# Patient Record
Sex: MALE | Race: WHITE | NOT HISPANIC OR LATINO | Employment: UNEMPLOYED | URBAN - METROPOLITAN AREA
[De-identification: names, ages, dates, MRNs, and addresses within clinical notes are randomized per-mention and may not be internally consistent; named-entity substitution may affect disease eponyms.]

---

## 2017-02-15 ENCOUNTER — HOSPITAL ENCOUNTER (EMERGENCY)
Facility: HOSPITAL | Age: 47
Discharge: HOME | End: 2017-02-15
Attending: EMERGENCY MEDICINE
Payer: COMMERCIAL

## 2017-02-15 VITALS
TEMPERATURE: 97.5 F | SYSTOLIC BLOOD PRESSURE: 138 MMHG | RESPIRATION RATE: 18 BRPM | OXYGEN SATURATION: 98 % | WEIGHT: 260.7 LBS | HEART RATE: 101 BPM | DIASTOLIC BLOOD PRESSURE: 88 MMHG

## 2017-02-15 DIAGNOSIS — G47.00 INSOMNIA: ICD-10-CM

## 2017-02-15 DIAGNOSIS — S76.211A GROIN STRAIN, RIGHT, INITIAL ENCOUNTER: Primary | ICD-10-CM

## 2017-02-15 PROCEDURE — 99283 EMERGENCY DEPT VISIT LOW MDM: CPT

## 2017-02-15 RX ORDER — NAPROXEN SODIUM 550 MG/1
550 TABLET ORAL 2 TIMES DAILY WITH MEALS
Qty: 20 TABLET | Refills: 0 | Status: SHIPPED | OUTPATIENT
Start: 2017-02-15 | End: 2018-03-02

## 2017-02-15 RX ORDER — IBUPROFEN 400 MG/1
800 TABLET ORAL ONCE
Status: COMPLETED | OUTPATIENT
Start: 2017-02-15 | End: 2017-02-15

## 2017-02-15 RX ADMIN — IBUPROFEN 800 MG: 400 TABLET ORAL at 01:30

## 2017-02-27 ENCOUNTER — APPOINTMENT (EMERGENCY)
Dept: CT IMAGING | Facility: HOSPITAL | Age: 47
End: 2017-02-27
Payer: COMMERCIAL

## 2017-02-27 ENCOUNTER — HOSPITAL ENCOUNTER (EMERGENCY)
Facility: HOSPITAL | Age: 47
Discharge: HOME | End: 2017-02-27
Attending: EMERGENCY MEDICINE
Payer: COMMERCIAL

## 2017-02-27 VITALS
BODY MASS INDEX: 33.37 KG/M2 | HEIGHT: 74 IN | RESPIRATION RATE: 16 BRPM | DIASTOLIC BLOOD PRESSURE: 67 MMHG | TEMPERATURE: 98.1 F | WEIGHT: 260 LBS | SYSTOLIC BLOOD PRESSURE: 135 MMHG | HEART RATE: 102 BPM | OXYGEN SATURATION: 98 %

## 2017-02-27 DIAGNOSIS — R10.9 ABDOMINAL PAIN: Primary | ICD-10-CM

## 2017-02-27 DIAGNOSIS — T14.8XXA MUSCLE STRAIN: ICD-10-CM

## 2017-02-27 LAB
ALBUMIN SERPL BCP-MCNC: 3.2 G/DL (ref 3.5–5)
ALP SERPL-CCNC: 111 U/L (ref 46–116)
ALT SERPL W P-5'-P-CCNC: 45 U/L (ref 12–78)
ANION GAP SERPL CALCULATED.3IONS-SCNC: 12 MMOL/L (ref 4–13)
APTT PPP: 27 SECONDS (ref 24–36)
AST SERPL W P-5'-P-CCNC: 42 U/L (ref 5–45)
BASOPHILS # BLD AUTO: 0.06 THOUSANDS/ΜL (ref 0–0.1)
BASOPHILS NFR BLD AUTO: 1 % (ref 0–1)
BILIRUB SERPL-MCNC: 0.4 MG/DL (ref 0.2–1)
BUN SERPL-MCNC: 9 MG/DL (ref 5–25)
CALCIUM SERPL-MCNC: 8.2 MG/DL (ref 8.3–10.1)
CHLORIDE SERPL-SCNC: 101 MMOL/L (ref 100–108)
CO2 SERPL-SCNC: 22 MMOL/L (ref 21–32)
CREAT SERPL-MCNC: 0.91 MG/DL (ref 0.6–1.3)
EOSINOPHIL # BLD AUTO: 0.41 THOUSAND/ΜL (ref 0–0.61)
EOSINOPHIL NFR BLD AUTO: 4 % (ref 0–6)
ERYTHROCYTE [DISTWIDTH] IN BLOOD BY AUTOMATED COUNT: 14.7 % (ref 11.6–15.1)
GFR SERPL CREATININE-BSD FRML MDRD: >60 ML/MIN/1.73SQ M
GLUCOSE SERPL-MCNC: 249 MG/DL (ref 65–140)
HCT VFR BLD AUTO: 45.1 % (ref 36.5–49.3)
HGB BLD-MCNC: 15.5 G/DL (ref 12–17)
INR PPP: 0.95 (ref 0.86–1.16)
LIPASE SERPL-CCNC: 218 U/L (ref 73–393)
LYMPHOCYTES # BLD AUTO: 1.51 THOUSANDS/ΜL (ref 0.6–4.47)
LYMPHOCYTES NFR BLD AUTO: 16 % (ref 14–44)
MCH RBC QN AUTO: 30.6 PG (ref 26.8–34.3)
MCHC RBC AUTO-ENTMCNC: 34.4 G/DL (ref 31.4–37.4)
MCV RBC AUTO: 89 FL (ref 82–98)
MONOCYTES # BLD AUTO: 0.56 THOUSAND/ΜL (ref 0.17–1.22)
MONOCYTES NFR BLD AUTO: 6 % (ref 4–12)
NEUTROPHILS # BLD AUTO: 6.75 THOUSANDS/ΜL (ref 1.85–7.62)
NEUTS SEG NFR BLD AUTO: 73 % (ref 43–75)
PLATELET # BLD AUTO: 177 THOUSANDS/UL (ref 149–390)
PMV BLD AUTO: 10.5 FL (ref 8.9–12.7)
POTASSIUM SERPL-SCNC: 4 MMOL/L (ref 3.5–5.3)
PROT SERPL-MCNC: 7.3 G/DL (ref 6.4–8.2)
PROTHROMBIN TIME: 12.5 SECONDS (ref 12–14.3)
RBC # BLD AUTO: 5.07 MILLION/UL (ref 3.88–5.62)
SODIUM SERPL-SCNC: 135 MMOL/L (ref 136–145)
WBC # BLD AUTO: 9.29 THOUSAND/UL (ref 4.31–10.16)

## 2017-02-27 PROCEDURE — 83690 ASSAY OF LIPASE: CPT | Performed by: EMERGENCY MEDICINE

## 2017-02-27 PROCEDURE — 80053 COMPREHEN METABOLIC PANEL: CPT | Performed by: EMERGENCY MEDICINE

## 2017-02-27 PROCEDURE — 36415 COLL VENOUS BLD VENIPUNCTURE: CPT | Performed by: EMERGENCY MEDICINE

## 2017-02-27 PROCEDURE — 85610 PROTHROMBIN TIME: CPT | Performed by: EMERGENCY MEDICINE

## 2017-02-27 PROCEDURE — 85730 THROMBOPLASTIN TIME PARTIAL: CPT | Performed by: EMERGENCY MEDICINE

## 2017-02-27 PROCEDURE — 96374 THER/PROPH/DIAG INJ IV PUSH: CPT

## 2017-02-27 PROCEDURE — 85025 COMPLETE CBC W/AUTO DIFF WBC: CPT | Performed by: EMERGENCY MEDICINE

## 2017-02-27 PROCEDURE — 74177 CT ABD & PELVIS W/CONTRAST: CPT

## 2017-02-27 PROCEDURE — 99284 EMERGENCY DEPT VISIT MOD MDM: CPT

## 2017-02-27 RX ORDER — KETOROLAC TROMETHAMINE 30 MG/ML
30 INJECTION, SOLUTION INTRAMUSCULAR; INTRAVENOUS ONCE
Status: COMPLETED | OUTPATIENT
Start: 2017-02-27 | End: 2017-02-27

## 2017-02-27 RX ORDER — OXYCODONE HYDROCHLORIDE AND ACETAMINOPHEN 5; 325 MG/1; MG/1
1 TABLET ORAL EVERY 4 HOURS PRN
COMMUNITY
End: 2018-03-02

## 2017-02-27 RX ORDER — SERTRALINE HYDROCHLORIDE 100 MG/1
150 TABLET, FILM COATED ORAL DAILY
COMMUNITY
End: 2018-03-02

## 2017-02-27 RX ORDER — ALPRAZOLAM 1 MG/1
1 TABLET ORAL
COMMUNITY
End: 2018-03-02

## 2017-02-27 RX ORDER — RANITIDINE 300 MG/1
300 TABLET ORAL
COMMUNITY
End: 2018-03-02

## 2017-02-27 RX ORDER — IBUPROFEN 400 MG/1
800 TABLET ORAL ONCE
Status: COMPLETED | OUTPATIENT
Start: 2017-02-27 | End: 2017-02-27

## 2017-02-27 RX ADMIN — IBUPROFEN 800 MG: 400 TABLET ORAL at 08:14

## 2017-02-27 RX ADMIN — KETOROLAC TROMETHAMINE 30 MG: 30 INJECTION, SOLUTION INTRAMUSCULAR at 06:25

## 2017-02-27 RX ADMIN — IOHEXOL 100 ML: 350 INJECTION, SOLUTION INTRAVENOUS at 07:37

## 2017-05-04 ENCOUNTER — HOSPITAL ENCOUNTER (EMERGENCY)
Facility: HOSPITAL | Age: 47
Discharge: HOME | End: 2017-05-04
Payer: COMMERCIAL

## 2017-05-04 VITALS
DIASTOLIC BLOOD PRESSURE: 87 MMHG | BODY MASS INDEX: 32.74 KG/M2 | TEMPERATURE: 97.7 F | SYSTOLIC BLOOD PRESSURE: 144 MMHG | WEIGHT: 255 LBS | OXYGEN SATURATION: 95 % | RESPIRATION RATE: 16 BRPM | HEART RATE: 95 BPM

## 2017-05-04 DIAGNOSIS — L30.9 DERMATITIS: Primary | ICD-10-CM

## 2017-05-04 PROCEDURE — 99282 EMERGENCY DEPT VISIT SF MDM: CPT

## 2017-05-04 RX ORDER — METHYLPREDNISOLONE 4 MG/1
TABLET ORAL
Qty: 21 TABLET | Refills: 0 | Status: SHIPPED | OUTPATIENT
Start: 2017-05-04 | End: 2018-03-02

## 2017-05-04 RX ORDER — DIPHENHYDRAMINE HCL 25 MG
25 TABLET ORAL
Qty: 30 TABLET | Refills: 0 | Status: SHIPPED | OUTPATIENT
Start: 2017-05-04 | End: 2018-03-02

## 2018-03-02 ENCOUNTER — HOSPITAL ENCOUNTER (EMERGENCY)
Facility: HOSPITAL | Age: 48
Discharge: HOME | End: 2018-03-02
Attending: EMERGENCY MEDICINE | Admitting: EMERGENCY MEDICINE
Payer: COMMERCIAL

## 2018-03-02 VITALS
SYSTOLIC BLOOD PRESSURE: 143 MMHG | TEMPERATURE: 98.2 F | WEIGHT: 260 LBS | OXYGEN SATURATION: 98 % | HEIGHT: 74 IN | DIASTOLIC BLOOD PRESSURE: 81 MMHG | BODY MASS INDEX: 33.37 KG/M2 | HEART RATE: 109 BPM | RESPIRATION RATE: 18 BRPM

## 2018-03-02 DIAGNOSIS — F10.10 ALCOHOL ABUSE: ICD-10-CM

## 2018-03-02 DIAGNOSIS — F10.929 ACUTE ALCOHOL INTOXICATION (HCC): ICD-10-CM

## 2018-03-02 DIAGNOSIS — F41.9 ANXIETY: ICD-10-CM

## 2018-03-02 DIAGNOSIS — R45.89 DEPRESSED MOOD: Primary | ICD-10-CM

## 2018-03-02 LAB
ALBUMIN SERPL BCP-MCNC: 3.6 G/DL (ref 3.5–5)
ALP SERPL-CCNC: 116 U/L (ref 46–116)
ALT SERPL W P-5'-P-CCNC: 45 U/L (ref 12–78)
AMPHETAMINES SERPL QL SCN: NEGATIVE
ANION GAP SERPL CALCULATED.3IONS-SCNC: 15 MMOL/L (ref 4–13)
AST SERPL W P-5'-P-CCNC: 23 U/L (ref 5–45)
ATRIAL RATE: 97 BPM
BACTERIA UR QL AUTO: ABNORMAL /HPF
BARBITURATES UR QL: NEGATIVE
BASOPHILS # BLD AUTO: 0.1 THOUSANDS/ΜL (ref 0–0.1)
BASOPHILS NFR BLD AUTO: 1 % (ref 0–1)
BENZODIAZ UR QL: NEGATIVE
BILIRUB SERPL-MCNC: 0.3 MG/DL (ref 0.2–1)
BILIRUB UR QL STRIP: NEGATIVE
BUN SERPL-MCNC: 7 MG/DL (ref 5–25)
CALCIUM SERPL-MCNC: 8.8 MG/DL (ref 8.3–10.1)
CHLORIDE SERPL-SCNC: 105 MMOL/L (ref 100–108)
CLARITY UR: CLEAR
CO2 SERPL-SCNC: 22 MMOL/L (ref 21–32)
COCAINE UR QL: NEGATIVE
COLOR UR: YELLOW
CREAT SERPL-MCNC: 1 MG/DL (ref 0.6–1.3)
EOSINOPHIL # BLD AUTO: 0.3 THOUSAND/ΜL (ref 0–0.61)
EOSINOPHIL NFR BLD AUTO: 3 % (ref 0–6)
ERYTHROCYTE [DISTWIDTH] IN BLOOD BY AUTOMATED COUNT: 14 % (ref 11.6–15.1)
ETHANOL SERPL-MCNC: 144 MG/DL (ref 0–3)
GFR SERPL CREATININE-BSD FRML MDRD: 89 ML/MIN/1.73SQ M
GLUCOSE SERPL-MCNC: 139 MG/DL (ref 65–140)
GLUCOSE UR STRIP-MCNC: NEGATIVE MG/DL
HCT VFR BLD AUTO: 51.7 % (ref 42–52)
HGB BLD-MCNC: 17.4 G/DL (ref 14–18)
HGB UR QL STRIP.AUTO: ABNORMAL
KETONES UR STRIP-MCNC: NEGATIVE MG/DL
LEUKOCYTE ESTERASE UR QL STRIP: NEGATIVE
LYMPHOCYTES # BLD AUTO: 3.2 THOUSANDS/ΜL (ref 0.6–4.47)
LYMPHOCYTES NFR BLD AUTO: 30 % (ref 14–44)
MCH RBC QN AUTO: 30.7 PG (ref 27–31)
MCHC RBC AUTO-ENTMCNC: 33.6 G/DL (ref 31.4–37.4)
MCV RBC AUTO: 91 FL (ref 82–98)
METHADONE UR QL: NEGATIVE
MONOCYTES # BLD AUTO: 0.7 THOUSAND/ΜL (ref 0.17–1.22)
MONOCYTES NFR BLD AUTO: 6 % (ref 4–12)
NEUTROPHILS # BLD AUTO: 6.5 THOUSANDS/ΜL (ref 1.85–7.62)
NEUTS SEG NFR BLD AUTO: 61 % (ref 43–75)
NITRITE UR QL STRIP: NEGATIVE
NON-SQ EPI CELLS URNS QL MICRO: ABNORMAL /HPF
NRBC BLD AUTO-RTO: 0 /100 WBCS
OPIATES UR QL SCN: NEGATIVE
P AXIS: 75 DEGREES
PCP UR QL: NEGATIVE
PH UR STRIP.AUTO: 6 [PH] (ref 5–9)
PLATELET # BLD AUTO: 195 THOUSANDS/UL (ref 130–400)
PMV BLD AUTO: 8.6 FL (ref 8.9–12.7)
POTASSIUM SERPL-SCNC: 3.4 MMOL/L (ref 3.5–5.3)
PR INTERVAL: 152 MS
PROT SERPL-MCNC: 7.5 G/DL (ref 6.4–8.2)
PROT UR STRIP-MCNC: NEGATIVE MG/DL
QRS AXIS: 79 DEGREES
QRSD INTERVAL: 94 MS
QT INTERVAL: 350 MS
QTC INTERVAL: 444 MS
RBC # BLD AUTO: 5.66 MILLION/UL (ref 4.7–6.1)
RBC #/AREA URNS AUTO: ABNORMAL /HPF
SODIUM SERPL-SCNC: 142 MMOL/L (ref 136–145)
SP GR UR STRIP.AUTO: 1.01 (ref 1–1.03)
T WAVE AXIS: 29 DEGREES
THC UR QL: NEGATIVE
UROBILINOGEN UR QL STRIP.AUTO: 0.2 E.U./DL
VENTRICULAR RATE: 97 BPM
WBC # BLD AUTO: 10.8 THOUSAND/UL (ref 4.8–10.8)
WBC #/AREA URNS AUTO: ABNORMAL /HPF

## 2018-03-02 PROCEDURE — 93005 ELECTROCARDIOGRAM TRACING: CPT | Performed by: EMERGENCY MEDICINE

## 2018-03-02 PROCEDURE — 36415 COLL VENOUS BLD VENIPUNCTURE: CPT | Performed by: EMERGENCY MEDICINE

## 2018-03-02 PROCEDURE — 81001 URINALYSIS AUTO W/SCOPE: CPT | Performed by: EMERGENCY MEDICINE

## 2018-03-02 PROCEDURE — 80320 DRUG SCREEN QUANTALCOHOLS: CPT | Performed by: EMERGENCY MEDICINE

## 2018-03-02 PROCEDURE — 85025 COMPLETE CBC W/AUTO DIFF WBC: CPT | Performed by: EMERGENCY MEDICINE

## 2018-03-02 PROCEDURE — 99284 EMERGENCY DEPT VISIT MOD MDM: CPT

## 2018-03-02 PROCEDURE — 80307 DRUG TEST PRSMV CHEM ANLYZR: CPT | Performed by: EMERGENCY MEDICINE

## 2018-03-02 PROCEDURE — 80053 COMPREHEN METABOLIC PANEL: CPT | Performed by: EMERGENCY MEDICINE

## 2018-03-02 PROCEDURE — 93010 ELECTROCARDIOGRAM REPORT: CPT | Performed by: INTERNAL MEDICINE

## 2018-03-02 RX ORDER — HYDROXYZINE HYDROCHLORIDE 25 MG/1
25 TABLET, FILM COATED ORAL
Qty: 12 TABLET | Refills: 0 | Status: SHIPPED | OUTPATIENT
Start: 2018-03-02

## 2018-03-02 RX ORDER — LORAZEPAM 1 MG/1
1 TABLET ORAL ONCE
Status: COMPLETED | OUTPATIENT
Start: 2018-03-02 | End: 2018-03-02

## 2018-03-02 RX ADMIN — LORAZEPAM 1 MG: 1 TABLET ORAL at 04:38

## 2018-03-02 NOTE — ED NOTES
Discharge instructions reviewed with patient  Belongings returned to patient  Patient states understanding  Discharged to home       Jl Gtz RN  03/02/18 7440

## 2018-03-02 NOTE — ED RE-EVALUATION NOTE
Patient signed out by Dr Marianna Reyes has 51 y/o M, presenting with depressed mood, acute alcohol intoxication, requesting to see psychiatrist   Gene Leslee any suicidal ideation, suicidal attempt homicidal ideations, lucent lesions or delusions on arrival and on re-evaluation this morning  No acute medical and/or traumatic complaints  At sign-out patient was pending crisis evaluation  Patient now status post crisis evaluation by Lidia Mensah, patient stable for discharge, recommend outpatient follow-up at [facility] which the knee is helping him obtaining an appointment for next week  Patient verbalizes understanding and agrees with plan of follow-up care  Jeremiah Sol MD  03/02/18 6184    Patient requesting anxiolytic to help with sleep disturbance, prescribed hydroxy q h s  #12 given  Patient given instructions on when to take medication to avoid alcohol while on this medication       Jeremiah Sol MD  03/02/18 7858
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
## 2018-03-02 NOTE — PROGRESS NOTES
3/2/18 @ 15: 52year-old SWVANESSA, who came to ED intoxicated with BAL @ 144, and stated that he wanted help with his depression  Patient presents in good mood, but says he's depressed  Patient reports many stressors, including his inability to hold a job, increasing finanacial problems, and feelings of very low self esteem  Patient denies SI/HI, or any history  Patient admits to drinking problem, but is not ready to quit  Please see copy of crisis assessment for further details  Patient given contact information for [facility]; PES called and left message that patient would be calling after 0900  Patient discharged home    Arline Epley, MS

## 2018-03-02 NOTE — DISCHARGE INSTRUCTIONS
Depression   WHAT YOU NEED TO KNOW:   What is depression? Depression is a medical condition that causes feelings of sadness or hopelessness that do not go away  Depression may cause you to lose interest in things you used to enjoy  These feelings may interfere with your daily life  What causes or increases my risk for depression? Depression may be caused by changes in brain chemicals that affect your mood  Your risk for depression may be higher if you have any of the following:  · Stressful events such as the death of a loved one, unemployment, childhood trauma, divorce, or domestic abuse    · A chronic medical condition such as diabetes, heart disease, or cancer    · Parents, siblings, or other family members with a history of depression    · Drug or alcohol abuse  What are the signs and symptoms of depression? · Appetite changes, or weight gain or loss    · Trouble going to sleep or staying asleep, or sleeping too much    · Fatigue or lack of energy    · Feeling restless, irritable, or withdrawn    · Feeling worthless, hopeless, discouraged, or guilty    · Trouble concentrating, remembering things, doing daily tasks, or making decisions    · Thoughts about hurting or killing yourself  How is depression diagnosed? Your healthcare provider will ask about your symptoms and how long you have had them  He or she will ask if you have any family members with depression  Tell your healthcare provider about any stressful events in your life  He or she may ask about any other health conditions or medicines you take  How is depression treated? · Therapy  may be used to treat your depression  A therapist will help you learn to cope with your thoughts and feelings  This can be done alone or in a group  It may also be done with family members or a significant other  · Antidepressant medicine  may be given to improve or balance your mood   You may need to take this medicine for several weeks before you begin to feel better  Tell your healthcare provider about any side effects or problems you have with your medicine  The type or amount of medicine may need to be changed  How can I manage depression? · Get regular physical activity  Try to exercise for 30 minutes, 3 to 5 days a week  Work with your healthcare provider to develop an exercise plan that you enjoy  Physical activity may improve your symptoms  · Get enough sleep  Create a routine to help you relax before bed  You can listen to music, read, or do yoga  Try to go to bed and wake up at the same time every day  Sleep is important for emotional health  · Eat a variety of healthy foods from all of the food groups  A healthy meal plan is low in fat, salt, and added sugar  Ask your healthcare provider for more information about a meal plan that is right for you  · Do not drink alcohol or use drugs  Alcohol and drugs can make your symptoms worse  Call 911 for any of the following:   · You think about harming yourself or someone else  When should I contact my healthcare provider? · Your symptoms do not improve  · You cannot make it to your next appointment  · You have new symptoms  · You have questions or concerns about your condition or care  CARE AGREEMENT:   You have the right to help plan your care  Learn about your health condition and how it may be treated  Discuss treatment options with your caregivers to decide what care you want to receive  You always have the right to refuse treatment  The above information is an  only  It is not intended as medical advice for individual conditions or treatments  Talk to your doctor, nurse or pharmacist before following any medical regimen to see if it is safe and effective for you  © 2017 [employer] Information is for End User's use only and may not be sold, redistributed or otherwise used for commercial purposes   All illustrations and images included in [employer] [address] are the copyrighted property of A [employer] A M , Inc  or Mukul David  Abuse of Alcohol   WHAT YOU NEED TO KNOW:   · Alcohol abuse is unhealthy drinking behavior  You may drink too much at one time once a week, or continue to drink too much daily  You continue to drink even though it causes problems  The problems can be alcohol related legal problems, or problems with work or relationships with family  · If you drink too much at one time, you are binge drinking  Binge drinking is when you have a large amount of alcohol in a short time  Your blood alcohol concentrations (ELIZABETH) goes above 0 08 g/dLlevel during binge drinking  For men, this usually happens with more than 4 drinks in 2 hours  For women, it is more than 3 drinks in 2 hours  A drink is 12 ounces of beer, 4 ounces of wine, or 1½ ounces of liquor  DISCHARGE INSTRUCTIONS:   Call 911 for the following:   · You have sudden chest pain or trouble breathing  · You have a seizure or have shaking or trembling  · You were in an accident because of alcohol  Seek care immediately if:   · You want to harm yourself or others  · You have hallucinations (you see or hear things that are not real)  · You cannot stop vomiting or you vomit blood  Contact your healthcare provider if:   · You need help to stop drinking alcohol  · You have questions or concerns about your condition or care  Medicines:   · Vitamin supplements  may be given to treat low vitamin levels  Alcohol can make it hard for your body to absorb enough vitamins such as B1  Vitamin supplements may also be given to prevent alcohol related brain damage  · Take your medicine as directed  Contact your healthcare provider if you think your medicine is not helping or if you have side effects  Tell him or her if you are allergic to any medicine  Keep a list of the medicines, vitamins, and herbs you take  Include the amounts, and when and why you take them  Bring the list or the pill bottles to follow-up visits  Carry your medicine list with you in case of an emergency  Treatments or therapies you may need:   · Detoxification (detox) and withdrawal  is a program that helps you to safely get alcohol out of your body  Detox can also help get rid of the physical need to drink  Healthcare providers monitor the physical symptoms of withdrawal  They may give you medicines to help decrease nausea, dehydration, and seizures  Healthcare providers will also monitor your blood pressure, heart and breathing rates, and your temperature  Symptoms of anxiety, depression, and suicidal thoughts are also monitored and managed during detox  Healthcare providers may give you medicines for these symptoms and therapy sessions will be available to you  Detox is usually done at a detox center or in a hospital  Healthcare providers do not recommend that you try to detox at home or by yourself  Withdrawal symptoms may become life-threatening  The center can help you find 12 step programs or an individual therapist to help with emotional support after detox  · Inpatient and outpatient treatment  focus on your personal needs to help you stop drinking  Treatment helps you understand the reasons you abuse alcohol  Counselors and therapists provide you with support and help you find ways to cope instead of drinking  You may need inpatient treatment to provide a controlled environment  You may need outpatient treatment after your inpatient treatment is complete  · Alcohol aversion therapy  takes away the desire to drink by causing a negative reaction when you drink  Healthcare providers may give you medicines that cause nausea and vomiting when you drink alcohol  They may instead give you a medicine that decreases your urge to drink alcohol  These medicines are used to help you stop drinking or reduce the amount you drink  They can also help you avoid relapse    Follow up with your healthcare provider as directed:  Write down your questions so you remember to ask them during your visits  Avoid alcohol:  You should stop drinking entirely  Alcohol can damage your brain, heart, and liver  It also increases your risk for injury, high blood pressure, and certain types of cancer  Alcohol is dangerous when you combine it with certain medicines  Do not drive if you drink alcohol:  Make sure someone who has not been drinking can help you get home  Get support:  Most people need support to stop drinking alcohol  Mental health providers, support groups, rehabilitation centers, and your healthcare provider can provide support  For more information:   · Alcoholics Anonymous  Web Address: http://[employer]/  · Substance Abuse and [address] , [address] [place]  Web Address: https://[employer]/  © 2017 [address] Information is for End User's use only and may not be sold, redistributed or otherwise used for commercial purposes  All illustrations and images included in CareNotes® are the copyrighted property of A [employer] A M , Inc  or Mukul David  The above information is an  only  It is not intended as medical advice for individual conditions or treatments  Talk to your doctor, nurse or pharmacist before following any medical regimen to see if it is safe and effective for you  Anxiety   WHAT YOU SHOULD KNOW:   Anxiety is a condition that causes you to feel excessive worry, uneasiness, or fear  Family or work stress, smoking, caffeine, and alcohol can increase your risk for anxiety  Certain medicines or health conditions can also increase your risk  Anxiety may begin gradually, and can become a long-term condition if it is not managed or treated  AFTER YOU LEAVE:   Medicines:   · Medicines  can help you feel more calm and relaxed, and decrease your symptoms  · Take your medicine as directed    Contact your healthcare provider if you think your medicine is not helping or if you have side effects  Tell him if you are allergic to any medicine  Keep a list of the medicines, vitamins, and herbs you take  Include the amounts, and when and why you take them  Bring the list or the pill bottles to follow-up visits  Carry your medicine list with you in case of an emergency  Follow up with your healthcare provider within 2 weeks or as directed:  Write down your questions so you remember to ask them during your visits  Manage anxiety:   · Go to counseling as directed  Cognitive behavioral therapy can help you understand and change how you react to events that trigger your symptoms  · Find ways to manage your symptoms  Activities such as exercise, meditation, or listening to music can help you relax  · Practice deep breathing  Breathing can change how your body reacts to stress  Focus on taking slow, deep breaths several times a day, or during an anxiety attack  Breathe in through your nose, and out through your mouth  · Avoid caffeine  Caffeine can make your symptoms worse  Avoid foods or drinks that are meant to increase your energy level  · Limit or avoid alcohol  Ask your healthcare provider if alcohol is safe for you  You may not be able to drink alcohol if you take certain anxiety or depression medicines  Limit alcohol to 1 drink per day if you are a woman  Limit alcohol to 2 drinks per day if you are a man  A drink of alcohol is 12 ounces of beer, 5 ounces of wine, or 1½ ounces of liquor  Contact your healthcare provider if:   · Your symptoms get worse or do not get better with treatment  · You think your medicine may be causing side effects  · Your anxiety keeps you from doing your regular daily activities  · You have new symptoms since your last visit  · You have questions or concerns about your condition or care    Seek care immediately or call 911 if:   · You have chest pain, tightness, or heaviness that may spread to your shoulders, arms, jaw, neck, or back  · You feel like hurting yourself or someone else  · You feel dizzy, lightheaded, or faint  © 2014 [address] is for End User's use only and may not be sold, redistributed or otherwise used for commercial purposes  All illustrations and images included in CareNotes® are the copyrighted property of [employer]  or Mukul David  The above information is an  only  It is not intended as medical advice for individual conditions or treatments  Talk to your doctor, nurse or pharmacist before following any medical regimen to see if it is safe and effective for you  Suicide Prevention for Adults   WHAT YOU NEED TO KNOW:   What do I need to know about suicide prevention? A person may see suicide as the only way to escape emotional or physical pain and suffering  You can help provide emotional support for him or her and get the help he or she needs  Learn to recognize warning signs that the person may be considering suicide  Find resources to help prevent him or her from attempting to take his or her life  What should I do if I think the person is considering suicide? Call 911 if you feel the person is at immediate risk of suicide, or if he or she talks about an active suicide plan  Assume that the person intends to carry out his or her plan  Resources are available to help you and the person  The following are some things you can do:  · Call the [address] [place] [address] at 8-980-151-TALK ([phone])   · Call the Suicide Hotline at 8-859-DJTVRGM (8-762.117.5472)   · Contact the person's therapist  His or her healthcare provider can give you a list of therapists if he or she does not have one  · Keep medicines, weapons, and alcohol out of the person's reach  Do not leave the person alone if he or she says they want to commit suicide   Do not leave the person alone if you think he or she may try it  Make sure you do not put yourself at risk if the person has a weapon  · Do not be afraid to ask if the person is thinking of ending his or her life  Ask if the person has a plan for hurting or killing himself or herself  What warning signs should I watch for? · Talking about a plan for committing suicide, or suddenly deciding to make a will    · Cutting himself or herself, burning the skin with cigarettes, or driving recklessly    · Drug or alcohol use, not taking prescribed medicine, or taking too much prescribed medicine    · Sudden anger, lashing out at others, or seeming hopeless, anxious, or angry and then suddenly becoming happy or peaceful    · Not wanting to spend time with others or doing things he or she usually enjoys    · Trouble at work, or not showing up for work    · A change in the way he or she eats, sleeps, or dresses    · Weight gain or loss or having less energy than usual    · Trouble sleeping or spending a lot of time sleeping    · Giving away or throwing away his belongings  What increases the risk for suicide? · Depression or chronic sleep disorders such as insomnia    · Alcohol or drug use    · Death of an important person, or the anniversary of that person's death    · A past suicide attempt, or someone close to him or her attempted or committed suicide    · Mental illness, such as schizophrenia, bipolar disorder, or posttraumatic stress disorder (PTSD)     · Chronic pain, or a serious illness, such as heart disease, cancer, or AIDS    · Being physically dependent on others    · Mental, physical, or sexual abuse    · A history of violence or aggression toward others, or feeling guilty for hurting someone else    · Stress from divorce or a breakup, or loss of a friendship, or loneliness    · Struggling with being rain, lesbian, or bisexual    · Stress from the loss of a job, or a stressful job  How will healthcare providers help the person? · Healthcare providers will ask questions about the person's suicide thoughts and plans  They will ask how often he or she thinks about suicide and if he or she has tried it before  They will ask if he or she hurt himself, such as with cutting or reckless driving  They may ask if he or she has access to weapons or drugs  · A healthcare provider will help the person create a safety plan  The plan includes a list of people or groups to contact if he or she has suicidal thoughts again  The list may include friends, family members, a spiritual leader, and others he or she trusts  The person may be asked to make a verbal agreement or sign a contract that he or she will not try to harm himself  What treatment may the person need? · Medicines  may be given to prevent mood swings, or to decrease anxiety or depression  The person will need to take all medicines as directed  A sudden stop can be harmful  It may take 4 to 6 weeks for the medicine to help him or her feel better  · A therapist  can help the person identify and change negative feelings or beliefs about himself or herself  This may also help change the way the he or she feels and acts  A therapist can also help the person find ways to cope with things that cannot be changed  What can I do to help the person? · Encourage the person to seek help for drug or alcohol abuse  Drugs and alcohol can increase suicidal thoughts and make the person more likely to act on them  · Help the person connect with others  Encourage him or her to become involved in the community  Some examples include tutoring a young student, volunteering at a [employer], or joining a group exercise program  The person may need help setting up a computer or creating an e-mail account to help him or her remain connected to others  · Exercise with the person  Exercise can lift his or her mood, increase energy, and make it easier to sleep at night       · Encourage the person to try new things  Adults who are open to new experiences handle stress and change better than those who are not  · Call, visit, or send postcards to the person often  Check on him or her after the loss of a pet, longtime friend, or child  Holidays, birthdays, and anniversaries can be difficult for a person after a loss  The loss of a spouse can be especially painful and lonely  · Help the person schedule a visit with his or her Anabaptism or spiritual leader  A Anabaptism or spiritual leader may be able to offer additional support and resources to the person  · Help the person get equipment that will increase his or her comfort and mobility  Examples are hearing aids, glasses, large print books, and walkers  These can help him or her enjoy activities and feel more independent  · Encourage the person to continue taking medicine and going to therapy  Medicine and therapy can help improve his or her mental health  Where can I find support and more information? · [address]  Phone: 3- 731 - [phone] (01 [phone])  Web Address: [employer]  · Suicide Awareness [employer] of Education  [address] [place] [address] [address] , [address]  Phone: 4- 902 - 682-9608  Web Address: [employer]  org  Call 911 if:   · The person has done something on purpose to hurt himself or herself  · The person tries to commit suicide  When should I seek immediate care? · The person tells you he or she made a plan to commit suicide  · The person acts out in anger, is reckless, or is abusing alcohol or drugs  · The person has serious thoughts of suicide, even with treatment  When should I contact the person's healthcare provider or therapist?   · You begin to see warning signs that the person may be considering suicide      · The person has intense feelings of sadness, anger, revenge, or despair, or he cannot make decisions easily  · The person tells you he or she has more thoughts of suicide when they are alone  · The person withdraws from others  · The person stops eating, or begins to smoke or drink heavily  · The person feels he or she is a burden because of a disability or disease  · The person has trouble dealing with stress, such as a breakup or a job loss  · You have questions or concerns about the person's condition or care  CARE AGREEMENT:   You have the right to help plan your care  Learn about your health condition and how it may be treated  Discuss treatment options with your caregivers to decide what care you want to receive  You always have the right to refuse treatment  The above information is an  only  It is not intended as medical advice for individual conditions or treatments  Talk to your doctor, nurse or pharmacist before following any medical regimen to see if it is safe and effective for you  © 2017 [address] Information is for End User's use only and may not be sold, redistributed or otherwise used for commercial purposes  All illustrations and images included in CareNotes® are the copyrighted property of [employer]  or Mukul David

## 2018-03-30 ENCOUNTER — OFFICE VISIT (OUTPATIENT)
Dept: FAMILY MEDICINE CLINIC | Facility: CLINIC | Age: 48
End: 2018-03-30
Payer: COMMERCIAL

## 2018-03-30 VITALS
WEIGHT: 253 LBS | HEART RATE: 110 BPM | DIASTOLIC BLOOD PRESSURE: 90 MMHG | SYSTOLIC BLOOD PRESSURE: 148 MMHG | OXYGEN SATURATION: 97 % | RESPIRATION RATE: 18 BRPM | HEIGHT: 74 IN | BODY MASS INDEX: 32.47 KG/M2

## 2018-03-30 DIAGNOSIS — G89.29 CHRONIC RIGHT-SIDED LOW BACK PAIN WITHOUT SCIATICA: ICD-10-CM

## 2018-03-30 DIAGNOSIS — K21.9 GASTROESOPHAGEAL REFLUX DISEASE, ESOPHAGITIS PRESENCE NOT SPECIFIED: ICD-10-CM

## 2018-03-30 DIAGNOSIS — Z13.1 SCREENING FOR DIABETES MELLITUS (DM): ICD-10-CM

## 2018-03-30 DIAGNOSIS — F32.A DEPRESSION, UNSPECIFIED DEPRESSION TYPE: ICD-10-CM

## 2018-03-30 DIAGNOSIS — Z13.6 SCREENING FOR CARDIOVASCULAR CONDITION: Primary | ICD-10-CM

## 2018-03-30 DIAGNOSIS — M79.671 RIGHT FOOT PAIN: ICD-10-CM

## 2018-03-30 DIAGNOSIS — M54.50 CHRONIC RIGHT-SIDED LOW BACK PAIN WITHOUT SCIATICA: ICD-10-CM

## 2018-03-30 PROCEDURE — 99203 OFFICE O/P NEW LOW 30 MIN: CPT | Performed by: FAMILY MEDICINE

## 2018-03-30 PROCEDURE — 3008F BODY MASS INDEX DOCD: CPT | Performed by: FAMILY MEDICINE

## 2018-03-30 PROCEDURE — 3725F SCREEN DEPRESSION PERFORMED: CPT | Performed by: FAMILY MEDICINE

## 2018-03-30 RX ORDER — ESCITALOPRAM OXALATE 20 MG/1
20 TABLET ORAL DAILY
Qty: 30 TABLET | Refills: 0 | Status: SHIPPED | OUTPATIENT
Start: 2018-03-30 | End: 2018-05-11 | Stop reason: SDUPTHER

## 2018-03-30 RX ORDER — RANITIDINE 150 MG/1
150 CAPSULE ORAL 2 TIMES DAILY
Qty: 60 CAPSULE | Refills: 0 | Status: SHIPPED | OUTPATIENT
Start: 2018-03-30 | End: 2018-05-11 | Stop reason: SDUPTHER

## 2018-03-30 RX ORDER — NAPROXEN 500 MG/1
500 TABLET ORAL 2 TIMES DAILY WITH MEALS
Qty: 28 TABLET | Refills: 0 | Status: SHIPPED | OUTPATIENT
Start: 2018-03-30 | End: 2018-04-13

## 2018-03-31 PROBLEM — G89.29 CHRONIC RIGHT-SIDED LOW BACK PAIN WITHOUT SCIATICA: Status: ACTIVE | Noted: 2018-03-31

## 2018-03-31 PROBLEM — M79.671 RIGHT FOOT PAIN: Status: ACTIVE | Noted: 2018-03-31

## 2018-03-31 PROBLEM — F32.A DEPRESSION: Status: ACTIVE | Noted: 2018-03-31

## 2018-03-31 PROBLEM — K21.9 GASTROESOPHAGEAL REFLUX DISEASE: Status: ACTIVE | Noted: 2018-03-31

## 2018-03-31 PROBLEM — M54.50 CHRONIC RIGHT-SIDED LOW BACK PAIN WITHOUT SCIATICA: Status: ACTIVE | Noted: 2018-03-31

## 2018-05-11 DIAGNOSIS — K21.9 GASTROESOPHAGEAL REFLUX DISEASE, ESOPHAGITIS PRESENCE NOT SPECIFIED: ICD-10-CM

## 2018-05-11 DIAGNOSIS — F32.A DEPRESSION, UNSPECIFIED DEPRESSION TYPE: ICD-10-CM

## 2018-05-11 RX ORDER — ESCITALOPRAM OXALATE 20 MG/1
20 TABLET ORAL DAILY
Qty: 60 TABLET | Refills: 2 | Status: SHIPPED | OUTPATIENT
Start: 2018-05-11 | End: 2018-12-08 | Stop reason: SDUPTHER

## 2018-05-11 RX ORDER — RANITIDINE 150 MG/1
150 CAPSULE ORAL 2 TIMES DAILY
Qty: 120 CAPSULE | Refills: 3 | Status: SHIPPED | OUTPATIENT
Start: 2018-05-11

## 2018-10-02 ENCOUNTER — HOSPITAL ENCOUNTER (EMERGENCY)
Facility: HOSPITAL | Age: 48
Discharge: HOME | End: 2018-10-02
Attending: EMERGENCY MEDICINE | Admitting: EMERGENCY MEDICINE
Payer: COMMERCIAL

## 2018-10-02 VITALS
HEART RATE: 97 BPM | OXYGEN SATURATION: 98 % | DIASTOLIC BLOOD PRESSURE: 78 MMHG | RESPIRATION RATE: 16 BRPM | SYSTOLIC BLOOD PRESSURE: 151 MMHG | TEMPERATURE: 98.6 F

## 2018-10-02 DIAGNOSIS — S61.209A AVULSION OF FINGER, INITIAL ENCOUNTER: Primary | ICD-10-CM

## 2018-10-02 PROCEDURE — 99282 EMERGENCY DEPT VISIT SF MDM: CPT

## 2018-10-03 NOTE — DISCHARGE INSTRUCTIONS
Skin Avulsion   WHAT YOU NEED TO KNOW:   Skin avulsion is a wound that happens when skin is torn from your body during an accident or other injury  The torn skin may be lost or too damaged to be repaired, and it must be removed  A wound of this type cannot be stitched closed because there is tissue missing  Avulsion wounds are usually bigger and have more scars because of the missing tissue  DISCHARGE INSTRUCTIONS:   Medicines:   · Antibiotic ointment:  Your healthcare provider may tell you to gently rub a topical antibiotic ointment on your wound  This will help prevent an infection and help your wound heal faster  · Pain medicine: You may be given medicine to take away or decrease pain  Do not wait until the pain is severe before you take your medicine  · NSAIDs , such as ibuprofen, help decrease swelling, pain, and fever  This medicine is available with or without a doctor's order  NSAIDs can cause stomach bleeding or kidney problems in certain people  If you take blood thinner medicine, always ask if NSAIDs are safe for you  Always read the medicine label and follow directions  Do not give these medicines to children under 10months of age without direction from your child's healthcare provider  · Take your medicine as directed  Contact your healthcare provider if you think your medicine is not helping or if you have side effects  Tell him of her if you are allergic to any medicine  Keep a list of the medicines, vitamins, and herbs you take  Include the amounts, and when and why you take them  Bring the list or the pill bottles to follow-up visits  Carry your medicine list with you in case of an emergency  Care for your wound:  Avulsion wounds may take longer to heal because they cannot be closed with tape or stitches  Keep your wound clean and protected to prevent infection and speed healing  · Clean your wound:  Wash your hands with soap and water before and after you care for your wound  You may be able to use a soft cloth to gently clean the wound after the first 24 to 48 hours  After that, gently clean the wound once or twice a day with cool water  Do not soak your wound  Use soap to clean around the wound, but try not to get any on the wound itself  Do not use alcohol or hydrogen peroxide to clean your wound unless you are directed to  Gently pat the area dry and reapply the bandage as directed  · Elevate your wound:  Prop your injured area on pillows to raise it above the level of your heart  This will help reduce pain and swelling  Do this for 30 minutes at a time, as often as you can  · Bandage your wound:  Bandages keep your wound clean, dry, and protected from infection  They may also prevent swelling  Use a bandage that does not stick to your wound, and has a spongy layer to absorb fluids  Leave your bandage on as long as directed  Ask your healthcare provider when and how to change your bandage  Do not wrap the bandage too tightly  This could cut off blood flow and cause more injury  · Use cool compresses:  Wet a washcloth or towel with cool water and hold it on your wound as directed  Ask how often to apply the compress and for how long each time  · Reduce scarring:  Avoid direct sunlight on your wound  Sunlight may burn or change the color of the new skin over your wound  Use sunscreen (SPF 30 or higher) on the new skin for at least 1 year after it heals  Support for leg and arm wounds: You may need to use crutches if the wound is on your leg  You may need to use a sling if the wound is on your arm  Crutches and slings help protect the injured area, prevent further injury, and heal the area in the right position  Follow up with your healthcare provider within 2 days or as directed: If you have stitches, ask when to return to have them removed  Write down your questions so you remember to ask them during your visits     Contact your healthcare provider if:   · You have new pain, or it gets worse  · You have trouble moving the injured body area  · Your wound splits open or does not seem to be healing  Return to the emergency department if:   · You have a fever  · You have painful swelling, redness, or warmth around your wound  · Your wound is red and there are red streaks on your skin starting at your wound and moving upward  · Your wound is draining pus  · You have heavy bleeding or bleeding that does not stop after 10 minutes of holding firm, direct pressure over the wound  · You feel like there is an object stuck in your wound  © 2017 [employer] Information is for End User's use only and may not be sold, redistributed or otherwise used for commercial purposes  All illustrations and images included in CareNotes® are the copyrighted property of A D A M , Inc  or Mukul David  The above information is an  only  It is not intended as medical advice for individual conditions or treatments  Talk to your doctor, nurse or pharmacist before following any medical regimen to see if it is safe and effective for you

## 2018-10-06 NOTE — ED PROVIDER NOTES
History  Chief Complaint   Patient presents with    Laceration      R pointer finger tip sliced with manadolin     Patient presents for evaluation of laceration of the pad of the right index finger  Patient sliced it on a mandolin  Tetanus up to date  History provided by:  Patient   used: No    Laceration       Prior to Admission Medications   Prescriptions Last Dose Informant Patient Reported? Taking?   escitalopram (LEXAPRO) 20 mg tablet   No No   Sig: Take 1 tablet (20 mg total) by mouth daily   hydrOXYzine HCL (ATARAX) 25 mg tablet   No No   Sig: Take 1 tablet (25 mg total) by mouth daily at bedtime as needed for itching   naproxen (EC NAPROSYN) 500 MG EC tablet   No No   Sig: Take 1 tablet (500 mg total) by mouth 2 (two) times a day with meals for 14 days   ranitidine (ZANTAC) 150 MG capsule   No No   Sig: Take 1 capsule (150 mg total) by mouth 2 (two) times a day      Facility-Administered Medications: None       Past Medical History:   Diagnosis Date    Anxiety     Back pain     Depression     GERD (gastroesophageal reflux disease)     Right foot pain        Past Surgical History:   Procedure Laterality Date    NO PAST SURGERIES         Family History   Problem Relation Age of Onset    COPD Father     Diabetes Father     Cancer Maternal Grandfather      I have reviewed and agree with the history as documented  Social History   Substance Use Topics    Smoking status: Current Every Day Smoker     Packs/day: 1 00     Years: 20 00    Smokeless tobacco: Never Used    Alcohol use 3 6 oz/week     6 Cans of beer per week      Comment: 6 pack of beer a night        Review of Systems   Skin: Positive for wound  All other systems reviewed and are negative  Physical Exam  Physical Exam   Constitutional: He is oriented to person, place, and time  No distress  Cardiovascular: Normal rate, regular rhythm and intact distal pulses      Pulmonary/Chest: Effort normal and breath sounds normal  No respiratory distress  Musculoskeletal: Normal range of motion  Neurological: He is alert and oriented to person, place, and time  Skin: Capillary refill takes less than 2 seconds  He is not diaphoretic  Nursing note and vitals reviewed  Vital Signs  ED Triage Vitals [10/02/18 2347]   Temperature Pulse Respirations Blood Pressure SpO2   98 6 °F (37 °C) 97 16 151/78 98 %      Temp Source Heart Rate Source Patient Position - Orthostatic VS BP Location FiO2 (%)   Tympanic Monitor Sitting -- --      Pain Score       9           Vitals:    10/02/18 2347   BP: 151/78   Pulse: 97   Patient Position - Orthostatic VS: Sitting       Visual Acuity      ED Medications  Medications - No data to display    Diagnostic Studies  Results Reviewed     None                 No orders to display              Procedures  Procedures       Phone Contacts  ED Phone Contact    ED Course                               MDM  Number of Diagnoses or Management Options  Avulsion of finger, initial encounter:   Diagnosis management comments: Pulse ox 98% on RA indicating adequate oxygenation    Wound was could not be sutured, gel foam and dressing placed on wound  Amount and/or Complexity of Data Reviewed  Decide to obtain previous medical records or to obtain history from someone other than the patient: yes  Review and summarize past medical records: yes    Patient Progress  Patient progress: stable    CritCare Time    Disposition  Final diagnoses:   Avulsion of finger, initial encounter     Time reflects when diagnosis was documented in both MDM as applicable and the Disposition within this note     Time User Action Codes Description Comment    10/2/2018 11:52 PM Kike Maki Avulsion of finger, initial encounter       ED Disposition     ED Disposition Condition Comment    Discharge  Maru Roach discharge to home/self care      Condition at discharge: stable        Follow-up Information     Follow up With Specialties Details Why Contact Info Additional Information    Carlene Acosta MD Family Medicine In 3 days For wound re-check [address]  [address] Emergency Department Emergency Medicine  If symptoms worsen [address] [place] [address]  [phone] [facility] ED, [facility] [place], [address]          Discharge Medication List as of 10/2/2018 11:52 PM      CONTINUE these medications which have NOT CHANGED    Details   escitalopram (LEXAPRO) 20 mg tablet Take 1 tablet (20 mg total) by mouth daily, Starting Fri 5/11/2018, Normal      hydrOXYzine HCL (ATARAX) 25 mg tablet Take 1 tablet (25 mg total) by mouth daily at bedtime as needed for itching, Starting Fri 3/2/2018, Print      naproxen (EC NAPROSYN) 500 MG EC tablet Take 1 tablet (500 mg total) by mouth 2 (two) times a day with meals for 14 days, Starting Fri 3/30/2018, Until Fri 4/13/2018, Normal      ranitidine (ZANTAC) 150 MG capsule Take 1 capsule (150 mg total) by mouth 2 (two) times a day, Starting Fri 5/11/2018, Normal           No discharge procedures on file      ED Provider  Electronically Signed by           Nathanial Cushing, DO  10/06/18 9476

## 2018-10-10 ENCOUNTER — VBI (OUTPATIENT)
Dept: FAMILY MEDICINE CLINIC | Facility: CLINIC | Age: 48
End: 2018-10-10

## 2018-10-10 NOTE — TELEPHONE ENCOUNTER
Pt was seen in [address] on 10/2/18  CC: Laceration, DX: Avulsion of finger  Phone number listed is not receiving incoming calls at this time, letter sent

## 2018-12-08 DIAGNOSIS — F32.A DEPRESSION, UNSPECIFIED DEPRESSION TYPE: ICD-10-CM

## 2018-12-11 RX ORDER — ESCITALOPRAM OXALATE 20 MG/1
TABLET ORAL
Qty: 60 TABLET | Refills: 2 | Status: SHIPPED | OUTPATIENT
Start: 2018-12-11 | End: 2019-06-24 | Stop reason: SDUPTHER

## 2019-03-05 DIAGNOSIS — K21.9 GASTROESOPHAGEAL REFLUX DISEASE, ESOPHAGITIS PRESENCE NOT SPECIFIED: Primary | ICD-10-CM

## 2019-03-05 RX ORDER — RANITIDINE 150 MG/1
TABLET ORAL
Qty: 120 TABLET | Refills: 0 | Status: SHIPPED | OUTPATIENT
Start: 2019-03-05 | End: 2019-05-15 | Stop reason: SDUPTHER

## 2019-05-15 DIAGNOSIS — K21.9 GASTROESOPHAGEAL REFLUX DISEASE, ESOPHAGITIS PRESENCE NOT SPECIFIED: ICD-10-CM

## 2019-05-16 RX ORDER — RANITIDINE 150 MG/1
TABLET ORAL
Qty: 120 TABLET | Refills: 0 | Status: SHIPPED | OUTPATIENT
Start: 2019-05-16 | End: 2019-07-27 | Stop reason: SDUPTHER

## 2019-06-24 DIAGNOSIS — F32.A DEPRESSION, UNSPECIFIED DEPRESSION TYPE: ICD-10-CM

## 2019-06-25 RX ORDER — ESCITALOPRAM OXALATE 20 MG/1
TABLET ORAL
Qty: 60 TABLET | Refills: 2 | Status: SHIPPED | OUTPATIENT
Start: 2019-06-25

## 2019-07-27 DIAGNOSIS — K21.9 GASTROESOPHAGEAL REFLUX DISEASE, ESOPHAGITIS PRESENCE NOT SPECIFIED: ICD-10-CM

## 2019-07-30 RX ORDER — RANITIDINE 150 MG/1
TABLET ORAL
Qty: 120 TABLET | Refills: 0 | Status: SHIPPED | OUTPATIENT
Start: 2019-07-30

## 2019-08-06 ENCOUNTER — APPOINTMENT (EMERGENCY)
Dept: RADIOLOGY | Facility: HOSPITAL | Age: 49
End: 2019-08-06
Payer: COMMERCIAL

## 2019-08-06 ENCOUNTER — HOSPITAL ENCOUNTER (EMERGENCY)
Facility: HOSPITAL | Age: 49
End: 2019-08-06
Attending: EMERGENCY MEDICINE | Admitting: EMERGENCY MEDICINE
Payer: COMMERCIAL

## 2019-08-06 ENCOUNTER — ANESTHESIA EVENT (EMERGENCY)
Dept: PERIOP | Facility: HOSPITAL | Age: 49
End: 2019-08-06
Payer: COMMERCIAL

## 2019-08-06 ENCOUNTER — HOSPITAL ENCOUNTER (EMERGENCY)
Facility: HOSPITAL | Age: 49
Discharge: HOME | End: 2019-08-06
Attending: PLASTIC SURGERY | Admitting: PLASTIC SURGERY
Payer: COMMERCIAL

## 2019-08-06 ENCOUNTER — ANESTHESIA (EMERGENCY)
Dept: PERIOP | Facility: HOSPITAL | Age: 49
End: 2019-08-06
Payer: COMMERCIAL

## 2019-08-06 VITALS
DIASTOLIC BLOOD PRESSURE: 86 MMHG | RESPIRATION RATE: 20 BRPM | OXYGEN SATURATION: 95 % | SYSTOLIC BLOOD PRESSURE: 147 MMHG | TEMPERATURE: 97.8 F | HEART RATE: 80 BPM

## 2019-08-06 VITALS
DIASTOLIC BLOOD PRESSURE: 58 MMHG | HEART RATE: 89 BPM | BODY MASS INDEX: 32.08 KG/M2 | OXYGEN SATURATION: 95 % | TEMPERATURE: 99.4 F | HEIGHT: 74 IN | SYSTOLIC BLOOD PRESSURE: 115 MMHG | RESPIRATION RATE: 16 BRPM | WEIGHT: 250 LBS

## 2019-08-06 DIAGNOSIS — S63.269A CLOSED DISLOCATION OF METACARPAL JOINT OF FINGER OF RIGHT HAND, INITIAL ENCOUNTER: Primary | ICD-10-CM

## 2019-08-06 PROBLEM — M54.50 CHRONIC RIGHT-SIDED LOW BACK PAIN WITHOUT SCIATICA: Status: RESOLVED | Noted: 2018-03-31 | Resolved: 2019-08-06

## 2019-08-06 PROBLEM — G89.29 CHRONIC RIGHT-SIDED LOW BACK PAIN WITHOUT SCIATICA: Status: RESOLVED | Noted: 2018-03-31 | Resolved: 2019-08-06

## 2019-08-06 PROBLEM — M79.671 RIGHT FOOT PAIN: Status: RESOLVED | Noted: 2018-03-31 | Resolved: 2019-08-06

## 2019-08-06 PROBLEM — K21.9 GASTROESOPHAGEAL REFLUX DISEASE: Status: RESOLVED | Noted: 2018-03-31 | Resolved: 2019-08-06

## 2019-08-06 PROBLEM — F32.A DEPRESSION: Status: RESOLVED | Noted: 2018-03-31 | Resolved: 2019-08-06

## 2019-08-06 PROCEDURE — 73110 X-RAY EXAM OF WRIST: CPT

## 2019-08-06 PROCEDURE — 99285 EMERGENCY DEPT VISIT HI MDM: CPT | Performed by: EMERGENCY MEDICINE

## 2019-08-06 PROCEDURE — 99284 EMERGENCY DEPT VISIT MOD MDM: CPT

## 2019-08-06 PROCEDURE — 26705 TREAT KNUCKLE DISLOCATION: CPT | Performed by: EMERGENCY MEDICINE

## 2019-08-06 PROCEDURE — 99152 MOD SED SAME PHYS/QHP 5/>YRS: CPT | Performed by: EMERGENCY MEDICINE

## 2019-08-06 PROCEDURE — 96376 TX/PRO/DX INJ SAME DRUG ADON: CPT

## 2019-08-06 PROCEDURE — 73140 X-RAY EXAM OF FINGER(S): CPT

## 2019-08-06 PROCEDURE — 96372 THER/PROPH/DIAG INJ SC/IM: CPT

## 2019-08-06 PROCEDURE — 96375 TX/PRO/DX INJ NEW DRUG ADDON: CPT

## 2019-08-06 PROCEDURE — 96374 THER/PROPH/DIAG INJ IV PUSH: CPT

## 2019-08-06 PROCEDURE — 73130 X-RAY EXAM OF HAND: CPT

## 2019-08-06 RX ORDER — BUPIVACAINE HYDROCHLORIDE 2.5 MG/ML
INJECTION, SOLUTION INFILTRATION; PERINEURAL AS NEEDED
Status: DISCONTINUED | OUTPATIENT
Start: 2019-08-06 | End: 2019-08-06 | Stop reason: HOSPADM

## 2019-08-06 RX ORDER — OXYCODONE HYDROCHLORIDE AND ACETAMINOPHEN 5; 325 MG/1; MG/1
1 TABLET ORAL ONCE
Status: COMPLETED | OUTPATIENT
Start: 2019-08-06 | End: 2019-08-06

## 2019-08-06 RX ORDER — LABETALOL 20 MG/4 ML (5 MG/ML) INTRAVENOUS SYRINGE
10 ONCE
Status: COMPLETED | OUTPATIENT
Start: 2019-08-06 | End: 2019-08-06

## 2019-08-06 RX ORDER — HYDROMORPHONE HCL/PF 1 MG/ML
0.5 SYRINGE (ML) INJECTION
Status: DISCONTINUED | OUTPATIENT
Start: 2019-08-06 | End: 2019-08-06 | Stop reason: HOSPADM

## 2019-08-06 RX ORDER — HYDROMORPHONE HCL/PF 1 MG/ML
1 SYRINGE (ML) INJECTION ONCE
Status: COMPLETED | OUTPATIENT
Start: 2019-08-06 | End: 2019-08-06

## 2019-08-06 RX ORDER — SODIUM CHLORIDE, SODIUM LACTATE, POTASSIUM CHLORIDE, CALCIUM CHLORIDE 600; 310; 30; 20 MG/100ML; MG/100ML; MG/100ML; MG/100ML
INJECTION, SOLUTION INTRAVENOUS CONTINUOUS PRN
Status: DISCONTINUED | OUTPATIENT
Start: 2019-08-06 | End: 2019-08-06 | Stop reason: SURG

## 2019-08-06 RX ORDER — KETAMINE HCL IN NACL, ISO-OSM 100MG/10ML
0.5 SYRINGE (ML) INJECTION ONCE
Status: COMPLETED | OUTPATIENT
Start: 2019-08-06 | End: 2019-08-06

## 2019-08-06 RX ORDER — KETOROLAC TROMETHAMINE 30 MG/ML
INJECTION, SOLUTION INTRAMUSCULAR; INTRAVENOUS AS NEEDED
Status: DISCONTINUED | OUTPATIENT
Start: 2019-08-06 | End: 2019-08-06 | Stop reason: SURG

## 2019-08-06 RX ORDER — CEFAZOLIN SODIUM 2 G/50ML
SOLUTION INTRAVENOUS AS NEEDED
Status: DISCONTINUED | OUTPATIENT
Start: 2019-08-06 | End: 2019-08-06 | Stop reason: SURG

## 2019-08-06 RX ORDER — RANITIDINE 150 MG/1
150 CAPSULE ORAL 2 TIMES DAILY
Status: CANCELLED | OUTPATIENT
Start: 2019-08-06

## 2019-08-06 RX ORDER — KETAMINE HYDROCHLORIDE 50 MG/ML
25 INJECTION, SOLUTION, CONCENTRATE INTRAMUSCULAR; INTRAVENOUS ONCE
Status: COMPLETED | OUTPATIENT
Start: 2019-08-06 | End: 2019-08-06

## 2019-08-06 RX ORDER — DEXAMETHASONE SODIUM PHOSPHATE 4 MG/ML
INJECTION, SOLUTION INTRA-ARTICULAR; INTRALESIONAL; INTRAMUSCULAR; INTRAVENOUS; SOFT TISSUE AS NEEDED
Status: DISCONTINUED | OUTPATIENT
Start: 2019-08-06 | End: 2019-08-06 | Stop reason: SURG

## 2019-08-06 RX ORDER — LIDOCAINE HYDROCHLORIDE 20 MG/ML
5 INJECTION, SOLUTION EPIDURAL; INFILTRATION; INTRACAUDAL; PERINEURAL ONCE
Status: COMPLETED | OUTPATIENT
Start: 2019-08-06 | End: 2019-08-06

## 2019-08-06 RX ORDER — PROPOFOL 10 MG/ML
150 INJECTION, EMULSION INTRAVENOUS ONCE
Status: COMPLETED | OUTPATIENT
Start: 2019-08-06 | End: 2019-08-06

## 2019-08-06 RX ORDER — MIDAZOLAM HYDROCHLORIDE 1 MG/ML
INJECTION INTRAMUSCULAR; INTRAVENOUS AS NEEDED
Status: DISCONTINUED | OUTPATIENT
Start: 2019-08-06 | End: 2019-08-06 | Stop reason: SURG

## 2019-08-06 RX ORDER — OXYCODONE HYDROCHLORIDE AND ACETAMINOPHEN 5; 325 MG/1; MG/1
1 TABLET ORAL EVERY 6 HOURS PRN
Status: CANCELLED | OUTPATIENT
Start: 2019-08-06

## 2019-08-06 RX ORDER — PROPOFOL 10 MG/ML
INJECTION, EMULSION INTRAVENOUS AS NEEDED
Status: DISCONTINUED | OUTPATIENT
Start: 2019-08-06 | End: 2019-08-06 | Stop reason: SURG

## 2019-08-06 RX ORDER — PROPOFOL 10 MG/ML
20 INJECTION, EMULSION INTRAVENOUS ONCE
Status: COMPLETED | OUTPATIENT
Start: 2019-08-06 | End: 2019-08-06

## 2019-08-06 RX ORDER — LIDOCAINE HYDROCHLORIDE 10 MG/ML
20 INJECTION, SOLUTION EPIDURAL; INFILTRATION; INTRACAUDAL; PERINEURAL ONCE
Status: COMPLETED | OUTPATIENT
Start: 2019-08-06 | End: 2019-08-06

## 2019-08-06 RX ORDER — HYDROXYZINE HYDROCHLORIDE 25 MG/1
25 TABLET, FILM COATED ORAL
Status: CANCELLED | OUTPATIENT
Start: 2019-08-06

## 2019-08-06 RX ORDER — MORPHINE SULFATE 4 MG/ML
4 INJECTION, SOLUTION INTRAMUSCULAR; INTRAVENOUS ONCE
Status: COMPLETED | OUTPATIENT
Start: 2019-08-06 | End: 2019-08-06

## 2019-08-06 RX ORDER — ESCITALOPRAM OXALATE 20 MG/1
20 TABLET ORAL DAILY
Status: CANCELLED | OUTPATIENT
Start: 2019-08-07

## 2019-08-06 RX ORDER — OXYCODONE HYDROCHLORIDE AND ACETAMINOPHEN 5; 325 MG/1; MG/1
1 TABLET ORAL EVERY 4 HOURS PRN
Status: DISCONTINUED | OUTPATIENT
Start: 2019-08-06 | End: 2019-08-06 | Stop reason: HOSPADM

## 2019-08-06 RX ORDER — METOCLOPRAMIDE HYDROCHLORIDE 5 MG/ML
10 INJECTION INTRAMUSCULAR; INTRAVENOUS ONCE AS NEEDED
Status: DISCONTINUED | OUTPATIENT
Start: 2019-08-06 | End: 2019-08-06 | Stop reason: HOSPADM

## 2019-08-06 RX ORDER — OXYCODONE HYDROCHLORIDE AND ACETAMINOPHEN 5; 325 MG/1; MG/1
1 TABLET ORAL EVERY 6 HOURS PRN
Qty: 6 TABLET | Refills: 0 | Status: SHIPPED | OUTPATIENT
Start: 2019-08-06

## 2019-08-06 RX ORDER — NAPROXEN 500 MG/1
500 TABLET ORAL 2 TIMES DAILY WITH MEALS
Status: CANCELLED | OUTPATIENT
Start: 2019-08-06

## 2019-08-06 RX ORDER — ONDANSETRON 2 MG/ML
4 INJECTION INTRAMUSCULAR; INTRAVENOUS ONCE AS NEEDED
Status: DISCONTINUED | OUTPATIENT
Start: 2019-08-06 | End: 2019-08-06 | Stop reason: HOSPADM

## 2019-08-06 RX ORDER — RANITIDINE 150 MG/1
150 TABLET ORAL 2 TIMES DAILY
Status: CANCELLED | OUTPATIENT
Start: 2019-08-06

## 2019-08-06 RX ORDER — FENTANYL CITRATE 50 UG/ML
INJECTION, SOLUTION INTRAMUSCULAR; INTRAVENOUS AS NEEDED
Status: DISCONTINUED | OUTPATIENT
Start: 2019-08-06 | End: 2019-08-06 | Stop reason: SURG

## 2019-08-06 RX ORDER — ONDANSETRON 2 MG/ML
INJECTION INTRAMUSCULAR; INTRAVENOUS AS NEEDED
Status: DISCONTINUED | OUTPATIENT
Start: 2019-08-06 | End: 2019-08-06 | Stop reason: SURG

## 2019-08-06 RX ORDER — PROPOFOL 10 MG/ML
10 INJECTION, EMULSION INTRAVENOUS ONCE
Status: COMPLETED | OUTPATIENT
Start: 2019-08-06 | End: 2019-08-06

## 2019-08-06 RX ORDER — GLYCOPYRROLATE 0.2 MG/ML
INJECTION INTRAMUSCULAR; INTRAVENOUS AS NEEDED
Status: DISCONTINUED | OUTPATIENT
Start: 2019-08-06 | End: 2019-08-06 | Stop reason: SURG

## 2019-08-06 RX ORDER — ROCURONIUM BROMIDE 10 MG/ML
INJECTION, SOLUTION INTRAVENOUS AS NEEDED
Status: DISCONTINUED | OUTPATIENT
Start: 2019-08-06 | End: 2019-08-06 | Stop reason: SURG

## 2019-08-06 RX ORDER — MAGNESIUM HYDROXIDE 1200 MG/15ML
LIQUID ORAL AS NEEDED
Status: DISCONTINUED | OUTPATIENT
Start: 2019-08-06 | End: 2019-08-06 | Stop reason: HOSPADM

## 2019-08-06 RX ORDER — SUCCINYLCHOLINE/SOD CL,ISO/PF 100 MG/5ML
SYRINGE (ML) INTRAVENOUS AS NEEDED
Status: DISCONTINUED | OUTPATIENT
Start: 2019-08-06 | End: 2019-08-06 | Stop reason: SURG

## 2019-08-06 RX ADMIN — OXYCODONE HYDROCHLORIDE AND ACETAMINOPHEN 1 TABLET: 5; 325 TABLET ORAL at 10:30

## 2019-08-06 RX ADMIN — PROPOFOL 300 MG: 10 INJECTION, EMULSION INTRAVENOUS at 15:19

## 2019-08-06 RX ADMIN — CEFAZOLIN SODIUM 2000 MG: 2 SOLUTION INTRAVENOUS at 15:14

## 2019-08-06 RX ADMIN — HYDROMORPHONE HYDROCHLORIDE 1 MG: 1 INJECTION, SOLUTION INTRAMUSCULAR; INTRAVENOUS; SUBCUTANEOUS at 04:09

## 2019-08-06 RX ADMIN — SUGAMMADEX 200 MG: 100 INJECTION, SOLUTION INTRAVENOUS at 16:02

## 2019-08-06 RX ADMIN — OXYCODONE HYDROCHLORIDE AND ACETAMINOPHEN 1 TABLET: 5; 325 TABLET ORAL at 13:13

## 2019-08-06 RX ADMIN — PROPOFOL 20 MG: 10 INJECTION, EMULSION INTRAVENOUS at 03:12

## 2019-08-06 RX ADMIN — FENTANYL CITRATE 50 MCG: 50 INJECTION INTRAMUSCULAR; INTRAVENOUS at 15:19

## 2019-08-06 RX ADMIN — ROCURONIUM BROMIDE 30 MG: 10 INJECTION, SOLUTION INTRAVENOUS at 15:28

## 2019-08-06 RX ADMIN — FENTANYL CITRATE 50 MCG: 50 INJECTION INTRAMUSCULAR; INTRAVENOUS at 15:28

## 2019-08-06 RX ADMIN — Medication 140 MG: at 15:19

## 2019-08-06 RX ADMIN — KETOROLAC TROMETHAMINE 30 MG: 30 INJECTION, SOLUTION INTRAMUSCULAR at 15:56

## 2019-08-06 RX ADMIN — KETAMINE HYDROCHLORIDE 25 MG: 50 INJECTION INTRAMUSCULAR; INTRAVENOUS at 03:18

## 2019-08-06 RX ADMIN — SODIUM CHLORIDE, SODIUM LACTATE, POTASSIUM CHLORIDE, AND CALCIUM CHLORIDE: .6; .31; .03; .02 INJECTION, SOLUTION INTRAVENOUS at 14:28

## 2019-08-06 RX ADMIN — DEXAMETHASONE SODIUM PHOSPHATE 4 MG: 4 INJECTION, SOLUTION INTRAMUSCULAR; INTRAVENOUS at 15:57

## 2019-08-06 RX ADMIN — LIDOCAINE HYDROCHLORIDE 20 ML: 10 INJECTION, SOLUTION EPIDURAL; INFILTRATION; INTRACAUDAL; PERINEURAL at 12:28

## 2019-08-06 RX ADMIN — LIDOCAINE HYDROCHLORIDE 100 MG: 20 INJECTION, SOLUTION INTRAVENOUS at 15:19

## 2019-08-06 RX ADMIN — MIDAZOLAM HYDROCHLORIDE 2 MG: 1 INJECTION, SOLUTION INTRAMUSCULAR; INTRAVENOUS at 15:12

## 2019-08-06 RX ADMIN — PROPOFOL 10 MG: 10 INJECTION, EMULSION INTRAVENOUS at 03:14

## 2019-08-06 RX ADMIN — MORPHINE SULFATE 4 MG: 4 INJECTION INTRAVENOUS at 03:53

## 2019-08-06 RX ADMIN — PROPOFOL 20 MG: 10 INJECTION, EMULSION INTRAVENOUS at 03:19

## 2019-08-06 RX ADMIN — GLYCOPYRROLATE 0.2 MG: 0.2 INJECTION, SOLUTION INTRAMUSCULAR; INTRAVENOUS at 16:03

## 2019-08-06 RX ADMIN — MORPHINE SULFATE 4 MG: 4 INJECTION INTRAVENOUS at 02:47

## 2019-08-06 RX ADMIN — ONDANSETRON 4 MG: 2 INJECTION INTRAMUSCULAR; INTRAVENOUS at 15:12

## 2019-08-06 RX ADMIN — LIDOCAINE HYDROCHLORIDE 5 ML: 20 INJECTION, SOLUTION EPIDURAL; INFILTRATION; INTRACAUDAL; PERINEURAL at 02:34

## 2019-08-06 RX ADMIN — Medication 25 MG: at 03:11

## 2019-08-06 RX ADMIN — PROPOFOL 20 MG: 10 INJECTION, EMULSION INTRAVENOUS at 03:16

## 2019-08-06 RX ADMIN — LABETALOL 20 MG/4 ML (5 MG/ML) INTRAVENOUS SYRINGE 10 MG: at 16:43

## 2019-08-06 RX ADMIN — MORPHINE SULFATE 2 MG: 2 INJECTION, SOLUTION INTRAMUSCULAR; INTRAVENOUS at 06:11

## 2019-08-06 NOTE — SEDATION DOCUMENTATION
Pt with 3 RN's in room attempting to reduce right hand, md having difficult time reducing despite multiple doses of medication pt seems to fight sedation

## 2019-08-06 NOTE — ED NOTES
Pt now requesting BLS transfer as he wants to let his family go home and sleep, MD made aware   md offered pt to have hand splinted- pt refused      Umer Pina RN  08/06/19 9967

## 2019-08-06 NOTE — ED NOTES
Pt verbalizing agitation "Im in pain I can't go home like this" Md aware of pts complaints, additional orders received      Javon Cedillo RN  08/06/19 4038

## 2019-08-06 NOTE — ED NOTES
Pt transported by [facility] for ortho consult  Pt provided with pillow for elevation of R hand  Pt continues to refuse ice and splint due to pain level when in use  All belongings went with pt to [facility] ED       Leah Tellez RN  08/06/19 3004

## 2019-08-06 NOTE — ED NOTES
Pain seems to come in waves pt intermittently laughing/joking  SR on monitor,   VSS      Ammy Jorgensen RN  08/06/19 0046

## 2019-08-06 NOTE — ED NOTES
Pt accepted as a transfer to [facility] for 0800 and dr Allan Fournier will meet pt there and bill to his insurance   Call placed to PAC's to arrange transfer to [facility] however pt go in own car driven by family      Valerie Isidro RN  08/06/19 Victor M Brown RN  08/06/19 6227

## 2019-08-06 NOTE — CONSULTS
History and physical;  Patient is a 66-year-old male seen for occult dislocation of the MP joint right index finger which happened last night when he tripped and fell and was seen in [address] emergency room  Past medical history hernia repair  Allergies none  Physical examination shows a dislocated MP joint right index finger and attempted reduction in the ER was unsuccessful patient Needs open reduction;   Heart normal sinus rhythm  Lungs are clear  Impression:  Dislocation Mpthe joint right index finger  Plan:  Open reduction under general anesthesia

## 2019-08-06 NOTE — ED NOTES
RN remains at bedside to monitor pt post procedure  MD was unable to reduce right hand dislocation  Pt remains on CM with SR/ST noted  VSS spo2 97% 2lnc   Pt talking non sense but speech clear      Leni Teresa RN  08/06/19 0161

## 2019-08-06 NOTE — ED NOTES
Pt reports due to [employer] insurance cannot cross into [place] for treatment, MD making phone calls       Huyen Dumont RN  08/06/19 8485

## 2019-08-06 NOTE — EMTALA/ACUTE CARE TRANSFER
[facility] EMERGENCY DEPARTMENT  [address]  [place] [address]  Dept: [phone]      EMTALA TRANSFER CONSENT    NAME Jeanette Eid                                         1970                              MRN [id number]    I have been informed of my rights regarding examination, treatment, and transfer   by Dr Franchester Boas, DO    Benefits: Specialized equipment and/or services available at the receiving facility (Include comment)________________________    Risks: Potential for delay in receiving treatment, Potential deterioration of medical condition, Increased discomfort during transfer, Possible worsening of condition or death during transfer      Consent for Transfer:  I acknowledge that my medical condition has been evaluated and explained to me by the emergency department physician or other qualified medical person and/or my attending physician, who has recommended that I be transferred to the service of  Accepting Physician: Dr Neal Kasota at [address] [place] [address] Rd Name, [address] : [place], [place]  The above potential benefits of such transfer, the potential risks associated with such transfer, and the probable risks of not being transferred have been explained to me, and I fully understand them  The doctor has explained that, in my case, the benefits of transfer outweigh the risks  I agree to be transferred  I authorize the performance of emergency medical procedures and treatments upon me in both transit and upon arrival at the receiving facility  Additionally, I authorize the release of any and all medical records to the receiving facility and request they be transported with me, if possible  I understand that the safest mode of transportation during a medical emergency is an ambulance and that the Hospital advocates the use of this mode of transport   Risks of traveling to the receiving facility by car, including absence of medical control, life sustaining equipment, such as oxygen, and medical personnel has been explained to me and I fully understand them  (CHRISTIE CORRECT BOX BELOW)  [  ]  I consent to the stated transfer and to be transported by ambulance/helicopter  [  ]  I consent to the stated transfer, but refuse transportation by ambulance and accept full responsibility for my transportation by car  I understand the risks of non-ambulance transfers and I exonerate the Hospital and its staff from any deterioration in my condition that results from this refusal     X___________________________________________    DATE  19  TIME________  Signature of patient or legally responsible individual signing on patient behalf           RELATIONSHIP TO PATIENT_________________________          Provider Certification    NAME Mauricio Interiano                                         1970                              MRN [id number]    A medical screening exam was performed on the above named patient  Based on the examination:    Condition Necessitating Transfer The encounter diagnosis was Closed dislocation of metacarpal joint of finger of right hand, initial encounter  Patient Condition:  An emergency transfer is being made prior to stabilization due to the need for definitive care and the benefit of transfer outweighs the risk    Reason for Transfer: Level of Care needed not available at this facility    Transfer Requirements: [address] [place], [place]   · Space available and qualified personnel available for treatment as acknowledged by    · Agreed to accept transfer and to provide appropriate medical treatment as acknowledged by       Dr Merline Mountain  · Appropriate medical records of the examination and treatment of the patient are provided at the time of transfer   [facility], [address] _______  · Transfer will be performed by qualified personnel from    and appropriate transfer equipment as required, including the use of necessary and appropriate life support measures  Provider Certification: I have examined the patient and explained the following risks and benefits of being transferred/refusing transfer to the patient/family:  General risk, such as traffic hazards, adverse weather conditions, rough terrain or turbulence, possible failure of equipment (including vehicle or aircraft), or consequences of actions of persons outside the control of the transport personnel, Risk of worsening condition      Based on these reasonable risks and benefits to the patient and/or the unborn child(blaise), and based upon the information available at the time of the patients examination, I certify that the medical benefits reasonably to be expected from the provision of appropriate medical treatments at another medical facility outweigh the increasing risks, if any, to the individuals medical condition, and in the case of labor to the unborn child, from effecting the transfer      X____________________________________________ DATE 08/06/19        TIME_______      ORIGINAL - SEND TO MEDICAL RECORDS   COPY - SEND WITH PATIENT DURING TRANSFER

## 2019-08-06 NOTE — ED NOTES
Pt awake and alert coming around  Pt reports 10/10 pain right hand  Speech clear and coherent, pt talkative   VSS      Aaron Saavedra RN  08/06/19 7493

## 2019-08-06 NOTE — ED NOTES
MD at bedside obtaining consent for conscious sedation, room/pt being prepared two RN's at bedside     Karin Billy RN  08/06/19 6122

## 2019-08-06 NOTE — DISCHARGE SUMMARY
Discharge Summary - Marques Cardona 52 y o  male MRN: [id number]    Unit/Bed#: OR POOL Encounter: [id number]    Admission Date:     Admitting Diagnosis: Closed dislocation of metacarpal joint of finger of right hand, initial encounter    HPI:     Procedures Performed: No orders of the defined types were placed in this encounter  Summary of Hospital Course:     Significant Findings, Care, Treatment and Services Provided:  Open reduction    Complications: none    Discharge Diagnosis: same    Resolved Problems  Date Reviewed: 3/31/2018    None          Condition at Discharge: good         Discharge instructions/Information to patient and family:   See after visit summary for information provided to patient and family  Provisions for Follow-Up Care:  See after visit summary for information related to follow-up care and any pertinent home health orders  PCP: Ananya Justice MD    Disposition: Home    Planned Readmission: No    Discharge Statement   I spent 30 minutes discharging the patient  This time was spent on the day of discharge  I had direct contact with the patient on the day of discharge  Additional documentation is required if more than 30 minutes were spent on discharge  Discharge Medications:  See after visit summary for reconciled discharge medications provided to patient and family

## 2019-08-06 NOTE — ED NOTES
MD at bedside speaking with pt and family discussing treatment options      Umer Pina RN  08/06/19 9664

## 2019-08-06 NOTE — ED PROVIDER NOTES
History  Chief Complaint   Patient presents with    Hand Problem     pt tripped over lawn chair injurying right hand + deformity  denies parestheisa  admits to few drihnks loydat arrived with spouse      Pt presents with pain and deformity to right hand after he tripped over a lawn chair and hit his hand on a table  Pt is right hand dominant  He admits to drinking some ETOH earlier tonight  He denies head injury or LOC  Prior to Admission Medications   Prescriptions Last Dose Informant Patient Reported? Taking?   escitalopram (LEXAPRO) 20 mg tablet 8/5/2019 at Unknown time  No Yes   Sig: TAKE ONE TABLET DAILY   hydrOXYzine HCL (ATARAX) 25 mg tablet Not Taking at Unknown time  No No   Sig: Take 1 tablet (25 mg total) by mouth daily at bedtime as needed for itching   Patient not taking: Reported on 8/6/2019   naproxen (EC NAPROSYN) 500 MG EC tablet   No No   Sig: Take 1 tablet (500 mg total) by mouth 2 (two) times a day with meals for 14 days   ranitidine (ZANTAC) 150 MG capsule 8/5/2019 at Unknown time  No Yes   Sig: Take 1 capsule (150 mg total) by mouth 2 (two) times a day   ranitidine (ZANTAC) 150 mg tablet Not Taking at Unknown time  No No   Sig: TAKE ONE TABLET TWICE DAILY   Patient not taking: Reported on 8/6/2019      Facility-Administered Medications: None       Past Medical History:   Diagnosis Date    Anxiety     Back pain     Depression     GERD (gastroesophageal reflux disease)     Right foot pain        Past Surgical History:   Procedure Laterality Date    NO PAST SURGERIES         Family History   Problem Relation Age of Onset    COPD Father     Diabetes Father     Cancer Maternal Grandfather      I have reviewed and agree with the history as documented  Social History     Tobacco Use    Smoking status: Current Every Day Smoker     Packs/day: 1 00     Years: 20 00     Pack years: 20 00    Smokeless tobacco: Never Used   Substance Use Topics    Alcohol use:  Yes Alcohol/week: 6 0 standard drinks     Types: 6 Cans of beer per week     Comment: 6 pack of beer a night    Drug use: No        Review of Systems   Constitutional: Negative for chills and fever  Respiratory: Negative for cough, shortness of breath and wheezing  Cardiovascular: Negative for chest pain and palpitations  Gastrointestinal: Negative for abdominal pain, constipation, diarrhea, nausea and vomiting  Genitourinary: Negative for dysuria, flank pain, hematuria and urgency  Musculoskeletal: Positive for joint swelling  Negative for back pain  Skin: Negative for color change, pallor, rash and wound  All other systems reviewed and are negative  Physical Exam  Physical Exam   Constitutional: He is oriented to person, place, and time  He appears well-developed and well-nourished  He appears distressed  HENT:   Head: Normocephalic and atraumatic  Eyes: Pupils are equal, round, and reactive to light  EOM are normal  Right conjunctiva is injected  Left conjunctiva is injected  Cardiovascular: Normal rate and regular rhythm  Pulmonary/Chest: Effort normal and breath sounds normal    Musculoskeletal: He exhibits tenderness and deformity  He exhibits no edema  Right hand: He exhibits decreased range of motion, tenderness, bony tenderness and deformity  He exhibits normal two-point discrimination, normal capillary refill, no laceration and no swelling  Normal sensation noted  Normal strength noted  Hands:  Indentation noted to dorsum of right hand at the base of right index finger, limited ROM secondary to pain  Neurological: He is alert and oriented to person, place, and time  Nursing note and vitals reviewed        Vital Signs  ED Triage Vitals [08/06/19 0124]   Temperature Pulse Respirations Blood Pressure SpO2   98 9 °F (37 2 °C) (!) 51 18 131/81 98 %      Temp Source Heart Rate Source Patient Position - Orthostatic VS BP Location FiO2 (%)   Oral Monitor Sitting Left arm --      Pain Score       7           Vitals:    08/06/19 0430 08/06/19 0445 08/06/19 0500 08/06/19 0615   BP: 110/61 111/58  115/58   Pulse: 102 104 100 89   Patient Position - Orthostatic VS:    Sitting         Visual Acuity      ED Medications  Medications   lidocaine (PF) (XYLOCAINE-MPF) 2 % injection 5 mL (5 mL Infiltration Given 8/6/19 0234)   Ketamine HCl 57 mg (25 mg Intravenous Given 8/6/19 0311)   propofol (DIPRIVAN) 200 MG/20ML bolus injection 150 mg (20 mg Intravenous Given 8/6/19 0312)   morphine (PF) 4 mg/mL injection 4 mg (4 mg Intravenous Given 8/6/19 0247)   propofol (DIPRIVAN) 200 MG/20ML bolus injection 10 mg (10 mg Intravenous Given 8/6/19 0314)   propofol (DIPRIVAN) 200 MG/20ML bolus injection 20 mg (20 mg Intravenous Given 8/6/19 0316)   ketamine (KETALAR) 50 mg/mL 25 mg (25 mg Intravenous Given 8/6/19 0318)   propofol (DIPRIVAN) 200 MG/20ML bolus injection 20 mg (20 mg Intravenous Given 8/6/19 0319)   morphine (PF) 4 mg/mL injection 4 mg (4 mg Intravenous Given 8/6/19 0353)   HYDROmorphone (DILAUDID) injection 1 mg (1 mg Intravenous Given 8/6/19 0409)   morphine injection 2 mg (2 mg Intramuscular Given 8/6/19 0611)       Diagnostic Studies  Results Reviewed     None                 XR hand 3+ views RIGHT   ED Interpretation by Sushila Medina DO (08/06 0209)   2nd MCP dislocation       by Jose Juan Soares MD (08/06 5183)      XR wrist 3+ views RIGHT    (Results Pending)              Procedures  Procedural Sedation  Date/Time: 8/6/2019 3:05 AM  Performed by: Sushila Medina DO  Authorized by: Sushila Medina DO     Immediate pre-procedure details:     Reassessment: Patient reassessed immediately prior to procedure      Reviewed: vital signs and NPO status      Verified: bag valve mask available, emergency equipment available, intubation equipment available, IV patency confirmed, oxygen available and suction available    Procedure details (see MAR for exact dosages):     Preoxygenation:  Nasal cannula    Sedation:  Ketamine    Analgesia:  Morphine    Intra-procedure monitoring:  Blood pressure monitoring and continuous pulse oximetry    Intra-procedure events: none    Post-procedure details:     Attendance: Constant attendance by certified staff until patient recovered      Recovery: Patient returned to pre-procedure baseline      Patient is stable for discharge or admission: yes      Patient tolerance: Tolerated well, no immediate complications    Orthopedic injury treatment  Date/Time: 8/6/2019 3:35 AM  Performed by: Romulo Mcdonnell DO  Authorized by: Romulo Mcdonnell DO     Patient Location:  ED  Other Assisting Provider: No    Verbal consent obtained?: Yes    Risks and benefits: Risks, benefits and alternatives were discussed    Consent given by:  Patient  Patient states understanding of procedure being performed: Yes    Patient's understanding of procedure matches consent: Yes    Procedure consent matches procedure scheduled: Yes    Site marked: Yes    Radiology Images displayed and confirmed  If images not available, report reviewed: Yes    Required items: Required blood products, implants, devices and special equipment available    Patient identity confirmed:  Verbally with patient  Time out: Immediately prior to the procedure a time out was called    Injury location:  Hand  Location details:  Right hand  Injury type:  Dislocation  Neurovascular status: Neurovascularly intact    Distal perfusion: normal    Neurological function: normal    Range of motion: normal    Local anesthesia used?: Yes    General anesthesia used?: Yes    Anesthesia:  Local infiltration  Local anesthetic:  Lidocaine 2% without epinephrine  Anesthetic total (ml):  6  Sedation type:   Moderate (conscious) sedation (See separate Procedural Sedation form)  Manipulation performed?: Yes    Skeletal traction used?: Yes    Reduction successful?: No    Neurovascular status: Neurovascularly intact    Distal perfusion: normal    Neurological function: normal    Range of motion: normal     Unsuccessful reduction      Conscious Sedation Assessment      Classification Score   ASA Scale Assessment  2-Mild to moderate systemic disease, medically well controlled, with no functional limitation filed at 08/06/2019 0308           ED Course                               MDM  Number of Diagnoses or Management Options  Closed dislocation of metacarpal joint of finger of right hand, initial encounter:   Diagnosis management comments: Unable to successfully reduce joint  D/w Dr Holly Spence  He will see pt in the office in the morning  [facility] and unable to see Dr Holly Spence in the office in the AM  D/w Dr Red Han, who states that Dr Victor M Gaviria is unavailable tomorrow  D/w Dr Holly Spence  He will see pt in [facility] ER at 8am today for joint reduction  Pt will be transferred  Amount and/or Complexity of Data Reviewed  Tests in the radiology section of CPT®: ordered and reviewed    Risk of Complications, Morbidity, and/or Mortality  Presenting problems: moderate  Diagnostic procedures: moderate  Management options: moderate    Patient Progress  Patient progress: stable      Disposition  Final diagnoses:   Closed dislocation of metacarpal joint of finger of right hand, initial encounter     Time reflects when diagnosis was documented in both MDM as applicable and the Disposition within this note     Time User Action Codes Description Comment    8/6/2019  4:48 AM Ann-Marie Galvan Add [[id number]] Closed dislocation of metacarpal joint of finger of right hand, initial encounter       ED Disposition     ED Disposition Condition Date/Time Comment    Transfer to Another Facility-In Network  Tue Aug 6, 2019  5:33 AM Shwetha William should be transferred out to [address] MD Documentation      Most Recent Value   Patient Condition  An emergency transfer is being made prior to stabilization due to the need for definitive care and the benefit of transfer outweighs the risk   Reason for Transfer  Level of Care needed not available at this facility   Benefits of Transfer  Specialized equipment and/or services available at the receiving facility (Include comment)________________________   Risks of Transfer  Potential for delay in receiving treatment, Potential deterioration of medical condition, Increased discomfort during transfer, Possible worsening of condition or death during transfer   Accepting Physician  Dr Zach Garcia Name, Patrick Ang [place]   Sending MD Dr Lawyer Bautista   Provider Certification  General risk, such as traffic hazards, adverse weather conditions, rough terrain or turbulence, possible failure of equipment (including vehicle or aircraft), or consequences of actions of persons outside the control of the transport personnel, Risk of worsening condition      RN Documentation      [facility] Name, Ravin Palaciosma   Bed Assignment  emergency department    Report Given to  cielo       Follow-up Information     Follow up With Specialties Details Why Contact Info    Franklin Martinez MD Family Medicine Schedule an appointment as soon as possible for a visit in 2 days for follow up [facility] [employer]  [facility]      Radha Smiley MD Plastic Surgery, Hand Surgery Go today for joint reduction [address]  [address]            Patient's Medications   Discharge Prescriptions    OXYCODONE-ACETAMINOPHEN (PERCOCET) 5-325 MG PER TABLET    Take 1 tablet by mouth every 6 (six) hours as needed for moderate pain for up to 6 dosesMax Daily Amount: 4 tablets       Start Date: 8/6/2019  End Date: --       Order Dose: 1 tablet       Quantity: 6 tablet    Refills: 0     No discharge procedures on file      ED Provider  Electronically Signed by           Franchester Boas, DO  08/06/19 7196

## 2019-08-06 NOTE — ANESTHESIA PREPROCEDURE EVALUATION
Review of Systems/Medical History  Patient summary reviewed  Chart reviewed  No history of anesthetic complications     Cardiovascular  Negative cardio ROS    Pulmonary  Smoker ,        GI/Hepatic    GERD ,        Negative  ROS        Endo/Other  Negative endo/other ROS      GYN       Hematology  Negative hematology ROS      Musculoskeletal  Negative musculoskeletal ROS        Neurology  Negative neurology ROS      Psychology   Anxiety, Depression ,              Physical Exam    Airway    Mallampati score: II  TM Distance: >3 FB  Neck ROM: full     Dental       Cardiovascular  Comment: Negative ROS,     Pulmonary      Other Findings        Anesthesia Plan  ASA Score- 2 Emergent    Anesthesia Type- general with ASA Monitors  Additional Monitors:   Airway Plan: ETT  Plan Factors-    Induction- intravenous  Postoperative Plan-     Informed Consent- Anesthetic plan and risks discussed with patient  I personally reviewed this patient with the CRNA  Discussed and agreed on the Anesthesia Plan with the CRNA  Lily Ponce

## 2019-08-06 NOTE — OP NOTE
OPERATIVE REPORT  PATIENT NAME: Anita Miles    :  1970  MRN: [id number]  Pt Location: AN OR ROOM 04    SURGERY DATE: 2019    Surgeon(s) and Role:     Chelsey Rizzo MD - Primary    Preop Diagnosis:  closed dislocation MP joint right index finger    * No Diagnosis Codes entered *    Procedure(s) (LRB):  OPEN REDUCTION FINGER (Right)    Specimen(s):  * No specimens in log *    Estimated Blood Loss:   Minimal    Drains:  * No LDAs found *    Anesthesia Type:   General    Operative Indications:  Failed closed reduction      Operative Findings:      Complications:   None    Procedure and Technique:  After the patient identified in the operating table general anesthesia induced by the anesthesiologist and the right upper extremity was prepped and draped local anesthesia infiltrated and a forearm tourniquet applied elevated 250 mm of mercury; incision was made over the metacarpal head right index and the soft tissue was dissected upon lifting the volar plate and the adjacent soft tissue the head of the metacarpal could be reduced back into the joint  The volar plate was sutured with figure-of-eight 4-0 nylon suture wound was thoroughly irrigated post reduction x-ray was done to confirm the position skin was closed with 4-0 nylon and a volar splint was applied extending from the tip of the finger to the mid forearm    Tourniquet let down down tourniquet time was 15 minutes patient was sent to the recovery room in a stable condition   I was present for the entire procedure    Patient Disposition:  PACU     SIGNATURE: Jaya Mills MD  DATE: 2019  TIME: 4:14 PM

## 2019-08-06 NOTE — ED NOTES
MD at bedside to discuss plan of care after discharge with pt and family   Offered splint to right hand      Aaron Saavedra RN  08/06/19 [id number]

## 2019-08-06 NOTE — CONSULTS
Consultation - Plastic Surgery   Lynda Baker 52 y o  male MRN: [id number]  Unit/Bed#: ED 13 Encounter: [id number]      Assessment/Plan      Assessment     Dislocation right index MPJ                                                                                             Plan : open reduction in OR  History of Present Illness   Physician Requesting Consult: No att  providers found  Reason for Consult / Principal Problem:   Hx and PE limited by:   HPI: Lynda Baker is a 52y o  year old male who presents with closed dislocation right index MPJ and attempted reduction in ER was unsuccessful ; need open reduction    Consults    Review of Systems    Historical Information   Past Medical History:   Diagnosis Date    Anxiety     Back pain     Depression     GERD (gastroesophageal reflux disease)     Right foot pain      Past Surgical History:   Procedure Laterality Date    NO PAST SURGERIES       Social History   Social History     Substance and Sexual Activity   Alcohol Use Yes    Alcohol/week: 6 0 standard drinks    Types: 6 Cans of beer per week    Comment: 6 pack of beer a night     Social History     Substance and Sexual Activity   Drug Use No     Social History     Tobacco Use   Smoking Status Current Every Day Smoker    Packs/day: 1 00    Years: 20 00    Pack years: 20 00   Smokeless Tobacco Never Used     Family History: non-contributory    Meds/Allergies   all current active meds have been reviewed    No Known Allergies    Objective   No intake or output data in the 24 hours ending 08/06/19 1308    Invasive Devices     None                 Physical Exam  Deformed index finger right at MPJ; heart normal sinus ; lungs clear  Lab Results:   Lab Results   Component Value Date    WBC 10 80 03/02/2018    HGB 17 4 03/02/2018    HCT 51 7 03/02/2018    MCV 91 03/02/2018     03/02/2018      No results found for: TISSUECULT, WOUNDCULT  Imaging Studies: dorsal dislocation MPJ index right  EKG, Pathology, and Other Studies:   No results found for: FINALDX  VTE Prophylaxis: Reason for no pharmacologic prophylaxis ambulatory    Code Status: No Order  Advance Directive and Living Will:      Power of :    POLST:      Counseling / Coordination of Care  Total floor / unit time spent today 30 minutes  Greater than 50% of total time was spent with the patient and / or family counseling and / or coordination of care   A description of the counseling / coordination of care:

## 2019-08-06 NOTE — H&P
Patient was transferred from [facility] Emergency Room with a dislocated MP joint right index finger that happened last night    Two attempts were made to reduce the dislocation in the ER but both were unsuccessful and patient is transferred here to [facility] for open reduction of the dislocation  Past medical history nothing significant  No allergies  Family see nothing significant  Smoker  Physical examination shows a deformity of the right index finger at the MP joint; heart normal sinus rhythm lungs are clear  X-ray reveals dorsal dislocation of the MP joint right index  Impression:  Closed dislocation MP joint  Right index finger  Plan:  Open reduction of the dislocation under general anesthesia

## 2019-08-06 NOTE — ED NOTES
Pt received resting on stretcher  Pt refuses ice or splint at this time  Pt instructed to remain NPO due to possible procedure in [facility] ED  Pt complaint with no complaints offered   [address], RN  08/06/19 8387

## 2020-08-10 ENCOUNTER — HOSPITAL ENCOUNTER (EMERGENCY)
Facility: HOSPITAL | Age: 50
Discharge: HOME | End: 2020-08-10
Attending: EMERGENCY MEDICINE | Admitting: EMERGENCY MEDICINE
Payer: COMMERCIAL

## 2020-08-10 VITALS
WEIGHT: 272.49 LBS | DIASTOLIC BLOOD PRESSURE: 78 MMHG | BODY MASS INDEX: 34.99 KG/M2 | RESPIRATION RATE: 18 BRPM | HEART RATE: 100 BPM | TEMPERATURE: 98.3 F | OXYGEN SATURATION: 98 % | SYSTOLIC BLOOD PRESSURE: 142 MMHG

## 2020-08-10 DIAGNOSIS — T30.0 BURN: Primary | ICD-10-CM

## 2020-08-10 PROCEDURE — 99283 EMERGENCY DEPT VISIT LOW MDM: CPT

## 2020-08-10 PROCEDURE — 99282 EMERGENCY DEPT VISIT SF MDM: CPT | Performed by: EMERGENCY MEDICINE

## 2020-08-10 RX ORDER — GINSENG 100 MG
1 CAPSULE ORAL 2 TIMES DAILY
Qty: 28 G | Refills: 0 | Status: SHIPPED | OUTPATIENT
Start: 2020-08-10

## 2020-08-10 RX ORDER — GINSENG 100 MG
1 CAPSULE ORAL ONCE
Status: COMPLETED | OUTPATIENT
Start: 2020-08-10 | End: 2020-08-10

## 2020-08-10 RX ADMIN — BACITRACIN ZINC 1 LARGE APPLICATION: 500 OINTMENT TOPICAL at 04:10
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
## 2020-08-10 NOTE — ED NOTES
Pt seen, assessed and d/c by provider  Pt appeared to be in no acute distress upon discharge  Pt able to ambulate well without assistance upon exiting        Dada Nunez RN  08/10/20 6731

## 2022-03-17 ENCOUNTER — HOSPITAL ENCOUNTER (EMERGENCY)
Facility: HOSPITAL | Age: 52
Discharge: HOME | End: 2022-03-17
Attending: EMERGENCY MEDICINE
Payer: COMMERCIAL

## 2022-03-17 ENCOUNTER — APPOINTMENT (EMERGENCY)
Dept: RADIOLOGY | Facility: HOSPITAL | Age: 52
End: 2022-03-17
Payer: COMMERCIAL

## 2022-03-17 VITALS
OXYGEN SATURATION: 98 % | WEIGHT: 250 LBS | DIASTOLIC BLOOD PRESSURE: 81 MMHG | TEMPERATURE: 98.2 F | BODY MASS INDEX: 32.1 KG/M2 | SYSTOLIC BLOOD PRESSURE: 142 MMHG | HEART RATE: 83 BPM | RESPIRATION RATE: 18 BRPM

## 2022-03-17 DIAGNOSIS — M25.561 ACUTE PAIN OF RIGHT KNEE: Primary | ICD-10-CM

## 2022-03-17 PROCEDURE — 99284 EMERGENCY DEPT VISIT MOD MDM: CPT | Performed by: PHYSICIAN ASSISTANT

## 2022-03-17 PROCEDURE — 73562 X-RAY EXAM OF KNEE 3: CPT

## 2022-03-17 PROCEDURE — 96372 THER/PROPH/DIAG INJ SC/IM: CPT

## 2022-03-17 PROCEDURE — 99283 EMERGENCY DEPT VISIT LOW MDM: CPT

## 2022-03-17 RX ORDER — NAPROXEN 500 MG/1
500 TABLET ORAL 2 TIMES DAILY WITH MEALS
Qty: 30 TABLET | Refills: 0 | Status: SHIPPED | OUTPATIENT
Start: 2022-03-17

## 2022-03-17 RX ORDER — KETOROLAC TROMETHAMINE 30 MG/ML
15 INJECTION, SOLUTION INTRAMUSCULAR; INTRAVENOUS ONCE
Status: COMPLETED | OUTPATIENT
Start: 2022-03-17 | End: 2022-03-17

## 2022-03-17 RX ADMIN — KETOROLAC TROMETHAMINE 15 MG: 30 INJECTION, SOLUTION INTRAMUSCULAR at 14:25

## 2022-03-17 NOTE — ED PROVIDER NOTES
History  Chief Complaint   Patient presents with    Knee Pain     Right knee pain started 3 days ago does not know what triggered it  At this point pt  states he is unable to walk and bear weight so he uses the furniture to hold unto  Patient is a 77-year-old male with past medical history significant for chronic back pain,  GERD, who presents today for evaluation of right-sided knee pain  Pain started approximately 3 days ago  Patient does not recall any injury but he has progressively become less and less able to bear weight  He is currently using furniture to help him ambulate around his home  History provided by:  Patient  Knee Pain  Location:  Knee  Time since incident:  3 days  Injury: no    Knee location:  R knee  Pain details:     Quality:  Throbbing, aching and sharp    Radiates to:  Does not radiate    Severity:  Severe    Onset quality:  Gradual    Timing:  Constant    Progression:  Worsening  Chronicity:  New  Dislocation: no    Foreign body present:  No foreign bodies  Prior injury to area:  No  Relieved by:  Nothing  Worsened by:  Bearing weight, extension, flexion, exercise and activity  Ineffective treatments:  Acetaminophen  Associated symptoms: decreased ROM, stiffness and swelling    Associated symptoms: no back pain, no fatigue, no fever, no muscle weakness, no numbness and no tingling    Swelling:     Location:  Leg (Knee  swelling)    Onset quality:  Sudden    Timing:  Constant    Progression:  Worsening    Chronicity:  New  Risk factors: obesity    Risk factors: no concern for non-accidental trauma, no frequent fractures, no known bone disorder and no recent illness        Prior to Admission Medications   Prescriptions Last Dose Informant Patient Reported?  Taking?   bacitracin topical ointment 500 units/g topical ointment   No No   Sig: Apply 1 large application topically 2 (two) times a day   escitalopram (LEXAPRO) 20 mg tablet   No No   Sig: TAKE ONE TABLET DAILY   hydrOXYzine HCL (ATARAX) 25 mg tablet   No No   Sig: Take 1 tablet (25 mg total) by mouth daily at bedtime as needed for itching   Patient not taking: Reported on 8/6/2019   naproxen (EC NAPROSYN) 500 MG EC tablet   No No   Sig: Take 1 tablet (500 mg total) by mouth 2 (two) times a day with meals for 14 days   oxyCODONE-acetaminophen (PERCOCET) 5-325 mg per tablet   No No   Sig: Take 1 tablet by mouth every 6 (six) hours as needed for moderate pain for up to 6 dosesMax Daily Amount: 4 tablets   ranitidine (ZANTAC) 150 MG capsule   No No   Sig: Take 1 capsule (150 mg total) by mouth 2 (two) times a day   ranitidine (ZANTAC) 150 mg tablet   No No   Sig: TAKE ONE TABLET TWICE DAILY   Patient not taking: Reported on 8/6/2019      Facility-Administered Medications: None       Past Medical History:   Diagnosis Date    Anxiety     Back pain     Chronic right-sided low back pain without sciatica 3/31/2018    Depression     Gastroesophageal reflux disease 3/31/2018    GERD (gastroesophageal reflux disease)     Right foot pain        Past Surgical History:   Procedure Laterality Date    NO PAST SURGERIES      ORIF FINGER FRACTURE Right 8/6/2019    Procedure: OPEN REDUCTION FINGER;  Surgeon: Paloma Soriano MD;  [facility];  Service: Plastics       Family History   Problem Relation Age of Onset    COPD Father     Diabetes Father     Cancer Maternal Grandfather      I have reviewed and agree with the history as documented  E-Cigarette/Vaping    E-Cigarette Use Never User      E-Cigarette/Vaping Substances    Nicotine No     THC No     CBD No     Flavoring No     Other No     Unknown No      Social History     Tobacco Use    Smoking status: Current Every Day Smoker     Packs/day: 1 00     Years: 20 00     Pack years: 20 00    Smokeless tobacco: Never Used   Vaping Use    Vaping Use: Never used   Substance Use Topics    Alcohol use:  Yes     Alcohol/week: 6 0 standard drinks     Types: 6 Cans of beer per week Comment: 6 pack of beer a night    Drug use: No       Review of Systems   Constitutional: Positive for activity change  Negative for chills, fatigue, fever and unexpected weight change  Respiratory: Negative for apnea, chest tightness, shortness of breath and wheezing  Cardiovascular: Negative for chest pain, palpitations and leg swelling  Gastrointestinal: Negative  Endocrine: Negative  Negative for polydipsia, polyphagia and polyuria  Musculoskeletal: Positive for arthralgias, joint swelling and stiffness  Negative for back pain, myalgias and neck stiffness  Skin: Negative  Allergic/Immunologic: Negative  Neurological: Negative  Hematological: Negative  Physical Exam  Physical Exam  Vitals and nursing note reviewed  Constitutional:       Appearance: He is well-developed  HENT:      Head: Normocephalic and atraumatic  Nose: Nose normal       Mouth/Throat:      Mouth: Mucous membranes are moist    Eyes:      General: No scleral icterus  Conjunctiva/sclera: Conjunctivae normal    Cardiovascular:      Rate and Rhythm: Normal rate and regular rhythm  Heart sounds: No murmur heard  Pulmonary:      Effort: Pulmonary effort is normal  No respiratory distress  Abdominal:      Palpations: Abdomen is soft  Tenderness: There is no abdominal tenderness  Musculoskeletal:      Cervical back: Normal range of motion and neck supple  Right knee: Swelling present  No deformity, ecchymosis or bony tenderness  Decreased range of motion  Tenderness present over the lateral joint line  Left knee: Normal       Right lower leg: Normal       Left lower leg: Normal       Right ankle: Normal       Right Achilles Tendon: Normal       Left ankle: Normal       Left Achilles Tendon: Normal         Legs:    Skin:     General: Skin is warm and dry  Neurological:      Mental Status: He is alert           Vital Signs  ED Triage Vitals   Temperature Pulse Respirations Blood Pressure SpO2   03/17/22 1319 03/17/22 1319 03/17/22 1319 03/17/22 1319 03/17/22 1319   98 2 °F (36 8 °C) 83 18 142/81 98 %      Temp Source Heart Rate Source Patient Position - Orthostatic VS BP Location FiO2 (%)   03/17/22 1319 03/17/22 1319 03/17/22 1319 03/17/22 1319 --   Tympanic Monitor Sitting Right arm       Pain Score       03/17/22 1425       10 - Worst Possible Pain           Vitals:    03/17/22 1319   BP: 142/81   Pulse: 83   Patient Position - Orthostatic VS: Sitting         Visual Acuity      ED Medications  Medications   ketorolac (TORADOL) injection 15 mg (15 mg Intramuscular Given 3/17/22 1425)       Diagnostic Studies  Results Reviewed     None                 XR knee 3 views right non injury   ED Interpretation by Adama Rollins PA-C (03/17 1426)   No obvious osseous abnormalite      Final Result by Frances Payton MD (03/17 1615)      No acute osseous abnormality  Small effusion  This report is in agreement with the preliminary interpretation           Workstation performed: [id number]                    Procedures  Procedures         ED Course  ED Course as of 03/17/22 1718   Thu Mar 17, 2022   1716 XR knee 3 views right non injury                                             MDM  Number of Diagnoses or Management Options  Acute pain of right knee: new and requires workup  Diagnosis management comments: Acute pain of right knee  Likely tendinopathy as opposed to osteoarthritis  Naprosyn scheduled twice a day for 2 weeks  Follow-up with Dr Jacqueline Estrada for recommendations for PT  Pt discharged with wrap and crutches         Amount and/or Complexity of Data Reviewed  Tests in the radiology section of CPT®: ordered and reviewed  Tests in the medicine section of CPT®: reviewed  Decide to obtain previous medical records or to obtain history from someone other than the patient: yes  Review and summarize past medical records: yes  Independent visualization of images, tracings, or specimens: yes    Risk of Complications, Morbidity, and/or Mortality  Presenting problems: moderate  Diagnostic procedures: moderate  Management options: moderate    Patient Progress  Patient progress: stable      Disposition  Final diagnoses:   Acute pain of right knee     Time reflects when diagnosis was documented in both MDM as applicable and the Disposition within this note     Time User Action Codes Description Comment    3/17/2022  2:35 PM Lu Lopes Add [S03 263] Acute pain of right knee       ED Disposition     ED Disposition Condition Date/Time Comment    Discharge Stable Thu Mar 17, 2022  2:34 PM Patrici Mohs discharge to home/self care              Follow-up Information     Follow up With Specialties Details Why Contact Info    Karen Patterson MD Orthopedic Surgery Schedule an appointment as soon as possible for a visit   [place] [address] [address]  Call  As needed if you need to establish a PCP in the area [phone]            Discharge Medication List as of 3/17/2022  5:00 PM      START taking these medications    Details   naproxen (Naprosyn) 500 mg tablet Take 1 tablet (500 mg total) by mouth 2 (two) times a day with meals, Starting Thu 3/17/2022, Normal         CONTINUE these medications which have NOT CHANGED    Details   bacitracin topical ointment 500 units/g topical ointment Apply 1 large application topically 2 (two) times a day, Starting Mon 8/10/2020, Normal      escitalopram (LEXAPRO) 20 mg tablet TAKE ONE TABLET DAILY, Normal      hydrOXYzine HCL (ATARAX) 25 mg tablet Take 1 tablet (25 mg total) by mouth daily at bedtime as needed for itching, Starting Fri 3/2/2018, Print      naproxen (EC NAPROSYN) 500 MG EC tablet Take 1 tablet (500 mg total) by mouth 2 (two) times a day with meals for 14 days, Starting Fri 3/30/2018, Until Fri 4/13/2018, Normal      oxyCODONE-acetaminophen (PERCOCET) 5-325 mg per tablet Take 1 tablet by mouth every 6 (six) hours as needed for moderate pain for up to 6 dosesMax Daily Amount: 4 tablets, Starting Tue 8/6/2019, Print      ranitidine (ZANTAC) 150 MG capsule Take 1 capsule (150 mg total) by mouth 2 (two) times a day, Starting Fri 5/11/2018, Normal      ranitidine (ZANTAC) 150 mg tablet TAKE ONE TABLET TWICE DAILY, Normal             No discharge procedures on file      PDMP Review     None          ED Provider  Electronically Signed by           Linda Padron PA-C  03/17/22 2287

## 2023-01-02 ENCOUNTER — HOSPITAL ENCOUNTER (EMERGENCY)
Facility: HOSPITAL | Age: 53
Discharge: HOME | End: 2023-01-02
Attending: EMERGENCY MEDICINE

## 2023-01-02 VITALS
DIASTOLIC BLOOD PRESSURE: 93 MMHG | RESPIRATION RATE: 18 BRPM | SYSTOLIC BLOOD PRESSURE: 191 MMHG | OXYGEN SATURATION: 96 % | HEART RATE: 94 BPM | TEMPERATURE: 98.2 F

## 2023-01-02 DIAGNOSIS — R03.0 ELEVATED BLOOD PRESSURE READING: ICD-10-CM

## 2023-01-02 DIAGNOSIS — J06.9 VIRAL URI WITH COUGH: Primary | ICD-10-CM

## 2023-01-02 DIAGNOSIS — J32.9 SINUSITIS: ICD-10-CM

## 2023-01-02 RX ORDER — AMOXICILLIN 500 MG/1
500 CAPSULE ORAL 3 TIMES DAILY
Qty: 30 CAPSULE | Refills: 0 | Status: SHIPPED | OUTPATIENT
Start: 2023-01-02 | End: 2023-01-12

## 2023-01-02 RX ORDER — FLUTICASONE PROPIONATE 50 MCG
1 SPRAY, SUSPENSION (ML) NASAL DAILY
Qty: 16 G | Refills: 0 | Status: SHIPPED | OUTPATIENT
Start: 2023-01-02

## 2023-01-02 NOTE — DISCHARGE INSTRUCTIONS
Please make sure to follow-up with family doctor  Your blood pressure is elevated in the ER  Use amoxicillin for the sinusitis  Use honey for cough

## 2023-01-03 LAB
FLUAV RNA RESP QL NAA+PROBE: NEGATIVE
FLUBV RNA RESP QL NAA+PROBE: NEGATIVE
SARS-COV-2 RNA RESP QL NAA+PROBE: NEGATIVE

## 2023-01-03 NOTE — ED PROVIDER NOTES
History  Chief Complaint   Patient presents with   • Cough     Pt arrives c/o cough and runny nose x 1 wk  Denies fever  Reports L ear pain since yesterday        History provided by:  Patient   used: No    Cough  Associated symptoms: ear pain and rhinorrhea    Associated symptoms: no chest pain, no chills, no diaphoresis, no fever, no headaches, no rash, no shortness of breath, no sore throat and no wheezing      80-year-old male presenting to emergency department with cough and runny nose  Been having congestion nasal for over a week  Left ear pain  No fever  No headache  Neck is supple  No chest pain or shortness of breath  No nausea vomiting  Prior to Admission Medications   Prescriptions Last Dose Informant Patient Reported?  Taking?   bacitracin topical ointment 500 units/g topical ointment   No No   Sig: Apply 1 large application topically 2 (two) times a day   escitalopram (LEXAPRO) 20 mg tablet   No No   Sig: TAKE ONE TABLET DAILY   hydrOXYzine HCL (ATARAX) 25 mg tablet   No No   Sig: Take 1 tablet (25 mg total) by mouth daily at bedtime as needed for itching   Patient not taking: Reported on 8/6/2019   naproxen (Naprosyn) 500 mg tablet   No No   Sig: Take 1 tablet (500 mg total) by mouth 2 (two) times a day with meals   oxyCODONE-acetaminophen (PERCOCET) 5-325 mg per tablet   No No   Sig: Take 1 tablet by mouth every 6 (six) hours as needed for moderate pain for up to 6 dosesMax Daily Amount: 4 tablets   ranitidine (ZANTAC) 150 MG capsule   No No   Sig: Take 1 capsule (150 mg total) by mouth 2 (two) times a day   ranitidine (ZANTAC) 150 mg tablet   No No   Sig: TAKE ONE TABLET TWICE DAILY   Patient not taking: Reported on 8/6/2019      Facility-Administered Medications: None       Past Medical History:   Diagnosis Date   • Anxiety    • Back pain    • Chronic right-sided low back pain without sciatica 3/31/2018   • Depression    • Gastroesophageal reflux disease 3/31/2018   • GERD (gastroesophageal reflux disease)    • Right foot pain        Past Surgical History:   Procedure Laterality Date   • NO PAST SURGERIES     • ORIF FINGER FRACTURE Right 8/6/2019    Procedure: OPEN REDUCTION FINGER;  Surgeon: Tomie Ahumada, MD;  Location: [facility];  Service: Plastics       Family History   Problem Relation Age of Onset   • COPD Father    • Diabetes Father    • Cancer Maternal Grandfather      I have reviewed and agree with the history as documented  E-Cigarette/Vaping   • E-Cigarette Use Never User      E-Cigarette/Vaping Substances   • Nicotine No    • THC No    • CBD No    • Flavoring No    • Other No    • Unknown No      Social History     Tobacco Use   • Smoking status: Every Day     Packs/day: 1 00     Years: 20 00     Pack years: 20 00     Types: Cigarettes   • Smokeless tobacco: Never   Vaping Use   • Vaping Use: Never used   Substance Use Topics   • Alcohol use: Yes     Alcohol/week: 6 0 standard drinks     Types: 6 Cans of beer per week     Comment: 6 pack of beer a night   • Drug use: No       Review of Systems   Constitutional: Negative for chills, diaphoresis and fever  HENT: Positive for ear pain and rhinorrhea  Negative for congestion and sore throat  Respiratory: Positive for cough  Negative for shortness of breath, wheezing and stridor  Cardiovascular: Negative for chest pain, palpitations and leg swelling  Gastrointestinal: Negative for abdominal pain, blood in stool, diarrhea, nausea and vomiting  Genitourinary: Negative for dysuria, frequency and urgency  Musculoskeletal: Negative for neck stiffness  Skin: Negative for pallor and rash  Neurological: Negative for dizziness, syncope, weakness, light-headedness and headaches  All other systems reviewed and are negative  Physical Exam  Physical Exam  Vitals reviewed  Constitutional:       Appearance: Normal appearance  He is well-developed  HENT:      Head: Normocephalic and atraumatic        Right Ear: Tympanic membrane normal       Left Ear: Tympanic membrane normal    Eyes:      Extraocular Movements: Extraocular movements intact  Pupils: Pupils are equal, round, and reactive to light  Cardiovascular:      Rate and Rhythm: Normal rate and regular rhythm  Heart sounds: Normal heart sounds  Pulmonary:      Effort: Pulmonary effort is normal  No respiratory distress  Breath sounds: Normal breath sounds  Abdominal:      General: Bowel sounds are normal       Palpations: Abdomen is soft  Tenderness: There is no abdominal tenderness  Musculoskeletal:         General: No swelling or tenderness  Normal range of motion  Cervical back: Normal range of motion and neck supple  Skin:     General: Skin is warm and dry  Capillary Refill: Capillary refill takes less than 2 seconds  Neurological:      General: No focal deficit present  Mental Status: He is alert and oriented to person, place, and time           Vital Signs  ED Triage Vitals   Temperature Pulse Respirations Blood Pressure SpO2   01/02/23 1208 01/02/23 1206 01/02/23 1206 01/02/23 1206 01/02/23 1206   98 2 °F (36 8 °C) 94 18 (!) 191/93 96 %      Temp Source Heart Rate Source Patient Position - Orthostatic VS BP Location FiO2 (%)   01/02/23 1208 01/02/23 1206 01/02/23 1206 01/02/23 1206 --   Oral Monitor Lying Left arm       Pain Score       --                  Vitals:    01/02/23 1206   BP: (!) 191/93   Pulse: 94   Patient Position - Orthostatic VS: Lying         Visual Acuity      ED Medications  Medications - No data to display    Diagnostic Studies  Results Reviewed     Procedure Component Value Units Date/Time    FLU/COVID - if FLU clinically relevant [[id number]]  (Normal) Collected: 01/02/23 1406    Lab Status: Final result Specimen: Nares from Nose Updated: 01/03/23 1230     SARS-CoV-2 Negative     INFLUENZA A PCR Negative     INFLUENZA B PCR Negative    Narrative:      FOR PEDIATRIC PATIENTS - copy/paste COVID Guidelines URL to browser: [email or web address] [email or web address]  [email or web address]    SARS-CoV-2 assay is a Nucleic Acid Amplification assay intended for the  qualitative detection of nucleic acid from SARS-CoV-2 in nasopharyngeal  swabs  Results are for the presumptive identification of SARS-CoV-2 RNA  Positive results are indicative of infection with SARS-CoV-2, the virus  causing COVID-19, but do not rule out bacterial infection or co-infection  with other viruses  Laboratories within the [place] and its  territories are required to report all positive results to the appropriate  public health authorities  Negative results do not preclude SARS-CoV-2  infection and should not be used as the sole basis for treatment or other  patient management decisions  Negative results must be combined with  clinical observations, patient history, and epidemiological information  This test has not been FDA cleared or approved  This test has been authorized by FDA under an Emergency Use Authorization  (EUA)  This test is only authorized for the duration of time the  declaration that circumstances exist justifying the authorization of the  emergency use of an in vitro diagnostic tests for detection of SARS-CoV-2  virus and/or diagnosis of COVID-19 infection under section 564(b)(1) of  the Act, 21 U  S C  530BCT-1(D)(8), unless the authorization is terminated  or revoked sooner  The test has been validated but independent review by FDA  and CLIA is pending  Test performed using the Roche ashley 6800 System: This RT-PCR assay  targets ORF1, a region unique to SARS-CoV-2  A conserved region in the  E-gene was chosen for pan-Sarbecovirus detection which includes  SARS-CoV-2  According to CMS-2020-01-R, this platform meets the definition of high-throughput technology                     No orders to display              Procedures  Procedures         ED Course Medical Decision Making  66-year-old presenting with viral syndrome has progressed into sinusitis  Concern for bacterial sinusitis, will treat with antibiotics  Discussed elevated blood pressure, he is on blood pressure medications, will follow-up with family doctor  Elevated blood pressure reading: complicated acute illness or injury  Sinusitis: complicated acute illness or injury  Viral URI with cough: complicated acute illness or injury  Amount and/or Complexity of Data Reviewed  Labs: ordered  Risk  Prescription drug management  Disposition  Final diagnoses:   Viral URI with cough   Sinusitis   Elevated blood pressure reading     Time reflects when diagnosis was documented in both MDM as applicable and the Disposition within this note     Time User Action Codes Description Comment    1/2/2023  2:04 PM Lauren Luna [J06 9] Viral URI with cough     1/2/2023  2:04 PM Lauren Luna [J32 9] Sinusitis     1/2/2023  2:04 PM Lauren Luna [R03 0] Elevated blood pressure reading       ED Disposition     ED Disposition   Discharge    Condition   Stable    Date/Time   Mon Jan 2, 2023  2:04 PM    [id number] discharge to home/self care                 Follow-up Information     Follow up With Specialties Details Why Contact Info Additional Information    [facility] Emergency Medicine  As needed, If symptoms worsen [address] [facility] [address] [facility], [address], [place], [place], [address] [facility]  Family Medicine Schedule an appointment as soon as possible for a visit  Please follow-up with family doctor [address] [phone]  [id number]  , [place], [place], [address]          Discharge Medication List as of 1/2/2023  2:05 PM      START taking these medications    Details   amoxicillin (AMOXIL) 500 mg capsule Take 1 capsule (500 mg total) by mouth 3 (three) times a day for 10 days, Starting Mon 1/2/2023, Until Thu 1/12/2023, Normal         CONTINUE these medications which have NOT CHANGED    Details   bacitracin topical ointment 500 units/g topical ointment Apply 1 large application topically 2 (two) times a day, Starting Mon 8/10/2020, Normal      escitalopram (LEXAPRO) 20 mg tablet TAKE ONE TABLET DAILY, Normal      hydrOXYzine HCL (ATARAX) 25 mg tablet Take 1 tablet (25 mg total) by mouth daily at bedtime as needed for itching, Starting Fri 3/2/2018, Print      naproxen (Naprosyn) 500 mg tablet Take 1 tablet (500 mg total) by mouth 2 (two) times a day with meals, Starting Thu 3/17/2022, Normal      oxyCODONE-acetaminophen (PERCOCET) 5-325 mg per tablet Take 1 tablet by mouth every 6 (six) hours as needed for moderate pain for up to 6 dosesMax Daily Amount: 4 tablets, Starting Tue 8/6/2019, Print      ranitidine (ZANTAC) 150 MG capsule Take 1 capsule (150 mg total) by mouth 2 (two) times a day, Starting Fri 5/11/2018, Normal      ranitidine (ZANTAC) 150 mg tablet TAKE ONE TABLET TWICE DAILY, Normal             No discharge procedures on file      PDMP Review     None          ED Provider  Electronically Signed by           Livan Longo MD  01/03/23 4994

## 2023-12-14 ENCOUNTER — HOSPITAL ENCOUNTER (OUTPATIENT)
Dept: RADIOLOGY | Facility: HOSPITAL | Age: 53
End: 2023-12-14
Payer: COMMERCIAL

## 2023-12-14 DIAGNOSIS — M25.562 ARTHRALGIA OF BOTH LOWER LEGS: ICD-10-CM

## 2023-12-14 DIAGNOSIS — M25.561 ARTHRALGIA OF BOTH LOWER LEGS: ICD-10-CM

## 2023-12-14 PROCEDURE — 73562 X-RAY EXAM OF KNEE 3: CPT

## 2023-12-21 ENCOUNTER — OFFICE VISIT (OUTPATIENT)
Dept: OBGYN CLINIC | Facility: CLINIC | Age: 53
End: 2023-12-21
Payer: COMMERCIAL

## 2023-12-21 VITALS
DIASTOLIC BLOOD PRESSURE: 100 MMHG | OXYGEN SATURATION: 98 % | SYSTOLIC BLOOD PRESSURE: 145 MMHG | BODY MASS INDEX: 31.95 KG/M2 | HEART RATE: 111 BPM | WEIGHT: 249 LBS | HEIGHT: 74 IN

## 2023-12-21 DIAGNOSIS — G89.29 CHRONIC PAIN OF LEFT KNEE: Primary | ICD-10-CM

## 2023-12-21 DIAGNOSIS — M25.562 CHRONIC PAIN OF LEFT KNEE: Primary | ICD-10-CM

## 2023-12-21 PROCEDURE — 99203 OFFICE O/P NEW LOW 30 MIN: CPT | Performed by: PHYSICIAN ASSISTANT

## 2023-12-21 RX ORDER — MELOXICAM 15 MG/1
15 TABLET ORAL DAILY
Qty: 28 TABLET | Refills: 0 | Status: SHIPPED | OUTPATIENT
Start: 2023-12-21

## 2023-12-21 NOTE — PROGRESS NOTES
Assessment/Plan   Diagnoses and all orders for this visit:    Chronic pain of left knee  - Continue PT exercises  - Ice as needed  - Mobic  - MRI attn menisci  - Follow up with Dr. De La O          Subjective   Patient ID: Viktor Farfan is a 53 y.o. male.    Vitals:    12/21/23 1616   BP: 145/100   Pulse: (!) 111   SpO2: 98%     52yo male comes in for an evaluation of his left knee.  He originally injured the left knee about 2 years ago when he slipped while walking up a hill.  He was completely unable to walk for about 2 weeks and then slowly improved.  Xrays at that time were negative for fracture and he was told it was a meniscal tear.  He did several courses of PT and has tried NSAIDs.  He has regained his motion and ability to walk, but still has generalized knee pain.  No catching or locking.  Sometimes, when he is limping a lot, he gets some soreness in the right, but this is not nearly as bad as the left.  He works delivering packages.  The pain is dull in character, mild in severity, pain does not radiate and is not associated with numbness.            The following portions of the patient's history were reviewed and updated as appropriate: allergies, current medications, past family history, past medical history, past social history, past surgical history, and problem list.    Review of Systems  Ortho Exam  Past Medical History:   Diagnosis Date    Anxiety     Back pain     Chronic right-sided low back pain without sciatica 3/31/2018    Depression     Gastroesophageal reflux disease 3/31/2018    GERD (gastroesophageal reflux disease)     Right foot pain      Past Surgical History:   Procedure Laterality Date    NO PAST SURGERIES      ORIF FINGER FRACTURE Right 8/6/2019    Procedure: OPEN REDUCTION FINGER;  Surgeon: Tomas Morgan MD;  Location[facility];  Service: Plastics     Family History   Problem Relation Age of Onset    COPD Father     Diabetes Father     Cancer Maternal Grandfather      Social History      Occupational History    Not on file   Tobacco Use    Smoking status: Every Day     Current packs/day: 1.00     Average packs/day: 1 pack/day for 20.0 years (20.0 ttl pk-yrs)     Types: Cigarettes    Smokeless tobacco: Never   Vaping Use    Vaping status: Never Used   Substance and Sexual Activity    Alcohol use: Yes     Alcohol/week: 6.0 standard drinks of alcohol     Types: 6 Cans of beer per week     Comment: 6 pack of beer a night    Drug use: No    Sexual activity: Not on file       Review of Systems   Constitutional: Negative.    HENT: Negative.    Eyes: Negative.    Respiratory: Negative.    Cardiovascular: Negative.    Gastrointestinal: Negative.    Endocrine: Negative.    Genitourinary: Negative.    Musculoskeletal: As below..   Allergic/Immunologic: Negative.    Neurological: Negative.    Hematological: Negative.    Psychiatric/Behavioral: Negative.        Objective   Physical Exam    Constitutional: Awake, Alert, Oriented  Eyes: EOMI  Psych: Mood and affect appropriate  Heart: regular rate   Lungs: No audible wheezing  Abdomen: No guarding  Lymph: no lymphedema  left Knee:  Appearance  No swelling, discoloration, deformity, or ecchymosis  Effusion  none  Palpation  + Tenderness medial joint line., + Tenderness lateral joint line., and Otherwise no tenderness of the patella, patellar tendon, MCL, LCL, pes anserine, medial / lateral plateau  ROM  Extension: 0 and Flexion: 130  Special Tests  Kane's Test negative, Lachman's Test negative, Anterior Drawer Test negative, Posterior Drawer Test negative, Valgus Stress Test negative, Varus Stress Test negative, and Patellar apprehension negative  Motor  normal 5/5 in all planes  NVI distally    Xrays  3 views of the b/l knees were done in the office today.  No acute displaced fracture or severe osteoarthritis. Radiologist reading pending.

## 2023-12-30 ENCOUNTER — HOSPITAL ENCOUNTER (OUTPATIENT)
Dept: MRI IMAGING | Facility: HOSPITAL | Age: 53
Discharge: HOME | End: 2023-12-30
Payer: COMMERCIAL

## 2023-12-30 DIAGNOSIS — M25.562 CHRONIC PAIN OF LEFT KNEE: ICD-10-CM

## 2023-12-30 DIAGNOSIS — G89.29 CHRONIC PAIN OF LEFT KNEE: ICD-10-CM

## 2023-12-30 PROCEDURE — G1004 CDSM NDSC: HCPCS

## 2023-12-30 PROCEDURE — 73721 MRI JNT OF LWR EXTRE W/O DYE: CPT

## 2024-01-03 ENCOUNTER — OFFICE VISIT (OUTPATIENT)
Dept: OBGYN CLINIC | Facility: CLINIC | Age: 54
End: 2024-01-03
Payer: COMMERCIAL

## 2024-01-03 VITALS
HEIGHT: 74 IN | HEART RATE: 113 BPM | SYSTOLIC BLOOD PRESSURE: 140 MMHG | WEIGHT: 249 LBS | DIASTOLIC BLOOD PRESSURE: 87 MMHG | BODY MASS INDEX: 31.95 KG/M2 | RESPIRATION RATE: 18 BRPM

## 2024-01-03 DIAGNOSIS — G89.29 CHRONIC PAIN OF LEFT KNEE: Primary | ICD-10-CM

## 2024-01-03 DIAGNOSIS — M25.562 CHRONIC PAIN OF LEFT KNEE: Primary | ICD-10-CM

## 2024-01-03 PROCEDURE — 99214 OFFICE O/P EST MOD 30 MIN: CPT | Performed by: STUDENT IN AN ORGANIZED HEALTH CARE EDUCATION/TRAINING PROGRAM

## 2024-01-03 RX ORDER — GABAPENTIN 100 MG/1
CAPSULE ORAL
COMMUNITY
Start: 2023-11-27

## 2024-01-03 RX ORDER — MIRTAZAPINE 15 MG/1
TABLET, FILM COATED ORAL
COMMUNITY
Start: 2023-11-16

## 2024-01-03 RX ORDER — AMLODIPINE BESYLATE 10 MG/1
10 TABLET ORAL DAILY
COMMUNITY
Start: 2023-11-18

## 2024-01-03 RX ORDER — OMEPRAZOLE 20 MG/1
20 CAPSULE, DELAYED RELEASE ORAL DAILY
COMMUNITY
Start: 2023-12-18

## 2024-01-03 NOTE — PROGRESS NOTES
Ortho Sports Medicine Knee New Patient Visit     Assesment:   53 y.o. male bilateral knee osteoarthritis, right knee chronic anterior cruciate ligament tear.    Plan:    Viktor is present today for evaluation for bilateral knee pain. Patient referred by Zac Bishop PA-C. MRI and X-rays were done prior to today's visit. Both X-rays and MRI were reviewed in detail with the patient. We discussed that we do not have official radiologist read, however, looks like there is a chronic anterior cruciate ligament tear which corresponds with physical exam.  I recommend conservative treatment with a short hinged knee brace and a course of physical therapy for lower extremity range of motion and strengthening.  Patient declined physical therapy today however is interested in a knee brace.  Explained he can follow-up in several weeks after a home directed exercise program and bracing to reevaluate his symptoms.  I explained I can we discuss this with the patient after formal radiology read.      OTC NSAIDS prn for pain.  Tylenol for pain.  Hinged knee brace ordered.  Let pain guide gradual return activities.    Imaging:    All imaging from today was reviewed by myself and explained to the patient.       Injection:    No Injection planned at this time.      Surgery:     No surgery is recommended at this point, continue with conservative treatment plan as noted.      Follow up:    Return in about 2 weeks (around 1/17/2024).        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  At this time the knee pain is worse in the left knee, reports that knee pains are sharp in character ans is a 6-7/10 pain. Pain improves with rest and aleve. Pain is located at the medial joint line on the right knee and anterior  knee of the left. Patient denies any related injuries or accidents, however, states that he was walking in his drive way two years ago when he randomly developed pain. Reports that he feels instability with walking up and down the stairs and while walking. Patient reports he was not able to walk for two weeks following this incident due to intense pain. Pain is worse with walking and stairs.   Reports that occasionally he will get a sharp pain from his knee to his calf bilaterally, denies numbness.       The patient has tried rest and NSAIDS.          Knee Surgical History:  None    Past Medical, Social and Family History:  Past Medical History:   Diagnosis Date    Anxiety     Back pain     Chronic right-sided low back pain without sciatica 3/31/2018    Depression     Gastroesophageal reflux disease 3/31/2018    GERD (gastroesophageal reflux disease)     Right foot pain      Past Surgical History:   Procedure Laterality Date    NO PAST SURGERIES      ORIF FINGER FRACTURE Right 8/6/2019    Procedure: OPEN REDUCTION FINGER;  Surgeon: Tomas Morgan MD;  Location: [facility];  Service: Plastics     No Known Allergies  Current Outpatient Medications on File Prior to Visit   Medication Sig Dispense Refill    amLODIPine (NORVASC) 10 mg tablet Take 10 mg by mouth daily      bacitracin topical ointment 500 units/g topical ointment Apply 1 large application topically 2 (two) times a day 28 g 0    Diclofenac Sodium (VOLTAREN) 1 % APPLY 2 GRAMS TO AFFECTED AREA TWICE A DAY      escitalopram (LEXAPRO) 20 mg tablet TAKE ONE TABLET DAILY 60 tablet 2    fluticasone (FLONASE) 50 mcg/act nasal spray 1 spray into each nostril daily 16 g 0    gabapentin (NEURONTIN) 100 mg capsule TAKE 2 CAPSULE BY ORAL ROUTE 3 TIMES EVERY BEDTIME      meloxicam (Mobic) 15 mg tablet Take 1 tablet (15 mg total) by mouth daily 28 tablet 0    mirtazapine (REMERON) 15 mg tablet TAKE 1 TABLET BY MOUTH EVERY DAY BEFORE BEDTIME      omeprazole (PriLOSEC) 20 mg  delayed release capsule Take 20 mg by mouth daily Take before a meal      oxyCODONE-acetaminophen (PERCOCET) 5-325 mg per tablet Take 1 tablet by mouth every 6 (six) hours as needed for moderate pain for up to 6 dosesMax Daily Amount: 4 tablets 6 tablet 0    hydrOXYzine HCL (ATARAX) 25 mg tablet Take 1 tablet (25 mg total) by mouth daily at bedtime as needed for itching (Patient not taking: Reported on 8/6/2019) 12 tablet 0    [DISCONTINUED] ranitidine (ZANTAC) 150 MG capsule Take 1 capsule (150 mg total) by mouth 2 (two) times a day 120 capsule 3    [DISCONTINUED] ranitidine (ZANTAC) 150 mg tablet TAKE ONE TABLET TWICE DAILY (Patient not taking: Reported on 8/6/2019) 120 tablet 0     No current facility-administered medications on file prior to visit.     Social History     Socioeconomic History    Marital status: Single     Spouse name: Not on file    Number of children: Not on file    Years of education: Not on file    Highest education level: Not on file   Occupational History    Not on file   Tobacco Use    Smoking status: Every Day     Current packs/day: 1.00     Average packs/day: 1 pack/day for 20.0 years (20.0 ttl pk-yrs)     Types: Cigarettes    Smokeless tobacco: Never   Vaping Use    Vaping status: Never Used   Substance and Sexual Activity    Alcohol use: Yes     Alcohol/week: 6.0 standard drinks of alcohol     Types: 6 Cans of beer per week     Comment: 6 pack of beer a night    Drug use: No    Sexual activity: Not on file   Other Topics Concern    Not on file   Social History Narrative    Not on file     Social Determinants of Health     Financial Resource Strain: Not on file   Food Insecurity: Not on file   Transportation Needs: Not on file   Physical Activity: Not on file   Stress: Not on file   Social Connections: Not on file   Intimate Partner Violence: Not on file   Housing Stability: Not on file         I have reviewed the past medical, surgical, social and family history, medications and  "allergies as documented in the EMR.    Review of systems: ROS is negative other than that noted in the HPI.  Constitutional: Negative for fatigue and fever.   HENT: Negative for sore throat.    Respiratory: Negative for shortness of breath.    Cardiovascular: Negative for chest pain.   Gastrointestinal: Negative for abdominal pain.   Endocrine: Negative for cold intolerance and heat intolerance.   Genitourinary: Negative for flank pain.   Musculoskeletal: Negative for back pain.   Skin: Negative for rash.   Allergic/Immunologic: Negative for immunocompromised state.   Neurological: Negative for dizziness.   Psychiatric/Behavioral: Negative for agitation.      Physical Exam:    Blood pressure 140/87, pulse (!) 113, resp. rate 18, height 6' 2\" (1.88 m), weight 113 kg (249 lb).    General/Constitutional: NAD, well developed, well nourished  HENT: Normocephalic, atraumatic  CV: Intact distal pulses, regular rate  Resp: No respiratory distress or labored breathing  Lymphatic: No lymphadenopathy palpated  Neuro: Alert and Oriented x 3, no focal deficits  Psych: Normal mood, normal affect, normal judgement, normal behavior  Skin: Warm, dry, no rashes, no erythema      Left Knee Exam (focused):  Visual inspection of the left  knee demonstrates normal contour without atrophy.   Palpable bakers cyst present  NO previous incisions   There is no significant erythema or edema.    No significant joint effusion   Range of motion is full from 0-130 degrees of flexion   Able to straight leg raise   - medial joint line tenderness, - lateral joint line tenderness  +medial Kane's, - lateral Kane's   2B Lachman exam, stable posterior drawer  Stable to varus and valgus stress at both 0 and 30°  Patella tracks normally.  No J sign.  No apprehension.  Translation is approximately 2 quadrants and is equal to the contralateral side  Patellar eversion is similar to the contralateral side    Examination of the patient's ipsilateral hip " demonstrates full painless range of motion.  No crepitus.      LE NV Exam: +2 DP/PT pulses bilaterally  Sensation intact to light touch L2-S1 bilaterally     Bilateral hip ROM demonstrates no pain actively or passively    No calf tenderness to palpation bilaterally    Right Knee Exam (focused):  Visual inspection of the right knee demonstrates normal contour without atrophy.   NO previous incisions   There is no significant erythema or edema.    No significant joint effusion   Range of motion is full from 5-130 degrees of flexion   Able to straight leg raise   + medial joint line tenderness, - lateral joint line tenderness  - medial Kane's, - lateral Kane's  stable Lachman exam, stable posterior drawer  Stable to varus and valgus stress at both 0 and 30°  Patella tracks normally.  No J sign.  No apprehension.  Translation is approximately 2 quadrants and is equal to the contralateral side  Patellar eversion is similar to the contralateral side    Knee Imaging    X-rays of the bilateral knee were reviewed, which demonstrate mild-moderate osteoarthritis.  I have reviewed the radiology report and agree with their impression.    MRI of the left knee were reviewed, which demonstrate chronic anterior cruciate ligament tear.  No evidence of bone bruise pattern.  No significant knee effusion.  No obvious internal derangement of the medial meniscus.  Lateral meniscus has some slight signal abnormality of the anterior horn at the site of ACL insertion, however the remainder of the lateral meniscus appears intact.  No full-thickness chondral defects.  There is some fissuring of the medial facet of the patella.  I have reviewed the radiology report and do not currently have a radiology reading from [facility], but will check the result once the reading is performed.      Scribe Attestation      I,:  Aurora Wright PA-C am acting as a scribe while in the presence of the attending physician.:       I,:  Sean De La O,  DO personally performed the services described in this documentation    as scribed in my presence.:

## 2024-01-10 DIAGNOSIS — M17.12 PRIMARY OSTEOARTHRITIS OF LEFT KNEE: Primary | ICD-10-CM

## 2024-01-17 ENCOUNTER — OFFICE VISIT (OUTPATIENT)
Dept: OBGYN CLINIC | Facility: CLINIC | Age: 54
End: 2024-01-17
Payer: COMMERCIAL

## 2024-01-17 VITALS
RESPIRATION RATE: 17 BRPM | DIASTOLIC BLOOD PRESSURE: 88 MMHG | WEIGHT: 249 LBS | HEIGHT: 74 IN | BODY MASS INDEX: 31.95 KG/M2 | SYSTOLIC BLOOD PRESSURE: 144 MMHG | HEART RATE: 111 BPM

## 2024-01-17 DIAGNOSIS — M17.12 PRIMARY OSTEOARTHRITIS OF LEFT KNEE: Primary | ICD-10-CM

## 2024-01-17 PROCEDURE — 99213 OFFICE O/P EST LOW 20 MIN: CPT | Performed by: STUDENT IN AN ORGANIZED HEALTH CARE EDUCATION/TRAINING PROGRAM

## 2024-01-17 NOTE — PROGRESS NOTES
Ortho Sports Medicine Knee Follow Up Visit     Assesment:     53 y.o. male bilateral knee osteoarthritis  Plan:    Viktor is present today for follow up visit. Due to benign imaging we discussed and recommended physical therapy for bilateral knees for strengthening as his quads appear weak. Patient did not want to try physical therapy. Explained that the pain and soreness that he is experiencing is associated with his osteoarthritis bilaterally. We offered corticosteroid injections bilaterally which was decline by the patient. Patient is to use the brace given at last appointment and follow up as needed for possible corticosteroid injection.     Conservative treatment:    Ice to knee for 20 minutes at least 1-2 times daily.  OTC NSAIDS   Tylenol for pain.    Imaging:    All imaging from today was reviewed by myself and explained to the patient.       Injection:    No Injection planned at this time.      Surgery:     No surgery is recommended at this point, continue with conservative treatment plan as noted.    Follow up:    Return if symptoms worsen or fail to improve.        Chief Complaint   Patient presents with    Left Knee - Follow-up    Right Knee - Follow-up       History of Present Illness:    The patient is returns for follow up of bilateral knee osteoarthritis.  Since the prior visit, He reports no improvement. Pain is still present in the posterior knee in the left with instability along with medial joint line tenderness. In the right knee he has medial joint line tenderness as well. Patient reports that he still feels weak and that his knees feel like they will give out below him. Patient was prescribed Meloxicam by his PCP which he reports does not help. He takes aleve as needed but also does not provide adequate relief. Pain is still a 7/10 dull soreness.          Knee Surgical History:  None    Past Medical, Social and Family History:  Past Medical History:   Diagnosis Date    Anxiety     Back pain      Chronic right-sided low back pain without sciatica 3/31/2018    Depression     Gastroesophageal reflux disease 3/31/2018    GERD (gastroesophageal reflux disease)     Right foot pain      Past Surgical History:   Procedure Laterality Date    NO PAST SURGERIES      ORIF FINGER FRACTURE Right 8/6/2019    Procedure: OPEN REDUCTION FINGER;  Surgeon: Tomas Morgan MD;  Location[facility];  Service: Plastics     No Known Allergies  Current Outpatient Medications on File Prior to Visit   Medication Sig Dispense Refill    amLODIPine (NORVASC) 10 mg tablet Take 10 mg by mouth daily      bacitracin topical ointment 500 units/g topical ointment Apply 1 large application topically 2 (two) times a day 28 g 0    Diclofenac Sodium (VOLTAREN) 1 % APPLY 2 GRAMS TO AFFECTED AREA TWICE A DAY      escitalopram (LEXAPRO) 20 mg tablet TAKE ONE TABLET DAILY 60 tablet 2    fluticasone (FLONASE) 50 mcg/act nasal spray 1 spray into each nostril daily 16 g 0    gabapentin (NEURONTIN) 100 mg capsule TAKE 2 CAPSULE BY ORAL ROUTE 3 TIMES EVERY BEDTIME      meloxicam (Mobic) 15 mg tablet Take 1 tablet (15 mg total) by mouth daily 28 tablet 0    mirtazapine (REMERON) 15 mg tablet TAKE 1 TABLET BY MOUTH EVERY DAY BEFORE BEDTIME      omeprazole (PriLOSEC) 20 mg delayed release capsule Take 20 mg by mouth daily Take before a meal      oxyCODONE-acetaminophen (PERCOCET) 5-325 mg per tablet Take 1 tablet by mouth every 6 (six) hours as needed for moderate pain for up to 6 dosesMax Daily Amount: 4 tablets 6 tablet 0    hydrOXYzine HCL (ATARAX) 25 mg tablet Take 1 tablet (25 mg total) by mouth daily at bedtime as needed for itching (Patient not taking: Reported on 8/6/2019) 12 tablet 0     No current facility-administered medications on file prior to visit.     Social History     Socioeconomic History    Marital status: Single     Spouse name: Not on file    Number of children: Not on file    Years of education: Not on file    Highest education level:  "Not on file   Occupational History    Not on file   Tobacco Use    Smoking status: Every Day     Current packs/day: 1.00     Average packs/day: 1 pack/day for 20.0 years (20.0 ttl pk-yrs)     Types: Cigarettes    Smokeless tobacco: Never   Vaping Use    Vaping status: Never Used   Substance and Sexual Activity    Alcohol use: Yes     Alcohol/week: 6.0 standard drinks of alcohol     Types: 6 Cans of beer per week     Comment: 6 pack of beer a night    Drug use: No    Sexual activity: Not on file   Other Topics Concern    Not on file   Social History Narrative    Not on file     Social Determinants of Health     Financial Resource Strain: Not on file   Food Insecurity: Not on file   Transportation Needs: Not on file   Physical Activity: Not on file   Stress: Not on file   Social Connections: Not on file   Intimate Partner Violence: Not on file   Housing Stability: Not on file         I have reviewed the past medical, surgical, social and family history, medications and allergies as documented in the EMR.    Review of systems: ROS is negative other than that noted in the HPI.  Constitutional: Negative for fatigue and fever.      Physical Exam:    Blood pressure 144/88, pulse (!) 111, resp. rate 17, height 6' 2\" (1.88 m), weight 113 kg (249 lb).    General/Constitutional: NAD, well developed, well nourished  HENT: Normocephalic, atraumatic  CV: Intact distal pulses, regular rate  Resp: No respiratory distress or labored breathing  Lymphatic: No lymphadenopathy palpated  Neuro: Alert and Oriented x 3, no focal deficits  Psych: Normal mood, normal affect, normal judgement, normal behavior  Skin: Warm, dry, no rashes, no erythema      Left Knee Exam (focused):  Visual inspection of the left  knee demonstrates normal contour without atrophy.   Palpable bakers cyst present  NO previous incisions   There is no significant erythema or edema.    No significant joint effusion   Range of motion is full from 0-130 degrees of " flexion   Difficulty with straight leg raise due to stiffness   +medial joint line tenderness, - lateral joint line tenderness  +medial Kane's, - lateral Kane's   stable Lachman exam, stable posterior drawer  Stable to varus and valgus stress at both 0 and 30°  Patella tracks normally.  No J sign.  No apprehension.  Translation is approximately 2 quadrants and is equal to the contralateral side  Patellar eversion is similar to the contralateral side Examination of the patient's ipsilateral hip demonstrates full painless range of motion.  No crepitus.           LE NV Exam: +2 DP/PT pulses bilaterally  Sensation intact to light touch L2-S1 bilaterally    No calf tenderness to palpation bilaterally    Right Knee Exam (focused):  Visual inspection of the right knee demonstrates normal contour without atrophy.   NO previous incisions   There is no significant erythema or edema.    No significant joint effusion   Range of motion is full from 5-130 degrees of flexion   Difficulty with straight leg raise due to stiffness   + medial joint line tenderness, - lateral joint line tenderness  - medial Kane's, - lateral Kane's  stable Lachman exam, stable posterior drawer  Stable to varus and valgus stress at both 0 and 30°  Patella tracks normally.  No J sign.  No apprehension.  Translation is approximately 2 quadrants and is equal to the contralateral side  Patellar eversion is similar to the contralateral side    Knee Imaging    No imaging was performed today      Scribe Attestation      I,:  Aurora Wright PA-C am acting as a scribe while in the presence of the attending physician.:       I,:  Sean De La O, DO personally performed the services described in this documentation    as scribed in my presence.:

## 2024-06-10 ENCOUNTER — NURSE TRIAGE (OUTPATIENT)
Dept: OTHER | Facility: OTHER | Age: 54
End: 2024-06-10

## 2024-06-11 NOTE — TELEPHONE ENCOUNTER
"Patient reports \"massive\" right knee pain, swelling, redness and warmth. Denies fever but does note he started with chills today. Patient rates pain 10/10 and has difficulty walking. Has tried Icy Hot, motrin and ice with no relief.     Care advice given, patient was advised to be evaluated in the ED due to symptoms however he declined. Wishes to wait and be seen in the office.    Will forward to office- please facilitate appt ASAP. Thank you.   "

## 2024-06-11 NOTE — TELEPHONE ENCOUNTER
"Reason for Disposition  • [1] SEVERE pain (e.g., excruciating, unable to walk) AND [2] not improved after 2 hours of pain medicine    Answer Assessment - Initial Assessment Questions  1. LOCATION and RADIATION: \"Where is the pain located?\"       Right knee pain  2. QUALITY: \"What does the pain feel like?\"  (e.g., sharp, dull, aching, burning)      Shooting pain  3. SEVERITY: \"How bad is the pain?\" \"What does it keep you from doing?\"   (Scale 1-10; or mild, moderate, severe)    -  MILD (1-3): doesn't interfere with normal activities     -  MODERATE (4-7): interferes with normal activities (e.g., work or school) or awakens from sleep, limping     -  SEVERE (8-10): excruciating pain, unable to do any normal activities, unable to walk      Severe, rates pain 10/10  4. ONSET: \"When did the pain start?\" \"Does it come and go, or is it there all the time?\"      Ongoing for 5 months now, but has been gradually getting worse  5. RECURRENT: \"Have you had this pain before?\" If Yes, ask: \"When, and what happened then?\"      yes  7. AGGRAVATING FACTORS: \"What makes the knee pain worse?\" (e.g., walking, climbing stairs, running)      Worse when walking  8. ASSOCIATED SYMPTOMS: \"Is there any swelling or redness of the knee?\"      Swelling, redness and warmth to right knee  9. OTHER SYMPTOMS: \"Do you have any other symptoms?\" (e.g., chest pain, difficulty breathing, fever, calf pain)      Denies    No fever noted, but patient does complain of chills today    Protocols used: Knee Pain-ADULT-AH    "

## 2024-06-14 ENCOUNTER — OFFICE VISIT (OUTPATIENT)
Dept: OBGYN CLINIC | Facility: CLINIC | Age: 54
End: 2024-06-14
Payer: COMMERCIAL

## 2024-06-14 VITALS
WEIGHT: 249 LBS | SYSTOLIC BLOOD PRESSURE: 117 MMHG | HEART RATE: 102 BPM | RESPIRATION RATE: 17 BRPM | DIASTOLIC BLOOD PRESSURE: 75 MMHG | BODY MASS INDEX: 31.95 KG/M2 | HEIGHT: 74 IN

## 2024-06-14 DIAGNOSIS — M17.12 PRIMARY OSTEOARTHRITIS OF LEFT KNEE: Primary | ICD-10-CM

## 2024-06-14 DIAGNOSIS — M17.11 PRIMARY OSTEOARTHRITIS OF RIGHT KNEE: ICD-10-CM

## 2024-06-14 PROCEDURE — 99213 OFFICE O/P EST LOW 20 MIN: CPT | Performed by: STUDENT IN AN ORGANIZED HEALTH CARE EDUCATION/TRAINING PROGRAM

## 2024-06-14 NOTE — PROGRESS NOTES
Ortho Sports Medicine Knee Follow Up Visit     Assesment:     53 y.o. male bilateral knee osteoarthritis  Plan:    Viktor is present today for follow up visit. Due to benign imaging we discussed and recommended physical therapy for bilateral knees for strengthening.  Explained that the pain and soreness that he is experiencing is associated with his osteoarthritis bilaterally. We offered corticosteroid injections bilaterally which was decline by the patient, patient at this time does not want to do physical therapy. Discussed that if he needs chronic pain medication that he should talk with his PCP as they can further manage and monitor labs.    Conservative treatment:    Ice to knee for 20 minutes at least 1-2 times daily.  OTC NSAIDS   Tylenol for pain.    Imaging:    All imaging from today was reviewed by myself and explained to the patient.       Injection:    No Injection planned at this time.      Surgery:     No surgery is recommended at this point, continue with conservative treatment plan as noted.    Follow up:    Return if symptoms worsen or fail to improve.        Chief Complaint   Patient presents with    Left Knee - Follow-up       History of Present Illness:  6/14/24 Update:  At this time patient reports that his left knee is doing alright and no longer a problem. At this time he is complaining of right knee pain along the medial joint line. Patient reports that he does not have any numbness or tingling. At this time would like to talk about medication to help with his pain, reports that ibuprofen and tylenol do not work, topical Voltaren gel does not help. States that his pain is keeping him up at night.     The patient is returns for follow up of bilateral knee osteoarthritis.  Since the prior visit, He reports no improvement. Pain is still present in the posterior knee in the left with instability along with medial joint line tenderness. In the right knee he has medial joint line tenderness as well.  Patient reports that he still feels weak and that his knees feel like they will give out below him. Patient was prescribed Meloxicam by his PCP which he reports does not help. He takes aleve as needed but also does not provide adequate relief. Pain is still a 7/10 dull soreness.          Knee Surgical History:  None    Past Medical, Social and Family History:  Past Medical History:   Diagnosis Date    Anxiety     Back pain     Chronic right-sided low back pain without sciatica 3/31/2018    Depression     Gastroesophageal reflux disease 3/31/2018    GERD (gastroesophageal reflux disease)     Right foot pain      Past Surgical History:   Procedure Laterality Date    NO PAST SURGERIES      ORIF FINGER FRACTURE Right 8/6/2019    Procedure: OPEN REDUCTION FINGER;  Surgeon: Tomas Morgan MD;  Location: [facility];  Service: Plastics     No Known Allergies  Current Outpatient Medications on File Prior to Visit   Medication Sig Dispense Refill    amLODIPine (NORVASC) 10 mg tablet Take 10 mg by mouth daily      bacitracin topical ointment 500 units/g topical ointment Apply 1 large application topically 2 (two) times a day 28 g 0    Diclofenac Sodium (VOLTAREN) 1 % APPLY 2 GRAMS TO AFFECTED AREA TWICE A DAY      escitalopram (LEXAPRO) 20 mg tablet TAKE ONE TABLET DAILY 60 tablet 2    fluticasone (FLONASE) 50 mcg/act nasal spray 1 spray into each nostril daily 16 g 0    gabapentin (NEURONTIN) 100 mg capsule TAKE 2 CAPSULE BY ORAL ROUTE 3 TIMES EVERY BEDTIME      meloxicam (Mobic) 15 mg tablet Take 1 tablet (15 mg total) by mouth daily 28 tablet 0    mirtazapine (REMERON) 15 mg tablet TAKE 1 TABLET BY MOUTH EVERY DAY BEFORE BEDTIME      omeprazole (PriLOSEC) 20 mg delayed release capsule Take 20 mg by mouth daily Take before a meal      oxyCODONE-acetaminophen (PERCOCET) 5-325 mg per tablet Take 1 tablet by mouth every 6 (six) hours as needed for moderate pain for up to 6 dosesMax Daily Amount: 4 tablets 6 tablet 0    hydrOXYzine  "HCL (ATARAX) 25 mg tablet Take 1 tablet (25 mg total) by mouth daily at bedtime as needed for itching (Patient not taking: Reported on 8/6/2019) 12 tablet 0     No current facility-administered medications on file prior to visit.     Social History     Socioeconomic History    Marital status: Single     Spouse name: Not on file    Number of children: Not on file    Years of education: Not on file    Highest education level: Not on file   Occupational History    Not on file   Tobacco Use    Smoking status: Every Day     Current packs/day: 1.00     Average packs/day: 1 pack/day for 20.0 years (20.0 ttl pk-yrs)     Types: Cigarettes    Smokeless tobacco: Never   Vaping Use    Vaping status: Never Used   Substance and Sexual Activity    Alcohol use: Yes     Alcohol/week: 6.0 standard drinks of alcohol     Types: 6 Cans of beer per week     Comment: 6 pack of beer a night    Drug use: No    Sexual activity: Not on file   Other Topics Concern    Not on file   Social History Narrative    Not on file     Social Determinants of Health     Financial Resource Strain: Not on file   Food Insecurity: Not on file   Transportation Needs: Not on file   Physical Activity: Not on file   Stress: Not on file   Social Connections: Not on file   Intimate Partner Violence: Not on file   Housing Stability: Not on file         I have reviewed the past medical, surgical, social and family history, medications and allergies as documented in the EMR.    Review of systems: ROS is negative other than that noted in the HPI.  Constitutional: Negative for fatigue and fever.      Physical Exam:    Blood pressure 117/75, pulse 102, resp. rate 17, height 6' 2\" (1.88 m), weight 113 kg (249 lb).    General/Constitutional: NAD, well developed, well nourished  HENT: Normocephalic, atraumatic  CV: Intact distal pulses, regular rate  Resp: No respiratory distress or labored breathing  Lymphatic: No lymphadenopathy palpated  Neuro: Alert and Oriented x 3, no " focal deficits  Psych: Normal mood, normal affect, normal judgement, normal behavior  Skin: Warm, dry, no rashes, no erythema      Left Knee Exam (focused):  Visual inspection of the left  knee demonstrates normal contour without atrophy.   Palpable bakers cyst present  NO previous incisions   There is no significant erythema or edema.    No significant joint effusion   Range of motion is full from 0-130 degrees of flexion   Difficulty with straight leg raise due to stiffness   +medial joint line tenderness, - lateral joint line tenderness  +medial Kane's, - lateral Kane's   stable Lachman exam, stable posterior drawer  Stable to varus and valgus stress at both 0 and 30°  Patella tracks normally.  No J sign.  No apprehension.  Translation is approximately 2 quadrants and is equal to the contralateral side  Patellar eversion is similar to the contralateral side Examination of the patient's ipsilateral hip demonstrates full painless range of motion.  No crepitus.           LE NV Exam: +2 DP/PT pulses bilaterally  Sensation intact to light touch L2-S1 bilaterally    No calf tenderness to palpation bilaterally    Right Knee Exam (focused):  Visual inspection of the right knee demonstrates normal contour without atrophy.   NO previous incisions   There is no significant erythema or edema.    No significant joint effusion   Range of motion is full from 5-130 degrees of flexion   Difficulty with straight leg raise due to stiffness   + medial joint line tenderness, - lateral joint line tenderness  - medial Kane's, - lateral Kane's  stable Lachman exam, stable posterior drawer  Stable to varus and valgus stress at both 0 and 30°  Patella tracks normally.  No J sign.  No apprehension.  Translation is approximately 2 quadrants and is equal to the contralateral side  Patellar eversion is similar to the contralateral side    Knee Imaging    No imaging was performed today      Scribe Attestation      I,:  Aurora  KIM Wright am acting as a scribe while in the presence of the attending physician.:       I,:  Aurora Wright PA-C personally performed the services described in this documentation    as scribed in my presence.:

## 2024-06-17 ENCOUNTER — TELEPHONE (OUTPATIENT)
Dept: OBGYN CLINIC | Facility: CLINIC | Age: 54
End: 2024-06-17

## 2024-06-17 NOTE — TELEPHONE ENCOUNTER
Left Voicemail for patient regarding his concerns with his visit with Dr. De La O on 6/14/24. I had received a voicemail from his girlfriend, Solange, but we do not have a communication consent form that allows us to speak with her.

## 2024-07-08 DIAGNOSIS — G89.29 CHRONIC PAIN OF LEFT KNEE: ICD-10-CM

## 2024-07-08 DIAGNOSIS — M25.562 CHRONIC PAIN OF LEFT KNEE: ICD-10-CM

## 2024-07-09 DIAGNOSIS — G89.29 CHRONIC PAIN OF LEFT KNEE: ICD-10-CM

## 2024-07-09 DIAGNOSIS — M25.562 CHRONIC PAIN OF LEFT KNEE: ICD-10-CM

## 2024-07-09 RX ORDER — MELOXICAM 15 MG/1
15 TABLET ORAL DAILY
Qty: 30 TABLET | Refills: 5 | Status: SHIPPED | OUTPATIENT
Start: 2024-07-09 | End: 2024-07-12

## 2024-07-09 NOTE — TELEPHONE ENCOUNTER
Please review to see if the refill is appropriate.   Should this script be discontinued? Or will  continue to refill

## 2024-07-12 RX ORDER — MELOXICAM 15 MG/1
15 TABLET ORAL DAILY
Qty: 30 TABLET | Refills: 5 | Status: SHIPPED | OUTPATIENT
Start: 2024-07-12

## 2024-08-19 ENCOUNTER — TELEPHONE (OUTPATIENT)
Age: 54
End: 2024-08-19

## 2024-08-19 NOTE — TELEPHONE ENCOUNTER
Pts spouse Solange calling for NP appt for acne    Offered VV with Dr Stark late Oct/Early Nov, she stated he needs to be seen in person. So I offered NP next avail, she declined and may call back

## 2024-08-21 ENCOUNTER — TELEPHONE (OUTPATIENT)
Age: 54
End: 2024-08-21

## 2024-09-19 ENCOUNTER — HOSPITAL ENCOUNTER (OUTPATIENT)
Dept: ULTRASOUND IMAGING | Facility: HOSPITAL | Age: 54
End: 2024-09-19
Attending: FAMILY MEDICINE
Payer: COMMERCIAL

## 2024-09-19 DIAGNOSIS — R94.5 NONSPECIFIC ABNORMAL RESULTS OF FUNCTION STUDY OF LIVER: ICD-10-CM

## 2024-09-19 PROCEDURE — 76700 US EXAM ABDOM COMPLETE: CPT

## 2024-10-15 ENCOUNTER — TELEPHONE (OUTPATIENT)
Age: 54
End: 2024-10-15

## 2024-10-15 NOTE — TELEPHONE ENCOUNTER
Pt Friend is calling with pt next to her. Pt is scheduled for a new patient consult on 10/23 with Dr. Ro for cough and issues with breathing. They requested a sooner appt. State that Pt is wheezing, coughing, having shortness of breath, and shallow breathing, and stops for a second, gets out of breath just getting dressed. Pt has already said that he is adamant about going to the ED and his friend states that she cannot force him. Informed completely understand however, the recommendation for concerns as shallow breathing and stops breathing periodically is going to the ED for further evaluation or UC as well, if he is really adamant about both then he would have to follow up with PCP. Explained that the ED although seems to be too much for patient our providers can be consulted there as well and that would be beneficial to the patient. Informed that do not any other appt this week at the [facility] and that the next available is 10/22 at 10AM, could offer a possible sooner appt at a different location but pt wanted [facility], switched appt day to 10/22.

## 2024-10-16 ENCOUNTER — APPOINTMENT (INPATIENT)
Dept: CT IMAGING | Facility: HOSPITAL | Age: 54
DRG: 720 | End: 2024-10-16
Payer: COMMERCIAL

## 2024-10-16 ENCOUNTER — APPOINTMENT (EMERGENCY)
Dept: VASCULAR ULTRASOUND | Facility: HOSPITAL | Age: 54
End: 2024-10-16
Payer: COMMERCIAL

## 2024-10-16 ENCOUNTER — APPOINTMENT (INPATIENT)
Dept: RADIOLOGY | Facility: HOSPITAL | Age: 54
DRG: 720 | End: 2024-10-16
Payer: COMMERCIAL

## 2024-10-16 ENCOUNTER — APPOINTMENT (EMERGENCY)
Dept: CT IMAGING | Facility: HOSPITAL | Age: 54
End: 2024-10-16
Payer: COMMERCIAL

## 2024-10-16 ENCOUNTER — HOSPITAL ENCOUNTER (EMERGENCY)
Facility: HOSPITAL | Age: 54
Discharge: ANOTHER HOSPITAL | End: 2024-10-16
Attending: INTERNAL MEDICINE
Payer: COMMERCIAL

## 2024-10-16 ENCOUNTER — APPOINTMENT (EMERGENCY)
Dept: RADIOLOGY | Facility: HOSPITAL | Age: 54
End: 2024-10-16
Payer: COMMERCIAL

## 2024-10-16 ENCOUNTER — HOSPITAL ENCOUNTER (INPATIENT)
Facility: HOSPITAL | Age: 54
LOS: 12 days | Discharge: HOME | DRG: 720 | End: 2024-10-28
Attending: INTERNAL MEDICINE | Admitting: INTERNAL MEDICINE
Payer: COMMERCIAL

## 2024-10-16 VITALS
RESPIRATION RATE: 21 BRPM | HEART RATE: 93 BPM | OXYGEN SATURATION: 94 % | TEMPERATURE: 97.8 F | DIASTOLIC BLOOD PRESSURE: 56 MMHG | SYSTOLIC BLOOD PRESSURE: 149 MMHG

## 2024-10-16 DIAGNOSIS — E87.70 VOLUME OVERLOAD: ICD-10-CM

## 2024-10-16 DIAGNOSIS — R06.02 SOB (SHORTNESS OF BREATH): ICD-10-CM

## 2024-10-16 DIAGNOSIS — E53.8 VITAMIN B12 DEFICIENCY: ICD-10-CM

## 2024-10-16 DIAGNOSIS — R74.01 TRANSAMINITIS: ICD-10-CM

## 2024-10-16 DIAGNOSIS — F32.A DEPRESSION, UNSPECIFIED DEPRESSION TYPE: ICD-10-CM

## 2024-10-16 DIAGNOSIS — R06.03 RESPIRATORY DISTRESS: ICD-10-CM

## 2024-10-16 DIAGNOSIS — E87.1 HYPONATREMIA: Primary | ICD-10-CM

## 2024-10-16 DIAGNOSIS — G70.01 MYASTHENIA GRAVIS IN CRISIS (HCC): ICD-10-CM

## 2024-10-16 DIAGNOSIS — J81.1 PULMONARY EDEMA: ICD-10-CM

## 2024-10-16 DIAGNOSIS — E53.8 FOLATE DEFICIENCY: ICD-10-CM

## 2024-10-16 DIAGNOSIS — J18.9 PNEUMONIA OF BOTH UPPER LOBES DUE TO INFECTIOUS ORGANISM: Primary | ICD-10-CM

## 2024-10-16 DIAGNOSIS — F10.10 ALCOHOL ABUSE: ICD-10-CM

## 2024-10-16 DIAGNOSIS — R26.2 AMBULATORY DYSFUNCTION: ICD-10-CM

## 2024-10-16 DIAGNOSIS — Z72.0 TOBACCO ABUSE: ICD-10-CM

## 2024-10-16 DIAGNOSIS — E87.1 HYPONATREMIA: ICD-10-CM

## 2024-10-16 PROBLEM — R65.10 SIRS (SYSTEMIC INFLAMMATORY RESPONSE SYNDROME) (HCC): Status: ACTIVE | Noted: 2024-10-16

## 2024-10-16 PROBLEM — E87.20 NORMAL ANION GAP METABOLIC ACIDOSIS: Status: RESOLVED | Noted: 2024-10-16 | Resolved: 2024-10-16

## 2024-10-16 PROBLEM — M25.511 RIGHT SHOULDER PAIN: Status: ACTIVE | Noted: 2024-10-16

## 2024-10-16 PROBLEM — E87.4: Status: ACTIVE | Noted: 2024-10-16

## 2024-10-16 PROBLEM — R29.6 FALLS: Status: ACTIVE | Noted: 2024-10-16

## 2024-10-16 PROBLEM — E83.42 HYPOMAGNESEMIA: Status: ACTIVE | Noted: 2024-10-16

## 2024-10-16 PROBLEM — E87.20 NORMAL ANION GAP METABOLIC ACIDOSIS: Status: ACTIVE | Noted: 2024-10-16

## 2024-10-16 PROBLEM — E87.5 HYPERKALEMIA: Status: ACTIVE | Noted: 2024-10-16

## 2024-10-16 LAB
ALBUMIN SERPL BCG-MCNC: 3.3 G/DL (ref 3.5–5)
ALBUMIN SERPL BCG-MCNC: 3.5 G/DL (ref 3.5–5)
ALBUMIN SERPL BCG-MCNC: 3.6 G/DL (ref 3.5–5)
ALP SERPL-CCNC: 196 U/L (ref 34–104)
ALP SERPL-CCNC: 211 U/L (ref 34–104)
ALP SERPL-CCNC: 213 U/L (ref 34–104)
ALT SERPL W P-5'-P-CCNC: 19 U/L (ref 7–52)
ANION GAP SERPL CALCULATED.3IONS-SCNC: 11 MMOL/L (ref 4–13)
ANION GAP SERPL CALCULATED.3IONS-SCNC: 7 MMOL/L (ref 4–13)
ANION GAP SERPL CALCULATED.3IONS-SCNC: 7 MMOL/L (ref 4–13)
ANION GAP SERPL CALCULATED.3IONS-SCNC: 8 MMOL/L (ref 4–13)
ANION GAP SERPL CALCULATED.3IONS-SCNC: 9 MMOL/L (ref 4–13)
AST SERPL W P-5'-P-CCNC: 64 U/L (ref 13–39)
AST SERPL W P-5'-P-CCNC: 66 U/L (ref 13–39)
AST SERPL W P-5'-P-CCNC: 66 U/L (ref 13–39)
ATRIAL RATE: 92 BPM
BACTERIA UR QL AUTO: ABNORMAL /HPF
BASE EX.OXY STD BLDV CALC-SCNC: 80.3 % (ref 60–80)
BASE EX.OXY STD BLDV CALC-SCNC: 93.7 % (ref 60–80)
BASE EXCESS BLDV CALC-SCNC: -3.1 MMOL/L
BASE EXCESS BLDV CALC-SCNC: -3.6 MMOL/L
BASOPHILS # BLD AUTO: 0.02 THOUSANDS/ΜL (ref 0–0.1)
BASOPHILS NFR BLD AUTO: 0 % (ref 0–1)
BILIRUB SERPL-MCNC: 2.3 MG/DL (ref 0.2–1)
BILIRUB SERPL-MCNC: 2.34 MG/DL (ref 0.2–1)
BILIRUB SERPL-MCNC: 2.35 MG/DL (ref 0.2–1)
BILIRUB UR QL STRIP: NEGATIVE
BNP SERPL-MCNC: 308 PG/ML (ref 0–100)
BUN SERPL-MCNC: 7 MG/DL (ref 5–25)
BUN SERPL-MCNC: 8 MG/DL (ref 5–25)
CA-I BLD-SCNC: 1.02 MMOL/L (ref 1.12–1.32)
CALCIUM ALBUM COR SERPL-MCNC: 8.5 MG/DL (ref 8.3–10.1)
CALCIUM SERPL-MCNC: 7.9 MG/DL (ref 8.4–10.2)
CALCIUM SERPL-MCNC: 8 MG/DL (ref 8.4–10.2)
CALCIUM SERPL-MCNC: 8.1 MG/DL (ref 8.4–10.2)
CALCIUM SERPL-MCNC: 8.2 MG/DL (ref 8.4–10.2)
CALCIUM SERPL-MCNC: 8.6 MG/DL (ref 8.4–10.2)
CHLORIDE SERPL-SCNC: 74 MMOL/L (ref 96–108)
CHLORIDE SERPL-SCNC: 75 MMOL/L (ref 96–108)
CHLORIDE SERPL-SCNC: 75 MMOL/L (ref 96–108)
CHLORIDE SERPL-SCNC: 76 MMOL/L (ref 96–108)
CHLORIDE SERPL-SCNC: 79 MMOL/L (ref 96–108)
CLARITY UR: CLEAR
CO2 SERPL-SCNC: 16 MMOL/L (ref 21–32)
CO2 SERPL-SCNC: 17 MMOL/L (ref 21–32)
CO2 SERPL-SCNC: 17 MMOL/L (ref 21–32)
CO2 SERPL-SCNC: 18 MMOL/L (ref 21–32)
CO2 SERPL-SCNC: 20 MMOL/L (ref 21–32)
COLOR UR: ABNORMAL
CREAT SERPL-MCNC: 0.67 MG/DL (ref 0.6–1.3)
CREAT SERPL-MCNC: 0.72 MG/DL (ref 0.6–1.3)
CREAT SERPL-MCNC: 0.74 MG/DL (ref 0.6–1.3)
CREAT SERPL-MCNC: 0.75 MG/DL (ref 0.6–1.3)
CREAT SERPL-MCNC: 0.76 MG/DL (ref 0.6–1.3)
D DIMER PPP FEU-MCNC: 1.85 UG/ML FEU
EOSINOPHIL # BLD AUTO: 0.01 THOUSAND/ΜL (ref 0–0.61)
EOSINOPHIL NFR BLD AUTO: 0 % (ref 0–6)
ERYTHROCYTE [DISTWIDTH] IN BLOOD BY AUTOMATED COUNT: 14.2 % (ref 11.6–15.1)
ERYTHROCYTE [DISTWIDTH] IN BLOOD BY AUTOMATED COUNT: 14.7 % (ref 11.6–15.1)
ETHANOL SERPL-MCNC: <10 MG/DL
FERRITIN SERPL-MCNC: 62 NG/ML (ref 24–336)
FLUAV AG UPPER RESP QL IA.RAPID: NEGATIVE
FLUBV AG UPPER RESP QL IA.RAPID: NEGATIVE
FOLATE SERPL-MCNC: 5.9 NG/ML
GFR SERPL CREATININE-BSD FRML MDRD: 103 ML/MIN/1.73SQ M
GFR SERPL CREATININE-BSD FRML MDRD: 103 ML/MIN/1.73SQ M
GFR SERPL CREATININE-BSD FRML MDRD: 104 ML/MIN/1.73SQ M
GFR SERPL CREATININE-BSD FRML MDRD: 105 ML/MIN/1.73SQ M
GFR SERPL CREATININE-BSD FRML MDRD: 108 ML/MIN/1.73SQ M
GLUCOSE SERPL-MCNC: 135 MG/DL (ref 65–140)
GLUCOSE SERPL-MCNC: 144 MG/DL (ref 65–140)
GLUCOSE SERPL-MCNC: 163 MG/DL (ref 65–140)
GLUCOSE SERPL-MCNC: 166 MG/DL (ref 65–140)
GLUCOSE SERPL-MCNC: 182 MG/DL (ref 65–140)
GLUCOSE UR STRIP-MCNC: NEGATIVE MG/DL
HCO3 BLDV-SCNC: 18.1 MMOL/L (ref 24–30)
HCO3 BLDV-SCNC: 18.9 MMOL/L (ref 24–30)
HCT VFR BLD AUTO: 31.1 % (ref 36.5–49.3)
HCT VFR BLD AUTO: 31.7 % (ref 36.5–49.3)
HGB BLD-MCNC: 11.1 G/DL (ref 12–17)
HGB BLD-MCNC: 11.3 G/DL (ref 12–17)
HGB UR QL STRIP.AUTO: NEGATIVE
IMM GRANULOCYTES # BLD AUTO: 0.08 THOUSAND/UL (ref 0–0.2)
IMM GRANULOCYTES NFR BLD AUTO: 1 % (ref 0–2)
INR PPP: 1.27 (ref 0.85–1.19)
IRON SATN MFR SERPL: 32 % (ref 15–50)
IRON SERPL-MCNC: 123 UG/DL (ref 50–212)
KETONES UR STRIP-MCNC: ABNORMAL MG/DL
L PNEUMO1 AG UR QL IA.RAPID: NEGATIVE
LACTATE SERPL-SCNC: 1.9 MMOL/L (ref 0.5–2)
LEUKOCYTE ESTERASE UR QL STRIP: NEGATIVE
LIPASE SERPL-CCNC: 57 U/L (ref 11–82)
LYMPHOCYTES # BLD AUTO: 0.7 THOUSANDS/ΜL (ref 0.6–4.47)
LYMPHOCYTES NFR BLD AUTO: 8 % (ref 14–44)
MAGNESIUM SERPL-MCNC: 1.9 MG/DL (ref 1.9–2.7)
MCH RBC QN AUTO: 28.5 PG (ref 26.8–34.3)
MCH RBC QN AUTO: 28.6 PG (ref 26.8–34.3)
MCHC RBC AUTO-ENTMCNC: 35.6 G/DL (ref 31.4–37.4)
MCHC RBC AUTO-ENTMCNC: 35.7 G/DL (ref 31.4–37.4)
MCV RBC AUTO: 80 FL (ref 82–98)
MCV RBC AUTO: 80 FL (ref 82–98)
MONOCYTES # BLD AUTO: 0.83 THOUSAND/ΜL (ref 0.17–1.22)
MONOCYTES NFR BLD AUTO: 9 % (ref 4–12)
NASAL CANNULA: 4
NEUTROPHILS # BLD AUTO: 7.51 THOUSANDS/ΜL (ref 1.85–7.62)
NEUTS SEG NFR BLD AUTO: 82 % (ref 43–75)
NITRITE UR QL STRIP: NEGATIVE
NON-SQ EPI CELLS URNS QL MICRO: ABNORMAL /HPF
NRBC BLD AUTO-RTO: 0 /100 WBCS
O2 CT BLDV-SCNC: 13.4 ML/DL
O2 CT BLDV-SCNC: 20.2 ML/DL
OSMOLALITY UR/SERPL-RTO: 233 MMOL/KG (ref 282–298)
OSMOLALITY UR: 347 MMOL/KG
P AXIS: 44 DEGREES
PCO2 BLDV: 24.1 MM HG (ref 42–50)
PCO2 BLDV: 27.1 MM HG (ref 42–50)
PH BLDV: 7.46 [PH] (ref 7.3–7.4)
PH BLDV: 7.49 [PH] (ref 7.3–7.4)
PH UR STRIP.AUTO: 5.5 [PH]
PHOSPHATE SERPL-MCNC: 2.4 MG/DL (ref 2.7–4.5)
PLATELET # BLD AUTO: 104 THOUSANDS/UL (ref 149–390)
PLATELET # BLD AUTO: 105 THOUSANDS/UL (ref 149–390)
PMV BLD AUTO: 11.6 FL (ref 8.9–12.7)
PMV BLD AUTO: 11.9 FL (ref 8.9–12.7)
PO2 BLDV: 42.6 MM HG (ref 35–45)
PO2 BLDV: 76.9 MM HG (ref 35–45)
POTASSIUM SERPL-SCNC: 4.9 MMOL/L (ref 3.5–5.3)
POTASSIUM SERPL-SCNC: 5 MMOL/L (ref 3.5–5.3)
POTASSIUM SERPL-SCNC: 5.1 MMOL/L (ref 3.5–5.3)
POTASSIUM SERPL-SCNC: 5.3 MMOL/L (ref 3.5–5.3)
POTASSIUM SERPL-SCNC: 5.5 MMOL/L (ref 3.5–5.3)
PR INTERVAL: 158 MS
PROT SERPL-MCNC: 7.4 G/DL (ref 6.4–8.4)
PROT SERPL-MCNC: 7.8 G/DL (ref 6.4–8.4)
PROT SERPL-MCNC: 7.9 G/DL (ref 6.4–8.4)
PROT UR STRIP-MCNC: NEGATIVE MG/DL
PROTHROMBIN TIME: 16.6 SECONDS (ref 12.3–15)
QRS AXIS: 103 DEGREES
QRSD INTERVAL: 108 MS
QT INTERVAL: 368 MS
QTC INTERVAL: 455 MS
RBC # BLD AUTO: 3.9 MILLION/UL (ref 3.88–5.62)
RBC # BLD AUTO: 3.95 MILLION/UL (ref 3.88–5.62)
RBC #/AREA URNS AUTO: ABNORMAL /HPF
S PNEUM AG UR QL: NEGATIVE
SARS-COV+SARS-COV-2 AG RESP QL IA.RAPID: NEGATIVE
SODIUM 24H UR-SCNC: 21 MOL/L
SODIUM SERPL-SCNC: 101 MMOL/L (ref 135–147)
SODIUM SERPL-SCNC: 102 MMOL/L (ref 135–147)
SODIUM SERPL-SCNC: 103 MMOL/L (ref 135–147)
SODIUM SERPL-SCNC: 104 MMOL/L (ref 135–147)
SODIUM SERPL-SCNC: 99 MMOL/L (ref 135–147)
SP GR UR STRIP.AUTO: 1.03 (ref 1–1.03)
T WAVE AXIS: 47 DEGREES
TIBC SERPL-MCNC: 384 UG/DL (ref 250–450)
TSH SERPL DL<=0.05 MIU/L-ACNC: 1.35 UIU/ML (ref 0.45–4.5)
UIBC SERPL-MCNC: 261 UG/DL (ref 155–355)
UROBILINOGEN UR STRIP-ACNC: <2 MG/DL
VENTRICULAR RATE: 92 BPM
VIT B12 SERPL-MCNC: 330 PG/ML (ref 180–914)
WBC # BLD AUTO: 7.94 THOUSAND/UL (ref 4.31–10.16)
WBC # BLD AUTO: 9.15 THOUSAND/UL (ref 4.31–10.16)
WBC #/AREA URNS AUTO: ABNORMAL /HPF

## 2024-10-16 PROCEDURE — 80053 COMPREHEN METABOLIC PANEL: CPT

## 2024-10-16 PROCEDURE — 93971 EXTREMITY STUDY: CPT

## 2024-10-16 PROCEDURE — 85610 PROTHROMBIN TIME: CPT

## 2024-10-16 PROCEDURE — 87040 BLOOD CULTURE FOR BACTERIA: CPT

## 2024-10-16 PROCEDURE — 70450 CT HEAD/BRAIN W/O DYE: CPT

## 2024-10-16 PROCEDURE — 82805 BLOOD GASES W/O2 SATURATION: CPT

## 2024-10-16 PROCEDURE — 83540 ASSAY OF IRON: CPT

## 2024-10-16 PROCEDURE — 82607 VITAMIN B-12: CPT

## 2024-10-16 PROCEDURE — 73030 X-RAY EXAM OF SHOULDER: CPT

## 2024-10-16 PROCEDURE — 85379 FIBRIN DEGRADATION QUANT: CPT | Performed by: INTERNAL MEDICINE

## 2024-10-16 PROCEDURE — 71045 X-RAY EXAM CHEST 1 VIEW: CPT

## 2024-10-16 PROCEDURE — 96365 THER/PROPH/DIAG IV INF INIT: CPT

## 2024-10-16 PROCEDURE — 80048 BASIC METABOLIC PNL TOTAL CA: CPT | Performed by: PHYSICIAN ASSISTANT

## 2024-10-16 PROCEDURE — 93010 ELECTROCARDIOGRAM REPORT: CPT | Performed by: INTERNAL MEDICINE

## 2024-10-16 PROCEDURE — NC001 PR NO CHARGE: Performed by: INTERNAL MEDICINE

## 2024-10-16 PROCEDURE — 80048 BASIC METABOLIC PNL TOTAL CA: CPT | Performed by: INTERNAL MEDICINE

## 2024-10-16 PROCEDURE — 82077 ASSAY SPEC XCP UR&BREATH IA: CPT

## 2024-10-16 PROCEDURE — 83935 ASSAY OF URINE OSMOLALITY: CPT

## 2024-10-16 PROCEDURE — 36415 COLL VENOUS BLD VENIPUNCTURE: CPT | Performed by: INTERNAL MEDICINE

## 2024-10-16 PROCEDURE — 83880 ASSAY OF NATRIURETIC PEPTIDE: CPT | Performed by: INTERNAL MEDICINE

## 2024-10-16 PROCEDURE — 87449 NOS EACH ORGANISM AG IA: CPT

## 2024-10-16 PROCEDURE — 84300 ASSAY OF URINE SODIUM: CPT

## 2024-10-16 PROCEDURE — 93005 ELECTROCARDIOGRAM TRACING: CPT

## 2024-10-16 PROCEDURE — 82330 ASSAY OF CALCIUM: CPT

## 2024-10-16 PROCEDURE — 93971 EXTREMITY STUDY: CPT | Performed by: SURGERY

## 2024-10-16 PROCEDURE — 83550 IRON BINDING TEST: CPT

## 2024-10-16 PROCEDURE — 83930 ASSAY OF BLOOD OSMOLALITY: CPT

## 2024-10-16 PROCEDURE — 96367 TX/PROPH/DG ADDL SEQ IV INF: CPT

## 2024-10-16 PROCEDURE — 99285 EMERGENCY DEPT VISIT HI MDM: CPT

## 2024-10-16 PROCEDURE — 81001 URINALYSIS AUTO W/SCOPE: CPT

## 2024-10-16 PROCEDURE — 83735 ASSAY OF MAGNESIUM: CPT

## 2024-10-16 PROCEDURE — 87081 CULTURE SCREEN ONLY: CPT

## 2024-10-16 PROCEDURE — 87811 SARS-COV-2 COVID19 W/OPTIC: CPT | Performed by: INTERNAL MEDICINE

## 2024-10-16 PROCEDURE — 71275 CT ANGIOGRAPHY CHEST: CPT

## 2024-10-16 PROCEDURE — 83605 ASSAY OF LACTIC ACID: CPT

## 2024-10-16 PROCEDURE — 87205 SMEAR GRAM STAIN: CPT

## 2024-10-16 PROCEDURE — 80053 COMPREHEN METABOLIC PANEL: CPT | Performed by: INTERNAL MEDICINE

## 2024-10-16 PROCEDURE — 87070 CULTURE OTHR SPECIMN AEROBIC: CPT

## 2024-10-16 PROCEDURE — 85025 COMPLETE CBC W/AUTO DIFF WBC: CPT | Performed by: INTERNAL MEDICINE

## 2024-10-16 PROCEDURE — 83918 ORGANIC ACIDS TOTAL QUANT: CPT

## 2024-10-16 PROCEDURE — 82746 ASSAY OF FOLIC ACID SERUM: CPT

## 2024-10-16 PROCEDURE — 99255 IP/OBS CONSLTJ NEW/EST HI 80: CPT | Performed by: STUDENT IN AN ORGANIZED HEALTH CARE EDUCATION/TRAINING PROGRAM

## 2024-10-16 PROCEDURE — 96375 TX/PRO/DX INJ NEW DRUG ADDON: CPT

## 2024-10-16 PROCEDURE — 85027 COMPLETE CBC AUTOMATED: CPT

## 2024-10-16 PROCEDURE — 84100 ASSAY OF PHOSPHORUS: CPT

## 2024-10-16 PROCEDURE — 84443 ASSAY THYROID STIM HORMONE: CPT

## 2024-10-16 PROCEDURE — 87804 INFLUENZA ASSAY W/OPTIC: CPT | Performed by: INTERNAL MEDICINE

## 2024-10-16 PROCEDURE — 94640 AIRWAY INHALATION TREATMENT: CPT

## 2024-10-16 PROCEDURE — 83690 ASSAY OF LIPASE: CPT

## 2024-10-16 PROCEDURE — 82728 ASSAY OF FERRITIN: CPT

## 2024-10-16 PROCEDURE — 87040 BLOOD CULTURE FOR BACTERIA: CPT | Performed by: INTERNAL MEDICINE

## 2024-10-16 RX ORDER — FLUTICASONE PROPIONATE 50 MCG
1 SPRAY, SUSPENSION (ML) NASAL DAILY
Status: DISCONTINUED | OUTPATIENT
Start: 2024-10-17 | End: 2024-10-18

## 2024-10-16 RX ORDER — CHLORHEXIDINE GLUCONATE ORAL RINSE 1.2 MG/ML
15 SOLUTION DENTAL EVERY 12 HOURS SCHEDULED
Status: DISCONTINUED | OUTPATIENT
Start: 2024-10-16 | End: 2024-10-28 | Stop reason: HOSPADM

## 2024-10-16 RX ORDER — ASPIRIN 81 MG
81 TABLET,CHEWABLE ORAL DAILY
COMMUNITY
Start: 2024-10-09

## 2024-10-16 RX ORDER — LANOLIN ALCOHOL/MO/W.PET/CERES
250 CREAM (GRAM) TOPICAL 3 TIMES DAILY
Status: DISCONTINUED | OUTPATIENT
Start: 2024-10-19 | End: 2024-10-16

## 2024-10-16 RX ORDER — MIRTAZAPINE 15 MG/1
15 TABLET, FILM COATED ORAL
Status: DISCONTINUED | OUTPATIENT
Start: 2024-10-16 | End: 2024-10-28 | Stop reason: HOSPADM

## 2024-10-16 RX ORDER — LANOLIN ALCOHOL/MO/W.PET/CERES
100 CREAM (GRAM) TOPICAL DAILY
Status: DISCONTINUED | OUTPATIENT
Start: 2024-10-26 | End: 2024-10-16

## 2024-10-16 RX ORDER — ASPIRIN 81 MG/1
81 TABLET, CHEWABLE ORAL DAILY
Status: DISCONTINUED | OUTPATIENT
Start: 2024-10-17 | End: 2024-10-28 | Stop reason: HOSPADM

## 2024-10-16 RX ORDER — LANOLIN ALCOHOL/MO/W.PET/CERES
500 CREAM (GRAM) TOPICAL 3 TIMES DAILY
Status: DISCONTINUED | OUTPATIENT
Start: 2024-10-16 | End: 2024-10-16

## 2024-10-16 RX ORDER — ATORVASTATIN CALCIUM 20 MG/1
20 TABLET, FILM COATED ORAL DAILY
COMMUNITY
Start: 2024-10-09

## 2024-10-16 RX ORDER — CEFTRIAXONE 1 G/50ML
1000 INJECTION, SOLUTION INTRAVENOUS ONCE
Status: COMPLETED | OUTPATIENT
Start: 2024-10-16 | End: 2024-10-16

## 2024-10-16 RX ORDER — HYDRALAZINE HYDROCHLORIDE 20 MG/ML
10 INJECTION INTRAMUSCULAR; INTRAVENOUS ONCE
Status: COMPLETED | OUTPATIENT
Start: 2024-10-16 | End: 2024-10-16

## 2024-10-16 RX ORDER — OXYBUTYNIN CHLORIDE 5 MG/1
5 TABLET ORAL ONCE
Status: COMPLETED | OUTPATIENT
Start: 2024-10-17 | End: 2024-10-17

## 2024-10-16 RX ORDER — AMLODIPINE BESYLATE 5 MG/1
10 TABLET ORAL DAILY
Status: DISCONTINUED | OUTPATIENT
Start: 2024-10-17 | End: 2024-10-17

## 2024-10-16 RX ORDER — LANOLIN ALCOHOL/MO/W.PET/CERES
100 CREAM (GRAM) TOPICAL DAILY
Status: DISCONTINUED | OUTPATIENT
Start: 2024-10-26 | End: 2024-10-28 | Stop reason: HOSPADM

## 2024-10-16 RX ORDER — GINSENG 100 MG
1 CAPSULE ORAL 2 TIMES DAILY
Status: DISCONTINUED | OUTPATIENT
Start: 2024-10-16 | End: 2024-10-16

## 2024-10-16 RX ORDER — ONDANSETRON 2 MG/ML
4 INJECTION INTRAMUSCULAR; INTRAVENOUS EVERY 8 HOURS PRN
Status: DISCONTINUED | OUTPATIENT
Start: 2024-10-16 | End: 2024-10-28 | Stop reason: HOSPADM

## 2024-10-16 RX ORDER — LANOLIN ALCOHOL/MO/W.PET/CERES
100 CREAM (GRAM) TOPICAL 3 TIMES DAILY
Status: DISCONTINUED | OUTPATIENT
Start: 2024-10-22 | End: 2024-10-16

## 2024-10-16 RX ORDER — ONDANSETRON 2 MG/ML
4 INJECTION INTRAMUSCULAR; INTRAVENOUS ONCE
Status: COMPLETED | OUTPATIENT
Start: 2024-10-16 | End: 2024-10-16

## 2024-10-16 RX ORDER — FOLIC ACID 1 MG/1
1 TABLET ORAL DAILY
Status: DISCONTINUED | OUTPATIENT
Start: 2024-10-17 | End: 2024-10-16

## 2024-10-16 RX ORDER — CALCIUM GLUCONATE 20 MG/ML
1 INJECTION, SOLUTION INTRAVENOUS ONCE
Status: COMPLETED | OUTPATIENT
Start: 2024-10-16 | End: 2024-10-16

## 2024-10-16 RX ORDER — ENOXAPARIN SODIUM 100 MG/ML
40 INJECTION SUBCUTANEOUS DAILY
Status: DISCONTINUED | OUTPATIENT
Start: 2024-10-16 | End: 2024-10-28 | Stop reason: HOSPADM

## 2024-10-16 RX ORDER — 3% SODIUM CHLORIDE 3 G/100ML
30 INJECTION, SOLUTION INTRAVENOUS CONTINUOUS
Status: DISCONTINUED | OUTPATIENT
Start: 2024-10-16 | End: 2024-10-16

## 2024-10-16 RX ORDER — PANTOPRAZOLE SODIUM 20 MG/1
20 TABLET, DELAYED RELEASE ORAL
Status: DISCONTINUED | OUTPATIENT
Start: 2024-10-17 | End: 2024-10-17

## 2024-10-16 RX ORDER — POLYETHYLENE GLYCOL 3350 17 G/17G
17 POWDER, FOR SOLUTION ORAL DAILY
Status: DISCONTINUED | OUTPATIENT
Start: 2024-10-16 | End: 2024-10-22

## 2024-10-16 RX ORDER — IPRATROPIUM BROMIDE AND ALBUTEROL SULFATE 2.5; .5 MG/3ML; MG/3ML
3 SOLUTION RESPIRATORY (INHALATION) ONCE
Status: COMPLETED | OUTPATIENT
Start: 2024-10-16 | End: 2024-10-16

## 2024-10-16 RX ORDER — METHYLPREDNISOLONE SODIUM SUCCINATE 125 MG/2ML
60 INJECTION, POWDER, LYOPHILIZED, FOR SOLUTION INTRAMUSCULAR; INTRAVENOUS ONCE
Status: COMPLETED | OUTPATIENT
Start: 2024-10-16 | End: 2024-10-16

## 2024-10-16 RX ORDER — DESMOPRESSIN ACETATE 4 UG/ML
1 INJECTION, SOLUTION INTRAVENOUS; SUBCUTANEOUS ONCE
Status: COMPLETED | OUTPATIENT
Start: 2024-10-16 | End: 2024-10-16

## 2024-10-16 RX ORDER — FUROSEMIDE 10 MG/ML
20 INJECTION INTRAMUSCULAR; INTRAVENOUS ONCE
Status: COMPLETED | OUTPATIENT
Start: 2024-10-16 | End: 2024-10-16

## 2024-10-16 RX ORDER — ATORVASTATIN CALCIUM 20 MG/1
20 TABLET, FILM COATED ORAL DAILY
Status: DISCONTINUED | OUTPATIENT
Start: 2024-10-17 | End: 2024-10-28 | Stop reason: HOSPADM

## 2024-10-16 RX ORDER — NICOTINE 21 MG/24HR
21 PATCH, TRANSDERMAL 24 HOURS TRANSDERMAL DAILY
Status: DISCONTINUED | OUTPATIENT
Start: 2024-10-16 | End: 2024-10-28 | Stop reason: HOSPADM

## 2024-10-16 RX ORDER — SODIUM CHLORIDE 3 G/100ML
150 INJECTION, SOLUTION INTRAVENOUS ONCE
Status: COMPLETED | OUTPATIENT
Start: 2024-10-16 | End: 2024-10-16

## 2024-10-16 RX ADMIN — THIAMINE HYDROCHLORIDE 500 MG: 100 INJECTION, SOLUTION INTRAMUSCULAR; INTRAVENOUS at 18:15

## 2024-10-16 RX ADMIN — SODIUM CHLORIDE 150 ML: 3 INJECTION, SOLUTION INTRAVENOUS at 12:48

## 2024-10-16 RX ADMIN — PHENOBARBITAL SODIUM 650 MG: 130 INJECTION INTRAMUSCULAR at 18:43

## 2024-10-16 RX ADMIN — CEFEPIME 2000 MG: 2 INJECTION, POWDER, FOR SOLUTION INTRAVENOUS at 17:01

## 2024-10-16 RX ADMIN — IPRATROPIUM BROMIDE AND ALBUTEROL SULFATE 3 ML: 2.5; .5 SOLUTION RESPIRATORY (INHALATION) at 10:15

## 2024-10-16 RX ADMIN — FUROSEMIDE 20 MG: 10 INJECTION, SOLUTION INTRAMUSCULAR; INTRAVENOUS at 10:49

## 2024-10-16 RX ADMIN — THIAMINE HYDROCHLORIDE 500 MG: 100 INJECTION, SOLUTION INTRAMUSCULAR; INTRAVENOUS at 21:01

## 2024-10-16 RX ADMIN — ENOXAPARIN SODIUM 40 MG: 40 INJECTION SUBCUTANEOUS at 16:58

## 2024-10-16 RX ADMIN — AZITHROMYCIN MONOHYDRATE 500 MG: 500 INJECTION, POWDER, LYOPHILIZED, FOR SOLUTION INTRAVENOUS at 23:22

## 2024-10-16 RX ADMIN — SODIUM CHLORIDE 2000 MG: 0.9 INJECTION, SOLUTION INTRAVENOUS at 17:15

## 2024-10-16 RX ADMIN — DICLOFENAC SODIUM 2 G: 10 GEL TOPICAL at 23:30

## 2024-10-16 RX ADMIN — METHYLPREDNISOLONE SODIUM SUCCINATE 60 MG: 125 INJECTION, POWDER, FOR SOLUTION INTRAMUSCULAR; INTRAVENOUS at 10:14

## 2024-10-16 RX ADMIN — CEFTRIAXONE 1000 MG: 1 INJECTION, SOLUTION INTRAVENOUS at 11:43

## 2024-10-16 RX ADMIN — DESMOPRESSIN ACETATE 1 MCG: 4 INJECTION, SOLUTION INTRAVENOUS; SUBCUTANEOUS at 17:15

## 2024-10-16 RX ADMIN — HYDRALAZINE HYDROCHLORIDE 10 MG: 20 INJECTION INTRAMUSCULAR; INTRAVENOUS at 13:08

## 2024-10-16 RX ADMIN — VANCOMYCIN HYDROCHLORIDE 1750 MG: 5 INJECTION, POWDER, LYOPHILIZED, FOR SOLUTION INTRAVENOUS at 23:19

## 2024-10-16 RX ADMIN — CALCIUM GLUCONATE 1 G: 20 INJECTION, SOLUTION INTRAVENOUS at 17:09

## 2024-10-16 RX ADMIN — SODIUM CHLORIDE: 4 INJECTION, SOLUTION, CONCENTRATE INTRAVENOUS at 17:01

## 2024-10-16 RX ADMIN — NICOTINE 21 MG: 21 PATCH, EXTENDED RELEASE TRANSDERMAL at 16:58

## 2024-10-16 RX ADMIN — DOXYCYCLINE 100 MG: 100 INJECTION, POWDER, LYOPHILIZED, FOR SOLUTION INTRAVENOUS at 11:59

## 2024-10-16 RX ADMIN — ONDANSETRON 4 MG: 2 INJECTION INTRAMUSCULAR; INTRAVENOUS at 15:36

## 2024-10-16 RX ADMIN — IOHEXOL 80 ML: 350 INJECTION, SOLUTION INTRAVENOUS at 11:44

## 2024-10-16 NOTE — ASSESSMENT & PLAN NOTE
"Patient is a 54-year-old male who reports that he first began drinking alcohol at the age of 16.  States that he typically drinks a 12 pack of 16 ounce beer daily and has been doing so for an indeterminate number of years.  States he has tried to quit drinking \"cold turkey\" in the past without success.  Patient denies drinking hard liquor or wine, but wife reports that patient frequently drinks multiple glasses of rum and coke in addition to the 12 pack of beer daily.  Wife reports patient typically gets violent after drinking but states he has never been hospitalized for alcohol use before.    PLAN:  Initiate CIWA protocol and give phenobarbital as indicated  N.p.o. 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

## 2024-10-16 NOTE — ED NOTES
Patient moved to ED 9, monitors reconnected. Writer introduced self to patient and family.      Debi Monroy RN  10/16/24 [id number]

## 2024-10-16 NOTE — ED PROVIDER NOTES
Time reflects when diagnosis was documented in both MDM as applicable and the Disposition within this note       Time User Action Codes Description Comment    10/16/2024 12:42 PM Luis Santiago Add [J18.9] Pneumonia of both upper lobes due to infectious organism     10/16/2024 12:42 PM Luis Santiago Add [E87.1] Hyponatremia     10/16/2024 12:42 PM Luis Santiago Add [R06.03] Respiratory distress     10/16/2024 12:43 PM Luis Santiago Add [J81.1] Pulmonary edema           ED Disposition       ED Disposition   Transfer to Another Facility-In Network    Condition   --    Date/Time   Wed Oct 16, 2024  1:07 PM    Comment   Viktor Farfan should be transferred out to [place].               Assessment & Plan       Medical Decision Making  This is a 54 years old came from home for having SOB for 3 days.  Patient has productive cough of whitish sputum also for 3 days.  Patient has generalized weakness.  Patient has no fever.  Patient has bilateral leg edema in the left calf pain.  Patient is alcoholic drinking daily large amount of alcohol and also he is a smoker 1 pack/day all his life.  Patient has snoring and has sleep apnea .  On the arrival patient has pulse ox 90% giving 4 L/min, O2 and this increase pulse ox to 95%.  Patient denies abdominal pain.  Patient has extensive rash which are scattered papules on the trunk.  Physical exam is significant for having bilateral lung wheezing.  Has bilateral leg and ankle edema 2+.  EKG shows NSR rate 92/min with right axis deviation.  And low voltage.  CXR;  Cardiomegaly with probable pulmonary edema and asymmetric left perihilar alveolar edema. Superimposed pneumonia cannot be completely excluded  CTA chest;   Evaluation compromised by motion.   No emboli in the main, right, and left pulmonary arteries. Segmental and subsegmental emboli cannot be excluded due to severe motion artifact and suboptimal opacification of the pulmonary arteries.  Diffuse septal thickening  and groundglass opacity due to pulmonary edema with trace pleural effusions.   Multifocal bilateral upper lobe consolidation which could be due to pneumonia in the appropriate clinical setting versus localized alveolar edema.   Mildly nodular liver suggestive of cirrhosis with trace ascites.  Venous duplex LLE ; Negative for DVT    Reviewing the labs is significant for having sodium 101 and repeated again came back to 99, chloride 74, CO2 17, creatinine 0.75, AST 64 ALT 19 D-dimer is 1.85, ,   WBC is 9.8.  Tested for COVID/flu/ are undetected  Patient started on hypertonic NS 3% giving 150 mL in 20 minutes.  Patient started on antibiotics doxycycline and Rocephin for bilateral upper lobe pneumonias.  Pt has bilateral pneumonia, pulmonary edema, hyponatremia , respiratory distress.  Case discussed with critical at [facility] and he is accepted at ICU.         Amount and/or Complexity of Data Reviewed  Labs: ordered.     Details:   Reviewing the labs is significant for having sodium 101 and repeated again came back to 99, chloride 74, CO2 17, creatinine 0.75, AST 64 ALT 19 D-dimer is 1.85, ,   WBC is 9.8  Radiology: ordered.     Details: CXR;  Cardiomegaly with probable pulmonary edema and asymmetric left perihilar alveolar edema. Superimposed pneumonia cannot be completely excluded.         CTA chest;   Evaluation compromised by motion.   No emboli in the main, right, and left pulmonary arteries. Segmental and subsegmental emboli cannot be excluded due to severe motion artifact and suboptimal opacification of the pulmonary arteries.  Diffuse septal thickening and groundglass opacity due to pulmonary edema with trace pleural effusions.   Multifocal bilateral upper lobe consolidation which could be due to pneumonia in the appropriate clinical setting versus localized alveolar edema.   Mildly nodular liver suggestive of cirrhosis with trace ascites.    Venous duplex; LLE , negative for  DVT.    ECG/medicine tests: ordered.     Details: EKG shows NSR rate 92/min with right axis deviation.  And low voltage.      Discussion of management or test interpretation with external provider(s): Case discussed with critical at [facility] and he is accepted at ICU.      Risk  Prescription drug management.             Medications   ipratropium-albuterol (DUO-NEB) 0.5-2.5 mg/3 mL inhalation solution 3 mL (3 mL Nebulization Given 10/16/24 1015)   methylPREDNISolone sodium succinate (Solu-MEDROL) injection 60 mg (60 mg Intravenous Given 10/16/24 1014)   furosemide (LASIX) injection 20 mg (20 mg Intravenous Given 10/16/24 1049)   cefTRIAXone (ROCEPHIN) IVPB (premix in dextrose) 1,000 mg 50 mL (0 mg Intravenous Stopped 10/16/24 1159)   doxycycline (VIBRAMYCIN) 100 mg in sodium chloride 0.9 % 100 mL IVPB (0 mg Intravenous Stopped 10/16/24 1304)   iohexol (OMNIPAQUE) 350 MG/ML injection (SINGLE-DOSE) 80 mL (80 mL Intravenous Given 10/16/24 1144)   sodium chloride (HYPERTONIC) 3 % bolus 150 mL (0 mL Intravenous Stopped 10/16/24 1312)   hydrALAZINE (APRESOLINE) injection 10 mg (10 mg Intravenous Given 10/16/24 1308)       ED Risk Strat Scores                           SBIRT 20yo+      Flowsheet Row Most Recent Value   Initial Alcohol Screen: US AUDIT-C     1. How often do you have a drink containing alcohol? 0 Filed at: 10/16/2024 0959   2. How many drinks containing alcohol do you have on a typical day you are drinking?  0 Filed at: 10/16/2024 0959   3a. Male UNDER 65: How often do you have five or more drinks on one occasion? 0 Filed at: 10/16/2024 0959   3b. FEMALE Any Age, or MALE 65+: How often do you have 4 or more drinks on one occassion? 0 Filed at: 10/16/2024 0959   Audit-C Score 0 Filed at: 10/16/2024 0959   THANH: How many times in the past year have you...    Used an illegal drug or used a prescription medication for non-medical reasons? Never Filed at: 10/16/2024 0959                             History of Present Illness       Chief Complaint   Patient presents with    Shortness of Breath     Pt presents to the ed with SOB that started 3-4 days ago denies any chest pain, no meds pta        Past Medical History:   Diagnosis Date    Anxiety     Back pain     Chronic right-sided low back pain without sciatica 3/31/2018    Depression     Gastroesophageal reflux disease 3/31/2018    GERD (gastroesophageal reflux disease)     Right foot pain       Past Surgical History:   Procedure Laterality Date    NO PAST SURGERIES      ORIF FINGER FRACTURE Right 8/6/2019    Procedure: OPEN REDUCTION FINGER;  Surgeon: Tomas Morgan MD;  Location: [facility];  Service: Plastics      Family History   Problem Relation Age of Onset    COPD Father     Diabetes Father     Cancer Maternal Grandfather       Social History     Tobacco Use    Smoking status: Every Day     Current packs/day: 1.00     Average packs/day: 1 pack/day for 38.0 years (38.0 ttl pk-yrs)     Types: Cigarettes    Smokeless tobacco: Never   Vaping Use    Vaping status: Never Used   Substance Use Topics    Alcohol use: Yes     Alcohol/week: 6.0 standard drinks of alcohol     Types: 6 Cans of beer per week     Comment: 12 pack of beer a night    Drug use: No      E-Cigarette/Vaping    E-Cigarette Use Never User       E-Cigarette/Vaping Substances    Nicotine No     THC No     CBD No     Flavoring No     Other No     Unknown No       I have reviewed and agree with the history as documented.     This is a 54 years old with history of diabetes hypertension, peripheral neuropathy, came today because of generalized weakness and SOB.  Patient has SOB for 3 days.  Patient denies chest pain.  Patient is alcoholic and drinks alcohol daily for a long time and is a smoker 1 pack daily for many years.  Patient has no fever at home and he denies history of asthma or COPD.  Patient denies history of CHF.  Patient has a productive cough of whitish sputum for 3 days also.   Patient said that his right leg is also swollen with tenderness.  Wife stated that he snores heavily and he may have sleep apnea.  On the arrival to the ER patient has pulse ox 90% on the room air giving oxygen 4L/min, and his pulse ox ago up to 95%.  Patient denies any nausea vomiting denies abdominal pain.  Patient has history of osteoarthritis and according to the wife he does not move too much at home.      History provided by:  Patient and spouse   used: No    Shortness of Breath  Severity:  Moderate  Onset quality:  Sudden  Duration:  3 days  Timing:  Constant  Progression:  Worsening  Chronicity:  New  Relieved by:  Nothing  Worsened by:  Nothing  Associated symptoms: cough    Associated symptoms: no abdominal pain, no chest pain, no ear pain, no fever, no rash, no sore throat and no vomiting        Review of Systems   Constitutional:  Positive for fatigue. Negative for chills and fever.   HENT:  Negative for congestion, ear pain, rhinorrhea, sinus pressure, sinus pain and sore throat.    Eyes:  Negative for pain and visual disturbance.   Respiratory:  Positive for cough and shortness of breath.    Cardiovascular:  Negative for chest pain and palpitations.   Gastrointestinal:  Negative for abdominal pain, diarrhea and vomiting.   Endocrine: Negative for polydipsia, polyphagia and polyuria.   Genitourinary:  Negative for dysuria and hematuria.   Musculoskeletal:  Negative for arthralgias and back pain.   Skin:  Negative for color change and rash.   Neurological:  Negative for seizures, syncope and weakness.   Hematological:  Negative for adenopathy. Does not bruise/bleed easily.   All other systems reviewed and are negative.          Objective       ED Triage Vitals   Temperature Pulse Blood Pressure Respirations SpO2 Patient Position - Orthostatic VS   10/16/24 1052 10/16/24 1030 10/16/24 1030 10/16/24 1030 10/16/24 1030 10/16/24 1030   97.8 °F (36.6 °C) 84 (!) 175/74 (!) 28 95 % Lying       Temp Source Heart Rate Source BP Location FiO2 (%) Pain Score    10/16/24 1052 -- 10/16/24 1030 -- 10/16/24 1245    Oral  Left arm  No Pain      Vitals      Date and Time Temp Pulse SpO2 Resp BP Pain Score FACES Pain Rating User   10/16/24 1415 -- 93 94 % -- 149/56 -- -- DU   10/16/24 1400 -- 94 93 % -- 164/70 -- -- DU   10/16/24 1345 -- 93 94 % -- 146/63 -- -- DU   10/16/24 1330 -- 92 94 % -- 140/82 -- -- DU   10/16/24 1315 -- 91 93 % -- 139/71 -- -- DU   10/16/24 1300 -- 91 94 % 21 -- -- -- DU   10/16/24 1245 -- 88 93 % 28 217/87 No Pain -- DU   10/16/24 1052 97.8 °F (36.6 °C) -- -- -- -- -- -- DB   10/16/24 1030 -- 84 95 % 28 175/74 -- -- DB            Physical Exam  Vitals and nursing note reviewed.   Constitutional:       General: He is not in acute distress.     Appearance: He is well-developed. He is ill-appearing. He is not toxic-appearing or diaphoretic.      Interventions: He is not intubated.  HENT:      Head: Normocephalic and atraumatic.      Mouth/Throat:      Mouth: Mucous membranes are moist.      Pharynx: No pharyngeal swelling or oropharyngeal exudate.   Eyes:      Conjunctiva/sclera: Conjunctivae normal.   Neck:      Thyroid: No thyromegaly.      Vascular: No hepatojugular reflux or JVD.      Trachea: No tracheal deviation.   Cardiovascular:      Rate and Rhythm: Normal rate and regular rhythm. No extrasystoles are present.     Heart sounds: No murmur heard.     No gallop.   Pulmonary:      Effort: Pulmonary effort is normal. No tachypnea, bradypnea, accessory muscle usage or respiratory distress. He is not intubated.      Breath sounds: No stridor. Examination of the right-middle field reveals wheezing. Examination of the left-middle field reveals wheezing. Examination of the right-lower field reveals wheezing. Examination of the left-lower field reveals wheezing. Wheezing present. No decreased breath sounds, rhonchi or rales.   Chest:      Chest wall: No mass, deformity, tenderness, crepitus  or edema. There is no dullness to percussion.   Abdominal:      Palpations: Abdomen is soft. There is no hepatomegaly, splenomegaly or mass.      Tenderness: There is no abdominal tenderness. There is no guarding or rebound.   Musculoskeletal:         General: No swelling.      Cervical back: Normal range of motion and neck supple.      Right lower leg: No tenderness. Edema present.      Left lower leg: Tenderness present. Edema present.      Comments: Bilateral leg and ankle  edema ++  Swelling of LLE and tenderness at the L calf.    Lymphadenopathy:      Cervical: No cervical adenopathy.   Skin:     General: Skin is warm and dry.      Capillary Refill: Capillary refill takes less than 2 seconds.      Coloration: Skin is not cyanotic or pale.      Findings: Rash present. No ecchymosis or erythema.      Nails: There is no clubbing.      Comments: Has extensive multiple papules on the trunk and on his back.  Patient has appointment with the dermatologist on 10/30/2024.   Neurological:      Mental Status: He is alert.   Psychiatric:         Mood and Affect: Mood normal.         Results Reviewed       Procedure Component Value Units Date/Time    Blood culture #1 [[id number]] Collected: 10/16/24 1119    Lab Status: Preliminary result Specimen: Blood from Arm, Left Updated: 10/17/24 1401     Blood Culture No Growth at 24 hrs.    Blood culture #2 [[id number]] Collected: 10/16/24 1119    Lab Status: Preliminary result Specimen: Blood from Arm, Right Updated: 10/17/24 1401     Blood Culture No Growth at 24 hrs.    Basic metabolic panel [[id number]]  (Abnormal) Collected: 10/16/24 1354    Lab Status: Final result Specimen: Blood from Arm, Left Updated: 10/16/24 1436     Sodium 102 mmol/L      Potassium 4.9 mmol/L      Chloride 75 mmol/L      CO2 20 mmol/L      ANION GAP 7 mmol/L      BUN 7 mg/dL      Creatinine 0.74 mg/dL      Glucose 163 mg/dL      Calcium 8.2 mg/dL      eGFR 104 ml/min/1.73sq m     Narrative:      National  Kidney Disease Foundation guidelines for Chronic Kidney Disease (CKD):     Stage 1 with normal or high GFR (GFR > 90 mL/min/1.73 square meters)    Stage 2 Mild CKD (GFR = 60-89 mL/min/1.73 square meters)    Stage 3A Moderate CKD (GFR = 45-59 mL/min/1.73 square meters)    Stage 3B Moderate CKD (GFR = 30-44 mL/min/1.73 square meters)    Stage 4 Severe CKD (GFR = 15-29 mL/min/1.73 square meters)    Stage 5 End Stage CKD (GFR <15 mL/min/1.73 square meters)  Note: GFR calculation is accurate only with a steady state creatinine    Comprehensive metabolic panel [[id number]]  (Abnormal) Collected: 10/16/24 1141    Lab Status: Final result Specimen: Blood from Arm, Left Updated: 10/16/24 1212     Sodium 99 mmol/L      Potassium 5.3 mmol/L      Chloride 74 mmol/L      CO2 17 mmol/L      ANION GAP 8 mmol/L      BUN 7 mg/dL      Creatinine 0.75 mg/dL      Glucose 144 mg/dL      Calcium 7.9 mg/dL      Corrected Calcium 8.5 mg/dL      AST 64 U/L      ALT 19 U/L      Alkaline Phosphatase 196 U/L      Total Protein 7.4 g/dL      Albumin 3.3 g/dL      Total Bilirubin 2.34 mg/dL      eGFR 103 ml/min/1.73sq m     Narrative:      This sample was a second draw due to critical Sodium on previous sample  National Kidney Disease Foundation guidelines for Chronic Kidney Disease (CKD):     Stage 1 with normal or high GFR (GFR > 90 mL/min/1.73 square meters)    Stage 2 Mild CKD (GFR = 60-89 mL/min/1.73 square meters)    Stage 3A Moderate CKD (GFR = 45-59 mL/min/1.73 square meters)    Stage 3B Moderate CKD (GFR = 30-44 mL/min/1.73 square meters)    Stage 4 Severe CKD (GFR = 15-29 mL/min/1.73 square meters)    Stage 5 End Stage CKD (GFR <15 mL/min/1.73 square meters)  Note: GFR calculation is accurate only with a steady state creatinine    FLU/COVID Rapid Antigen (30 min. TAT) - Preferred screening test in ED [[id number]]  (Normal) Collected: 10/16/24 1119    Lab Status: Final result Specimen: Nares from Nose Updated: 10/16/24 1147     SARS COV  Rapid Antigen Negative     Influenza A Rapid Antigen Negative     Influenza B Rapid Antigen Negative    Narrative:      This test has been performed using the [employer] Ellen 2 FLU+SARS Antigen test under the Emergency Use Authorization (EUA). This test has been validated by the  and verified by the performing laboratory. The Ellen uses lateral flow immunofluorescent sandwich assay to detect SARS-COV, Influenza A and Influenza B Antigen.     The [employer] Ellen 2 SARS Antigen test does not differentiate between SARS-CoV and SARS-CoV-2.     Negative results are presumptive and may be confirmed with a molecular assay, if necessary, for patient management. Negative results do not rule out SARS-CoV-2 or influenza infection and should not be used as the sole basis for treatment or patient management decisions. A negative test result may occur if the level of antigen in a sample is below the limit of detection of this test.     Positive results are indicative of the presence of viral antigens, but do not rule out bacterial infection or co-infection with other viruses.     All test results should be used as an adjunct to clinical observations and other information available to the provider.    FOR PEDIATRIC PATIENTS - copy/paste COVID Guidelines URL to browser: [email or web address]    Comprehensive metabolic panel [[id number]]  (Abnormal) Collected: 10/16/24 1009    Lab Status: Final result Specimen: Blood from Arm, Left Updated: 10/16/24 1107     Sodium 101 mmol/L      Potassium 5.5 mmol/L      Chloride 75 mmol/L      CO2 17 mmol/L      ANION GAP 9 mmol/L      BUN 7 mg/dL      Creatinine 0.76 mg/dL      Glucose 135 mg/dL      Calcium 8.6 mg/dL      AST 66 U/L      ALT 19 U/L      Alkaline Phosphatase 213 U/L      Total Protein 7.9 g/dL      Albumin 3.6 g/dL      Total Bilirubin 2.30 mg/dL      eGFR 103 ml/min/1.73sq m     Narrative:      National Kidney Disease  Foundation guidelines for Chronic Kidney Disease (CKD):     Stage 1 with normal or high GFR (GFR > 90 mL/min/1.73 square meters)    Stage 2 Mild CKD (GFR = 60-89 mL/min/1.73 square meters)    Stage 3A Moderate CKD (GFR = 45-59 mL/min/1.73 square meters)    Stage 3B Moderate CKD (GFR = 30-44 mL/min/1.73 square meters)    Stage 4 Severe CKD (GFR = 15-29 mL/min/1.73 square meters)    Stage 5 End Stage CKD (GFR <15 mL/min/1.73 square meters)  Note: GFR calculation is accurate only with a steady state creatinine    CBC and differential [[id number]]  (Abnormal) Collected: 10/16/24 1009    Lab Status: Final result Specimen: Blood from Arm, Left Updated: 10/16/24 1059     WBC 9.15 Thousand/uL      RBC 3.95 Million/uL      Hemoglobin 11.3 g/dL      Hematocrit 31.7 %      MCV 80 fL      MCH 28.6 pg      MCHC 35.6 g/dL      RDW 14.2 %      MPV 11.6 fL      Platelets 104 Thousands/uL      nRBC 0 /100 WBCs      Segmented % 82 %      Immature Grans % 1 %      Lymphocytes % 8 %      Monocytes % 9 %      Eosinophils Relative 0 %      Basophils Relative 0 %      Absolute Neutrophils 7.51 Thousands/µL      Absolute Immature Grans 0.08 Thousand/uL      Absolute Lymphocytes 0.70 Thousands/µL      Absolute Monocytes 0.83 Thousand/µL      Eosinophils Absolute 0.01 Thousand/µL      Basophils Absolute 0.02 Thousands/µL     B-Type Natriuretic Peptide(BNP) [[id number]]  (Abnormal) Collected: 10/16/24 1009    Lab Status: Final result Specimen: Blood from Arm, Left Updated: 10/16/24 1059      pg/mL     D-dimer, quantitative [[id number]]  (Abnormal) Collected: 10/16/24 1009    Lab Status: Final result Specimen: Blood from Arm, Left Updated: 10/16/24 1028     D-Dimer, Quant 1.85 ug/ml FEU     Narrative:      In the evaluation for possible pulmonary embolism, in the appropriate (Well's Score of 4 or less) patient, the age adjusted d-dimer cutoff for this patient can be calculated as:    Age x 0.01 (in ug/mL) for Age-adjusted D-dimer exclusion  threshold for a patient over 50 years.            CTA ed chest pe study   Final Interpretation by Christina Rodriguez MD (10/16 1208)      Evaluation compromised by motion.      No emboli in the main, right, and left pulmonary arteries. Segmental and subsegmental emboli cannot be excluded due to severe motion artifact and suboptimal opacification of the pulmonary arteries.      Diffuse septal thickening and groundglass opacity due to pulmonary edema with trace pleural effusions.      Multifocal bilateral upper lobe consolidation which could be due to pneumonia in the appropriate clinical setting versus localized alveolar edema.      Mildly nodular liver suggestive of cirrhosis with trace ascites.      The study was marked in EPIC for immediate notification.            Workstation performed: [id number]         VAS lower limb venous duplex study, unilateral/limited   Final Interpretation by Wang Higgins MD (10/16 1257)      XR chest portable   Final Interpretation by Db Berrios DO (10/16 1126)      Cardiomegaly with probable pulmonary edema and asymmetric left perihilar alveolar edema. Superimposed pneumonia cannot be completely excluded.               Resident: Jona Pacheco I, the attending radiologist, have reviewed the images and agree with the final report above.      Workstation performed: [id number]             ECG 12 Lead Documentation Only    Date/Time: 10/16/2024 3:45 AM    Performed by: Luis Santiago MD  Authorized by: Luis Santiago MD    Indications / Diagnosis:  SOB  ECG reviewed by me, the ED Provider: yes    Patient location:  ED and bedside  Previous ECG:     Previous ECG:  Compared to current    Similarity:  No change  Interpretation:     Interpretation: abnormal    Rate:     ECG rate:  92    ECG rate assessment: normal    Rhythm:     Rhythm: sinus rhythm    Ectopy:     Ectopy: none    QRS:     QRS axis:  Normal    QRS intervals:  Normal  Conduction:      Conduction: normal    ST segments:     ST segments:  Normal  T waves:     T waves: normal    Comments:      LOW VOLTAGE  CriticalCare Time    Date/Time: 10/16/2024 1:12 PM    Performed by: Luis Santiago MD  Authorized by: Luis Santiago MD    Critical care provider statement:     Critical care time (minutes):  110    Critical care start time:  10/16/2024 10:10 PM    Critical care end time:  10/16/2024 12:33 PM    Critical care time was exclusive of:  Separately billable procedures and treating other patients    Critical care was necessary to treat or prevent imminent or life-threatening deterioration of the following conditions:  Metabolic crisis and respiratory failure (PULMONARY EDEMA- PNEUMONIA)    Critical care was time spent personally by me on the following activities:  Blood draw for specimens, development of treatment plan with patient or surrogate, discussions with consultants, discussions with primary provider, evaluation of patient's response to treatment, examination of patient, ordering and performing treatments and interventions, ordering and review of laboratory studies, ordering and review of radiographic studies, re-evaluation of patient's condition and review of old charts      ED Medication and Procedure Management   Prior to Admission Medications   Prescriptions Last Dose Informant Patient Reported? Taking?   Diclofenac Sodium (VOLTAREN) 1 %  Self Yes No   Sig: APPLY 2 GRAMS TO AFFECTED AREA TWICE A DAY   amLODIPine (NORVASC) 10 mg tablet  Self Yes No   Sig: Take 10 mg by mouth daily   bacitracin topical ointment 500 units/g topical ointment  Self No No   Sig: Apply 1 large application topically 2 (two) times a day   escitalopram (LEXAPRO) 20 mg tablet  Self No No   Sig: TAKE ONE TABLET DAILY   fluticasone (FLONASE) 50 mcg/act nasal spray  Self No No   Si spray into each nostril daily   gabapentin (NEURONTIN) 100 mg capsule  Self Yes No   Sig: TAKE 2 CAPSULE BY ORAL ROUTE 3 TIMES  EVERY BEDTIME   hydrOXYzine HCL (ATARAX) 25 mg tablet  Self No No   Sig: Take 1 tablet (25 mg total) by mouth daily at bedtime as needed for itching   meloxicam (MOBIC) 15 mg tablet   No No   Sig: TAKE 1 TABLET (15 MG TOTAL) BY MOUTH DAILY.   mirtazapine (REMERON) 15 mg tablet  Self Yes No   Sig: TAKE 1 TABLET BY MOUTH EVERY DAY BEFORE BEDTIME   omeprazole (PriLOSEC) 20 mg delayed release capsule  Self Yes No   Sig: Take 20 mg by mouth daily Take before a meal   oxyCODONE-acetaminophen (PERCOCET) 5-325 mg per tablet  Self No No   Sig: Take 1 tablet by mouth every 6 (six) hours as needed for moderate pain for up to 6 dosesMax Daily Amount: 4 tablets      Facility-Administered Medications: None     Discharge Medication List as of 10/16/2024  2:28 PM        CONTINUE these medications which have NOT CHANGED    Details   amLODIPine (NORVASC) 10 mg tablet Take 10 mg by mouth daily, Starting Sat 11/18/2023, Historical Med      bacitracin topical ointment 500 units/g topical ointment Apply 1 large application topically 2 (two) times a day, Starting Mon 8/10/2020, Normal      Diclofenac Sodium (VOLTAREN) 1 % APPLY 2 GRAMS TO AFFECTED AREA TWICE A DAY, Historical Med      escitalopram (LEXAPRO) 20 mg tablet TAKE ONE TABLET DAILY, Normal      fluticasone (FLONASE) 50 mcg/act nasal spray 1 spray into each nostril daily, Starting Mon 1/2/2023, Normal      gabapentin (NEURONTIN) 100 mg capsule TAKE 2 CAPSULE BY ORAL ROUTE 3 TIMES EVERY BEDTIME, Historical Med      hydrOXYzine HCL (ATARAX) 25 mg tablet Take 1 tablet (25 mg total) by mouth daily at bedtime as needed for itching, Starting Fri 3/2/2018, Print      meloxicam (MOBIC) 15 mg tablet TAKE 1 TABLET (15 MG TOTAL) BY MOUTH DAILY., Starting Fri 7/12/2024, Normal      mirtazapine (REMERON) 15 mg tablet TAKE 1 TABLET BY MOUTH EVERY DAY BEFORE BEDTIME, Historical Med      omeprazole (PriLOSEC) 20 mg delayed release capsule Take 20 mg by mouth daily Take before a meal, Starting  Mon 12/18/2023, Historical Med      oxyCODONE-acetaminophen (PERCOCET) 5-325 mg per tablet Take 1 tablet by mouth every 6 (six) hours as needed for moderate pain for up to 6 dosesMax Daily Amount: 4 tablets, Starting Tue 8/6/2019, Print           No discharge procedures on file.  ED SEPSIS DOCUMENTATION   Time reflects when diagnosis was documented in both MDM as applicable and the Disposition within this note       Time User Action Codes Description Comment    10/16/2024 12:42 PM Luis Santiago [J18.9] Pneumonia of both upper lobes due to infectious organism     10/16/2024 12:42 PM Luis Santiago [E87.1] Hyponatremia     10/16/2024 12:42 PM Luis Santiago [R06.03] Respiratory distress     10/16/2024 12:43 PM Luis Santiago [J81.1] Pulmonary edema                  Luis Santiago MD  10/17/24 [id number]

## 2024-10-16 NOTE — PROGRESS NOTES
Viktor Farfan is a 54 y.o. male who is currently ordered Vancomycin IV with management by the Pharmacy Consult service.  Relevant clinical data and objective / subjective history reviewed.  Vancomycin Assessment:  Indication and Goal AUC/Trough: Pneumonia (goal -600, trough >10)  Clinical Status:  critical care  Micro:     Renal Function:  SCr: 0.67 mg/dL  CrCl: 177.4 mL/min  Renal replacement: Not on dialysis  Days of Therapy: 1  Current Dose: 2000 mg IV once  Vancomycin Plan:  New Dosin mg IV q12h  Estimated AUC: 483 mcg*hr/mL  Estimated Trough: 13 mcg/mL  Next Level: 10/18 at 0600  Renal Function Monitoring: Daily BMP and UOP  Pharmacy will continue to follow closely for s/sx of nephrotoxicity, infusion reactions and appropriateness of therapy.  BMP and CBC will be ordered per protocol. We will continue to follow the patient’s culture results and clinical progress daily.    Yesenia Florez, Pharmacist

## 2024-10-16 NOTE — ASSESSMENT & PLAN NOTE
-Frequent falls at home  -Suspect related to electrolyte abnormalities  -PT/OT  -Fall precautions  -Management per primary team

## 2024-10-16 NOTE — ASSESSMENT & PLAN NOTE
Patient reports he has been a smoker since the age of 16 or 17  States he has had significant shortness of breath for the past 3 days with associated white sputum production  Denies history of COPD but has not followed with a pulmonary physician  Chest x-ray significant for cardiomegaly with probable pulmonary edema and asymmetric left perihilar alveolar edema. Superimposed pneumonia cannot be completely excluded.   CT PE no emboli in main, right, and left pulmonary arteries.  Segmental and subsegmental emboli not excluded due to artifact.  Trace pleural effusions.  Multifocal bilateral upper lobe consolidation possible pneumonia or localized alveolar edema.  Mildly nodular liver suggestive of possible cirrhosis with trace ascites    PLAN:  Sputum cultures, strep pneumo, and Legionella pending  Currently on 4 L oxygen nasal cannula.  Increase as needed  Consider incentive spirometry if patient develops difficulty breathing

## 2024-10-16 NOTE — ASSESSMENT & PLAN NOTE
-Lactic acid normal  -BC, strep pneumonia, Legionella pending  -CT concerning for pulmonary process, possible pneumonia  -Workup and management per primary team

## 2024-10-16 NOTE — PLAN OF CARE
Problem: PAIN - ADULT  Goal: Verbalizes/displays adequate comfort level or baseline comfort level  Description: Interventions:  - Encourage patient to monitor pain and request assistance  - Assess pain using appropriate pain scale  - Administer analgesics based on type and severity of pain and evaluate response  - Implement non-pharmacological measures as appropriate and evaluate response  - Consider cultural and social influences on pain and pain management  - Notify physician/advanced practitioner if interventions unsuccessful or patient reports new pain  Outcome: Progressing     Problem: INFECTION - ADULT  Goal: Absence or prevention of progression during hospitalization  Description: INTERVENTIONS:  - Assess and monitor for signs and symptoms of infection  - Monitor lab/diagnostic results  - Monitor all insertion sites, i.e. indwelling lines, tubes, and drains  - Monitor endotracheal if appropriate and nasal secretions for changes in amount and color  - [place] appropriate cooling/warming therapies per order  - Administer medications as ordered  - Instruct and encourage patient and family to use good hand hygiene technique  - Identify and instruct in appropriate isolation precautions for identified infection/condition  Outcome: Progressing  Goal: Absence of fever/infection during neutropenic period  Description: INTERVENTIONS:  - Monitor WBC    Outcome: Progressing     Problem: SAFETY ADULT  Goal: Patient will remain free of falls  Description: INTERVENTIONS:  - Educate patient/family on patient safety including physical limitations  - Instruct patient to call for assistance with activity   - Consult OT/PT to assist with strengthening/mobility   - Keep Call bell within reach  - Keep bed low and locked with side rails adjusted as appropriate  - Keep care items and personal belongings within reach  - Initiate and maintain comfort rounds  - Make Fall Risk Sign visible to staff  - Offer Toileting every  Hours,  in advance of need  - Initiate/Maintain alarm  - Obtain necessary fall risk management equipment:   - Apply yellow socks and bracelet for high fall risk patients  - Consider moving patient to room near nurses station  Outcome: Progressing  Goal: Maintain or return to baseline ADL function  Description: INTERVENTIONS:  -  Assess patient's ability to carry out ADLs; assess patient's baseline for ADL function and identify physical deficits which impact ability to perform ADLs (bathing, care of mouth/teeth, toileting, grooming, dressing, etc.)  - Assess/evaluate cause of self-care deficits   - Assess range of motion  - Assess patient's mobility; develop plan if impaired  - Assess patient's need for assistive devices and provide as appropriate  - Encourage maximum independence but intervene and supervise when necessary  - Involve family in performance of ADLs  - Assess for home care needs following discharge   - Consider OT consult to assist with ADL evaluation and planning for discharge  - Provide patient education as appropriate  Outcome: Progressing  Goal: Maintains/Returns to pre admission functional level  Description: INTERVENTIONS:  - Perform AM-PAC 6 Click Basic Mobility/ Daily Activity assessment daily.  - Set and communicate daily mobility goal to care team and patient/family/caregiver.   - Collaborate with rehabilitation services on mobility goals if consulted  - Perform Range of Motion  times a day.  - Reposition patient every  hours.  - Dangle patient  times a day  - Stand patient  times a day  - Ambulate patient  times a day  - Out of bed to chair  times a day   - Out of bed for meals  times a day  - Out of bed for toileting  - Record patient progress and toleration of activity level   Outcome: Progressing     Problem: DISCHARGE PLANNING  Goal: Discharge to home or other facility with appropriate resources  Description: INTERVENTIONS:  - Identify barriers to discharge w/patient and caregiver  - Arrange for  needed discharge resources and transportation as appropriate  - Identify discharge learning needs (meds, wound care, etc.)  - Arrange for interpretive services to assist at discharge as needed  - Refer to Case Management Department for coordinating discharge planning if the patient needs post-hospital services based on physician/advanced practitioner order or complex needs related to functional status, cognitive ability, or social support system  Outcome: Progressing     Problem: Knowledge Deficit  Goal: Patient/family/caregiver demonstrates understanding of disease process, treatment plan, medications, and discharge instructions  Description: Complete learning assessment and assess knowledge base.  Interventions:  - Provide teaching at level of understanding  - Provide teaching via preferred learning methods  Outcome: Progressing     Problem: NEUROSENSORY - ADULT  Goal: Achieves stable or improved neurological status  Description: INTERVENTIONS  - Monitor and report changes in neurological status  - Monitor vital signs such as temperature, blood pressure, glucose, and any other labs ordered   - Initiate measures to prevent increased intracranial pressure  - Monitor for seizure activity and implement precautions if appropriate      Outcome: Progressing  Goal: Remains free of injury related to seizures activity  Description: INTERVENTIONS  - Maintain airway, patient safety  and administer oxygen as ordered  - Monitor patient for seizure activity, document and report duration and description of seizure to physician/advanced practitioner  - If seizure occurs,  ensure patient safety during seizure  - Reorient patient post seizure  - Seizure pads on all 4 side rails  - Instruct patient/family to notify RN of any seizure activity including if an aura is experienced  - Instruct patient/family to call for assistance with activity based on nursing assessment  - Administer anti-seizure medications if ordered    Outcome:  Progressing  Goal: Achieves maximal functionality and self care  Description: INTERVENTIONS  - Monitor swallowing and airway patency with patient fatigue and changes in neurological status  - Encourage and assist patient to increase activity and self care.   - Encourage visually impaired, hearing impaired and aphasic patients to use assistive/communication devices  Outcome: Progressing     Problem: CARDIOVASCULAR - ADULT  Goal: Maintains optimal cardiac output and hemodynamic stability  Description: INTERVENTIONS:  - Monitor I/O, vital signs and rhythm  - Monitor for S/S and trends of decreased cardiac output  - Administer and titrate ordered vasoactive medications to optimize hemodynamic stability  - Assess quality of pulses, skin color and temperature  - Assess for signs of decreased coronary artery perfusion  - Instruct patient to report change in severity of symptoms  Outcome: Progressing  Goal: Absence of cardiac dysrhythmias or at baseline rhythm  Description: INTERVENTIONS:  - Continuous cardiac monitoring, vital signs, obtain 12 lead EKG if ordered  - Administer antiarrhythmic and heart rate control medications as ordered  - Monitor electrolytes and administer replacement therapy as ordered  Outcome: Progressing     Problem: RESPIRATORY - ADULT  Goal: Achieves optimal ventilation and oxygenation  Description: INTERVENTIONS:  - Assess for changes in respiratory status  - Assess for changes in mentation and behavior  - Position to facilitate oxygenation and minimize respiratory effort  - Oxygen administered by appropriate delivery if ordered  - Initiate smoking cessation education as indicated  - Encourage broncho-pulmonary hygiene including cough, deep breathe, Incentive Spirometry  - Assess the need for suctioning and aspirate as needed  - Assess and instruct to report SOB or any respiratory difficulty  - Respiratory Therapy support as indicated  Outcome: Progressing     Problem: GASTROINTESTINAL -  ADULT  Goal: Minimal or absence of nausea and/or vomiting  Description: INTERVENTIONS:  - Administer IV fluids if ordered to ensure adequate hydration  - Maintain NPO status until nausea and vomiting are resolved  - Nasogastric tube if ordered  - Administer ordered antiemetic medications as needed  - Provide nonpharmacologic comfort measures as appropriate  - Advance diet as tolerated, if ordered  - Consider nutrition services referral to assist patient with adequate nutrition and appropriate food choices  Outcome: Progressing  Goal: Maintains or returns to baseline bowel function  Description: INTERVENTIONS:  - Assess bowel function  - Encourage oral fluids to ensure adequate hydration  - Administer IV fluids if ordered to ensure adequate hydration  - Administer ordered medications as needed  - Encourage mobilization and activity  - Consider nutritional services referral to assist patient with adequate nutrition and appropriate food choices  Outcome: Progressing  Goal: Maintains adequate nutritional intake  Description: INTERVENTIONS:  - Monitor percentage of each meal consumed  - Identify factors contributing to decreased intake, treat as appropriate  - Assist with meals as needed  - Monitor I&O, weight, and lab values if indicated  - Obtain nutrition services referral as needed  Outcome: Progressing  Goal: Establish and maintain optimal ostomy function  Description: INTERVENTIONS:  - Assess bowel function  - Encourage oral fluids to ensure adequate hydration  - Administer IV fluids if ordered to ensure adequate hydration   - Administer ordered medications as needed  - Encourage mobilization and activity  - Nutrition services referral to assist patient with appropriate food choices  - Assess stoma site  - Consider wound care consult   Outcome: Progressing  Goal: Oral mucous membranes remain intact  Description: INTERVENTIONS  - Assess oral mucosa and hygiene practices  - Implement preventative oral hygiene  regimen  - Implement oral medicated treatments as ordered  - Initiate Nutrition services referral as needed  Outcome: Progressing     Problem: GENITOURINARY - ADULT  Goal: Maintains or returns to baseline urinary function  Description: INTERVENTIONS:  - Assess urinary function  - Encourage oral fluids to ensure adequate hydration if ordered  - Administer IV fluids as ordered to ensure adequate hydration  - Administer ordered medications as needed  - Offer frequent toileting  - Follow urinary retention protocol if ordered  Outcome: Progressing  Goal: Absence of urinary retention  Description: INTERVENTIONS:  - Assess patient’s ability to void and empty bladder  - Monitor I/O  - Bladder scan as needed  - Discuss with physician/AP medications to alleviate retention as needed  - Discuss catheterization for long term situations as appropriate  Outcome: Progressing  Goal: Urinary catheter remains patent  Description: INTERVENTIONS:  - Assess patency of urinary catheter  - If patient has a chronic shah, consider changing catheter if non-functioning  - Follow guidelines for intermittent irrigation of non-functioning urinary catheter  Outcome: Progressing     Problem: METABOLIC, FLUID AND ELECTROLYTES - ADULT  Goal: Electrolytes maintained within normal limits  Description: INTERVENTIONS:  - Monitor labs and assess patient for signs and symptoms of electrolyte imbalances  - Administer electrolyte replacement as ordered  - Monitor response to electrolyte replacements, including repeat lab results as appropriate  - Instruct patient on fluid and nutrition as appropriate  Outcome: Progressing  Goal: Fluid balance maintained  Description: INTERVENTIONS:  - Monitor labs   - Monitor I/O and WT  - Instruct patient on fluid and nutrition as appropriate  - Assess for signs & symptoms of volume excess or deficit  Outcome: Progressing  Goal: Glucose maintained within target range  Description: INTERVENTIONS:  - Monitor Blood Glucose as  ordered  - Assess for signs and symptoms of hyperglycemia and hypoglycemia  - Administer ordered medications to maintain glucose within target range  - Assess nutritional intake and initiate nutrition service referral as needed  Outcome: Progressing     Problem: SKIN/TISSUE INTEGRITY - ADULT  Goal: Skin Integrity remains intact(Skin Breakdown Prevention)  Description: Assess:  -Perform [place] assessment every   -Clean and moisturize skin every   -Inspect skin when repositioning, toileting, and assisting with ADLS  -Assess under medical devices such as  every   -Assess extremities for adequate circulation and sensation     Bed Management:  -Have minimal linens on bed & keep smooth, unwrinkled  -Change linens as needed when moist or perspiring  -Avoid sitting or lying in one position for more than  hours while in bed  -Keep HOB at degrees     Toileting:  -Offer bedside commode  -Assess for incontinence every   -Use incontinent care products after each incontinent episode such as     Activity:  -Mobilize patient times a day  -Encourage activity and walks on unit  -Encourage or provide ROM exercises   -Turn and reposition patient every  Hours  -Use appropriate equipment to lift or move patient in bed  -Instruct/ Assist with weight shifting every  when out of bed in chair  -Consider limitation of chair time  hour intervals    Skin Care:  -Avoid use of baby powder, tape, friction and shearing, hot water or constrictive clothing  -Relieve pressure over bony prominences using   -Do not massage red bony areas    Next Steps:  -Teach patient strategies to minimize risks such as    -Consider consults to  interdisciplinary teams such as   Outcome: Progressing  Goal: Incision(s), wounds(s) or drain site(s) healing without S/S of infection  Description: INTERVENTIONS  - Assess and document dressing, incision, wound bed, drain sites and surrounding tissue  - Provide patient and family education  - Perform skin care/dressing changes  every   Outcome: Progressing  Goal: Pressure injury heals and does not worsen  Description: Interventions:  - Implement low air loss mattress or specialty surface (Criteria met)  - Apply silicone foam dressing  - Instruct/assist with weight shifting every minutes when in chair   - Limit chair time to  hour intervals  - Use special pressure reducing interventions such as  when in chair   - Apply fecal or urinary incontinence containment device   - Perform passive or active ROM every   - Turn and reposition patient & offload bony prominences every  hours   - Utilize friction reducing device or surface for transfers   - Consider consults to  interdisciplinary teams such as   - Use incontinent care products after each incontinent episode such as  - Consider nutrition services referral as needed  Outcome: Progressing     Problem: HEMATOLOGIC - ADULT  Goal: Maintains hematologic stability  Description: INTERVENTIONS  - Assess for signs and symptoms of bleeding or hemorrhage  - Monitor labs  - Administer supportive blood products/factors as ordered and appropriate  Outcome: Progressing     Problem: MUSCULOSKELETAL - ADULT  Goal: Maintain or return mobility to safest level of function  Description: INTERVENTIONS:  - Assess patient's ability to carry out ADLs; assess patient's baseline for ADL function and identify physical deficits which impact ability to perform ADLs (bathing, care of mouth/teeth, toileting, grooming, dressing, etc.)  - Assess/evaluate cause of self-care deficits   - Assess range of motion  - Assess patient's mobility  - Assess patient's need for assistive devices and provide as appropriate  - Encourage maximum independence but intervene and supervise when necessary  - Involve family in performance of ADLs  - Assess for home care needs following discharge   - Consider OT consult to assist with ADL evaluation and planning for discharge  - Provide patient education as appropriate  Outcome: Progressing  Goal:  Maintain proper alignment of affected body part  Description: INTERVENTIONS:  - Support, maintain and protect limb and body alignment  - Provide patient/ family with appropriate education  Outcome: Progressing     Problem: Nutrition/Hydration-ADULT  Goal: Nutrient/Hydration intake appropriate for improving, restoring or maintaining nutritional needs  Description: Monitor and assess patient's nutrition/hydration status for malnutrition. Collaborate with interdisciplinary team and initiate plan and interventions as ordered.  Monitor patient's weight and dietary intake as ordered or per policy. Utilize nutrition screening tool and intervene as necessary. Determine patient's food preferences and provide high-protein, high-caloric foods as appropriate.     INTERVENTIONS:  - Monitor oral intake, urinary output, labs, and treatment plans  - Assess nutrition and hydration status and recommend course of action  - Evaluate amount of meals eaten  - Assist patient with eating if necessary   - Allow adequate time for meals  - Recommend/ encourage appropriate diets, oral nutritional supplements, and vitamin/mineral supplements  - Order, calculate, and assess calorie counts as needed  - Recommend, monitor, and adjust tube feedings and TPN/PPN based on assessed needs  - Assess need for intravenous fluids  - Provide specific nutrition/hydration education as appropriate  - Include patient/family/caregiver in decisions related to nutrition  Outcome: Progressing

## 2024-10-16 NOTE — ASSESSMENT & PLAN NOTE
Sodium found to be severely decreased at 99  Patient received 3% normal saline 150 mL at [facility]  Sodium at [place] 103  Goal correction 107 at 24 hours for tomorrow at noon  Will be started on 1.8% normal saline at 40 mL/h per nephrology  BMP rechecked every 4 hours  Consult placed to nephrology.  Appreciate recommendations.  Patient will be n.p.o. with fluid restriction.  Will continue to monitor

## 2024-10-16 NOTE — ASSESSMENT & PLAN NOTE
Reports he has been smoking 1 pack daily since the age of 16  Nicotine patch ordered  Patient will be counseled on smoking cessation when more oriented and other more critical issues have been addressed

## 2024-10-16 NOTE — ASSESSMENT & PLAN NOTE
-p/w cough, dyspnea  -CT and CXR concerning for pulmonary edema, possible pneumonia  -Requiring 3L NC O2  -Workup and management per primary team

## 2024-10-16 NOTE — CONSULTS
Consultation - Nephrology   Name: Viktor Farfan 54 y.o. male I MRN: [id number]  Unit/Bed#: ICU 10 I Date of Admission: 10/16/2024   Date of Service: 10/16/2024 I Hospital Day: 0   Inpatient consult to Nephrology  Consult performed by: Steffi Prieto PA-C  Consult ordered by: PARVEEN Schroeder      Physician Requesting Evaluation: Irma Ro MD   Reason for Evaluation / Principal Problem: severe acute hyponatremia    Assessment & Plan  Hyponatremia  -Etiology:suspect multifactorial due to volume depletion, poor oral intake, active pulmonary process, vomiting and pain with underlying chronic ETOH use, NSAID use and mirtazapine  -Admission sodium 101-->99 @ 1140-->hypertonic 3% saline 150 ml at 1250-->102, 103 corrected @ 1354-->103, corrected 104 @ 1530 today  -Baseline sodium appears to be 135 meq since 2017  -workup: serum osmolality pending, urine osmolality pending, urine sodium 21  -TSH normal   -Imaging: CTA chest with patchy multifocal consolidation bilateral upper lobes with septal thickening and groundglass opacities concerning for pulmonary edema, pneumonia.  -Neuro status stable.  No seizure activity  -s/p hypertonic 3% saline 150 ml x 1 in [place] at [facility] [place]  -trending to overcorrection with current trajectory   -start 1.8% saline solution @ 40 ml/hr x 6 hrs  -will give DDAVP 1 mcg IV x 1 after starting 1.8% saline to prevent overcorrection  -Check BMP Q 4 hrs. call nephrology with results of BMP to determine next course of action.  -keep npo  -Avoid NSAIDs  -Consider avoiding PPIs  -Strict I/O.  -Goal to raise sodium by 8 meq in the next  24 hours. Avoid overcorrection >8 meq.  Goal serum sodium no higher than 110 by 0500 on 10/17  -Call if sodium is < 101 or  > 110 in the next 24 hours.   -Renal function normal and at baseline  -continue to monitor closely in ICU  -d/w critical care team and we agree to start 1.8% saline at 40 mL/h, DDAVP and check BMP in 4 hours.  Continue BMP every 4  hours.    Hyperkalemia  -resolved  -K+ 5/5 on admission  -s/p Lasix 20 mg IV x 1 in ED  -Repeat K+ 5.0  -Continue to trend  Abnormal acid-base balance  -Mixed  -VB.4/24/76/18/-3  -Sodium bicarb 16, AG 11  -Continue to monitor  Hypomagnesemia  -Magnesium 1.9  -Replete  -Management per primary team  SIRS (systemic inflammatory response syndrome) (HCC)  -Lactic acid normal  -BC, strep pneumonia, Legionella pending  -CT concerning for pulmonary process, possible pneumonia  -Workup and management per primary team  SOB (shortness of breath)  -p/w cough, dyspnea  -CT and CXR concerning for pulmonary edema, possible pneumonia  -Requiring 3L NC O2  -Workup and management per primary team    Falls  -Frequent falls at home  -Suspect related to electrolyte abnormalities  -PT/OT  -Fall precautions  -Management per primary team  Alcohol abuse  -Reports drinking 10-12 beers per night for years  -CIWA protocol  -Management per primary team  Right shoulder pain  -Plain radiograph pending  -Management per primary team    HISTORY OF PRESENT ILLNESS:  Requesting Physician: Irma Ro MD  Reason for Consult: Acute hyponatremia    Viktor Farfan is a 54 y.o. year old male with EtOH abuse, GERD, depression, anxiety who was admitted to [facility] on [place] [facility] [place] presenting with SOB, cough, vomiting, confusion and generalized malaise. Pt reports feeling poorly last 2 days with no oral intake and decreased urine output.  Intermittent vomiting with attempting to clear secretions.  Admission serum sodium 101->99.  Pt given 150 ml of hypertonic 3% saline in [place] at [facility] [place] and sodium increased to 102A renal consultation is requested today for assistance in the management of hyponatremia.    PAST MEDICAL HISTORY:  Past Medical History:   Diagnosis Date    Anxiety     Back pain     Chronic right-sided low back pain without sciatica 3/31/2018    Depression     Gastroesophageal reflux disease 3/31/2018    GERD (gastroesophageal reflux disease)      Right foot pain        PAST SURGICAL HISTORY:  Past Surgical History:   Procedure Laterality Date    NO PAST SURGERIES      ORIF FINGER FRACTURE Right 8/6/2019    Procedure: OPEN REDUCTION FINGER;  Surgeon: Tomas Morgan MD;  Location: [facility];  Service: Plastics       ALLERGIES:  No Known Allergies    SOCIAL HISTORY:  Social History     Substance and Sexual Activity   Alcohol Use Yes    Alcohol/week: 6.0 standard drinks of alcohol    Types: 6 Cans of beer per week    Comment: 6 pack of beer a night     Social History     Substance and Sexual Activity   Drug Use No     Social History     Tobacco Use   Smoking Status Every Day    Current packs/day: 1.00    Average packs/day: 1 pack/day for 20.0 years (20.0 ttl pk-yrs)    Types: Cigarettes   Smokeless Tobacco Never       FAMILY HISTORY:  Family History   Problem Relation Age of Onset    COPD Father     Diabetes Father     Cancer Maternal Grandfather        MEDICATIONS:    Current Facility-Administered Medications:     ondansetron (ZOFRAN) injection 4 mg, 4 mg, Intravenous, Q8H PRN, Roscoe Raman DO    REVIEW OF SYSTEMS:  A complete 10 point review of systems was performed and found to be negative unless otherwise noted in the history of present illness.  General: No fevers, chills.   Cardiovascular:  No chest pain, No leg edema.  Respiratory: No cough, sputum production,  + shortness of breath.  Gastrointestinal:  + nausea/vomiting,  No diarrhea,  No abdominal pain.  Genitourinary: No hematuria.  No foamy urine.  No dysuria    PHYSICAL EXAM:  Current Weight:    First Weight:    There were no vitals filed for this visit.    Intake/Output Summary (Last 24 hours) at 10/16/2024 1545  Last data filed at 10/16/2024 1520  Gross per 24 hour   Intake --   Output 1600 ml   Net -1600 ml     General:  Awake, alert, appears comfortable and in mild acute distress. .  Skin:  No rash, warm, good skin turgor   Eyes:  PERRL, EOMI, sclerae nonicteric.  no periorbital edema   ENT:  Moist  mucous membranes  Neck:  Trachea midline, symmetric.  No JVD.  No carotid bruits.  Chest: Bibasilar crackles.  Few scattered rhonchi   CVS:  Regular rate and rhythm without murmur, gallop or rub.  S1 and S2 identified and normal.  No S3, S4.   Abdomen:  Soft, nontender, nondistended without masses.  Normal bowel sounds x 4 quadrants.  No bruit.  Extremities:  Warm, pink, motor and sensory intact and well perfused.  No cyanosis, pallor.  Trace BLE edema.  Neuro:  Awake, alert, oriented x3.  Grossly intact  Psych:  Appropriate affect.  Mentating appropriately in general.  Slightly confused.   : Klein catheter in place    Invasive Devices:   Urethral Catheter Temperature probe (Active)   Amt returned on insertion(mL) 1600 mL 10/16/24 1520   Reasons to continue Urinary Catheter  Accurate I&O assessment in critically ill patients (48 hr. max);Acute urinary retention/obstruction failing urinary retention protocol 10/16/24 1520   Goal for Removal Remove after 48 hrs of I/O monitoring 10/16/24 1520   Site Assessment Clean 10/16/24 1520   Klein Care Done 10/16/24 1520   Collection Container Standard drainage bag 10/16/24 1520   Securement Method Securing device (Describe) 10/16/24 1520       Lab Results:   Results from last 7 days   Lab Units 10/16/24  1528 10/16/24  1354 10/16/24  1141 10/16/24  1009   WBC Thousand/uL 7.94  --   --  9.15   HEMOGLOBIN g/dL 11.1*  --   --  11.3*   HEMATOCRIT % 31.1*  --   --  31.7*   PLATELETS Thousands/uL 105*  --   --  104*   SODIUM mmol/L  --  102* 99* 101*   POTASSIUM mmol/L  --  4.9 5.3 5.5*   CHLORIDE mmol/L  --  75* 74* 75*   CO2 mmol/L  --  20* 17* 17*   BUN mg/dL  --  7 7 7   CREATININE mg/dL  --  0.74 0.75 0.76   CALCIUM mg/dL  --  8.2* 7.9* 8.6   ALK PHOS U/L  --   --  196* 213*   ALT U/L  --   --  19 19   AST U/L  --   --  64* 66*       Imaging Studies:  CTA chest 10/16/2024:  Imaging studies and images personally reviewed.  No PE in the main right and left pulmonary arteries.   Segmental and subsegmental emboli cannot be ruled out due to motion artifact.  Multifocal bilateral upper lobe consolidation.  Mild nodularity liver suggestive of cirrhosis with trace ascites.  Trace septal thickening and groundglass opacity suggestive of pulmonary edema.  Trace pleural effusions.    CXR 10/16/2024:  Imaging study and images personally reviewed.  Cardiomegaly with prominent interstitial pulmonary vasculature consistent with pulmonary edema.  Left perihilar alveolar edema.  Possible superimposed pneumonia.    EMR, including Epic and [facility] Everywhere, reviewed.  I have personally reviewed the blood work as stated above and in my note.  I have personally reviewed CTA chest, CXR imaging studies.  I have personally reviewed internal Medicine, co-consultants and prior nephrology notes.

## 2024-10-16 NOTE — ASSESSMENT & PLAN NOTE
Has history of frequent falls and difficulties with balance.  Wife reports patient has been afraid to take showers due to concern for fall  Wife reports patient has had a head strike in the past  Patient reports that he has fallen and caught himself multiple times most recently injuring his right shoulder  Significant ataxia on finger-to-nose on exam    PLAN:  CT head without contrast pending  X-ray of right shoulder pending  Neurochecks every hour to assess for ataxia

## 2024-10-16 NOTE — ED NOTES
Report called to [facility], spoke with Jalen PALACIO. Patient leaving department at this time with EMS.      Debi Monroy RN  10/16/24 3302

## 2024-10-16 NOTE — ASSESSMENT & PLAN NOTE
Meet SIRS criteria for tachycardia and tachypnea  White blood cell count currently within normal limits  Patient currently afebrile  Will obtain blood cultures, UA, Legionella, strep pneumo, and sputum cultures to evaluate for possible sepsis  MRSA cultures pending  Lactate 1.9  Started on cefepime, azithromycin, and vancomycin due to severity of illness.  Continue to trend CBC daily

## 2024-10-16 NOTE — SEPSIS NOTE
"  Sepsis Note   Viktor Farfan 54 y.o. male MRN: [id number]  Unit/Bed#: ICU 10 Encounter: [id number]       Initial Sepsis Screening       Row Name 10/16/24 1603                Is the patient's history suggestive of a new or worsening infection? Yes (Proceed)  -MARI        Suspected source of infection pneumonia  -MARI        Indicate SIRS criteria Tachycardia > 90 bpm;Tachypnea > 20 resp per min  -AMRI        Are two or more of the above signs & symptoms of infection both present and new to the patient? Yes (Proceed)  -MARI        Assess for evidence of organ dysfunction: Are any of the below criteria present within 6 hours of suspected infection and SIRS criteria that are NOT considered to be chronic conditions? Bilirubin > 2.0  -        Date of presentation of severe sepsis 10/16/24  -        Time of presentation of severe sepsis 1612  -        Sepsis Note: Click \"NEXT\" below (NOT \"close\") to generate sepsis note based on above information. YES (proceed by clicking \"NEXT\")  -                  User Key  (r) = Recorded By, (t) = Taken By, (c) = Cosigned By      Initials Name Provider Type    MARI Madelaine Dejesus MD Resident                        There is no height or weight on file to calculate BMI.  Wt Readings from Last 1 Encounters:   06/14/24 113 kg (249 lb)        Ideal body weight: 82.2 kg (181 lb 3.5 oz)  Adjusted ideal body weight: 94.5 kg (208 lb 5.3 oz)    This patient meets SIRS Criteria and may be septic.  SIRS = Systemic Inflammatory Response Syndrome      The recent clinical data is shown below.  Vitals:    10/16/24 1500   BP: 143/85   Pulse: 96   Resp: (!) 24    Concern for Sepsis POA as evidenced by Tachycardia, Tachypnea  Suspected source: Pneumonia     VS component SIRS tin the setting of alcohol dependence, cirrhosis and possible undiagnosable cardiomyopathy      Plan:  Blood Cultures, Urine clx  Trend WBC/Fever curve  Abx Cefepime + Vancomycin+ Azithromycin   IV Fluid hydration per sepsis " protocol 30ml/kg fluid bolus was not given to the patient despite hypotension and/or significantly elevated lactate of >= 4 and/or presence of septic shock due to: Concern for fluid/volume overload acute severe hyponatremia.Orders for this have been placed in [facility]. The patient may receive additional colloid or crystalloid fluids thereafter based on clinical condition.     Madelaine Dejesus MD

## 2024-10-16 NOTE — ASSESSMENT & PLAN NOTE
-Etiology:suspect multifactorial due to volume depletion, poor oral intake, active pulmonary process, vomiting and pain with underlying chronic ETOH use, NSAID use and mirtazapine  -Admission sodium 101-->99 @ 1140-->hypertonic 3% saline 150 ml at 1250-->102, 103 corrected @ 1354-->103, corrected 104 @ 1530 today  -Baseline sodium appears to be 135 meq since 2017  -workup: serum osmolality pending, urine osmolality pending, urine sodium 21  -TSH normal   -Imaging: CTA chest with patchy multifocal consolidation bilateral upper lobes with septal thickening and groundglass opacities concerning for pulmonary edema, pneumonia.  -Neuro status stable.  No seizure activity  -s/p hypertonic 3% saline 150 ml x [place] [facility] [place]  -trending to overcorrection with current trajectory   -start 1.8% saline solution @ 40 ml/hr x 6 hrs  -will give DDAVP 1 mcg IV x 1 after starting 1.8% saline to prevent overcorrection  -Check BMP Q 4 hrs. call nephrology with results of BMP to determine next course of action.  -keep npo  -Avoid NSAIDs  -Consider avoiding PPIs  -Strict I/O.  -Goal to raise sodium by 8 meq in the next  24 hours. Avoid overcorrection >8 meq.  Goal serum sodium no higher than 110 by 0500 on 10/17  -Call if sodium is < 101 or  > 110 in the next 24 hours.   -Renal function normal and at baseline  -continue to monitor closely in ICU  -d/w critical care team and we agree to start 1.8% saline at 40 mL/h, DDAVP and check BMP in 4 hours.  Continue BMP every 4 hours.

## 2024-10-16 NOTE — ED NOTES
Transport at bedside, report given to EMS. Patient to be transferred to  [facility] ICU     Debi Monroy RN  10/16/24 [id number]

## 2024-10-16 NOTE — H&P
"H&P - Critical Care/ICU   Name: Viktor Farfan 54 y.o. male I MRN: [id number]  Unit/Bed#: ICU 10 I Date of Admission: 10/16/2024   Date of Service: 10/16/2024 I Hospital Day: 0       Assessment & Plan  Alcohol abuse  Patient is a 54-year-old male who reports that he first began drinking alcohol at the age of 16.  States that he typically drinks a 12 pack of 16 ounce beer daily and has been doing so for an indeterminate number of years.  States he has tried to quit drinking \"cold turkey\" in the past without success.  Patient denies drinking hard liquor or wine, but wife reports that patient frequently drinks multiple glasses of rum and coke in addition to the 12 pack of beer daily.  Wife reports patient typically gets violent after drinking but states he has never been hospitalized for alcohol use before.    PLAN:  Initiate CIWA protocol and give phenobarbital as indicated  N.p.o. with fluid restriction due to beerpotomania with severe hyponatremia  Seizure precautions  Consider Precedex if patient becomes violent or experiences severe hallucinations even after receiving phenobarbital per CIWA protocol  Monitor I's and O's  Ethanol level obtained on this admission within normal limits  Will continue to trend CMP  Right upper quadrant ultrasound to evaluate for cirrhosis  Echocardiogram to evaluate for alcoholic cardio myopathy  Hyponatremia  Sodium found to be severely decreased at 99  Patient received 3% normal saline 150 mL at [place]  Sodium at [place] 103  Goal correction 107 at 24 hours for tomorrow at noon  Will be started on 1.8% normal saline at 40 mL/h per nephrology  BMP rechecked every 4 hours  Consult placed to nephrology.  Appreciate recommendations.  Patient will be n.p.o. with fluid restriction.  Will continue to monitor  Falls  Has history of frequent falls and difficulties with balance.  Wife reports patient has been afraid to take showers due to concern for fall  Wife reports patient has had a head " strike in the past  Patient reports that he has fallen and caught himself multiple times most recently injuring his right shoulder  Significant ataxia on finger-to-nose on exam    PLAN:  CT head without contrast pending  X-ray of right shoulder pending  Neurochecks every hour to assess for ataxia  Right shoulder pain  X-ray of right shoulder pending  SOB (shortness of breath)  Patient reports he has been a smoker since the age of 16 or 17  States he has had significant shortness of breath for the past 3 days with associated white sputum production  Denies history of COPD but has not followed with a pulmonary physician  Chest x-ray significant for cardiomegaly with probable pulmonary edema and asymmetric left perihilar alveolar edema. Superimposed pneumonia cannot be completely excluded.   CT PE no emboli in main, right, and left pulmonary arteries.  Segmental and subsegmental emboli not excluded due to artifact.  Trace pleural effusions.  Multifocal bilateral upper lobe consolidation possible pneumonia or localized alveolar edema.  Mildly nodular liver suggestive of possible cirrhosis with trace ascites    PLAN:  Sputum cultures, strep pneumo, and Legionella pending  Currently on 4 L oxygen nasal cannula.  Increase as needed  Consider incentive spirometry if patient develops difficulty breathing  SIRS (systemic inflammatory response syndrome) (HCC)  Meet SIRS criteria for tachycardia and tachypnea  White blood cell count currently within normal limits  Patient currently afebrile  Will obtain blood cultures, UA, Legionella, strep pneumo, and sputum cultures to evaluate for possible sepsis  MRSA cultures pending  Lactate 1.9  Started on cefepime, azithromycin, and vancomycin due to severity of illness.  Continue to trend CBC daily  Tobacco abuse  Reports he has been smoking 1 pack daily since the age of 16  Nicotine patch ordered  Patient will be counseled on smoking cessation when more oriented and other more  critical issues have been addressed  Disposition: Critical care    History of Present Illness   Viktor Farfan is a 54 y.o. who presents to the ED at [facility] due to shortness of breath, sputum production white in color, and generalized weakness.  Patient reports he first began drinking at the age of 16.  States that he has been drinking a 12 pack of beer daily for the past unknown amount of years.  Wife also reports that patient typically drinks several glasses of rum and coke daily in addition to the 12 pack of 16 ounce beer daily.  Per wife patient has not eaten any food for the past 2 to 3 days and struggles with severe depression and is unable to get out of bed.  Wife states that yesterday was the first time she was able to convince him to shower as he was afraid he would fall.  Patient has been falling multiple times daily and usually catches himself but has had at least 1 episode of a head strike.  Complaining of right shoulder weakness and pain after fall.  Patient also reports that he first began smoking tobacco at the age of 16 or 17 and typically smokes 1 pack/day.    Patient reports he has had shortness of breath and sputum production that is worsened over the past 3 days.  Denies any recent illness or recent sick contacts.  Denies any history of COPD or emphysema.  Patient reports shortness of breath is new but also has a poor historian due to his current mental state.    History obtained from spouse, chart review, and the patient.  Review of Systems: See HPI for Review of Systems  Review of Systems - General ROS: negative for - chills or fever  Psychological ROS: positive for - anxiety  ENT ROS: negative for - epistaxis or hearing change  Respiratory ROS: negative for - hemoptysis; positive wheezing, cough   Cardiovascular ROS: negative for - chest pain  Gastrointestinal ROS: negative for - abdominal pain; positive nausea, vomiting  Genito-Urinary ROS: no dysuria, trouble voiding, or  hematuria  Neurological ROS: no TIA or stroke symptoms  Dermatological ROS: positive for lumps  negative for jaundice      Historical Information   Past Medical History:  No date: Anxiety  No date: Back pain  3/31/2018: Chronic right-sided low back pain without sciatica  No date: Depression  3/31/2018: Gastroesophageal reflux disease  No date: GERD (gastroesophageal reflux disease)  No date: Right foot pain Past Surgical History:  No date: NO PAST SURGERIES  8/6/2019: ORIF FINGER FRACTURE; Right      Comment:  Procedure: OPEN REDUCTION FINGER;  Surgeon: Tomas Morgan MD;  Location: [facility];  Service: Plastics   Current Outpatient Medications   Medication Instructions    amLODIPine (NORVASC) 10 mg, Oral, Daily    Aspirin Low Dose 81 mg, Oral, Daily, Chew    atorvastatin (LIPITOR) 20 mg, Oral, Daily    bacitracin topical ointment 500 units/g topical ointment 1 large application, Topical, 2 times daily    Diclofenac Sodium (VOLTAREN) 1 % APPLY 2 GRAMS TO AFFECTED AREA TWICE A DAY    escitalopram (LEXAPRO) 20 mg tablet TAKE ONE TABLET DAILY    fluticasone (FLONASE) 50 mcg/act nasal spray 1 spray, Nasal, Daily    gabapentin (NEURONTIN) 100 mg capsule TAKE 2 CAPSULE BY ORAL ROUTE 3 TIMES EVERY BEDTIME    hydrOXYzine HCL (ATARAX) 25 mg, Oral, Daily at bedtime PRN    meloxicam (MOBIC) 15 mg, Oral, Daily    metFORMIN (GLUCOPHAGE) 500 mg, Oral, 2 times daily with meals, With morning and evening    mirtazapine (REMERON) 15 mg tablet TAKE 1 TABLET BY MOUTH EVERY DAY BEFORE BEDTIME    omeprazole (PRILOSEC) 20 mg, Oral, Daily, Take before a meal    oxyCODONE-acetaminophen (PERCOCET) 5-325 mg per tablet 1 tablet, Oral, Every 6 hours PRN    No Known Allergies   Social History     Tobacco Use    Smoking status: Every Day     Current packs/day: 1.00     Average packs/day: 1 pack/day for 38.0 years (38.0 ttl pk-yrs)     Types: Cigarettes    Smokeless tobacco: Never   Vaping Use    Vaping status: Never Used   Substance  Use Topics    Alcohol use: Yes     Alcohol/week: 6.0 standard drinks of alcohol     Types: 6 Cans of beer per week     Comment: 12 pack of beer a night    Drug use: No    Family History   Problem Relation Age of Onset    COPD Father     Diabetes Father     Cancer Maternal Grandfather           Objective :                   Vitals I/O      Most Recent Min/Max in 24hrs   Temp   Temp  Min: 97.8 °F (36.6 °C)  Max: 97.8 °F (36.6 °C)   Pulse 96 Pulse  Min: 84  Max: 96   Resp (!) 24 Resp  Min: 21  Max: 28   /85 BP  Min: 139/71  Max: 217/87   O2 Sat 94 % SpO2  Min: 93 %  Max: 95 %      Intake/Output Summary (Last 24 hours) at 10/16/2024 1641  Last data filed at 10/16/2024 1520  Gross per 24 hour   Intake --   Output 1600 ml   Net -1600 ml       Diet NPO    Invasive Monitoring           Physical Exam   Physical Exam  Eyes:      Conjunctiva/sclera: Conjunctivae normal.   Skin:     General: Skin is warm and dry.      Coloration: Skin is not jaundiced.   HENT:      Head: Normocephalic.      Right Ear: Hearing normal.      Left Ear: Hearing normal.      Nose: No congestion.      Mouth/Throat:      Pharynx: No oropharyngeal exudate.   Neck:   no midline tenderness Cardiovascular:      Rate and Rhythm: Tachycardia present.      Pulses: Normal pulses.   Musculoskeletal:         General: No deformity or signs of injury.      Right lower leg: Trace Edema present.      Left lower leg: Trace Edema present.   Abdominal:      Palpations: Abdomen is soft.      Tenderness: There is no abdominal tenderness.   Constitutional:       General: He is not in acute distress.  Pulmonary:      Effort: Tachypnea present.   Neurological:      General: No focal deficit present.   Genitourinary/Anorectal:  Klein present.        Diagnostic Studies        Lab Results: I have reviewed the following results:     Medications:  Scheduled PRN   [START ON 10/17/2024] amLODIPine, 10 mg, Daily  [START ON 10/17/2024] aspirin, 81 mg, Daily  [START ON  10/17/2024] atorvastatin, 20 mg, Daily  azithromycin, 500 mg, Q24H  bacitracin topical ointment, 1 large application, BID  calcium gluconate, 1 g, Once  cefepime, 2,000 mg, Q8H  chlorhexidine, 15 mL, Q12H JUAN  Diclofenac Sodium, 2 g, 4x Daily  enoxaparin, 40 mg, Daily  [START ON 10/17/2024] fluticasone, 1 spray, Daily  [START ON 10/17/2024] folic acid, 1 mg, Daily  metFORMIN, 500 mg, BID With Meals  mirtazapine, 15 mg, HS  [START ON 10/17/2024] multivitamin-minerals, 1 tablet, Daily  nicotine, 21 mg, Daily  [START ON 10/17/2024] pantoprazole, 20 mg, Early Morning  polyethylene glycol, 17 g, Daily  thiamine, 500 mg, TID   Followed by  [START ON 10/19/2024] thiamine, 250 mg, TID   Followed by  [START ON 10/22/2024] thiamine, 100 mg, TID   Followed by  [START ON 10/26/2024] thiamine, 100 mg, Daily  vancomycin, 2,000 mg, Once      ondansetron, 4 mg, Q8H PRN  trimethobenzamide, 200 mg, Q6H PRN       Continuous    sodium chloride 23.4% 308 mEq in sterile water 1,000 mL infusion,          Labs:   CBC    Recent Labs     10/16/24  1009 10/16/24  1528   WBC 9.15 7.94   HGB 11.3* 11.1*   HCT 31.7* 31.1*   * 105*     BMP    Recent Labs     10/16/24  1354 10/16/24  1528   SODIUM 102* 103*   K 4.9 5.0   CL 75* 76*   CO2 20* 16*   AGAP 7 11   BUN 7 7   CREATININE 0.74 0.67   CALCIUM 8.2* 8.1*       Coags    Recent Labs     10/16/24  1528   INR 1.27*        Additional Electrolytes  Recent Labs     10/16/24  1528   MG 1.9   PHOS 2.4*   CAIONIZED 1.02*          Blood Gas    No recent results  Recent Labs     10/16/24  1528   PHVEN 7.494*   IUE1LCR 24.1*   PO2VEN 76.9*   FNJ6TFP 18.1*   BEVEN -3.1   K0QHPHP 93.7*    LFTs  Recent Labs     10/16/24  1141 10/16/24  1528   ALT 19 19   AST 64* 66*   ALKPHOS 196* 211*   ALB 3.3* 3.5   TBILI 2.34* 2.35*       Infectious  No recent results  Glucose  Recent Labs     10/16/24  1009 10/16/24  1141 10/16/24  1354 10/16/24  1528   GLUC 135 144* 163* 182*

## 2024-10-16 NOTE — ASSESSMENT & PLAN NOTE
-resolved  -K+ 5/5 on admission  -s/p Lasix 20 mg IV x 1 in ED  -Repeat K+ 5.0  -Continue to trend

## 2024-10-16 NOTE — EMTALA/ACUTE CARE TRANSFER
[facility] EMERGENCY DEPARTMENT  [address] [place] [id number]  Dept: [phone]      EMTALA TRANSFER CONSENT    NAME Viktor Farfan                                         1970                              MRN [id number]    I have been informed of my rights regarding examination, treatment, and transfer   by Dr. Luis Santiago MD    Benefits: Specialized equipment and/or services available at the receiving facility (Include comment)________________________, Continuity of care    Risks: Potential for delay in receiving treatment, Potential deterioration of medical condition, Loss of IV, Increased discomfort during transfer, Possible worsening of condition or death during transfer      Transfer Request   I acknowledge that my medical condition has been evaluated and explained to me by the emergency department physician or other qualified medical person and/or my attending physician who has recommended and offered to me further medical examination and treatment. I understand the Hospital's obligation with respect to the treatment and stabilization of my emergency medical condition. I nevertheless request to be transferred. I release the Hospital, the doctor, and any other persons caring for me from all responsibility or liability for any injury or ill effects that may result from my transfer and agree to accept all responsibility for the consequences of my choice to transfer, rather than receive stabilizing treatment at the Hospital. I understand that because the transfer is my request, my insurance may not provide reimbursement for the services.  The Hospital will assist and direct me and my family in how to make arrangements for transfer, but the hospital is not liable for any fees charged by the transport service.  In spite of this understanding, I refuse to consent to further medical examination and treatment which has been offered to me, and request transfer to Accepting  Facility Name, City & State : [place]. I authorize the performance of emergency medical procedures and treatments upon me in both transit and upon arrival at the receiving facility.  Additionally, I authorize the release of any and all medical records to the receiving facility and request they be transported with me, if possible.    I authorize the performance of emergency medical procedures and treatments upon me in both transit and upon arrival at the receiving facility.  Additionally, I authorize the release of any and all medical records to the receiving facility and request they be transported with me, if possible.  I understand that the safest mode of transportation during a medical emergency is an ambulance and that the Hospital advocates the use of this mode of transport. Risks of traveling to the receiving facility by car, including absence of medical control, life sustaining equipment, such as oxygen, and medical personnel has been explained to me and I fully understand them.    (CHRISTIE CORRECT BOX BELOW)  [  ]  I consent to the stated transfer and to be transported by ambulance/helicopter.  [  ]  I consent to the stated transfer, but refuse transportation by ambulance and accept full responsibility for my transportation by car.  I understand the risks of non-ambulance transfers and I exonerate the Hospital and its staff from any deterioration in my condition that results from this refusal.    X___________________________________________    DATE  10/16/24  TIME________  Signature of patient or legally responsible individual signing on patient behalf           RELATIONSHIP TO PATIENT_________________________          Provider Certification    NAME Viktor SHASHANK Farfan                                         1970                              MRN [id number]    A medical screening exam was performed on the above named patient.  Based on the examination:    Condition Necessitating Transfer The primary encounter  diagnosis was Pneumonia of both upper lobes due to infectious organism. Diagnoses of Hyponatremia, Respiratory distress, and Pulmonary edema were also pertinent to this visit.    Patient Condition: The patient has been stabilized such that within reasonable medical probability, no material deterioration of the patient condition or the condition of the unborn child(blaise) is likely to result from the transfer    Reason for Transfer: Level of Care needed not available at this facility    Transfer Requirements: Facility [facility]   Space available and qualified personnel available for treatment as acknowledged by    Agreed to accept transfer and to provide appropriate medical treatment as acknowledged by       Irma Madrigal  Appropriate medical records of the examination and treatment of the patient are provided at the time of transfer   STAFF INITIAL WHEN COMPLETED _______  Transfer will be performed by qualified personnel from    and appropriate transfer equipment as required, including the use of necessary and appropriate life support measures.    Provider Certification: I have examined the patient and explained the following risks and benefits of being transferred/refusing transfer to the patient/family:  General risk, such as traffic hazards, adverse weather conditions, rough terrain or turbulence, possible failure of equipment (including vehicle or aircraft), or consequences of actions of persons outside the control of the transport personnel, Unanticipated needs of medical equipment and personnel during transport, Risk of worsening condition, The possibility of a transport vehicle being unavailable      Based on these reasonable risks and benefits to the patient and/or the unborn child(blaise), and based upon the information available at the time of the patient’s examination, I certify that the medical benefits reasonably to be expected from the provision of appropriate medical treatments at another medical facility  outweigh the increasing risks, if any, to the individual’s medical condition, and in the case of labor to the unborn child, from effecting the transfer.    X____________________________________________ DATE 10/16/24        TIME_______      ORIGINAL - SEND TO MEDICAL RECORDS   COPY - SEND WITH PATIENT DURING TRANSFER

## 2024-10-17 ENCOUNTER — APPOINTMENT (INPATIENT)
Dept: NON INVASIVE DIAGNOSTICS | Facility: HOSPITAL | Age: 54
DRG: 720 | End: 2024-10-17
Payer: COMMERCIAL

## 2024-10-17 ENCOUNTER — APPOINTMENT (OUTPATIENT)
Dept: ULTRASOUND IMAGING | Facility: HOSPITAL | Age: 54
DRG: 720 | End: 2024-10-17
Payer: COMMERCIAL

## 2024-10-17 PROBLEM — E87.5 HYPERKALEMIA: Status: RESOLVED | Noted: 2024-10-16 | Resolved: 2024-10-17

## 2024-10-17 LAB
ALBUMIN SERPL BCG-MCNC: 3.4 G/DL (ref 3.5–5)
ALP SERPL-CCNC: 179 U/L (ref 34–104)
ALT SERPL W P-5'-P-CCNC: 20 U/L (ref 7–52)
ANION GAP SERPL CALCULATED.3IONS-SCNC: 7 MMOL/L (ref 4–13)
ANION GAP SERPL CALCULATED.3IONS-SCNC: 8 MMOL/L (ref 4–13)
ANION GAP SERPL CALCULATED.3IONS-SCNC: 9 MMOL/L (ref 4–13)
ANION GAP SERPL CALCULATED.3IONS-SCNC: 9 MMOL/L (ref 4–13)
AORTIC ROOT: 3.4 CM
APICAL FOUR CHAMBER EJECTION FRACTION: 57 %
ASCENDING AORTA: 3.1 CM
AST SERPL W P-5'-P-CCNC: 65 U/L (ref 13–39)
ATRIAL RATE: 97 BPM
B PARAP IS1001 DNA NPH QL NAA+NON-PROBE: NOT DETECTED
B PERT.PT PRMT NPH QL NAA+NON-PROBE: NOT DETECTED
BASOPHILS # BLD AUTO: 0.01 THOUSANDS/ΜL (ref 0–0.1)
BASOPHILS NFR BLD AUTO: 0 % (ref 0–1)
BILIRUB DIRECT SERPL-MCNC: 0.85 MG/DL (ref 0–0.2)
BILIRUB SERPL-MCNC: 1.95 MG/DL (ref 0.2–1)
BSA FOR ECHO PROCEDURE: 2.46 M2
BUN SERPL-MCNC: 10 MG/DL (ref 5–25)
BUN SERPL-MCNC: 11 MG/DL (ref 5–25)
BUN SERPL-MCNC: 11 MG/DL (ref 5–25)
BUN SERPL-MCNC: 9 MG/DL (ref 5–25)
C PNEUM DNA NPH QL NAA+NON-PROBE: NOT DETECTED
CA-I BLD-SCNC: 1.04 MMOL/L (ref 1.12–1.32)
CALCIUM SERPL-MCNC: 7.9 MG/DL (ref 8.4–10.2)
CALCIUM SERPL-MCNC: 8 MG/DL (ref 8.4–10.2)
CALCIUM SERPL-MCNC: 8 MG/DL (ref 8.4–10.2)
CALCIUM SERPL-MCNC: 8.3 MG/DL (ref 8.4–10.2)
CHLORIDE SERPL-SCNC: 80 MMOL/L (ref 96–108)
CHLORIDE SERPL-SCNC: 81 MMOL/L (ref 96–108)
CHLORIDE SERPL-SCNC: 81 MMOL/L (ref 96–108)
CHLORIDE SERPL-SCNC: 84 MMOL/L (ref 96–108)
CO2 SERPL-SCNC: 17 MMOL/L (ref 21–32)
CO2 SERPL-SCNC: 19 MMOL/L (ref 21–32)
CO2 SERPL-SCNC: 19 MMOL/L (ref 21–32)
CO2 SERPL-SCNC: 20 MMOL/L (ref 21–32)
CO2 SERPL-SCNC: 20 MMOL/L (ref 21–32)
CO2 SERPL-SCNC: 21 MMOL/L (ref 21–32)
CORTIS AM PEAK SERPL-MCNC: 21.4 UG/DL (ref 6.7–22.6)
CREAT SERPL-MCNC: 0.7 MG/DL (ref 0.6–1.3)
CREAT SERPL-MCNC: 0.71 MG/DL (ref 0.6–1.3)
CREAT SERPL-MCNC: 0.73 MG/DL (ref 0.6–1.3)
CREAT SERPL-MCNC: 0.76 MG/DL (ref 0.6–1.3)
CREAT SERPL-MCNC: 0.83 MG/DL (ref 0.6–1.3)
CREAT SERPL-MCNC: 0.98 MG/DL (ref 0.6–1.3)
E WAVE DECELERATION TIME: 210 MS
E/A RATIO: 1.66
EOSINOPHIL # BLD AUTO: 0.05 THOUSAND/ΜL (ref 0–0.61)
EOSINOPHIL NFR BLD AUTO: 1 % (ref 0–6)
ERYTHROCYTE [DISTWIDTH] IN BLOOD BY AUTOMATED COUNT: 15 % (ref 11.6–15.1)
FLUAV RNA NPH QL NAA+NON-PROBE: NOT DETECTED
FLUBV RNA NPH QL NAA+NON-PROBE: NOT DETECTED
FRACTIONAL SHORTENING: 49 (ref 28–44)
GFR SERPL CREATININE-BSD FRML MDRD: 103 ML/MIN/1.73SQ M
GFR SERPL CREATININE-BSD FRML MDRD: 105 ML/MIN/1.73SQ M
GFR SERPL CREATININE-BSD FRML MDRD: 106 ML/MIN/1.73SQ M
GFR SERPL CREATININE-BSD FRML MDRD: 106 ML/MIN/1.73SQ M
GFR SERPL CREATININE-BSD FRML MDRD: 87 ML/MIN/1.73SQ M
GFR SERPL CREATININE-BSD FRML MDRD: 99 ML/MIN/1.73SQ M
GLUCOSE SERPL-MCNC: 116 MG/DL (ref 65–140)
GLUCOSE SERPL-MCNC: 118 MG/DL (ref 65–140)
GLUCOSE SERPL-MCNC: 124 MG/DL (ref 65–140)
GLUCOSE SERPL-MCNC: 141 MG/DL (ref 65–140)
GLUCOSE SERPL-MCNC: 143 MG/DL (ref 65–140)
GLUCOSE SERPL-MCNC: 180 MG/DL (ref 65–140)
HADV DNA NPH QL NAA+NON-PROBE: NOT DETECTED
HCOV 229E RNA NPH QL NAA+NON-PROBE: NOT DETECTED
HCOV HKU1 RNA NPH QL NAA+NON-PROBE: NOT DETECTED
HCOV NL63 RNA NPH QL NAA+NON-PROBE: NOT DETECTED
HCOV OC43 RNA NPH QL NAA+NON-PROBE: NOT DETECTED
HCT VFR BLD AUTO: 30.7 % (ref 36.5–49.3)
HGB BLD-MCNC: 10.9 G/DL (ref 12–17)
HMPV RNA NPH QL NAA+NON-PROBE: NOT DETECTED
HPIV1 RNA NPH QL NAA+NON-PROBE: NOT DETECTED
HPIV2 RNA NPH QL NAA+NON-PROBE: NOT DETECTED
HPIV3 RNA NPH QL NAA+NON-PROBE: NOT DETECTED
HPIV4 RNA NPH QL NAA+NON-PROBE: NOT DETECTED
IMM GRANULOCYTES # BLD AUTO: 0.04 THOUSAND/UL (ref 0–0.2)
IMM GRANULOCYTES NFR BLD AUTO: 0 % (ref 0–2)
INTERVENTRICULAR SEPTUM IN DIASTOLE (PARASTERNAL SHORT AXIS VIEW): 1.2 CM
INTERVENTRICULAR SEPTUM: 1.2 CM (ref 0.6–1.1)
LAAS-AP2: 12.9 CM2
LAAS-AP4: 19.6 CM2
LEFT ATRIUM SIZE: 5.3 CM
LEFT ATRIUM VOLUME (MOD BIPLANE): 48 ML
LEFT ATRIUM VOLUME INDEX (MOD BIPLANE): 19.5 ML/M2
LEFT INTERNAL DIMENSION IN SYSTOLE: 2.7 CM (ref 2.1–4)
LEFT VENTRICULAR INTERNAL DIMENSION IN DIASTOLE: 5.3 CM (ref 3.5–6)
LEFT VENTRICULAR POSTERIOR WALL IN END DIASTOLE: 1.2 CM
LEFT VENTRICULAR STROKE VOLUME: 110 ML
LVSV (TEICH): 110 ML
LYMPHOCYTES # BLD AUTO: 0.54 THOUSANDS/ΜL (ref 0.6–4.47)
LYMPHOCYTES NFR BLD AUTO: 5 % (ref 14–44)
M PNEUMO DNA NPH QL NAA+NON-PROBE: NOT DETECTED
MAGNESIUM SERPL-MCNC: 1.8 MG/DL (ref 1.9–2.7)
MCH RBC QN AUTO: 28.5 PG (ref 26.8–34.3)
MCHC RBC AUTO-ENTMCNC: 35.5 G/DL (ref 31.4–37.4)
MCV RBC AUTO: 80 FL (ref 82–98)
MONOCYTES # BLD AUTO: 0.85 THOUSAND/ΜL (ref 0.17–1.22)
MONOCYTES NFR BLD AUTO: 8 % (ref 4–12)
MV E'TISSUE VEL-LAT: 16 CM/S
MV E'TISSUE VEL-SEP: 10 CM/S
MV PEAK A VEL: 0.68 M/S
MV PEAK E VEL: 113 CM/S
MV STENOSIS PRESSURE HALF TIME: 61 MS
MV VALVE AREA P 1/2 METHOD: 3.61
NEUTROPHILS # BLD AUTO: 8.68 THOUSANDS/ΜL (ref 1.85–7.62)
NEUTS SEG NFR BLD AUTO: 86 % (ref 43–75)
NRBC BLD AUTO-RTO: 0 /100 WBCS
PHOSPHATE SERPL-MCNC: 2.8 MG/DL (ref 2.7–4.5)
PLATELET # BLD AUTO: 103 THOUSANDS/UL (ref 149–390)
PMV BLD AUTO: 11.6 FL (ref 8.9–12.7)
POTASSIUM SERPL-SCNC: 3.7 MMOL/L (ref 3.5–5.3)
POTASSIUM SERPL-SCNC: 4 MMOL/L (ref 3.5–5.3)
POTASSIUM SERPL-SCNC: 4.3 MMOL/L (ref 3.5–5.3)
POTASSIUM SERPL-SCNC: 4.5 MMOL/L (ref 3.5–5.3)
POTASSIUM SERPL-SCNC: 4.6 MMOL/L (ref 3.5–5.3)
POTASSIUM SERPL-SCNC: 5.4 MMOL/L (ref 3.5–5.3)
PR INTERVAL: 168 MS
PROT SERPL-MCNC: 7.5 G/DL (ref 6.4–8.4)
QRS AXIS: 146 DEGREES
QRSD INTERVAL: 104 MS
QT INTERVAL: 368 MS
QTC INTERVAL: 467 MS
RA PRESSURE ESTIMATED: 15 MMHG
RBC # BLD AUTO: 3.83 MILLION/UL (ref 3.88–5.62)
RIGHT ATRIUM AREA SYSTOLE A4C: 15.4 CM2
RIGHT VENTRICLE ID DIMENSION: 3.3 CM
RSV RNA NPH QL NAA+NON-PROBE: NOT DETECTED
RV PSP: 38 MMHG
RV+EV RNA NPH QL NAA+NON-PROBE: NOT DETECTED
SARS-COV-2 RNA NPH QL NAA+NON-PROBE: NOT DETECTED
SL CV LEFT ATRIUM LENGTH A2C: 4.2 CM
SL CV LV EF: 60
SL CV PED ECHO LEFT VENTRICLE DIASTOLIC VOLUME (MOD BIPLANE) 2D: 138 ML
SL CV PED ECHO LEFT VENTRICLE SYSTOLIC VOLUME (MOD BIPLANE) 2D: 28 ML
SODIUM SERPL-SCNC: 104 MMOL/L (ref 135–147)
SODIUM SERPL-SCNC: 107 MMOL/L (ref 135–147)
SODIUM SERPL-SCNC: 107 MMOL/L (ref 135–147)
SODIUM SERPL-SCNC: 108 MMOL/L (ref 135–147)
SODIUM SERPL-SCNC: 109 MMOL/L (ref 135–147)
SODIUM SERPL-SCNC: 114 MMOL/L (ref 135–147)
T WAVE AXIS: 16 DEGREES
TR MAX PG: 23 MMHG
TR PEAK VELOCITY: 2.4 M/S
TRICUSPID ANNULAR PLANE SYSTOLIC EXCURSION: 2.5 CM
TRICUSPID VALVE PEAK REGURGITATION VELOCITY: 2.42 M/S
URATE SERPL-MCNC: 4.3 MG/DL (ref 3.5–8.5)
VANCOMYCIN SERPL-MCNC: 16.3 UG/ML (ref 10–20)
VENTRICULAR RATE: 97 BPM
WBC # BLD AUTO: 10.17 THOUSAND/UL (ref 4.31–10.16)

## 2024-10-17 PROCEDURE — 84100 ASSAY OF PHOSPHORUS: CPT

## 2024-10-17 PROCEDURE — 97167 OT EVAL HIGH COMPLEX 60 MIN: CPT

## 2024-10-17 PROCEDURE — 80048 BASIC METABOLIC PNL TOTAL CA: CPT | Performed by: PHYSICIAN ASSISTANT

## 2024-10-17 PROCEDURE — 97163 PT EVAL HIGH COMPLEX 45 MIN: CPT

## 2024-10-17 PROCEDURE — 84550 ASSAY OF BLOOD/URIC ACID: CPT | Performed by: PHYSICIAN ASSISTANT

## 2024-10-17 PROCEDURE — 99232 SBSQ HOSP IP/OBS MODERATE 35: CPT | Performed by: INTERNAL MEDICINE

## 2024-10-17 PROCEDURE — 0202U NFCT DS 22 TRGT SARS-COV-2: CPT

## 2024-10-17 PROCEDURE — 76705 ECHO EXAM OF ABDOMEN: CPT

## 2024-10-17 PROCEDURE — 85025 COMPLETE CBC W/AUTO DIFF WBC: CPT

## 2024-10-17 PROCEDURE — 82533 TOTAL CORTISOL: CPT | Performed by: PHYSICIAN ASSISTANT

## 2024-10-17 PROCEDURE — 82330 ASSAY OF CALCIUM: CPT

## 2024-10-17 PROCEDURE — 93306 TTE W/DOPPLER COMPLETE: CPT | Performed by: INTERNAL MEDICINE

## 2024-10-17 PROCEDURE — 93306 TTE W/DOPPLER COMPLETE: CPT

## 2024-10-17 PROCEDURE — 83735 ASSAY OF MAGNESIUM: CPT

## 2024-10-17 PROCEDURE — 80202 ASSAY OF VANCOMYCIN: CPT | Performed by: INTERNAL MEDICINE

## 2024-10-17 PROCEDURE — 80076 HEPATIC FUNCTION PANEL: CPT

## 2024-10-17 PROCEDURE — 93010 ELECTROCARDIOGRAM REPORT: CPT | Performed by: INTERNAL MEDICINE

## 2024-10-17 PROCEDURE — 99233 SBSQ HOSP IP/OBS HIGH 50: CPT | Performed by: STUDENT IN AN ORGANIZED HEALTH CARE EDUCATION/TRAINING PROGRAM

## 2024-10-17 PROCEDURE — 97116 GAIT TRAINING THERAPY: CPT

## 2024-10-17 RX ORDER — FUROSEMIDE 10 MG/ML
40 INJECTION INTRAMUSCULAR; INTRAVENOUS ONCE
Status: COMPLETED | OUTPATIENT
Start: 2024-10-17 | End: 2024-10-17

## 2024-10-17 RX ORDER — MAGNESIUM SULFATE HEPTAHYDRATE 40 MG/ML
2 INJECTION, SOLUTION INTRAVENOUS ONCE
Status: COMPLETED | OUTPATIENT
Start: 2024-10-17 | End: 2024-10-17

## 2024-10-17 RX ORDER — PHENOBARBITAL SODIUM 65 MG/ML
130 INJECTION, SOLUTION INTRAMUSCULAR; INTRAVENOUS ONCE
Status: COMPLETED | OUTPATIENT
Start: 2024-10-17 | End: 2024-10-17

## 2024-10-17 RX ORDER — DEXTROSE MONOHYDRATE 50 MG/ML
200 INJECTION, SOLUTION INTRAVENOUS CONTINUOUS
Status: DISPENSED | OUTPATIENT
Start: 2024-10-17 | End: 2024-10-18

## 2024-10-17 RX ORDER — BUMETANIDE 0.25 MG/ML
1 INJECTION, SOLUTION INTRAMUSCULAR; INTRAVENOUS 2 TIMES DAILY
Status: DISCONTINUED | OUTPATIENT
Start: 2024-10-17 | End: 2024-10-18

## 2024-10-17 RX ORDER — FOLIC ACID 1 MG/1
1 TABLET ORAL DAILY
Status: DISCONTINUED | OUTPATIENT
Start: 2024-10-18 | End: 2024-10-28 | Stop reason: HOSPADM

## 2024-10-17 RX ORDER — MIRTAZAPINE 15 MG/1
7.5 TABLET, FILM COATED ORAL ONCE
Status: COMPLETED | OUTPATIENT
Start: 2024-10-17 | End: 2024-10-17

## 2024-10-17 RX ORDER — PANTOPRAZOLE SODIUM 40 MG/10ML
40 INJECTION, POWDER, LYOPHILIZED, FOR SOLUTION INTRAVENOUS
Status: DISCONTINUED | OUTPATIENT
Start: 2024-10-17 | End: 2024-10-17

## 2024-10-17 RX ORDER — PANTOPRAZOLE SODIUM 40 MG/1
40 TABLET, DELAYED RELEASE ORAL
Status: DISCONTINUED | OUTPATIENT
Start: 2024-10-18 | End: 2024-10-28 | Stop reason: HOSPADM

## 2024-10-17 RX ORDER — ACETAMINOPHEN 325 MG/1
650 TABLET ORAL EVERY 6 HOURS PRN
Status: DISCONTINUED | OUTPATIENT
Start: 2024-10-17 | End: 2024-10-28 | Stop reason: HOSPADM

## 2024-10-17 RX ORDER — CALCIUM GLUCONATE 20 MG/ML
2 INJECTION, SOLUTION INTRAVENOUS ONCE
Status: COMPLETED | OUTPATIENT
Start: 2024-10-17 | End: 2024-10-17

## 2024-10-17 RX ORDER — FUROSEMIDE 10 MG/ML
20 INJECTION INTRAMUSCULAR; INTRAVENOUS ONCE
Status: COMPLETED | OUTPATIENT
Start: 2024-10-17 | End: 2024-10-17

## 2024-10-17 RX ORDER — AMLODIPINE BESYLATE 5 MG/1
10 TABLET ORAL DAILY
Status: DISCONTINUED | OUTPATIENT
Start: 2024-10-18 | End: 2024-10-21

## 2024-10-17 RX ORDER — SODIUM CHLORIDE 9 MG/ML
75 INJECTION, SOLUTION INTRAVENOUS CONTINUOUS
Status: DISCONTINUED | OUTPATIENT
Start: 2024-10-17 | End: 2024-10-17

## 2024-10-17 RX ADMIN — CHLORHEXIDINE GLUCONATE 15 ML: 1.2 RINSE ORAL at 08:20

## 2024-10-17 RX ADMIN — CEFTRIAXONE 2000 MG: 2 INJECTION, POWDER, FOR SOLUTION INTRAMUSCULAR; INTRAVENOUS at 15:20

## 2024-10-17 RX ADMIN — ACETAMINOPHEN 650 MG: 325 TABLET ORAL at 01:12

## 2024-10-17 RX ADMIN — MIRTAZAPINE 15 MG: 15 TABLET, FILM COATED ORAL at 21:12

## 2024-10-17 RX ADMIN — PANTOPRAZOLE SODIUM 40 MG: 40 INJECTION, POWDER, FOR SOLUTION INTRAVENOUS at 08:17

## 2024-10-17 RX ADMIN — MIRTAZAPINE 7.5 MG: 15 TABLET, FILM COATED ORAL at 01:49

## 2024-10-17 RX ADMIN — PHENOBARBITAL SODIUM 130 MG: 65 INJECTION INTRAMUSCULAR at 04:55

## 2024-10-17 RX ADMIN — VANCOMYCIN HYDROCHLORIDE 1750 MG: 5 INJECTION, POWDER, LYOPHILIZED, FOR SOLUTION INTRAVENOUS at 10:47

## 2024-10-17 RX ADMIN — POLYETHYLENE GLYCOL 3350 17 G: 17 POWDER, FOR SOLUTION ORAL at 08:11

## 2024-10-17 RX ADMIN — THIAMINE HYDROCHLORIDE 500 MG: 100 INJECTION, SOLUTION INTRAMUSCULAR; INTRAVENOUS at 08:20

## 2024-10-17 RX ADMIN — PHENOBARBITAL SODIUM 130 MG: 65 INJECTION INTRAMUSCULAR at 07:06

## 2024-10-17 RX ADMIN — MAGNESIUM SULFATE HEPTAHYDRATE 2 G: 40 INJECTION, SOLUTION INTRAVENOUS at 06:31

## 2024-10-17 RX ADMIN — CEFEPIME 2000 MG: 2 INJECTION, POWDER, FOR SOLUTION INTRAVENOUS at 08:20

## 2024-10-17 RX ADMIN — MULTIPLE VITAMINS W/ MINERALS TAB 1 TABLET: TAB ORAL at 08:11

## 2024-10-17 RX ADMIN — CYANOCOBALAMIN TAB 500 MCG 1000 MCG: 500 TAB at 12:05

## 2024-10-17 RX ADMIN — Medication 7.5 MG: at 16:16

## 2024-10-17 RX ADMIN — FUROSEMIDE 20 MG: 10 INJECTION, SOLUTION INTRAMUSCULAR; INTRAVENOUS at 01:49

## 2024-10-17 RX ADMIN — BUMETANIDE 1 MG: 0.25 INJECTION INTRAMUSCULAR; INTRAVENOUS at 18:09

## 2024-10-17 RX ADMIN — PHENOBARBITAL SODIUM 130 MG: 65 INJECTION INTRAMUSCULAR at 19:15

## 2024-10-17 RX ADMIN — CEFEPIME 2000 MG: 2 INJECTION, POWDER, FOR SOLUTION INTRAVENOUS at 01:13

## 2024-10-17 RX ADMIN — DEXTROSE 200 ML/HR: 5 SOLUTION INTRAVENOUS at 21:21

## 2024-10-17 RX ADMIN — BUMETANIDE 1 MG: 0.25 INJECTION INTRAMUSCULAR; INTRAVENOUS at 12:05

## 2024-10-17 RX ADMIN — ASPIRIN 81 MG CHEWABLE TABLET 81 MG: 81 TABLET CHEWABLE at 08:11

## 2024-10-17 RX ADMIN — THIAMINE HYDROCHLORIDE 500 MG: 100 INJECTION, SOLUTION INTRAMUSCULAR; INTRAVENOUS at 21:12

## 2024-10-17 RX ADMIN — OXYBUTYNIN CHLORIDE 5 MG: 5 TABLET ORAL at 01:12

## 2024-10-17 RX ADMIN — NICOTINE 21 MG: 21 PATCH, EXTENDED RELEASE TRANSDERMAL at 08:19

## 2024-10-17 RX ADMIN — THIAMINE HYDROCHLORIDE 500 MG: 100 INJECTION, SOLUTION INTRAMUSCULAR; INTRAVENOUS at 15:20

## 2024-10-17 RX ADMIN — CALCIUM GLUCONATE 2 G: 20 INJECTION, SOLUTION INTRAVENOUS at 06:30

## 2024-10-17 RX ADMIN — FUROSEMIDE 40 MG: 10 INJECTION, SOLUTION INTRAMUSCULAR; INTRAVENOUS at 03:33

## 2024-10-17 RX ADMIN — PHENOBARBITAL SODIUM 130 MG: 65 INJECTION INTRAMUSCULAR at 23:21

## 2024-10-17 RX ADMIN — FOLIC ACID 1 MG: 5 INJECTION, SOLUTION INTRAMUSCULAR; INTRAVENOUS; SUBCUTANEOUS at 08:20

## 2024-10-17 RX ADMIN — ATORVASTATIN CALCIUM 20 MG: 20 TABLET, FILM COATED ORAL at 08:11

## 2024-10-17 RX ADMIN — ENOXAPARIN SODIUM 40 MG: 40 INJECTION SUBCUTANEOUS at 08:11

## 2024-10-17 RX ADMIN — PHENOBARBITAL SODIUM 130 MG: 65 INJECTION INTRAMUSCULAR at 03:33

## 2024-10-17 RX ADMIN — PHENOBARBITAL SODIUM 130 MG: 65 INJECTION INTRAMUSCULAR at 13:08

## 2024-10-17 NOTE — PROGRESS NOTES
"Progress Note - Critical Care/ICU   Name: Viktor Farfan 54 y.o. male I MRN: [id number]  Unit/Bed#: ICU 10 I Date of Admission: 10/16/2024   Date of Service: 10/17/2024 I Hospital Day: 1      Assessment & Plan  Alcohol abuse  Patient is a 54-year-old male who reports that he first began drinking alcohol at the age of 16.  States that he typically drinks a 12 pack of 16 ounce beer daily and has been doing so for an indeterminate number of years.  States he has tried to quit drinking \"cold turkey\" in the past without success.  Patient denies drinking hard liquor or wine, but wife reports that patient frequently drinks multiple glasses of rum and coke in addition to the 12 pack of beer daily.  Wife reports patient typically gets violent after drinking but states he has never been hospitalized for alcohol use before.    PLAN:  Initiate CIWA protocol and give phenobarbital as indicated  N.p.o. with fluid restriction due to beerpotomania with severe hyponatremia  Seizure precautions  Consider Precedex if patient becomes violent or experiences severe hallucinations even after receiving phenobarbital per CIWA protocol  Monitor I's and O's  Ethanol level obtained on this admission within normal limits  Will continue to trend CMP  Right upper quadrant ultrasound to evaluate for cirrhosis  Echocardiogram to evaluate for alcoholic cardio myopathy  Hyponatremia  Sodium found to be severely decreased at 99  Patient received 3% normal saline 150 mL at [facility] [place] [facility]    PLAN  Goal correction 107 at 24 hours for 10/17 at noon  Goal achieved as of 6 AM 10/17.  Holding additional fluids  Will be started on 1.8% normal saline at 40 mL/h per nephrology  BMP recheck every 4 hours  Consult placed to nephrology.  Appreciate recommendations.  Patient will be n.p.o. with fluid restriction.  Will continue to monitor  Urine sodium 21, urine os 347  Falls  Has history of frequent falls and difficulties with balance.  Wife reports " patient has been afraid to take showers due to concern for fall  Wife reports patient has had a head strike in the past  Patient reports that he has fallen and caught himself multiple times most recently injuring his right shoulder  Significant ataxia on finger-to-nose on exam    PLAN:  CT head without contrast negative for acute intracranial abnormality but did show right mastoid effusion  X-ray of right shoulder negative for acute fracture  Neurochecks every hour to assess for ataxia  Right shoulder pain  X-ray of right shoulder negative for fracture  SOB (shortness of breath)  Patient reports he has been a smoker since the age of 16 or 17  States he has had significant shortness of breath for the past 3 days with associated white sputum production  Denies history of COPD but has not followed with a pulmonary physician  Chest x-ray significant for cardiomegaly with probable pulmonary edema and asymmetric left perihilar alveolar edema. Superimposed pneumonia cannot be completely excluded.   CT PE no emboli in main, right, and left pulmonary arteries.  Segmental and subsegmental emboli not excluded due to artifact.  Trace pleural effusions.  Multifocal bilateral upper lobe consolidation possible pneumonia or localized alveolar edema.  Mildly nodular liver suggestive of possible cirrhosis with trace ascites    PLAN:  Sputum cultures and blood cultures pending  Strep pneumo, Legionella, COVID, flu, RSV pending  RP 2 pending  Currently on 4 L oxygen nasal cannula.  Increase as needed  Consider incentive spirometry if patient develops difficulty breathing  Received a total of Lasix 60 mg overnight due to difficulty breathing and hypoxia  SIRS (systemic inflammatory response syndrome) (HCC)  Meet SIRS criteria for tachycardia and tachypnea  White blood cell count currently within normal limits  Patient currently afebrile  Will obtain blood cultures, UA, Legionella, strep pneumo, and sputum cultures to evaluate for  possible sepsis.    Blood cultures pending.  All other negative.  MRSA cultures pending  Lactate 1.9  Started on cefepime, azithromycin, and vancomycin due to severity of illness.  Continue to trend CBC daily  Tobacco abuse  Reports he has been smoking 1 pack daily since the age of 16  Nicotine patch ordered  Patient will be counseled on smoking cessation when more oriented and other more critical issues have been addressed  Hyperkalemia (Resolved: 10/17/2024)  Potassium within normal limits  Abnormal acid-base balance  VBG significant for increased pH of 7.46  pCO2 decreased at 27.1  Metabolic acidosis due to ETOH with compensatory respiratory alkalosis  Normal anion gap  Hypomagnesemia  Magnesium decreased at 1.8.  Repleted.  Disposition: Critical care    ICU Core Measures     A: Assess, Prevent, and Manage Pain Has pain been assessed? Yes  Need for changes to pain regimen? No   B: Both SAT/SAT  N/A   C: Choice of Sedation RASS Goal: +1 Restless  Need for changes to sedation or analgesia regimen? No   D: Delirium CAM-ICU: Negative   E: Early Mobility  Plan for early mobility? No   F: Family Engagement Plan for family engagement today? Yes       Antibiotic Review: Awaiting culture results.  and Continue broad spectrum secondary to severity of illness.     Review of Invasive Devices:    Klein Plan: Continue for accurate I/O monitoring for 48 hours        Prophylaxis:  VTE VTE covered by:  enoxaparin, Subcutaneous, 40 mg at 10/17/24 0811       Stress Ulcer  covered byomeprazole (PriLOSEC) 20 mg delayed release capsule [[id number]] (Long-Term Med), pantoprazole (PROTONIX) injection 40 mg [[id number]]         24 Hour Events : Over the past 24 hours patient had CIWA score of 14.  Has received 650 phenobarbital +3 doses of phenobarbital 130 over the past 24 hours.  Overnight did have some hallucinations that there were drones and has been.  Tremor has improved.  Continue to have significant ataxia on  finger-to-nose.    Subjective   Review of Systems: See HPI for Review of Systems    Objective :                   Vitals I/O      Most Recent Min/Max in 24hrs   Temp 98.2 °F (36.8 °C) Temp  Min: 97.3 °F (36.3 °C)  Max: 98.6 °F (37 °C)   Pulse 96 Pulse  Min: 84  Max: 100   Resp (!) 28 Resp  Min: 19  Max: 34   /67 BP  Min: 119/63  Max: 217/87   O2 Sat 92 % SpO2  Min: 86 %  Max: 95 %      Intake/Output Summary (Last 24 hours) at 10/17/2024 0901  Last data filed at 10/17/2024 0800  Gross per 24 hour   Intake 2188.58 ml   Output 3820 ml   Net -1631.42 ml       Diet NPO    Invasive Monitoring           Physical Exam   Physical Exam  Eyes:      Extraocular Movements: Extraocular movements intact.   Skin:     General: Skin is warm and dry.      Coloration: Skin is not jaundiced.   HENT:      Head: Normocephalic.      Right Ear: Hearing normal.      Left Ear: Hearing normal.      Nose: No congestion.      Mouth/Throat:      Pharynx: No oropharyngeal exudate.   Neck:   no midline tenderness Cardiovascular:      Rate and Rhythm: Tachycardia present.      Pulses: Normal pulses.   Musculoskeletal:         General: No deformity.      Right lower leg: Trace Edema present.      Left lower leg: Trace Edema present.   Abdominal:      Tenderness: There is no abdominal tenderness.   Constitutional:       General: He is not in acute distress.  Pulmonary:      Effort: No respiratory distress.   Neurological:      Mental Status: He is agitated.   Genitourinary/Anorectal:  Klein present.        Diagnostic Studies        Lab Results: I have reviewed the following results:     Medications:  Scheduled PRN   [Held by provider] amLODIPine, 10 mg, Daily  aspirin, 81 mg, Daily  atorvastatin, 20 mg, Daily  azithromycin, 500 mg, Q24H  cefepime, 2,000 mg, Q8H  chlorhexidine, 15 mL, Q12H JUAN  Diclofenac Sodium, 2 g, 4x Daily  enoxaparin, 40 mg, Daily  fluticasone, 1 spray, Daily  folic acid 1 mg in sodium chloride 0.9 % 50 mL IVPB, 1 mg,  Daily  mirtazapine, 15 mg, HS  multivitamin-minerals, 1 tablet, Daily  nicotine, 21 mg, Daily  pantoprazole, 40 mg, Q24H JUAN  polyethylene glycol, 17 g, Daily  thiamine, 500 mg, TID   Followed by  [START ON 10/19/2024] thiamine, 250 mg, TID   Followed by  [START ON 10/22/2024] thiamine, 100 mg, TID   Followed by  [START ON 10/26/2024] thiamine, 100 mg, Daily  vancomycin, 1,750 mg, Q12H      acetaminophen, 650 mg, Q6H PRN  ondansetron, 4 mg, Q8H PRN  trimethobenzamide, 200 mg, Q6H PRN       Continuous          Labs:   CBC    Recent Labs     10/16/24  1528 10/17/24  0444   WBC 7.94 10.17*   HGB 11.1* 10.9*   HCT 31.1* 30.7*   * 103*     BMP    Recent Labs     10/17/24  0444 10/17/24  0756   SODIUM 107* 107*   K 4.6 4.5   CL 81* 80*   CO2 19* 20*   AGAP 7 7   BUN 10 10   CREATININE 0.76 0.73   CALCIUM 7.9* 8.3*       Coags    Recent Labs     10/16/24  1528   INR 1.27*        Additional Electrolytes  Recent Labs     10/16/24  1528 10/17/24  0444   MG 1.9 1.8*   PHOS 2.4* 2.8   CAIONIZED 1.02* 1.04*          Blood Gas    No recent results  Recent Labs     10/16/24  2348   PHVEN 7.462*   POK7HXI 27.1*   PO2VEN 42.6   EAJ1ZZO 18.9*   BEVEN -3.6   U1YFQIT 80.3*    LFTs  Recent Labs     10/16/24  1528 10/17/24  0444   ALT 19 20   AST 66* 65*   ALKPHOS 211* 179*   ALB 3.5 3.4*   TBILI 2.35* 1.95*       Infectious  No recent results  Glucose  Recent Labs     10/16/24  2022 10/16/24  2348 10/17/24  0444 10/17/24  0756   GLUC 166* 143* 141* 124

## 2024-10-17 NOTE — UTILIZATION REVIEW
NOTIFICATION OF INPATIENT ADMISSION   AUTHORIZATION REQUEST   SERVICING FACILITY:   [place] [address]  Tax ID: [id number]  NPI: [id number]   ATTENDING PROVIDER:  Attending Name and NPI#: Irma Ro Md [[id number]]  Address: [address] [place] [address]  Phone: [phone]     ADMISSION INFORMATION:  Place of Service: Inpatient [facility]  Place of Service Code: 21  Inpatient Admission Date/Time: 10/16/24  2:51 PM  Discharge Date/Time: No discharge date for patient encounter.  Admitting Diagnosis Code/Description:  Pneumonia of both upper lobes due to infectious organism     UTILIZATION REVIEW CONTACT:  Janey Mueller Utilization   Network Utilization Review Department  Phone: [phone]  Fax: [phone]  Email: [email or web address]  Contact for approvals/pending authorizations, clinical reviews, and discharge.     PHYSICIAN ADVISORY SERVICES:  Medical Necessity Denial & Meyf-yw-Ihnl Review  Phone: [phone]  Fax: [phone]  Email: [email or web address]     DISCHARGE SUPPORT TEAM:  For Patients Discharge Needs & Updates  Phone: [phone] opt. 2 Fax: [phone]  Email: [email or web address]

## 2024-10-17 NOTE — ASSESSMENT & PLAN NOTE
Meet SIRS criteria for tachycardia and tachypnea  White blood cell count currently within normal limits  Patient currently afebrile  Will obtain blood cultures, UA, Legionella, strep pneumo, and sputum cultures to evaluate for possible sepsis.    Blood cultures pending.  All other negative.  MRSA cultures pending  Lactate 1.9  Started on cefepime, azithromycin, and vancomycin due to severity of illness.  Continue to trend CBC daily

## 2024-10-17 NOTE — ASSESSMENT & PLAN NOTE
Patient reports he has been a smoker since the age of 16 or 17  States he has had significant shortness of breath for the past 3 days with associated white sputum production  Denies history of COPD but has not followed with a pulmonary physician  Chest x-ray significant for cardiomegaly with probable pulmonary edema and asymmetric left perihilar alveolar edema. Superimposed pneumonia cannot be completely excluded.   CT PE no emboli in main, right, and left pulmonary arteries.  Segmental and subsegmental emboli not excluded due to artifact.  Trace pleural effusions.  Multifocal bilateral upper lobe consolidation possible pneumonia or localized alveolar edema.  Mildly nodular liver suggestive of possible cirrhosis with trace ascites    PLAN:  Sputum cultures and blood cultures pending  Strep pneumo, Legionella, COVID, flu, RSV pending  RP 2 pending  Currently on 4 L oxygen nasal cannula.  Increase as needed  Consider incentive spirometry if patient develops difficulty breathing  Received a total of Lasix 60 mg overnight due to difficulty breathing and hypoxia

## 2024-10-17 NOTE — PROGRESS NOTES
Progress Note - Nephrology   Name: Viktor Farfan 54 y.o. male I MRN: [id number]  Unit/Bed#: ICU 10 I Date of Admission: 10/16/2024   Date of Service: 10/17/2024 I Hospital Day: 1     Assessment & Plan  Hyponatremia  Sodium on admission: 101mEq/L   Serum osm: 233  Urine osm: 347   Urine Sodium:21  Current sodium 107 mEq/L  Etiology: Multifactorial likely secondary to EtOH, poor solute intake for 3 days and fluid overload    Rate of correction: 101>99>104>107 appropriate correction    Plan:  Recommend Bumex 1 mg twice daily  Fluid restriction 1.5 L  No indication of sodium tablets at this time  Continue BMP Q4 for now   1.8% discontinue overnight in the settings of fluid overload  Avoid overcorrection : no more than 6-8mEq in the next 24hr, goal is 114 by tomorrow morning   Please monitor UOP,  Will monitor labs, call renal if SNa+ <106 or >114  Risk of ODS:  EtOH   Severe hyponatremia   malnourishment: serum alb 3.4       Abnormal acid-base balance  pH 7.46, pCO2 27.1, serum bicarbonate 17  Mixed respiratory alkalosis and metabolic acidosis  Will monitor  Hypomagnesemia  Magnesium 1.8  Continue with repletion as needed  SOB (shortness of breath)  Short of breath  S/p IV diuretics  Recommend Bumex twice daily    Alcohol abuse  Reports drinking 10-12 beers per night for years  CIWA protocol  Management per primary team    I have reviewed the nephrology recommendations including IV diuretics to improve volume status, with ICU team, and we are in agreement with renal plan including the information outlined above. I have discussed the above management plan in detail with the primary service.     Subjective   Brief History of Admission - 53 yo man with PMH of obesity, EtOH p/w EtOH intoxication, confusion found to have sodium of 99. Nephrology is consulted for management of severe hyponatremia     Patient is less confused this morning, appears short of breath, no chest pain.  Hypoxic overnight received Lasix    Objective  :  Temp:  [97.3 °F (36.3 °C)-98.6 °F (37 °C)] 98 °F (36.7 °C)  HR:  [] 78  BP: (119-217)/(56-97) 131/86  Resp:  [13-34] 26  SpO2:  [86 %-98 %] 98 %  O2 Device: Nasal cannula  Nasal Cannula O2 Flow Rate (L/min):  [2 L/min-4 L/min] 4 L/min    Current Weight: Weight - Scale: 122 kg (268 lb)  First Weight: Weight - Scale: 126 kg (278 lb 14.1 oz)  I/O         10/15 0701  10/16 0700 10/16 0701  10/17 0700 10/17 0701  10/18 0700    I.V. (mL/kg)  194 (1.6)     IV Piggyback  1957.5 267.1    Total Intake(mL/kg)  2151.5 (17.6) 267.1 (2.2)    Urine (mL/kg/hr)  3320 700 (1.3)    Total Output  3320 700    Net  -1168.5 -432.9                 Physical Exam  General:  no acute distress at this time  Skin:  No acute rash  Eyes:  No scleral icterus and noninjected  ENT:  mucous membranes moist  Neck:  no carotid bruits  Chest: Bilateral crackles   CVS:  Regular rate and rhythm without rub   Abdomen:  soft and nontender   Extremities:lower extremity edema  Neuro:  No gross focality  Psych:  Alert , cooperative     Medications:    Current Facility-Administered Medications:     acetaminophen (TYLENOL) tablet 650 mg, 650 mg, Oral, Q6H PRN, PARVEEN Dai, 650 mg at 10/17/24 0112    [Held by provider] amLODIPine (NORVASC) tablet 10 mg, 10 mg, Oral, Daily, Roscoe Raman DO    aspirin chewable tablet 81 mg, 81 mg, Oral, Daily, Roscoe Raman DO, 81 mg at 10/17/24 0811    atorvastatin (LIPITOR) tablet 20 mg, 20 mg, Oral, Daily, Roscoe Raman DO, 20 mg at 10/17/24 0811    azithromycin (ZITHROMAX) 500 mg in sodium chloride 0.9 % 250 mL IVPB, 500 mg, Intravenous, Q24H, Sahra Duarte DO, Stopped at 10/17/24 0200    cefTRIAXone (ROCEPHIN) 2,000 mg in dextrose 5 % 50 mL IVPB, 2,000 mg, Intravenous, Q24H, Madelaine Dejesus MD    chlorhexidine (PERIDEX) 0.12 % oral rinse 15 mL, 15 mL, Mouth/Throat, Q12H JUAN, Sahra Duarte DO, 15 mL at 10/17/24 0820    Diclofenac Sodium (VOLTAREN) 1 % topical gel 2 g, 2 g, Topical, 4x Daily,  Roscoe Raman DO, 2 g at 10/16/24 2330    enoxaparin (LOVENOX) subcutaneous injection 40 mg, 40 mg, Subcutaneous, Daily, Sahra Duarte DO, 40 mg at 10/17/24 0811    fluticasone (FLONASE) 50 mcg/act nasal spray 1 spray, 1 spray, Nasal, Daily, Roscoe Raman DO    folic acid 1 mg in sodium chloride 0.9 % 50 mL IVPB, 1 mg, Intravenous, Daily, Madelaine Dejesus MD, Stopped at 10/17/24 1000    mirtazapine (REMERON) tablet 15 mg, 15 mg, Oral, HS, Roscoe Raman DO    multivitamin-minerals (CENTRUM) tablet 1 tablet, 1 tablet, Oral, Daily, Sahra Duarte DO, 1 tablet at 10/17/24 0811    nicotine (NICODERM CQ) 21 mg/24 hr TD 24 hr patch 21 mg, 21 mg, Transdermal, Daily, Sahra Duarte DO, 21 mg at 10/17/24 0819    ondansetron (ZOFRAN) injection 4 mg, 4 mg, Intravenous, Q8H PRN, Roscoe Raman DO    pantoprazole (PROTONIX) injection 40 mg, 40 mg, Intravenous, Q24H [facility], Stacie Alarcon MD, 40 mg at 10/17/24 0817    polyethylene glycol (MIRALAX) packet 17 g, 17 g, Oral, Daily, Sahra Duarte DO, 17 g at 10/17/24 0811    thiamine (VITAMIN B1) 500 mg in sodium chloride 0.9 % 50 mL IVPB, 500 mg, Intravenous, TID, Stopped at 10/17/24 1000 **FOLLOWED BY** [START ON 10/19/2024] thiamine (VITAMIN B1) 250 mg in sodium chloride 0.9 % 50 mL IVPB, 250 mg, Intravenous, TID **FOLLOWED BY** [START ON 10/22/2024] thiamine (VITAMIN B1) 100 mg in sodium chloride 0.9 % 50 mL IVPB, 100 mg, Intravenous, TID **FOLLOWED BY** [START ON 10/26/2024] thiamine tablet 100 mg, 100 mg, Oral, Daily, Madelaine Dejesus MD    trimethobenzamide (TIGAN) IM injection 200 mg, 200 mg, Intramuscular, Q6H PRN, Watson Armani Lucke, CRNP      Lab Results: I have reviewed the following results:  Results from last 7 days   Lab Units 10/17/24  0756 10/17/24  0444 10/16/24  2348 10/16/24  2022 10/16/24  1528 10/16/24  1354 10/16/24  1141 10/16/24  1009   WBC Thousand/uL  --  10.17*  --   --  7.94  --   --  9.15   HEMOGLOBIN g/dL  --  10.9*  --   --   "11.1*  --   --  11.3*   HEMATOCRIT %  --  30.7*  --   --  31.1*  --   --  31.7*   PLATELETS Thousands/uL  --  103*  --   --  105*  --   --  104*   POTASSIUM mmol/L 4.5 4.6 5.4* 5.1 5.0 4.9 5.3 5.5*   CHLORIDE mmol/L 80* 81* 80* 79* 76* 75* 74* 75*   CO2 mmol/L 20* 19* 17* 18* 16* 20* 17* 17*   BUN mg/dL 10 10 9 8 7 7 7 7   CREATININE mg/dL 0.73 0.76 0.70 0.72 0.67 0.74 0.75 0.76   CALCIUM mg/dL 8.3* 7.9* 7.9* 8.0* 8.1* 8.2* 7.9* 8.6   MAGNESIUM mg/dL  --  1.8*  --   --  1.9  --   --   --    PHOSPHORUS mg/dL  --  2.8  --   --  2.4*  --   --   --    ALBUMIN g/dL  --  3.4*  --   --  3.5  --  3.3* 3.6       Administrative Statements     Portions of the record may have been created with voice recognition software. Occasional wrong word or \"sound a like\" substitutions may have occurred due to the inherent limitations of voice recognition software. Read the chart carefully and recognize, using context, where substitutions have occurred.If you have any questions, please contact the dictating provider.  "

## 2024-10-17 NOTE — CASE MANAGEMENT
Case Management Progress Note    Patient name Viktor Farfan  Location ICU 10/ICU 10 MRN [id number]  : 1970 Date 10/17/2024       LOS (days): 1  Geometric Mean LOS (GMLOS) (days): 3.6  Days to GMLOS:2.5        OBJECTIVE:        Current admission status: Inpatient  Preferred Pharmacy:   [facility] - [place], [place] - [address] [place] [address]  Phone: [phone] Fax: [phone]    [facility]pharmacy #[id number] - [place] - [address]  [address] [facility] [address]  Phone: [phone] Fax: [phone]    Primary Care Provider: No primary care provider on file.    Primary Insurance: [employer]  Secondary Insurance:     PROGRESS NOTE:    CM received consult for TAMIKO/OUD. CM contacted Certified  to refer patient and provided minimal necessary information. CRS to meet with patient and follow up with CM to provide update on plan of care following patient connection.

## 2024-10-17 NOTE — PLAN OF CARE
Problem: PAIN - ADULT  Goal: Verbalizes/displays adequate comfort level or baseline comfort level  Description: Interventions:  - Encourage patient to monitor pain and request assistance  - Assess pain using appropriate pain scale  - Administer analgesics based on type and severity of pain and evaluate response  - Implement non-pharmacological measures as appropriate and evaluate response  - Consider cultural and social influences on pain and pain management  - Notify physician/advanced practitioner if interventions unsuccessful or patient reports new pain  Outcome: Progressing     Problem: INFECTION - ADULT  Goal: Absence or prevention of progression during hospitalization  Description: INTERVENTIONS:  - Assess and monitor for signs and symptoms of infection  - Monitor lab/diagnostic results  - Monitor all insertion sites, i.e. indwelling lines, tubes, and drains  - Monitor endotracheal if appropriate and nasal secretions for changes in amount and color  - [place] appropriate cooling/warming therapies per order  - Administer medications as ordered  - Instruct and encourage patient and family to use good hand hygiene technique  - Identify and instruct in appropriate isolation precautions for identified infection/condition  Outcome: Progressing  Goal: Absence of fever/infection during neutropenic period  Description: INTERVENTIONS:  - Monitor WBC    Outcome: Progressing     Problem: SAFETY ADULT  Goal: Patient will remain free of falls  Description: INTERVENTIONS:  - Educate patient/family on patient safety including physical limitations  - Instruct patient to call for assistance with activity   - Consult OT/PT to assist with strengthening/mobility   - Keep Call bell within reach  - Keep bed low and locked with side rails adjusted as appropriate  - Keep care items and personal belongings within reach  - Initiate and maintain comfort rounds  - Make Fall Risk Sign visible to staff  - Offer Toileting every 2 Hours,  in advance of need  - Initiate/Maintain bed alarm  - Obtain necessary fall risk management equipment  - Apply yellow socks and bracelet for high fall risk patients  - Consider moving patient to room near nurses station  Outcome: Progressing  Goal: Maintain or return to baseline ADL function  Description: INTERVENTIONS:  -  Assess patient's ability to carry out ADLs; assess patient's baseline for ADL function and identify physical deficits which impact ability to perform ADLs (bathing, care of mouth/teeth, toileting, grooming, dressing, etc.)  - Assess/evaluate cause of self-care deficits   - Assess range of motion  - Assess patient's mobility; develop plan if impaired  - Assess patient's need for assistive devices and provide as appropriate  - Encourage maximum independence but intervene and supervise when necessary  - Involve family in performance of ADLs  - Assess for home care needs following discharge   - Consider OT consult to assist with ADL evaluation and planning for discharge  - Provide patient education as appropriate  Outcome: Progressing  Goal: Maintains/Returns to pre admission functional level  Description: INTERVENTIONS:  - Perform AM-PAC 6 Click Basic Mobility/ Daily Activity assessment daily.  - Set and communicate daily mobility goal to care team and patient/family/caregiver.   - Collaborate with rehabilitation services on mobility goals if consulted  - Perform Range of Motion 3 times a day.  - Reposition patient every 2 hours.  - Dangle patient 3 times a day  - Stand patient 3 times a day  - Ambulate patient 3 times a day  - Out of bed to chair 3 times a day   - Out of bed for meals 3 times a day  - Out of bed for toileting  - Record patient progress and toleration of activity level   Outcome: Progressing     Problem: DISCHARGE PLANNING  Goal: Discharge to home or other facility with appropriate resources  Description: INTERVENTIONS:  - Identify barriers to discharge w/patient and caregiver  -  Arrange for needed discharge resources and transportation as appropriate  - Identify discharge learning needs (meds, wound care, etc.)  - Arrange for interpretive services to assist at discharge as needed  - Refer to Case Management Department for coordinating discharge planning if the patient needs post-hospital services based on physician/advanced practitioner order or complex needs related to functional status, cognitive ability, or social support system  Outcome: Progressing     Problem: Knowledge Deficit  Goal: Patient/family/caregiver demonstrates understanding of disease process, treatment plan, medications, and discharge instructions  Description: Complete learning assessment and assess knowledge base.  Interventions:  - Provide teaching at level of understanding  - Provide teaching via preferred learning methods  Outcome: Progressing     Problem: NEUROSENSORY - ADULT  Goal: Achieves stable or improved neurological status  Description: INTERVENTIONS  - Monitor and report changes in neurological status  - Monitor vital signs such as temperature, blood pressure, glucose, and any other labs ordered   - Initiate measures to prevent increased intracranial pressure  - Monitor for seizure activity and implement precautions if appropriate      Outcome: Progressing  Goal: Remains free of injury related to seizures activity  Description: INTERVENTIONS  - Maintain airway, patient safety  and administer oxygen as ordered  - Monitor patient for seizure activity, document and report duration and description of seizure to physician/advanced practitioner  - If seizure occurs,  ensure patient safety during seizure  - Reorient patient post seizure  - Seizure pads on all 4 side rails  - Instruct patient/family to notify RN of any seizure activity including if an aura is experienced  - Instruct patient/family to call for assistance with activity based on nursing assessment  - Administer anti-seizure medications if  ordered    Outcome: Progressing  Goal: Achieves maximal functionality and self care  Description: INTERVENTIONS  - Monitor swallowing and airway patency with patient fatigue and changes in neurological status  - Encourage and assist patient to increase activity and self care.   - Encourage visually impaired, hearing impaired and aphasic patients to use assistive/communication devices  Outcome: Progressing     Problem: CARDIOVASCULAR - ADULT  Goal: Maintains optimal cardiac output and hemodynamic stability  Description: INTERVENTIONS:  - Monitor I/O, vital signs and rhythm  - Monitor for S/S and trends of decreased cardiac output  - Administer and titrate ordered vasoactive medications to optimize hemodynamic stability  - Assess quality of pulses, skin color and temperature  - Assess for signs of decreased coronary artery perfusion  - Instruct patient to report change in severity of symptoms  Outcome: Progressing  Goal: Absence of cardiac dysrhythmias or at baseline rhythm  Description: INTERVENTIONS:  - Continuous cardiac monitoring, vital signs, obtain 12 lead EKG if ordered  - Administer antiarrhythmic and heart rate control medications as ordered  - Monitor electrolytes and administer replacement therapy as ordered  Outcome: Progressing     Problem: RESPIRATORY - ADULT  Goal: Achieves optimal ventilation and oxygenation  Description: INTERVENTIONS:  - Assess for changes in respiratory status  - Assess for changes in mentation and behavior  - Position to facilitate oxygenation and minimize respiratory effort  - Oxygen administered by appropriate delivery if ordered  - Initiate smoking cessation education as indicated  - Encourage broncho-pulmonary hygiene including cough, deep breathe, Incentive Spirometry  - Assess the need for suctioning and aspirate as needed  - Assess and instruct to report SOB or any respiratory difficulty  - Respiratory Therapy support as indicated  Outcome: Progressing     Problem:  GASTROINTESTINAL - ADULT  Goal: Minimal or absence of nausea and/or vomiting  Description: INTERVENTIONS:  - Administer IV fluids if ordered to ensure adequate hydration  - Maintain NPO status until nausea and vomiting are resolved  - Nasogastric tube if ordered  - Administer ordered antiemetic medications as needed  - Provide nonpharmacologic comfort measures as appropriate  - Advance diet as tolerated, if ordered  - Consider nutrition services referral to assist patient with adequate nutrition and appropriate food choices  Outcome: Progressing  Goal: Maintains or returns to baseline bowel function  Description: INTERVENTIONS:  - Assess bowel function  - Encourage oral fluids to ensure adequate hydration  - Administer IV fluids if ordered to ensure adequate hydration  - Administer ordered medications as needed  - Encourage mobilization and activity  - Consider nutritional services referral to assist patient with adequate nutrition and appropriate food choices  Outcome: Progressing  Goal: Maintains adequate nutritional intake  Description: INTERVENTIONS:  - Monitor percentage of each meal consumed  - Identify factors contributing to decreased intake, treat as appropriate  - Assist with meals as needed  - Monitor I&O, weight, and lab values if indicated  - Obtain nutrition services referral as needed  Outcome: Progressing  Goal: Establish and maintain optimal ostomy function  Description: INTERVENTIONS:  - Assess bowel function  - Encourage oral fluids to ensure adequate hydration  - Administer IV fluids if ordered to ensure adequate hydration   - Administer ordered medications as needed  - Encourage mobilization and activity  - Nutrition services referral to assist patient with appropriate food choices  - Assess stoma site  - Consider wound care consult   Outcome: Progressing  Goal: Oral mucous membranes remain intact  Description: INTERVENTIONS  - Assess oral mucosa and hygiene practices  - Implement preventative  oral hygiene regimen  - Implement oral medicated treatments as ordered  - Initiate Nutrition services referral as needed  Outcome: Progressing     Problem: GENITOURINARY - ADULT  Goal: Maintains or returns to baseline urinary function  Description: INTERVENTIONS:  - Assess urinary function  - Encourage oral fluids to ensure adequate hydration if ordered  - Administer IV fluids as ordered to ensure adequate hydration  - Administer ordered medications as needed  - Offer frequent toileting  - Follow urinary retention protocol if ordered  Outcome: Progressing  Goal: Absence of urinary retention  Description: INTERVENTIONS:  - Assess patient’s ability to void and empty bladder  - Monitor I/O  - Bladder scan as needed  - Discuss with physician/AP medications to alleviate retention as needed  - Discuss catheterization for long term situations as appropriate  Outcome: Progressing  Goal: Urinary catheter remains patent  Description: INTERVENTIONS:  - Assess patency of urinary catheter  - If patient has a chronic shah, consider changing catheter if non-functioning  - Follow guidelines for intermittent irrigation of non-functioning urinary catheter  Outcome: Progressing     Problem: SKIN/TISSUE INTEGRITY - ADULT  Goal: Skin Integrity remains intact(Skin Breakdown Prevention)  Description: Assess:  -Perform [place] assessment   -Clean and moisturize  -Inspect skin when repositioning, toileting, and assisting with ADLS  -Assess under medical devices   -Assess extremities for adequate circulation and sensation     Bed Management:  -Have minimal linens on bed & keep smooth, unwrinkled  -Change linens as needed when moist or perspiring  -Avoid sitting or lying in one position for more than 2 hours while in bed  -Keep HOB at 30 degrees     Toileting:  -Offer bedside commode  -Assess for incontinence   -Use incontinent care products after each incontinent episod         Problem: METABOLIC, FLUID AND ELECTROLYTES - ADULT  Goal:  Electrolytes maintained within normal limits  Description: INTERVENTIONS:  - Monitor labs and assess patient for signs and symptoms of electrolyte imbalances  - Administer electrolyte replacement as ordered  - Monitor response to electrolyte replacements, including repeat lab results as appropriate  - Instruct patient on fluid and nutrition as appropriate  Outcome: Progressing  Goal: Fluid balance maintained  Description: INTERVENTIONS:  - Monitor labs   - Monitor I/O and WT  - Instruct patient on fluid and nutrition as appropriate  - Assess for signs & symptoms of volume excess or deficit  Outcome: Progressing  Goal: Glucose maintained within target range  Description: INTERVENTIONS:  - Monitor Blood Glucose as ordered  - Assess for signs and symptoms of hyperglycemia and hypoglycemia  - Administer ordered medications to maintain glucose within target range  - Assess nutritional intake and initiate nutrition service referral as needed  Outcome: Progressing     Problem: MUSCULOSKELETAL - ADULT  Goal: Maintain or return mobility to safest level of function  Description: INTERVENTIONS:  - Assess patient's ability to carry out ADLs; assess patient's baseline for ADL function and identify physical deficits which impact ability to perform ADLs (bathing, care of mouth/teeth, toileting, grooming, dressing, etc.)  - Assess/evaluate cause of self-care deficits   - Assess range of motion  - Assess patient's mobility  - Assess patient's need for assistive devices and provide as appropriate  - Encourage maximum independence but intervene and supervise when necessary  - Involve family in performance of ADLs  - Assess for home care needs following discharge   - Consider OT consult to assist with ADL evaluation and planning for discharge  - Provide patient education as appropriate  Outcome: Progressing  Goal: Maintain proper alignment of affected body part  Description: INTERVENTIONS:  - Support, maintain and protect limb and body  alignment  - Provide patient/ family with appropriate education  Outcome: Progressing     Problem: HEMATOLOGIC - ADULT  Goal: Maintains hematologic stability  Description: INTERVENTIONS  - Assess for signs and symptoms of bleeding or hemorrhage  - Monitor labs  - Administer supportive blood products/factors as ordered and appropriate  Outcome: Progressing     Problem: Nutrition/Hydration-ADULT  Goal: Nutrient/Hydration intake appropriate for improving, restoring or maintaining nutritional needs  Description: Monitor and assess patient's nutrition/hydration status for malnutrition. Collaborate with interdisciplinary team and initiate plan and interventions as ordered.  Monitor patient's weight and dietary intake as ordered or per policy. Utilize nutrition screening tool and intervene as necessary. Determine patient's food preferences and provide high-protein, high-caloric foods as appropriate.     INTERVENTIONS:  - Monitor oral intake, urinary output, labs, and treatment plans  - Assess nutrition and hydration status and recommend course of action  - Evaluate amount of meals eaten  - Assist patient with eating if necessary   - Allow adequate time for meals  - Recommend/ encourage appropriate diets, oral nutritional supplements, and vitamin/mineral supplements  - Order, calculate, and assess calorie counts as needed  - Recommend, monitor, and adjust tube feedings and TPN/PPN based on assessed needs  - Assess need for intravenous fluids  - Provide specific nutrition/hydration education as appropriate  - Include patient/family/caregiver in decisions related to nutrition  Outcome: Progressing

## 2024-10-17 NOTE — PHYSICAL THERAPY NOTE
PHYSICAL THERAPY EVALUATION & TREATMENT  DATE: 10/17/24  TIME: 3443-9339    NAME:  Viktor Farfan  AGE:   54 y.o.  Mrn:   [id number]  Length Of Stay: 1    ADMIT DX:  Pneumonia of both upper lobes due to infectious organism    Past Medical History:   Diagnosis Date    Anxiety     Back pain     Chronic right-sided low back pain without sciatica 3/31/2018    Depression     Gastroesophageal reflux disease 3/31/2018    GERD (gastroesophageal reflux disease)     Right foot pain      Past Surgical History:   Procedure Laterality Date    NO PAST SURGERIES      ORIF FINGER FRACTURE Right 8/6/2019    Procedure: OPEN REDUCTION FINGER;  Surgeon: Tomas Morgan MD;  Location: [facility];  Service: Plastics       Performed at least 2 patient identifiers during session: Name, Birthday, ID bracelet, and Epic photo     10/17/24 1328   PT Last Visit   PT Visit Date 10/17/24   Note Type   Note type Evaluation  (& treatment)   Pain Assessment   Pain Assessment Tool 0-10   Pain Score No Pain   Restrictions/Precautions   Weight Bearing Precautions Per Order No   Other Precautions Impulsive;Cognitive;Chair Alarm;Bed Alarm;1:1;Multiple lines;Telemetry;O2;Fall Risk;Seizure  (HIGHLY IMPULSIVE; 4L O2 via NC, shah catheter, hard tele monitor, virtual 1:1 observation, CIWA and seizure protocol)   Home Living   Type of Home Apartment  (first floor apartment)   Home Layout Stairs to enter without rails;One level  (4 TANK, single level living)   Bathroom Shower/Tub Tub/shower unit   Bathroom Toilet Standard   Bathroom Equipment Other (Comment)  (none)   Bathroom Accessibility Accessible   Home Equipment Other (Comment)  (none)   Additional Comments Pt reports that his wife works from home but often goes into office, during that time pt is home alone.   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 Pt is a questionable historian, will confirm information with CM vs family as able.   General   Additional Pertinent History Pt is a 54 yr old male admitted 10/16/24 with c/o SOB x3-4days, increasing number of falls; dx: hyponatremia due to alcohol abuse.   Family/Caregiver Present No   Cognition   Overall Cognitive Status Impaired   Arousal/Participation Cooperative   Attention Attends with cues to redirect   Orientation Level Oriented X4   Memory Decreased short term memory;Decreased recall of recent events;Decreased recall of precautions   Following Commands Follows one step commands with increased time or repetition   Comments Pt highly impulsive, requires frequent redirection, displays poor insight to functional deficits / safety.   Subjective   Subjective \"I feel like i'm walking fine. But I normally go faster.\"   RUE Assessment   RUE Assessment WFL   LUE Assessment   LUE Assessment WFL   RLE Assessment   RLE Assessment WFL  (4+/5 to 5/5 MMT throughout B LEs, however during functional mobility/gait pt noted with R foot drop and steppage gait to compensate)   LLE Assessment   LLE Assessment WFL   Vision-Basic Assessment   Current Vision Wears glasses only for reading   Patient Visual Report Other (Comment)  (denies acute changes)   Coordination   Movements are Fluid and Coordinated 0   Coordination and Movement Description highly impulsive, segmental movements, significant pathway deviations with ambulation, slight tremor in B UEs   Sensation X  (pt reports chronic numbnesss in B LE from toes to above knees.)   Light " Touch   RLE Light Touch Impaired   LLE Light Touch Impaired   Proprioception   RLE Proprioception Grossly intact   LLE Proprioception Grossly Intact   Bed Mobility   Additional Comments Bed mobility NT on this date as  pt presents and was left seated OOB in recliner chair with alarm engaged and virtual 1:1 remaining.   Transfers   Sit to Stand 4  Minimal assistance   Additional items Assist x 1;Armrests;Impulsive;Increased time required;Verbal cues  (CGA - impulsive to initiate, increased time to complete full transfer)   Stand to Sit 4  Minimal assistance   Additional items Assist x 1;Armrests;Increased time required;Verbal cues   Stand pivot 3  Moderate assistance   Additional items Assist x 1;Impulsive;Verbal cues  (no AD)   Additional Comments Cues for safe mechanics, pt with good application and carry over. No use of AD for transfers. Pt noted with moderate degree of retropulsion upon full stand, requiring variable MIN-MODA to maintain balance.   Ambulation/Elevation   Gait pattern Improper Weight shift;Short stride;Decreased hip extension;Decreased heel strike;Decreased toe off  (B steppage gait initially but improves on L LE (persistent on RLE))   Gait Assistance 3  Moderate assist  (MIN- MODA 2 person)   Additional items Assist x 2;Verbal cues   Assistive Device None   Distance 22ft  (+ additional distance into hallway for additional treatment)   Stair Management Assistance Not tested   Balance   Static Sitting Fair +   Dynamic Sitting Fair -   Static Standing Poor -   Dynamic Standing Poor -   Ambulatory Poor -   Endurance Deficit   Endurance Deficit Yes   Activity Tolerance   Activity Tolerance Patient limited by fatigue;Other (Comment)  (impuslive, impaired cognition, impaire insight/safety)   Medical Staff Made Aware Spoke with PIA Rao/Shahnaz, OT RYNE Mckeon   Assessment   Prognosis Fair   Problem List Decreased strength;Decreased endurance;Impaired balance;Decreased mobility;Decreased  coordination;Decreased cognition;Impaired judgement;Decreased safety awareness;Impaired sensation;Obesity;Decreased skin integrity   Assessment Pt seen for PT evaluation for mobility assessment & discharge needs. Pt admitted 10/16/2024 w/ SOB, hyponatremia, falls, dx Alcohol abuse. Comorbidities affecting pt's fnxl performance include: Anxiety, chronic back pain, depression, R finger ORIF 2019. During PT IE, pt requires JAKUB/CGA for STS transfers, however variable MIN-MODA to gain and maintain standing balance, then variable MIN-MODA 2 person for ambulation of 22ft in room with no AD; vitals monitored and stable. Additional ambulation distance into hallway completed with continued variable MIN-MODA 2 person and no AD. Pt displays above outlined functional impairments & limitations, and presents below his baseline level of functional mobility. The AM-PAC & Barthel Index outcome tools were used to assist in determining pt safety w/ mobility/self care & appropriate d/c recommendations, see above for scores. Pt is at risk of falls d/t multiple comorbidities, impulsivity, h/o falls, impaired balance, impaired sensation, impaired cognition, impaired insight/safety awareness, varying levels of pain , acuity of medical illness, ongoing medical treatment of primary dx, abnormal lab values, polypharmacy, and unstable vitals. Pt's clinical presentation is currently unstable/unpredictable as seen in pt's presentation of vital sign response, changing level of pain, varying levels of cognitive performance, increased fall risk, new onset of impairment of functional mobility, decreased endurance, and new onset of weakness. Pt will benefit from continued PT services in order to address impairments, decrease risk of falls, maximize independence w/ fnxl mobility, & ensure safety w/ mobility for transition to next level of care. Based on pt presentation & impairments, pt would most appropriately benefit from Level II (moderate PT  "intensity) resources upon d/c.   Barriers to Discharge Inaccessible home environment;Decreased caregiver support   Goals   Patient Goals \"to go home\"   [facility] Expiration Date 10/31/24   Short Term Goal #1 Patient PT goals established in order to address pt self reported goal of \"to go home\". Pt will: complete all bed mobility independently in flat bed in order to promote increased OOB functional mobility and simulate home environment; complete all transfers consistently with S in order to increase safety with functional mobility; ambulate >150ft with LRAD and S in order to increase safety with household and short community distance functional mobility; negotiate 3-5 stairs with HR assist and S in order to facilitate safe access to his home; demonstrate understanding and independence with LE strengthening HEP; improve ambulatory balance to >/= fair grade in order to promote safety and increased independence with mobility; improve AM-PAC score to >/= 19/24 in order to increase independence with mobility and decrease burden of care; improve Barthel Index score to >/= 60/100 in order to increase independence and decrease risk of falls.   PT Treatment Day 1   Plan   Treatment/Interventions Functional transfer training;LE strengthening/ROM;Elevations;Therapeutic exercise;Endurance training;Cognitive reorientation;Patient/family training;Equipment eval/education;Bed mobility;Gait training;Compensatory technique education;Spoke to nursing;Spoke to case management   PT Frequency 3-5x/wk   Discharge Recommendation   Rehab Resource Intensity Level, PT II (Moderate Resource Intensity)   AM-PAC Basic Mobility Inpatient   Turning in Flat Bed Without Bedrails 3   Lying on Back to Sitting on Edge of Flat Bed Without Bedrails 2   Moving Bed to Chair 2   Standing Up From Chair Using Arms 3   Walk in Room 2   Climb 3-5 Stairs With Railing 2   Basic Mobility Inpatient Raw Score 14   Basic Mobility Standardized Score 35.55   [facility] " Highest Level Of Mobility   -[facility] Goal 4: Move to chair/commode   -HLM Achieved 7: Walk 25 feet or more   Modified Jeanna Scale   Modified Gaines Scale 4   Barthel Index   Feeding 10   Bathing 0   Grooming Score 0   Dressing Score 5   Bladder Score 0   Bowels Score 10   Toilet Use Score 5   Transfers (Bed/Chair) Score 5   Mobility (Level Surface) Score 10   Stairs Score 5   Barthel Index Score 50   Additional Treatment Session   Start Time 1344   End Time 1354   Treatment Assessment Pt is agreeable to participate in additional ambulation distance training post IE. Pt continues to require MIN A for transfers and MIN-MODA to gain stability prior to initiation of ambulation. Pt requires variable MIN-MODA 2 person for safe ambulation of additional 86ft in hallway, no AD used however RW not anticipated to improve pt's stability on this date; significant pathway deviation, improved LLE gait mechanics, however R LE continues with mild foot drop and steppage gait to compensate. With increased distance ambulation, pt with high degree of SOB/DONAHUE however HR and SPO2 monitored and stable t/o all mobility efforts. /56 post ambulation. At end of session pt was left seated OOB in recliner chair with alarm engaged and needs in reach, RN aware of pt status. Regarding functional mobility, pt remains below his baseline level of functional mobility, remains highly impulsive and unsafe with all aspects of mobility, and displays poor insight/safety towards functional deficits. Continue to recommend Level II (moderate PT intensity) resources once medically cleared for d/c from the acute care setting. Will continue skilled PT POC as able and appropriate to address functional impairments and progress towards therapy goals. Next session, plan for intervention of additional transfer and gait training as able.   Additional Treatment Day 1   End of Consult   Patient Position at End of Consult Bedside chair;Bed/Chair alarm activated;All  needs within reach       This session, pt required and most appropriately benefited from partial or full skilled PT/OT co-eval due to extensive physical assistance of SKILLED therapists, cognitive-communication impairments, cognitive-behavioral deficits, significant regression from baseline level of mobility, continuous vitals monitoring, decreased activity tolerance, and unpredictable medical and/or functional status. PT and OT goals were addressed separately as seen in documentation.    Based on patient's [facility] Highest Level of Mobility scores today, patient currently has a goal of [facility] Levels: 7: WALK 25 FEET OR MORE, to be completed with RN staffing each shift, in order to improve overall activity tolerance and mobility, combat hospital related deconditioning, and maximize outcomes for d/c from the acute care setting.     The patient's AM-PAC Basic Mobility Inpatient Short Form Raw Score is 14. A Raw score of less than or equal to 16 suggests the patient may benefit from discharge to post-acute rehabilitation services. Please also refer to the recommendation of the Physical Therapist for safe discharge planning.      Arlyn Arnett PT, DPT   Available via [employer]  NPI # [id number]  [place] License - [id number]  10/17/2024

## 2024-10-17 NOTE — ASSESSMENT & PLAN NOTE
VBG significant for increased pH of 7.46  pCO2 decreased at 27.1  Metabolic acidosis due to ETOH with compensatory respiratory alkalosis  Normal anion gap

## 2024-10-17 NOTE — ASSESSMENT & PLAN NOTE
pH 7.46, pCO2 27.1, serum bicarbonate 17  Mixed respiratory alkalosis and metabolic acidosis  Will monitor

## 2024-10-17 NOTE — CONSULTS
Vancomycin IV Pharmacy-to-Dose Consultation    Viktor Farfan is a 54 y.o. male who was receiving Vancomycin IV with management by the Pharmacy Consult service for treatment of Pneumonia (goal -600, trough >10)  .       The patient’s Vancomycin therapy has been discontinued. Thank you for allowing us to take part in this patient's care. Pharmacy will sign-off now; please call or re-consult if there are any questions.      Shelly Zhou, PharmD  Critical Care & Internal Medicine Clinical Pharmacist   Available via Epic Secure Chat

## 2024-10-17 NOTE — PLAN OF CARE
Problem: OCCUPATIONAL THERAPY ADULT  Goal: Performs self-care activities at highest level of function for planned discharge setting.  See evaluation for individualized goals.  Description: Treatment Interventions: ADL retraining, Functional transfer training, Endurance training, Cognitive reorientation, Patient/family training, Equipment evaluation/education, Compensatory technique education, Activityengagement, Energy conservation (cognitive assessments)          See flowsheet documentation for full assessment, interventions and recommendations.   Note: Limitation: Decreased ADL status, Decreased Safe judgement during ADL, Decreased cognition, Decreased endurance, Decreased self-care trans, Decreased high-level ADLs (insight, problem-solving, balance, activity tolerance, balance)  Prognosis: Fair  Assessment: Patient is a 54 y.o. male seen for OT evaluation following admission on 10/16/2024  s/p Alcohol abuse. Please see above for comprehensive list of comorbidities and significant PMHx impacting functional performance. Upon initial evaluation, pt appears to be performing below baseline functional status. Pt requires supervision for UB ADLs, JAKUB for LB ADLs, JAKUB (CGA) for transfers and variable minimal - moderate assist x 2 for functional mobility household distance bilateral HHA. The AM-PAC & Barthel Index outcome tools were used to assist in determining pt safety w/self care /mobility and appropriate d/c recommendations, see above for score. Occupational performance is affected by the following deficits: endurance , motor planning, decreased balance , decreased standing tolerance for self care tasks , decreased dynamic balance impacting functional reach, proprioceptive awareness , decreased functional reach , decreased trunk control , decreased activity tolerance , impaired memory , impaired judgement and problem solving , poor spatial awareness , impaired safety awareness , impulsive behavior, impaired  learning ability d/t current cognitive status , and direction following. Personal/Environmental factors impacting D/C include: (+) Hx of falls , steps to enter/navigate the home, Assistance needed for ADL/IADLs, Assistance needed for ADLs and functional mobility, High fall risk , decreased insight toward deficits , decreased recall of precautions , and health management. Supporting factors include: able to maintain FFSU, support system available, and attitude towards recovery . Patient would benefit from OT services within the acute care setting to maximize level of functional independence in the following areas fall prevention , energy conservation , self-care transfers, bed mobility , functional mobility, formal cognitive evaluation, and ADLs.  From OT standpoint, recommendation at time of D/C would be Level II (Moderate Resource Intensity) .     Rehab Resource Intensity Level, OT: II (Moderate Resource Intensity)     Linda Harrell MS OTR/L   [place] Licensure# [id number]

## 2024-10-17 NOTE — OCCUPATIONAL THERAPY NOTE
Occupational Therapy Evaluation     Patient Name: Viktor Farfan  Today's Date: 10/17/2024  Problem List  Principal Problem:    Alcohol abuse  Active Problems:    Hyponatremia    Falls    Right shoulder pain    SOB (shortness of breath)    SIRS (systemic inflammatory response syndrome) (HCC)    Tobacco abuse    Abnormal acid-base balance    Hypomagnesemia    Past Medical History  Past Medical History:   Diagnosis Date    Anxiety     Back pain     Chronic right-sided low back pain without sciatica 3/31/2018    Depression     Gastroesophageal reflux disease 3/31/2018    GERD (gastroesophageal reflux disease)     Right foot pain      Past Surgical History  Past Surgical History:   Procedure Laterality Date    NO PAST SURGERIES      ORIF FINGER FRACTURE Right 8/6/2019    Procedure: OPEN REDUCTION FINGER;  Surgeon: Tomas Morgan MD;  Location: [facility];  Service: Plastics        10/17/24 4320   OT Last Visit   OT Visit Date 10/17/24   Note Type   Note type Evaluation   Pain Assessment   Pain Assessment Tool 0-10   Pain Score No Pain   Restrictions/Precautions   Weight Bearing Precautions Per Order No   Other Precautions Impulsive;Cognitive;Chair Alarm;Bed Alarm;1:1;Multiple lines;Telemetry;Fall Risk;O2  (4L via NC, shah catheter, CIWA protocol)   Home Living   Type of Home Apartment  (first floor apt)   Home Layout One level;Performs ADLs on one level;Able to live on main level with bedroom/bathroom;Stairs to enter with rails  (4 TANK, maintains a single level)   Bathroom Shower/Tub Tub/shower unit   Bathroom Toilet Standard   Bathroom Equipment Other (Comment)  (none per patient)   Bathroom Accessibility Accessible   Home Equipment Other (Comment)  (none per patient)   Additional Comments Patient reports showering every other day however per EMR, spouse reported that patient had been avoiding showering w/ fear of falling.   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 Independent with ADLs/IADLs and fnxl mobility w/o use of AD. (+) driving and (+) falls.   Reciprocal Relationships Supportive spouse   Service to Others Unemployed   General   Additional Pertinent History Pt admit to [facility] [place] [facility] [place] presenting with SOB, cough, vomiting, confusion and generalized malaise. Found to have sodium of 99 w/ severe hyponatremia. PMHx:  EtOH abuse, GERD, depression, anxiety   Family/Caregiver Present No   Subjective   Subjective \"Once my breathing clears up I will be good to go\"   ADL   Eating Assistance 6  Modified independent   Grooming Assistance 5  Supervision/Setup   UB Bathing Assistance 5  Supervision/Setup   LB Bathing Assistance 4  Minimal Assistance   UB Dressing Assistance 5  Supervision/Setup   LB Dressing Assistance 4  Minimal Assistance   LB Dressing Deficit   (able to pull up and adjust R sock while sitting in the recliner chair)   Toileting Assistance  4  Minimal Assistance   Additional Comments The above levels of assistance are anticipated based on functional performance deficits with use of clinical judgement.   Bed Mobility   Additional Comments Bed mobility NT; pt received OOB in recliner chair at the start/end of the session with all needs in reach   Transfers   Sit to Stand 4  Minimal assistance  (CGA)   Additional items Assist x 1;Armrests;Increased time " required;Verbal cues   Stand to Sit 4  Minimal assistance  (CGA)   Additional items Assist x 1;Armrests;Increased time required;Verbal cues   Additional Comments Verbal cues for safety, pacing and optimal hand placement. Pt w/ fair application and carry over. No use of AD. CGA to acheive sit to stand transfer. JAKUB x 1 to maintain static standing balance d/t moderate retropulsion. Verbal and tactile cues for postural corrections.   Functional Mobility   Functional Mobility 3  Moderate assistance   Additional Comments ambulation of functional household distance w/ varrying minimal - moderate assist x 2 people. Bilateral HHA. Pt noted w/ possible R foot drop and is grossly unsteady w/ multiple LOBs. Moderate s/s of SOB/DONAHUE post ambulation. SpO2 >90% on 4L via NC. Participation in purse lip breathing, inc time needed to recover.   Additional items Hand hold assistance  (bilateral)   Balance   Static Sitting Fair +   Dynamic Sitting Fair -   Static Standing Poor -   Dynamic Standing Poor -   Activity Tolerance   Activity Tolerance Treatment limited secondary to medical complications (Comment)  (impaired cognition + insight)   Medical Staff Made Aware Spoke with CM, PT   Nurse Made Aware Spoke with RN pre/post   RUE Assessment   RUE Assessment WFL  (MMT 4+/5 grossly, good  strength)   LUE Assessment   LUE Assessment WFL  (MMT 4+/5 grossly, good  strength)   Sensation   Light Touch   (diabetic neuropathy from feet up to calves bilaterally at baseline)   Vision-Basic Assessment   Current Vision Wears glasses only for reading   Cognition   Overall Cognitive Status Impaired   Arousal/Participation Cooperative   Attention Attends with cues to redirect   Orientation Level Oriented X4   Memory Decreased recall of precautions;Decreased recall of recent events   Following Commands Follows one step commands with increased time or repetition   Comments Pt ID via wristband, name and . Poor safety awareness and insight.  Education on pacing techniques.   Assessment   Limitation Decreased ADL status;Decreased Safe judgement during ADL;Decreased cognition;Decreased endurance;Decreased self-care trans;Decreased high-level ADLs  (insight, problem-solving, balance, activity tolerance, balance)   Prognosis Fair   Assessment Patient is a 54 y.o. male seen for OT evaluation following admission on 10/16/2024  s/p Alcohol abuse. Please see above for comprehensive list of comorbidities and significant PMHx impacting functional performance. Upon initial evaluation, pt appears to be performing below baseline functional status. Pt requires supervision for UB ADLs, JAKUB for LB ADLs, JAKUB (CGA) for transfers and variable minimal - moderate assist x 2 for functional mobility household distance bilateral HHA. The AM-PAC & Barthel Index outcome tools were used to assist in determining pt safety w/self care /mobility and appropriate d/c recommendations, see above for score. Occupational performance is affected by the following deficits: endurance , motor planning, decreased balance , decreased standing tolerance for self care tasks , decreased dynamic balance impacting functional reach, proprioceptive awareness , decreased functional reach , decreased trunk control , decreased activity tolerance , impaired memory , impaired judgement and problem solving , poor spatial awareness , impaired safety awareness , impulsive behavior, impaired learning ability d/t current cognitive status , and direction following. Personal/Environmental factors impacting D/C include: (+) Hx of falls , steps to enter/navigate the home, Assistance needed for ADL/IADLs, Assistance needed for ADLs and functional mobility, High fall risk , decreased insight toward deficits , decreased recall of precautions , and health management. Supporting factors include: able to maintain FFSU, support system available, and attitude towards recovery . Patient would benefit from OT services  within the acute care setting to maximize level of functional independence in the following areas fall prevention , energy conservation , self-care transfers, bed mobility , functional mobility, formal cognitive evaluation, and ADLs.  From OT standpoint, recommendation at time of D/C would be Level II (Moderate Resource Intensity) .   Goals   Patient Goals to go home   LTG Time Frame 10-14   Long Term Goal See below   Plan   Treatment Interventions ADL retraining;Functional transfer training;Endurance training;Cognitive reorientation;Patient/family training;Equipment evaluation/education;Compensatory technique education;Activityengagement;Energy conservation  (cognitive assessments)   Goal Expiration Date 10/27/24   OT Treatment Day 0   OT Frequency 3-5x/wk   Discharge Recommendation   Rehab Resource Intensity Level, OT II (Moderate Resource Intensity)   AM-PAC Daily Activity Inpatient   Lower Body Dressing 2   Bathing 2   Toileting 2   Upper Body Dressing 2   Grooming 2   Eating 4   Daily Activity Raw Score 14   Daily Activity Standardized Score (Calc for Raw Score >=11) 33.39   AM-PAC Applied Cognition Inpatient   Following a Speech/Presentation 2   Understanding Ordinary Conversation 3   Taking Medications 1   Remembering Where Things Are Placed or Put Away 3   Remembering List of 4-5 Errands 3   Taking Care of Complicated Tasks 2   Applied Cognition Raw Score 14   Applied Cognition Standardized Score 32.02   Barthel Index   Feeding 10   Bathing 0   Grooming Score 0   Dressing Score 5   Bladder Score 0   Bowels Score 10   Toilet Use Score 5   Transfers (Bed/Chair) Score 5   Mobility (Level Surface) Score 0   Stairs Score 0   Barthel Index Score 35   Modified Jeanna Scale   Modified [place] Scale 4   End of Consult   Education Provided Yes   Patient Position at End of Consult Bedside chair;Bed/Chair alarm activated;All needs within reach  (virtual 1:1)   Nurse Communication Nurse aware of consult     GOALS:       -Patient will perform grooming tasks standing at sink with overall Mod I in order to increase overall independence     -Patient will be Mod I with UB dressing using AE and AD as needed in order to increase (I) with ADLs     -Patient will be Mod I with UB bathing using AE and AD as needed in order to increase (I) with ADLs     -Patient will be Mod I with LB dressing with use of AE and AD as needed in order to increase (I) with ADLs     -Patient will be Mod I with LB bathing with use of AE and AD as needed in order to increase (I) with ADLs     -Patient will complete toileting w/ Mod I w/ G hygiene/thoroughness in order to reduce caregiver burden     -Patient will demonstrate Mod I with bed mobility for ability to manage own comfort and initiate OOB tasks.      -Patient will perform functional transfers with Mod I to/from all surfaces using DME as needed in order to increase (I) with functional tasks     -Patient will be Mod I with functional mobility to/from bathroom for increased independence with toileting tasks     -Patient will tolerate therapeutic activities for greater than 30 min, in order to increase tolerance for functional activities.      -Patient will engage in ongoing cognitive assessment in order to assist with safe discharge planning/recommendations.     -Patient will independently integrate one pacing strategy into morning ADL's.     -Patient will demonstrate standing for 5 min in order to increase active participation in functional activities    The patient's raw score on the AM-PAC Daily Activity Inpatient Short Form is 14. A raw score of less than 19 suggests the patient may benefit from discharge to post-acute rehabilitation services. Please refer to the recommendation of the Occupational Therapist for safe discharge planning.    This session, pt required and most appropriately benefited from skilled OT/PT co-eval due to extensive physical assistance of SKILLED therapists, significant regression  from functional baseline, and decreased activity tolerance. OT and PT goals were addressed separately as seen in documentation

## 2024-10-17 NOTE — PLAN OF CARE
Problem: PHYSICAL THERAPY ADULT  Goal: Performs mobility at highest level of function for planned discharge setting.  See evaluation for individualized goals.  Description: Treatment/Interventions: Functional transfer training, LE strengthening/ROM, Elevations, Therapeutic exercise, Endurance training, Cognitive reorientation, Patient/family training, Equipment eval/education, Bed mobility, Gait training, Compensatory technique education, Spoke to nursing, Spoke to case management     See flowsheet documentation for full assessment, interventions and recommendations.  10/17/2024 1616 by Arlyn Arnett PT  Outcome: Progressing  Note: Prognosis: Fair  Problem List: Decreased strength, Decreased endurance, Impaired balance, Decreased mobility, Decreased coordination, Decreased cognition, Impaired judgement, Decreased safety awareness, Impaired sensation, Obesity, Decreased skin integrity  Assessment: Pt seen for PT evaluation for mobility assessment & discharge needs. Pt admitted 10/16/2024 w/ SOB, hyponatremia, falls, dx Alcohol abuse. Comorbidities affecting pt's fnxl performance include: Anxiety, chronic back pain, depression, R finger ORIF 2019. During PT IE, pt requires JAKUB/CGA for STS transfers, however variable MIN-MODA to gain and maintain standing balance, then variable MIN-MODA 2 person for ambulation of 22ft in room with no AD; vitals monitored and stable. Additional ambulation distance into hallway completed with continued variable MIN-MODA 2 person and no AD. Pt displays above outlined functional impairments & limitations, and presents below his baseline level of functional mobility. The AM-PAC & Barthel Index outcome tools were used to assist in determining pt safety w/ mobility/self care & appropriate d/c recommendations, see above for scores. Pt is at risk of falls d/t multiple comorbidities, impulsivity, h/o falls, impaired balance, impaired sensation, impaired cognition, impaired insight/safety  awareness, varying levels of pain , acuity of medical illness, ongoing medical treatment of primary dx, abnormal lab values, polypharmacy, and unstable vitals. Pt's clinical presentation is currently unstable/unpredictable as seen in pt's presentation of vital sign response, changing level of pain, varying levels of cognitive performance, increased fall risk, new onset of impairment of functional mobility, decreased endurance, and new onset of weakness. Pt will benefit from continued PT services in order to address impairments, decrease risk of falls, maximize independence w/ fnxl mobility, & ensure safety w/ mobility for transition to next level of care. Based on pt presentation & impairments, pt would most appropriately benefit from Level II (moderate PT intensity) resources upon d/c.    Barriers to Discharge: Inaccessible home environment, Decreased caregiver support     Rehab Resource Intensity Level, PT: II (Moderate Resource Intensity)    See flowsheet documentation for full assessment.

## 2024-10-17 NOTE — ASSESSMENT & PLAN NOTE
Sodium found to be severely decreased at 99  Patient received 3% normal saline 150 mL at [facility]  Sodium at Obi 103    PLAN  Goal correction 107 at 24 hours for 10/17 at noon  Goal achieved as of 6 AM 10/17.  Holding additional fluids  Will be started on 1.8% normal saline at 40 mL/h per nephrology  BMP recheck every 4 hours  Consult placed to nephrology.  Appreciate recommendations.  Patient will be n.p.o. with fluid restriction.  Will continue to monitor  Urine sodium 21, urine os 347

## 2024-10-17 NOTE — ASSESSMENT & PLAN NOTE
Sodium on admission: 101mEq/L   Serum osm: 233  Urine osm: 347   Urine Sodium:21  Current sodium 107 mEq/L  Etiology: Multifactorial likely secondary to EtOH, poor solute intake for 3 days and fluid overload    Rate of correction: 101>99>104>107 appropriate correction    Plan:  Recommend Bumex 1 mg twice daily  Fluid restriction 1.5 L  No indication of sodium tablets at this time  Continue BMP Q4 for now   1.8% discontinue overnight in the settings of fluid overload  Avoid overcorrection : no more than 6-8mEq in the next 24hr, goal is 114 by tomorrow morning   Please monitor UOP,  Will monitor labs, call renal if SNa+ <106 or >114  Risk of ODS:  EtOH   Severe hyponatremia   malnourishment: serum alb 3.4

## 2024-10-17 NOTE — UTILIZATION REVIEW
Initial Clinical Review    Admission: Date/Time/Statement:   Admission Orders (From admission, onward)       Ordered        10/16/24 1520  INPATIENT ADMISSION  Once                          Orders Placed This Encounter   Procedures    INPATIENT ADMISSION     Standing Status:   Standing     Number of Occurrences:   1     Order Specific Question:   Level of Care     Answer:   Critical Care [15]     Order Specific Question:   Estimated length of stay     Answer:   More than 2 Midnights     Order Specific Question:   Certification     Answer:   I certify that inpatient services are medically necessary for this patient for a duration of greater than two midnights. See H&P and MD Progress Notes for additional information about the patient's course of treatment.      Arrival Information       Patient The patient was transferred to [facility] on 10/16/24 from [facility], where care began on 10/16/24. 1 midnight has already been surpassed with active ongoing care.                         chief complaint:  shortness of breath     Initial Presentation: 54 y.o. male  to ED via EMS from  [facility] .    Admitted to inpatient with Dx: Alcohol abuse/Hyponatremia/Falls/Shortness of Breath/Right shoulder pain/SIRS.   Drinks alcohol since age 16, drinks 12 pack beer and 16 oz beer daily last few years, no history of rehab.  Presented to ED at [facility]  with  shortness of breath starting 3 days prior to arrival, fatigue. Intake poor. Multiple falls.  Right shoulder pain and weakness.  Struggles with depression.    PMHx: anxiety, chronic back pain, depression, GERD.  On exam: appears ill, wheezing.  BLE edema and tender.  Extensive multiple papules on trunk and back. Na 101>99.   CO2 17.  Total bilirubin 2.34.  .  D dimer 1.85.   H&H 11.3/31.7.    Imaging shows pulmonary edema,  multifocal upper lobe consolidation which could be pneumonia, cirrhosis.  In the ED given 3% normal saline 150 ml.    Transfer to [facility] as higher level of care due to severe hyponatremia  At [facility] 10/16/24:   shortness of breath.  Cough, wheezing.  Anxiety, nausea and vomiting.   On exam: tachycardia.   BLE trace edema.   Tachypnea. Klein. Significant ataxia on finger-to-nose  Na 103.  CO2 16.   Phos 2.4.  ca ionized 1.02.  AST 66.  Total bilirubin 2.35.  glucose 182.   H&H 11.1/31.1.     Plan includes  start CIWA, phenobarb .  Trend CMP, check US RUQ, Echo.   BMP every 4 hours.   Goal Na of 107 at 24 hours - tomorrow at noon.  Start 1.8% normal saline at 40 ml/h.   Check ct head and xray right shoulder.   Neuro checks every hour.  Oximetry - on oxygen 4 liters.  Check sputum culture, strep pneumo and legionella    10/16/24 per nephrology - severe hyponatremia/hypertension/ETOH/acid base disorder.  Suspect hyponatremia due to beer potomania, etoh and poor solute intake last 3 days.    Not grossly fluid overloaded.   Borderline hypertension,  is agitated and confused.   Na Rate of correction: 101>99>102>103 in ~5.5h = 0.5mEq/L), slightly over correcting.  Start 1.8% 40 mL/h for 6 hours with DDAVP 1 mcg IV.  BMP every 4 hours.  Avoid overcorrection: no more than 6-8mEq in the next 24hr, goal is 110 by tomorrow morning.  NPO tonight.      Date: 10/17/24    Day 2:  since admission CIWA of 14 and has received 650 phenobarbital +3 doses of phenobarbital 130 over the past 24 hours.  Overnight with hallucinations.  Today tremor improved.   Continues with significant ataxia on finger to nose.  On exam:  tachycardia.   BLE trace edema.   Agitated.   Klein.  Wbc 10.17.  H&H 10.9/30.7.   platelets 103.   Na 107.   Total bilirubin 1.95.  continue CIWA with Phenobarb per score.   NPO.   Seizure precautions.   Possible Precedex if becomes violent or severe hallucinations after Phenobarb.   Trend Cmp.    Fluid restriction.   Trend BMP every 4 hours.  Neuro checks every hour.  Overnight started   on cefepime, azithromycin, and  vancomycin and trend cultures.       Scheduled Medications:  [Held by provider] amLODIPine, 10 mg, Oral, Daily  aspirin, 81 mg, Oral, Daily  atorvastatin, 20 mg, Oral, Daily  azithromycin, 500 mg, Intravenous, Q24H  cefepime, 2,000 mg, Intravenous, Q8H  chlorhexidine, 15 mL, Mouth/Throat, Q12H JUAN  Diclofenac Sodium, 2 g, Topical, 4x Daily  enoxaparin, 40 mg, Subcutaneous, Daily  fluticasone, 1 spray, Nasal, Daily  folic acid 1 mg in sodium chloride 0.9 % 50 mL IVPB, 1 mg, Intravenous, Daily  mirtazapine, 15 mg, Oral, HS  multivitamin-minerals, 1 tablet, Oral, Daily  nicotine, 21 mg, Transdermal, Daily  pantoprazole, 40 mg, Intravenous, Q24H JUAN  polyethylene glycol, 17 g, Oral, Daily  thiamine, 500 mg, Intravenous, TID   Followed by  [START ON 10/19/2024] thiamine, 250 mg, Intravenous, TID   Followed by  [START ON 10/22/2024] thiamine, 100 mg, Intravenous, TID   Followed by  [START ON 10/26/2024] thiamine, 100 mg, Oral, Daily  vancomycin, 1,750 mg, Intravenous, Q12H    calcium gluconate 1 g in sodium chloride 0.9% 50 mL (premix)  Dose: 1 g  Freq: Once Route: IV  Last Dose: Stopped (10/16/24 1745)  Start: 10/16/24 1630 End: 10/16/24 1745   calcium gluconate 2 g in sodium chloride 0.9% 100 mL (premix)  Dose: 2 g  Freq: Once Route: IV  Last Dose: Stopped (10/17/24 0905)  Start: 10/17/24 0630 End: 10/17/24 0905  desmopressin (DDAVP) injection 1 mcg  Dose: 1 mcg  Freq: Once Route: IV  Start: 10/16/24 1700 End: 10/16/24 1715   furosemide (LASIX) injection 40 mg  Dose: 40 mg  Freq: Once Route: IV  Start: 10/17/24 0330 End: 10/17/24 0333  magnesium sulfate 2 g/50 mL IVPB (premix) 2 g  Dose: 2 g  Freq: Once Route: IV  Last Dose: Stopped (10/17/24 0800)  Start: 10/17/24 0630 End: 10/17/24 0800  ondansetron (ZOFRAN) injection 4 mg  Dose: 4 mg  Freq: Once Route: IV  Start: 10/16/24 1530 End: 10/16/24 1536  PHENobarbital 650 mg in sodium chloride 0.9 % 100 mL IVPB  Dose: 650 mg  Freq: Once Route: IV  Last Dose: Stopped  (10/16/24 2028)  Start: 10/16/24 1815 End: 10/16/24 2028  PHENobarbital injection 130 mg  Dose: 130 mg  Freq: Once Route: IV  Start: 10/17/24 0700 End: 10/17/24 0706   PHENobarbital injection 130 mg  Dose: 130 mg  Freq: Once Route: IV  Start: 10/17/24 0445 End: 10/17/24 0455  PHENobarbital injection 130 mg  Dose: 130 mg  Freq: Once Route: IV  Start: 10/17/24 0330 End: 10/17/24 0333    Continuous IV Infusions:  sodium chloride 23.4% 308 mEq in sterile water 1,000 mL infusion  Rate: 40 mL/hr  Freq: Continuous Route: IV  Last Dose: Stopped (10/16/24 2323)  Start: 10/16/24 1630 End: 10/16/24 2300     PRN Meds:  acetaminophen, 650 mg, Oral, Q6H PRN x 1 10/17  ondansetron, 4 mg, Intravenous, Q8H PRN  trimethobenzamide, 200 mg, Intramuscular, Q6H PRN    ED Triage Vitals   Temperature Pulse Respirations Blood Pressure SpO2 Pain Score   10/16/24 1600 10/16/24 1500 10/16/24 1500 10/16/24 1500 10/16/24 1605 10/16/24 1530   97.5 °F (36.4 °C) 96 (!) 24 143/85 94 % No Pain     Weight (last 2 days)       Date/Time Weight    10/17/24 0552 122 (268.52)    10/16/24 1700 126 (278.88)    10/16/24 1548 126 (278.88)          Date and Time Eye Opening Best Verbal Response Best Motor Response [place] Coma Scale Score   10/17/24 0800 4 5 6 15   10/17/24 0400 4 5 6 15   10/17/24 0000 4 5 6 15   10/16/24 2000 4 5 6 15   10/16/24 1530 4 5 6 15       Vital Signs (last 3 days)       Date/Time Temp Pulse Resp BP MAP (mmHg) SpO2 Calculated FIO2 (%) - Nasal Cannula O2 Flow Rate (L/min) Nasal Cannula O2 Flow Rate (L/min) O2 Device [place] Coma Scale Score CIWA-Ar Total Pain    10/17/24 0800 -- -- -- -- -- -- -- -- -- -- 15 -- No Pain    10/17/24 0700 98.2 °F (36.8 °C) 96 28 138/67 93 92 % -- -- -- -- -- -- --    10/17/24 0656 -- 96 27 129/65 91 90 % -- -- -- -- -- 15 --    10/17/24 0635 -- -- -- -- -- -- 36 -- 4 L/min -- -- -- --    10/17/24 0600 -- -- -- -- -- -- 28 -- 2 L/min Nasal cannula -- -- --    10/17/24 0500 98.2 °F (36.8 °C) 93 20 144/67  91 91 % -- -- -- -- -- -- --    10/17/24 0445 -- -- -- 144/67 -- -- -- -- -- -- -- 14 --    10/17/24 0400 -- -- -- -- -- -- -- -- -- -- 15 -- No Pain    10/17/24 0321 -- -- -- -- -- -- -- -- -- -- -- 13 --    10/17/24 0236 98.1 °F (36.7 °C) 100 25 154/86 113 86 % -- -- -- -- -- -- --    10/17/24 0202 98.1 °F (36.7 °C) 91 27 -- -- 93 % -- -- -- -- -- -- --    10/17/24 0140 98.1 °F (36.7 °C) 91 23 128/93 102 93 % -- -- -- -- -- -- --    10/17/24 0112 98.1 °F (36.7 °C) 91 26 126/76 93 93 % -- -- -- -- -- -- 7    10/17/24 0000 -- -- -- 128/74 -- -- -- -- -- -- 15 11 No Pain    10/16/24 2334 -- -- -- -- -- -- -- -- -- -- -- -- 8    10/16/24 2302 98.6 °F (37 °C) 93 24 -- -- 93 % -- -- -- -- -- -- --    10/16/24 2200 98.4 °F (36.9 °C) 93 34 157/74 103 94 % -- -- -- -- -- -- --    10/16/24 2130 98.4 °F (36.9 °C) 95 20 147/70 101 93 % -- -- -- -- -- -- --    10/16/24 2100 98.4 °F (36.9 °C) 96 30 144/82 104 94 % -- -- -- -- -- -- --    10/16/24 2033 98.2 °F (36.8 °C) 95 24 134/71 94 91 % 36 -- 4 L/min Nasal cannula -- -- --    10/16/24 2000 98.1 °F (36.7 °C) 96 28 119/63 86 89 % -- -- -- -- 15 10 No Pain    10/16/24 1900 97.7 °F (36.5 °C) 93 28 139/72 99 94 % -- -- -- -- -- -- --    10/16/24 1800 97.5 °F (36.4 °C) 94 19 150/85 104 94 % -- -- -- -- -- 10 --    10/16/24 1729 97.5 °F (36.4 °C) 92 29 157/74 107 94 % -- -- -- -- -- -- --    10/16/24 1700 97.3 °F (36.3 °C) 90 26 161/78 110 -- -- -- -- -- -- -- --    10/16/24 1605 -- -- -- -- -- 94 % -- 4 L/min -- Nasal cannula -- -- --    10/16/24 1600 97.5 °F (36.4 °C) 90 23 151/72 104 -- -- -- -- -- -- 7 --    10/16/24 1530 -- -- -- -- -- -- -- -- -- -- 15 -- No Pain    10/16/24 1500 -- 96 24 143/85 104 -- -- -- -- -- -- -- --           CIWA-Ar Score       Row Name 10/17/24 0656 10/17/24 0445 10/17/24 0321       CIWA-Ar    BP -- 144/67 --    Pulse 96 -- --    Nausea and Vomiting 0 0 0    Tactile Disturbances 0 0 0    Tremor 1 2 2    Auditory Disturbances 2 0 0    Paroxysmal  Sweats 1 1 0    Visual Disturbances 2 0 0    Anxiety 4 3 3    Headache, Fullness in Head 0 0 0    Agitation 4 7 7    Orientation and Clouding of Sensorium 1 1 1    CIWA-Ar Total 15 14 13      Row Name 10/17/24 0000 10/16/24 2000 10/16/24 1800       CIWA-Ar    /74 119/63 --    Pulse -- 96 --    Nausea and Vomiting 0 0 2    Tactile Disturbances 0 0 0    Tremor 0 0 2    Auditory Disturbances 0 0 0    Paroxysmal Sweats 0 0 2    Visual Disturbances 0 0 3  reports seeing [employer] drones outside his window, not seen by any others    Anxiety 5 5 1    Headache, Fullness in Head 1 0 0    Agitation 5 5 0    Orientation and Clouding of Sensorium 0 0 0    CIWA-Ar Total 11 10 10                    Pertinent Labs/Diagnostic Test Results:   Radiology:  CT head wo contrast   Final Interpretation by Víctor Tong MD (10/17 0139)      No acute intracranial abnormality.                  Workstation performed: [id number]         XR shoulder 2+ vw right   Final Interpretation by Joi Beck MD (10/16 2969)   No acute displaced fracture   Patchy opacity right lung may be due to congestion/pneumonia      Computerized Assisted Algorithm (CAA) may have been used to analyze all applicable images.         Workstation performed: [id number]         US right upper quadrant    (Results Pending)   XR chest portable ICU    (Results Pending)     10/16/24 cta chest Evaluation compromised by motion.   No emboli in the main, right, and left pulmonary arteries. Segmental and subsegmental emboli cannot be excluded due to severe motion artifact and suboptimal opacification of the pulmonary arteries.   Diffuse septal thickening and groundglass opacity due to pulmonary edema with trace pleural effusions.   Multifocal bilateral upper lobe consolidation which could be due to pneumonia in the appropriate clinical setting versus localized alveolar edema.   Mildly nodular liver suggestive of cirrhosis with trace ascites.    10/16/24 CxR  - Cardiomegaly with probable pulmonary edema and asymmetric left perihilar alveolar edema. Superimposed pneumonia cannot be completely excluded.     10/16/24 venous duplex RIGHT LOWER LIMB  No evidence of acute or chronic deep vein thrombosis .  No evidence of superficial thrombophlebitis noted.  Doppler evaluation shows a normal response to augmentation maneuvers.  Popliteal, posterior tibial and anterior tibial arterial Doppler waveforms are  triphasic.  LEFT LOWER LIMB LIMITED  Evaluation shows no evidence of thrombus in the common femoral vein.  Doppler evaluation shows a normal response to augmentation maneuvers    Cardiology:  No orders to display     10/16/24    Indications / Diagnosis:  SOB  ECG reviewed by me, the ED Provider: yes    Patient location:  ED and bedside  Previous ECG:     Previous ECG:  Compared to current    Similarity:  No change  Interpretation:     Interpretation: abnormal    Rate:     ECG rate:  92    ECG rate assessment: normal    Rhythm:     Rhythm: sinus rhythm    Ectopy:     Ectopy: none    QRS:     QRS axis:  Normal    QRS intervals:  Normal  Conduction:     Conduction: normal    ST segments:     ST segments:  Normal  T waves:     T waves: normal    Comments:      LOW VOLTAGE      Results from last 7 days   Lab Units 10/17/24  0444 10/16/24  1528 10/16/24  1009   WBC Thousand/uL 10.17* 7.94 9.15   HEMOGLOBIN g/dL 10.9* 11.1* 11.3*   HEMATOCRIT % 30.7* 31.1* 31.7*   PLATELETS Thousands/uL 103* 105* 104*   TOTAL NEUT ABS Thousands/µL 8.68*  --  7.51     Results from last 7 days   Lab Units 10/17/24  0756 10/17/24  0444 10/16/24  2348 10/16/24  2022 10/16/24  1528   SODIUM mmol/L 107* 107* 104* 104* 103*   POTASSIUM mmol/L 4.5 4.6 5.4* 5.1 5.0   CHLORIDE mmol/L 80* 81* 80* 79* 76*   CO2 mmol/L 20* 19* 17* 18* 16*   ANION GAP mmol/L 7 7 7 7 11   BUN mg/dL 10 10 9 8 7   CREATININE mg/dL 0.73 0.76 0.70 0.72 0.67   EGFR ml/min/1.73sq m 105 103 106 105 108   CALCIUM mg/dL 8.3* 7.9* 7.9* 8.0*  8.1*   CALCIUM, IONIZED mmol/L  --  1.04*  --   --  1.02*   MAGNESIUM mg/dL  --  1.8*  --   --  1.9   PHOSPHORUS mg/dL  --  2.8  --   --  2.4*     Results from last 7 days   Lab Units 10/17/24  0444 10/16/24  1528 10/16/24  1141 10/16/24  1009   AST U/L 65* 66* 64* 66*   ALT U/L 20 19 19 19   ALK PHOS U/L 179* 211* 196* 213*   TOTAL PROTEIN g/dL 7.5 7.8 7.4 7.9   ALBUMIN g/dL 3.4* 3.5 3.3* 3.6   TOTAL BILIRUBIN mg/dL 1.95* 2.35* 2.34* 2.30*   BILIRUBIN DIRECT mg/dL 0.85*  --   --   --      Results from last 7 days   Lab Units 10/17/24  0756 10/17/24  0444 10/16/24  2348 10/16/24  2022 10/16/24  1528 10/16/24  1354 10/16/24  1141 10/16/24  1009   GLUCOSE RANDOM mg/dL 124 141* 143* 166* 182* 163* 144* 135     Results from last 7 days   Lab Units 10/16/24  1528   OSMOLALITY, SERUM mmol/*     Results from last 7 days   Lab Units 10/16/24  2348 10/16/24  1528   PH REBECCA  7.462* 7.494*   PCO2 REBECCA mm Hg 27.1* 24.1*   PO2 REBECCA mm Hg 42.6 76.9*   HCO3 REBECCA mmol/L 18.9* 18.1*   BASE EXC REBECCA mmol/L -3.6 -3.1   O2 CONTENT REBECCA ml/dL 13.4 20.2   O2 HGB, VENOUS % 80.3* 93.7*     Results from last 7 days   Lab Units 10/16/24  1009   D-DIMER QUANTITATIVE ug/ml FEU 1.85*     Results from last 7 days   Lab Units 10/16/24  1528   PROTIME seconds 16.6*   INR  1.27*     Results from last 7 days   Lab Units 10/16/24  1528   TSH 3RD GENERATON uIU/mL 1.353     Results from last 7 days   Lab Units 10/16/24  1528   LACTIC ACID mmol/L 1.9     Results from last 7 days   Lab Units 10/16/24  1009   BNP pg/mL 308*     Results from last 7 days   Lab Units 10/16/24  1528   FERRITIN ng/mL 62   IRON SATURATION % 32   IRON ug/dL 123   TIBC ug/dL 384     Results from last 7 days   Lab Units 10/16/24  1528   LIPASE u/L 57     Results from last 7 days   Lab Units 10/16/24  1528   OSMOLALITY, SERUM mmol/*   OSMO UR mmol/     Results from last 7 days   Lab Units 10/16/24  1528   CLARITY UA  Clear   COLOR UA  Light Yellow   SPEC GRAV UA  1.031*    PH UA  5.5   GLUCOSE UA mg/dl Negative   KETONES UA mg/dl 10 (1+)*   BLOOD UA  Negative   PROTEIN UA mg/dl Negative   NITRITE UA  Negative   BILIRUBIN UA  Negative   UROBILINOGEN UA (BE) mg/dl <2.0   LEUKOCYTES UA  Negative   WBC UA /hpf 1-2   RBC UA /hpf None Seen   BACTERIA UA /hpf None Seen   EPITHELIAL CELLS WET PREP /hpf None Seen   SODIUM UR  21     Results from last 7 days   Lab Units 10/16/24  1609   STREP PNEUMONIAE ANTIGEN, URINE  Negative   LEGIONELLA URINARY ANTIGEN  Negative     Results from last 7 days   Lab Units 10/16/24  1528   ETHANOL LVL mg/dL <10     Results from last 7 days   Lab Units 10/16/24  1533 10/16/24  1530 10/16/24  1119   BLOOD CULTURE  Received in Microbiology Lab. Culture in Progress. Received in Microbiology Lab. Culture in Progress. Received in Microbiology Lab. Culture in Progress.  Received in Microbiology Lab. Culture in Progress.     Results from last 7 days   Lab Units 10/17/24  0756   VANCOMYCIN RM ug/mL 16.3       Past Medical History:   Diagnosis Date    Anxiety     Back pain     Chronic right-sided low back pain without sciatica 3/31/2018    Depression     Gastroesophageal reflux disease 3/31/2018    GERD (gastroesophageal reflux disease)     Right foot pain      Present on Admission:   Hyponatremia   Abnormal acid-base balance   Hypomagnesemia      Admitting Diagnosis: Pneumonia of both upper lobes due to infectious organism  Age/Sex: 54 y.o. male    Network Utilization Review Department  ATTENTION: Please call with any questions or concerns to [phone] and carefully listen to the prompts so that you are directed to the right person. All voicemails are confidential.   For Discharge needs, contact Care Management DC Support Team at [phone] opt. 2  Send all requests for admission clinical reviews, approved or denied determinations and any other requests to dedicated fax number below belonging to the campus where the patient is receiving treatment. List of  dedicated fax numbers for the Facilities:  FACILITY NAME UR FAX NUMBER   ADMISSION DENIALS (Administrative/Medical Necessity) [phone]   DISCHARGE SUPPORT TEAM (NETWORK) [phone]   PARENT CHILD HEALTH (Maternity/NICU/Pediatrics) [phone]   [facility] [phone]   [facility] [phone]   [facility] [phone]   [facility] [phone]   [facility] [phone]   [facility] [phone]   [facility] [phone]   [facility] [phone]   [facility] [phone]   [facility] [phone]   [facility] [phone]   [facility] [phone]

## 2024-10-17 NOTE — ASSESSMENT & PLAN NOTE
Has history of frequent falls and difficulties with balance.  Wife reports patient has been afraid to take showers due to concern for fall  Wife reports patient has had a head strike in the past  Patient reports that he has fallen and caught himself multiple times most recently injuring his right shoulder  Significant ataxia on finger-to-nose on exam    PLAN:  CT head without contrast negative for acute intracranial abnormality but did show right mastoid effusion  X-ray of right shoulder negative for acute fracture  Neurochecks every hour to assess for ataxia

## 2024-10-18 LAB
ALBUMIN SERPL BCG-MCNC: 3.1 G/DL (ref 3.5–5)
ALP SERPL-CCNC: 165 U/L (ref 34–104)
ALT SERPL W P-5'-P-CCNC: 20 U/L (ref 7–52)
ANION GAP SERPL CALCULATED.3IONS-SCNC: 5 MMOL/L (ref 4–13)
ANION GAP SERPL CALCULATED.3IONS-SCNC: 6 MMOL/L (ref 4–13)
ANION GAP SERPL CALCULATED.3IONS-SCNC: 8 MMOL/L (ref 4–13)
ANISOCYTOSIS BLD QL SMEAR: PRESENT
APTT PPP: 35 SECONDS (ref 23–34)
AST SERPL W P-5'-P-CCNC: 64 U/L (ref 13–39)
BACTERIA SPT RESP CULT: ABNORMAL
BASOPHILS # BLD AUTO: 0.01 THOUSANDS/ΜL (ref 0–0.1)
BASOPHILS NFR BLD AUTO: 0 % (ref 0–1)
BILIRUB DIRECT SERPL-MCNC: 0.52 MG/DL (ref 0–0.2)
BILIRUB SERPL-MCNC: 1.38 MG/DL (ref 0.2–1)
BUN SERPL-MCNC: 10 MG/DL (ref 5–25)
BUN SERPL-MCNC: 6 MG/DL (ref 5–25)
BUN SERPL-MCNC: 7 MG/DL (ref 5–25)
BUN SERPL-MCNC: 7 MG/DL (ref 5–25)
BUN SERPL-MCNC: 8 MG/DL (ref 5–25)
BUN SERPL-MCNC: 9 MG/DL (ref 5–25)
BUN SERPL-MCNC: 9 MG/DL (ref 5–25)
CA-I BLD-SCNC: 1.02 MMOL/L (ref 1.12–1.32)
CALCIUM SERPL-MCNC: 7.4 MG/DL (ref 8.4–10.2)
CALCIUM SERPL-MCNC: 7.6 MG/DL (ref 8.4–10.2)
CALCIUM SERPL-MCNC: 7.6 MG/DL (ref 8.4–10.2)
CALCIUM SERPL-MCNC: 7.7 MG/DL (ref 8.4–10.2)
CALCIUM SERPL-MCNC: 7.7 MG/DL (ref 8.4–10.2)
CALCIUM SERPL-MCNC: 7.9 MG/DL (ref 8.4–10.2)
CALCIUM SERPL-MCNC: 7.9 MG/DL (ref 8.4–10.2)
CHLORIDE SERPL-SCNC: 86 MMOL/L (ref 96–108)
CHLORIDE SERPL-SCNC: 87 MMOL/L (ref 96–108)
CHLORIDE SERPL-SCNC: 88 MMOL/L (ref 96–108)
CHLORIDE SERPL-SCNC: 88 MMOL/L (ref 96–108)
CHLORIDE SERPL-SCNC: 89 MMOL/L (ref 96–108)
CO2 SERPL-SCNC: 22 MMOL/L (ref 21–32)
CO2 SERPL-SCNC: 23 MMOL/L (ref 21–32)
CO2 SERPL-SCNC: 24 MMOL/L (ref 21–32)
CREAT SERPL-MCNC: 0.65 MG/DL (ref 0.6–1.3)
CREAT SERPL-MCNC: 0.66 MG/DL (ref 0.6–1.3)
CREAT SERPL-MCNC: 0.67 MG/DL (ref 0.6–1.3)
CREAT SERPL-MCNC: 0.72 MG/DL (ref 0.6–1.3)
CREAT SERPL-MCNC: 0.8 MG/DL (ref 0.6–1.3)
CREAT SERPL-MCNC: 0.82 MG/DL (ref 0.6–1.3)
CREAT SERPL-MCNC: 0.85 MG/DL (ref 0.6–1.3)
EOSINOPHIL # BLD AUTO: 0.02 THOUSAND/ΜL (ref 0–0.61)
EOSINOPHIL NFR BLD AUTO: 0 % (ref 0–6)
ERYTHROCYTE [DISTWIDTH] IN BLOOD BY AUTOMATED COUNT: 15.6 % (ref 11.6–15.1)
GFR SERPL CREATININE-BSD FRML MDRD: 100 ML/MIN/1.73SQ M
GFR SERPL CREATININE-BSD FRML MDRD: 101 ML/MIN/1.73SQ M
GFR SERPL CREATININE-BSD FRML MDRD: 105 ML/MIN/1.73SQ M
GFR SERPL CREATININE-BSD FRML MDRD: 108 ML/MIN/1.73SQ M
GFR SERPL CREATININE-BSD FRML MDRD: 109 ML/MIN/1.73SQ M
GFR SERPL CREATININE-BSD FRML MDRD: 110 ML/MIN/1.73SQ M
GFR SERPL CREATININE-BSD FRML MDRD: 98 ML/MIN/1.73SQ M
GLUCOSE SERPL-MCNC: 116 MG/DL (ref 65–140)
GLUCOSE SERPL-MCNC: 130 MG/DL (ref 65–140)
GLUCOSE SERPL-MCNC: 132 MG/DL (ref 65–140)
GLUCOSE SERPL-MCNC: 135 MG/DL (ref 65–140)
GLUCOSE SERPL-MCNC: 147 MG/DL (ref 65–140)
GLUCOSE SERPL-MCNC: 165 MG/DL (ref 65–140)
GLUCOSE SERPL-MCNC: 221 MG/DL (ref 65–140)
GRAM STN SPEC: ABNORMAL
HCT VFR BLD AUTO: 29.5 % (ref 36.5–49.3)
HGB BLD-MCNC: 10.3 G/DL (ref 12–17)
IMM GRANULOCYTES # BLD AUTO: 0.06 THOUSAND/UL (ref 0–0.2)
IMM GRANULOCYTES NFR BLD AUTO: 1 % (ref 0–2)
INR PPP: 1.3 (ref 0.85–1.19)
LYMPHOCYTES # BLD AUTO: 0.7 THOUSANDS/ΜL (ref 0.6–4.47)
LYMPHOCYTES NFR BLD AUTO: 8 % (ref 14–44)
MAGNESIUM SERPL-MCNC: 1.9 MG/DL (ref 1.9–2.7)
MCH RBC QN AUTO: 28.5 PG (ref 26.8–34.3)
MCHC RBC AUTO-ENTMCNC: 34.9 G/DL (ref 31.4–37.4)
MCV RBC AUTO: 82 FL (ref 82–98)
MONOCYTES # BLD AUTO: 0.98 THOUSAND/ΜL (ref 0.17–1.22)
MONOCYTES NFR BLD AUTO: 11 % (ref 4–12)
MRSA NOSE QL CULT: NORMAL
NEUTROPHILS # BLD AUTO: 6.93 THOUSANDS/ΜL (ref 1.85–7.62)
NEUTS SEG NFR BLD AUTO: 80 % (ref 43–75)
NRBC BLD AUTO-RTO: 0 /100 WBCS
PHOSPHATE SERPL-MCNC: 2 MG/DL (ref 2.7–4.5)
PLATELET # BLD AUTO: 99 THOUSANDS/UL (ref 149–390)
PLATELET BLD QL SMEAR: ABNORMAL
PMV BLD AUTO: 11.2 FL (ref 8.9–12.7)
POTASSIUM SERPL-SCNC: 3.6 MMOL/L (ref 3.5–5.3)
POTASSIUM SERPL-SCNC: 3.7 MMOL/L (ref 3.5–5.3)
POTASSIUM SERPL-SCNC: 3.7 MMOL/L (ref 3.5–5.3)
POTASSIUM SERPL-SCNC: 3.8 MMOL/L (ref 3.5–5.3)
POTASSIUM SERPL-SCNC: 4.3 MMOL/L (ref 3.5–5.3)
PROCALCITONIN SERPL-MCNC: 0.1 NG/ML
PROT SERPL-MCNC: 7 G/DL (ref 6.4–8.4)
PROTHROMBIN TIME: 16.9 SECONDS (ref 12.3–15)
RBC # BLD AUTO: 3.61 MILLION/UL (ref 3.88–5.62)
RBC MORPH BLD: PRESENT
SODIUM SERPL-SCNC: 115 MMOL/L (ref 135–147)
SODIUM SERPL-SCNC: 116 MMOL/L (ref 135–147)
SODIUM SERPL-SCNC: 117 MMOL/L (ref 135–147)
SODIUM SERPL-SCNC: 118 MMOL/L (ref 135–147)
SODIUM SERPL-SCNC: 120 MMOL/L (ref 135–147)
WBC # BLD AUTO: 8.7 THOUSAND/UL (ref 4.31–10.16)

## 2024-10-18 PROCEDURE — 99232 SBSQ HOSP IP/OBS MODERATE 35: CPT | Performed by: INTERNAL MEDICINE

## 2024-10-18 PROCEDURE — 80076 HEPATIC FUNCTION PANEL: CPT

## 2024-10-18 PROCEDURE — 80048 BASIC METABOLIC PNL TOTAL CA: CPT

## 2024-10-18 PROCEDURE — 85610 PROTHROMBIN TIME: CPT

## 2024-10-18 PROCEDURE — 85730 THROMBOPLASTIN TIME PARTIAL: CPT

## 2024-10-18 PROCEDURE — 80048 BASIC METABOLIC PNL TOTAL CA: CPT | Performed by: PHYSICIAN ASSISTANT

## 2024-10-18 PROCEDURE — 83735 ASSAY OF MAGNESIUM: CPT

## 2024-10-18 PROCEDURE — 99233 SBSQ HOSP IP/OBS HIGH 50: CPT | Performed by: STUDENT IN AN ORGANIZED HEALTH CARE EDUCATION/TRAINING PROGRAM

## 2024-10-18 PROCEDURE — 84145 PROCALCITONIN (PCT): CPT

## 2024-10-18 PROCEDURE — 84100 ASSAY OF PHOSPHORUS: CPT

## 2024-10-18 PROCEDURE — 82330 ASSAY OF CALCIUM: CPT

## 2024-10-18 PROCEDURE — 85025 COMPLETE CBC W/AUTO DIFF WBC: CPT

## 2024-10-18 RX ORDER — DIAZEPAM 10 MG/2ML
5 INJECTION, SOLUTION INTRAMUSCULAR; INTRAVENOUS ONCE
Status: COMPLETED | OUTPATIENT
Start: 2024-10-18 | End: 2024-10-18

## 2024-10-18 RX ORDER — DESMOPRESSIN ACETATE 4 UG/ML
2 INJECTION, SOLUTION INTRAVENOUS; SUBCUTANEOUS ONCE
Status: COMPLETED | OUTPATIENT
Start: 2024-10-18 | End: 2024-10-18

## 2024-10-18 RX ORDER — MAGNESIUM SULFATE 1 G/100ML
1 INJECTION INTRAVENOUS ONCE
Status: COMPLETED | OUTPATIENT
Start: 2024-10-18 | End: 2024-10-18

## 2024-10-18 RX ORDER — POTASSIUM CHLORIDE 1500 MG/1
40 TABLET, EXTENDED RELEASE ORAL ONCE
Status: COMPLETED | OUTPATIENT
Start: 2024-10-18 | End: 2024-10-18

## 2024-10-18 RX ORDER — DEXTROSE MONOHYDRATE 50 MG/ML
50 INJECTION, SOLUTION INTRAVENOUS CONTINUOUS
Status: DISCONTINUED | OUTPATIENT
Start: 2024-10-18 | End: 2024-10-19

## 2024-10-18 RX ORDER — DEXTROSE MONOHYDRATE 50 MG/ML
200 INJECTION, SOLUTION INTRAVENOUS CONTINUOUS
Status: DISCONTINUED | OUTPATIENT
Start: 2024-10-18 | End: 2024-10-18

## 2024-10-18 RX ORDER — FUROSEMIDE 10 MG/ML
20 INJECTION INTRAMUSCULAR; INTRAVENOUS ONCE
Status: COMPLETED | OUTPATIENT
Start: 2024-10-18 | End: 2024-10-18

## 2024-10-18 RX ORDER — DEXTROSE MONOHYDRATE 50 MG/ML
75 INJECTION, SOLUTION INTRAVENOUS CONTINUOUS
Status: DISPENSED | OUTPATIENT
Start: 2024-10-18 | End: 2024-10-18

## 2024-10-18 RX ORDER — PHENOBARBITAL SODIUM 65 MG/ML
130 INJECTION, SOLUTION INTRAMUSCULAR; INTRAVENOUS ONCE
Status: COMPLETED | OUTPATIENT
Start: 2024-10-18 | End: 2024-10-18

## 2024-10-18 RX ORDER — DEXTROSE MONOHYDRATE 50 MG/ML
250 INJECTION, SOLUTION INTRAVENOUS CONTINUOUS
Status: DISPENSED | OUTPATIENT
Start: 2024-10-18 | End: 2024-10-18

## 2024-10-18 RX ADMIN — DESMOPRESSIN ACETATE 2 MCG: 4 INJECTION, SOLUTION INTRAVENOUS; SUBCUTANEOUS at 01:29

## 2024-10-18 RX ADMIN — DEXTROSE 250 ML/HR: 5 SOLUTION INTRAVENOUS at 09:01

## 2024-10-18 RX ADMIN — NICOTINE 21 MG: 21 PATCH, EXTENDED RELEASE TRANSDERMAL at 09:44

## 2024-10-18 RX ADMIN — DEXTROSE 250 ML/HR: 5 SOLUTION INTRAVENOUS at 07:57

## 2024-10-18 RX ADMIN — MIRTAZAPINE 15 MG: 15 TABLET, FILM COATED ORAL at 21:37

## 2024-10-18 RX ADMIN — CHLORHEXIDINE GLUCONATE 15 ML: 1.2 RINSE ORAL at 20:34

## 2024-10-18 RX ADMIN — ENOXAPARIN SODIUM 40 MG: 40 INJECTION SUBCUTANEOUS at 09:51

## 2024-10-18 RX ADMIN — PHENOBARBITAL SODIUM 260 MG: 130 INJECTION INTRAMUSCULAR at 00:38

## 2024-10-18 RX ADMIN — POTASSIUM CHLORIDE 40 MEQ: 1500 TABLET, EXTENDED RELEASE ORAL at 15:59

## 2024-10-18 RX ADMIN — DEXTROSE 200 ML/HR: 5 SOLUTION INTRAVENOUS at 01:32

## 2024-10-18 RX ADMIN — DEXTROSE 75 ML/HR: 5 SOLUTION INTRAVENOUS at 13:03

## 2024-10-18 RX ADMIN — THIAMINE HYDROCHLORIDE 500 MG: 100 INJECTION, SOLUTION INTRAMUSCULAR; INTRAVENOUS at 15:52

## 2024-10-18 RX ADMIN — PHENOBARBITAL SODIUM 130 MG: 65 INJECTION INTRAMUSCULAR at 19:27

## 2024-10-18 RX ADMIN — DIAZEPAM 5 MG: 10 INJECTION, SOLUTION INTRAMUSCULAR; INTRAVENOUS at 22:25

## 2024-10-18 RX ADMIN — POLYETHYLENE GLYCOL 3350 17 G: 17 POWDER, FOR SOLUTION ORAL at 09:45

## 2024-10-18 RX ADMIN — POTASSIUM PHOSPHATE, MONOBASIC AND POTASSIUM PHOSPHATE, DIBASIC 21 MMOL: 224; 236 INJECTION, SOLUTION, CONCENTRATE INTRAVENOUS at 07:59

## 2024-10-18 RX ADMIN — DEXTROSE 200 ML/HR: 5 SOLUTION INTRAVENOUS at 05:23

## 2024-10-18 RX ADMIN — THIAMINE HYDROCHLORIDE 500 MG: 100 INJECTION, SOLUTION INTRAMUSCULAR; INTRAVENOUS at 20:34

## 2024-10-18 RX ADMIN — DEXTROSE 50 ML/HR: 5 SOLUTION INTRAVENOUS at 22:00

## 2024-10-18 RX ADMIN — PHENOBARBITAL SODIUM 130 MG: 65 INJECTION INTRAMUSCULAR at 03:53

## 2024-10-18 RX ADMIN — FOLIC ACID 1 MG: 1 TABLET ORAL at 09:44

## 2024-10-18 RX ADMIN — MAGNESIUM SULFATE HEPTAHYDRATE 1 G: 1 INJECTION, SOLUTION INTRAVENOUS at 06:36

## 2024-10-18 RX ADMIN — CYANOCOBALAMIN TAB 500 MCG 1000 MCG: 500 TAB at 09:44

## 2024-10-18 RX ADMIN — DESMOPRESSIN ACETATE 2 MCG: 4 INJECTION, SOLUTION INTRAVENOUS; SUBCUTANEOUS at 05:23

## 2024-10-18 RX ADMIN — DEXTROSE 200 ML/HR: 5 SOLUTION INTRAVENOUS at 03:16

## 2024-10-18 RX ADMIN — MULTIPLE VITAMINS W/ MINERALS TAB 1 TABLET: TAB ORAL at 09:44

## 2024-10-18 RX ADMIN — ATORVASTATIN CALCIUM 20 MG: 20 TABLET, FILM COATED ORAL at 09:44

## 2024-10-18 RX ADMIN — AMLODIPINE BESYLATE 10 MG: 5 TABLET ORAL at 09:44

## 2024-10-18 RX ADMIN — THIAMINE HYDROCHLORIDE 500 MG: 100 INJECTION, SOLUTION INTRAMUSCULAR; INTRAVENOUS at 09:52

## 2024-10-18 RX ADMIN — CEFTRIAXONE 2000 MG: 2 INJECTION, POWDER, FOR SOLUTION INTRAMUSCULAR; INTRAVENOUS at 15:52

## 2024-10-18 RX ADMIN — ASPIRIN 81 MG CHEWABLE TABLET 81 MG: 81 TABLET CHEWABLE at 09:44

## 2024-10-18 RX ADMIN — CHLORHEXIDINE GLUCONATE 15 ML: 1.2 RINSE ORAL at 09:45

## 2024-10-18 RX ADMIN — FUROSEMIDE 20 MG: 10 INJECTION, SOLUTION INTRAMUSCULAR; INTRAVENOUS at 17:13

## 2024-10-18 NOTE — ASSESSMENT & PLAN NOTE
pH 7.46, pCO2 27.1, serum bicarbonate 17  Mixed respiratory alkalosis and metabolic acidosis  Last bicarbonate 24 mmol/L  Resolved

## 2024-10-18 NOTE — OCCUPATIONAL THERAPY NOTE
Occupational Therapy Cancellation Note     Patient Name: Viktor Farfan  Today's Date: 10/18/2024     10/18/24 1036   Note Type   Note Type Cancelled Session   Cancel Reasons   (Pt with active OT orders. Per ICU mobility rounds pt is not appropriate for OT tx at this time. Pt currently in 4 point locked restraints. Will continue to follow for OT POC.)     Linda Harrell MS OTR/L   [place] Licensure# [id number]

## 2024-10-18 NOTE — PROGRESS NOTES
Progress Note - Nephrology   Name: Viktor Farfan 54 y.o. male I MRN: [id number]  Unit/Bed#: ICU 10 I Date of Admission: 10/16/2024   Date of Service: 10/18/2024 I Hospital Day: 2     Assessment & Plan  Hyponatremia  Sodium on admission: 101mEq/L   Serum osm: 233  Urine osm: 347   Urine Sodium:21  Current sodium 107 mEq/L  Etiology: Multifactorial likely secondary to EtOH, poor solute intake for 3 days and fluid overload    Rate of correction: 101>99>104>107 >109>118  Overcorrection overnight received DDAVP x 2 and D5W     Plan:  Most recent sodium is 115, goal 1 15-1 18 for today  Holding Bumex for today due to excessive urinary output  Liberalize fluid restriction  Decrease D5 to 50 mL/h   Continue BMP Q4   Avoid overcorrection : no more than 6-8mEq in the next 24h  Goal is 1 15-1 84 today, 120 tomorrow morning   Monitor urinary output   Will monitor labs, call renal if SNa+ <115 or >120  Risk of ODS:  EtOH   Severe hyponatremia   malnourishment: serum alb 3.4       Abnormal acid-base balance  pH 7.46, pCO2 27.1, serum bicarbonate 17  Mixed respiratory alkalosis and metabolic acidosis  Last bicarbonate 24 mmol/L  Resolved  Hypomagnesemia  Magnesium 2  Continue with repletion as needed  SOB (shortness of breath)  Short of breath  S/p IV diuretics and tolvaptan low-dose, urinary output more than 8 L  Holding Bumex for now    Alcohol abuse  Reports drinking 10-12 beers per night for years  WA protocol  Management per primary team    I have reviewed the nephrology recommendations including holding Bumex, decrease in D5W, with ICU team, and we are in agreement with renal plan including the information outlined above. I have discussed the above management plan in detail with the primary service.     Subjective   Brief History of Admission - 55 yo man with PMH of obesity, EtOH p/w EtOH intoxication, confusion found to have sodium of 99. Nephrology is consulted for management of severe hyponatremia     Patient is  lethargic, on CIWA protocol.  Was agitated overnight    Objective :  Temp:  [97.7 °F (36.5 °C)-98.8 °F (37.1 °C)] 98.5 °F (36.9 °C)  HR:  [] 101  BP: (116-138)/(55-97) 118/62  Resp:  [10-34] 20  SpO2:  [91 %-98 %] 96 %  O2 Device: Nasal cannula  Nasal Cannula O2 Flow Rate (L/min):  [4 L/min] 4 L/min    Current Weight: Weight - Scale: 122 kg (268 lb)  First Weight: Weight - Scale: 126 kg (278 lb 14.1 oz)  I/O         10/16 0701  10/17 0700 10/17 0701  10/18 0700    P.O.  400    I.V. (mL/kg) 194 (1.6) 1196.7 (9.8)    IV Piggyback 1957.5 477.1    Total Intake(mL/kg) 2151.5 (17.6) 2073.7 (17)    Urine (mL/kg/hr) 3320 8350 (2.9)    Total Output 3320 8350    Net -1168.5 -2976.3                Physical Exam  General:  no acute distress at this time  Skin:  No acute rash  Eyes:  No scleral icterus and noninjected  ENT:  mucous membranes moist  Neck:  no carotid bruits  Chest:  Clear to auscultation percussion, good respiratory effort, no use of accessory respiratory muscles  CVS:  Regular rate and rhythm without rub   Abdomen:  soft and nontender   Extremities: lower extremity edema  Neuro: Unable to assess due to altered mental status   psych: Lethargic after CIWA    Medications:    Current Facility-Administered Medications:     acetaminophen (TYLENOL) tablet 650 mg, 650 mg, Oral, Q6H PRN, PARVEEN Dai, 650 mg at 10/17/24 0112    amLODIPine (NORVASC) tablet 10 mg, 10 mg, Oral, Daily, Madelaine Dejesus MD    aspirin chewable tablet 81 mg, 81 mg, Oral, Daily, Roscoe Raman DO, 81 mg at 10/17/24 0811    atorvastatin (LIPITOR) tablet 20 mg, 20 mg, Oral, Daily, Roscoe Raman DO, 20 mg at 10/17/24 0811    cefTRIAXone (ROCEPHIN) 2,000 mg in dextrose 5 % 50 mL IVPB, 2,000 mg, Intravenous, Q24H, Madelaine Dejesus MD, Last Rate: 100 mL/hr at 10/17/24 1520, 2,000 mg at 10/17/24 1520    chlorhexidine (PERIDEX) 0.12 % oral rinse 15 mL, 15 mL, Mouth/Throat, Q12H JUAN, Sahra Duarte DO, 15 mL at  10/17/24 0820    cyanocobalamin (VITAMIN B-12) tablet 1,000 mcg, 1,000 mcg, Oral, Daily, Madelaine Dejesus MD, 1,000 mcg at 10/17/24 1205    dextrose 5 % infusion, 200 mL/hr, Intravenous, Continuous, Issa Harding MD, Last Rate: 200 mL/hr at 10/18/24 0523, 200 mL/hr at 10/18/24 0523    Diclofenac Sodium (VOLTAREN) 1 % topical gel 2 g, 2 g, Topical, 4x Daily, Roscoe Raman DO, 2 g at 10/16/24 2330    enoxaparin (LOVENOX) subcutaneous injection 40 mg, 40 mg, Subcutaneous, Daily, Sahra Duarte DO, 40 mg at 10/17/24 0811    fluticasone (FLONASE) 50 mcg/act nasal spray 1 spray, 1 spray, Nasal, Daily, Roscoe Raman DO    folic acid (FOLVITE) tablet 1 mg, 1 mg, Oral, Daily, Madelaine Dejesus MD    magnesium sulfate IVPB (premix) SOLN 1 g, 1 g, Intravenous, Once, Sahra Duarte DO, 1 g at 10/18/24 0636    mirtazapine (REMERON) tablet 15 mg, 15 mg, Oral, HS, Roscoe Raman DO, 15 mg at 10/17/24 2112    multivitamin-minerals (CENTRUM) tablet 1 tablet, 1 tablet, Oral, Daily, Sahra Duarte DO, 1 tablet at 10/17/24 0811    nicotine (NICODERM CQ) 21 mg/24 hr TD 24 hr patch 21 mg, 21 mg, Transdermal, Daily, Sahra Duarte DO, 21 mg at 10/17/24 0819    ondansetron (ZOFRAN) injection 4 mg, 4 mg, Intravenous, Q8H PRN, Roscoe Raman DO    pantoprazole (PROTONIX) EC tablet 40 mg, 40 mg, Oral, Early Morning, Madelaine Dejesus MD    polyethylene glycol (MIRALAX) packet 17 g, 17 g, Oral, Daily, Sahra Duarte DO, 17 g at 10/17/24 0811    potassium phosphates 21 mmol in sodium chloride 0.9 % 250 mL infusion, 21 mmol, Intravenous, Once, Sahra Winter, DO    thiamine (VITAMIN B1) 500 mg in sodium chloride 0.9 % 50 mL IVPB, 500 mg, Intravenous, TID, Stopped at 10/17/24 2144 **FOLLOWED BY** [START ON 10/19/2024] thiamine (VITAMIN B1) 250 mg in sodium chloride 0.9 % 50 mL IVPB, 250 mg, Intravenous, TID **FOLLOWED BY** [START ON 10/22/2024] thiamine (VITAMIN B1) 100 mg in sodium chloride 0.9 % 50 mL IVPB, 100  "mg, Intravenous, TID **FOLLOWED BY** [START ON 10/26/2024] thiamine tablet 100 mg, 100 mg, Oral, Daily, Madelaine Dejesus MD    trimethobenzamide (TIGAN) IM injection 200 mg, 200 mg, Intramuscular, Q6H PRN, PARVEEN Schroeder      Lab Results: I have reviewed the following results:  Results from last 7 days   Lab Units 10/18/24  0407 10/18/24  0358 10/18/24  0028 10/17/24  2012 10/17/24  1635 10/17/24  1224 10/17/24  0756 10/17/24  0444 10/16/24  2022 10/16/24  1528 10/16/24  1354 10/16/24  1141 10/16/24  1009   WBC Thousand/uL 8.70  --   --   --   --   --   --  10.17*  --  7.94  --   --  9.15   HEMOGLOBIN g/dL 10.3*  --   --   --   --   --   --  10.9*  --  11.1*  --   --  11.3*   HEMATOCRIT % 29.5*  --   --   --   --   --   --  30.7*  --  31.1*  --   --  31.7*   PLATELETS Thousands/uL 99*  --   --   --   --   --   --  103*  --  105*  --   --  104*   POTASSIUM mmol/L  --  3.6 3.7 3.7 4.0 4.3 4.5 4.6   < > 5.0   < > 5.3 5.5*   CHLORIDE mmol/L  --  88* 87* 84* 81* 80* 80* 81*   < > 76*   < > 74* 75*   CO2 mmol/L  --  22 23 21 20* 19* 20* 19*   < > 16*   < > 17* 17*   BUN mg/dL  --  9 10 11 11 10 10 10   < > 7   < > 7 7   CREATININE mg/dL  --  0.82 0.85 0.98 0.83 0.71 0.73 0.76   < > 0.67   < > 0.75 0.76   CALCIUM mg/dL  --  7.7* 7.9* 8.0* 7.9* 8.0* 8.3* 7.9*   < > 8.1*   < > 7.9* 8.6   MAGNESIUM mg/dL 1.9  --   --   --   --   --   --  1.8*  --  1.9  --   --   --    PHOSPHORUS mg/dL 2.0*  --   --   --   --   --   --  2.8  --  2.4*  --   --   --    ALBUMIN g/dL  --   --   --   --   --   --   --  3.4*  --  3.5  --  3.3* 3.6    < > = values in this interval not displayed.       Administrative Statements     Portions of the record may have been created with voice recognition software. Occasional wrong word or \"sound a like\" substitutions may have occurred due to the inherent limitations of voice recognition software. Read the chart carefully and recognize, using context, where substitutions have occurred.If " you have any questions, please contact the dictating provider.

## 2024-10-18 NOTE — ASSESSMENT & PLAN NOTE
Sodium found to be severely decreased at 99  Patient received 3% normal saline 150 mL at [place]  Sodium at [place] 103    PLAN  Goal correction 107 at 24 hours for 10/17 at noon  Goal achieved as of 6 AM 10/17.  Holding additional fluids  Overnight 10/17 patient's sodium was 118 which was above goal of 115.  Nephrology was consulted and recommended a total of 3 doses of D5 200 mL/h over 3 hours.  Also received 2 doses of DDAVP 2 mcg.    Patient was also previously on Bumex 1 mg twice daily but has had significant urine output and is currently -6 L.  Holding Bumex and currently giving D5 250 mL/h over 3 hours per nephrology.  Stat BMP pending.  10/18 per nephrology goal sodium today 115-118 by noon.  Possible DDAVP at 11 AM if sodium continues to trend upward  BMP recheck every 4 hours  Consult placed to nephrology.  Appreciate recommendations.  Patient will be n.p.o. with fluid restriction of 1.8 L.  Will continue to monitor  Urine sodium 21, urine os 347

## 2024-10-18 NOTE — ASSESSMENT & PLAN NOTE
Meet SIRS criteria for tachycardia and tachypnea  White blood cell count currently within normal limits  Patient currently afebrile  Cultures negative.  Currently on Rocephin.  Discontinued azithromycin and vancomycin as low suspicion for MRSA and strep pneumo, Legionella, and RP 2 panel were negative  MRSA cultures pending  Lactate 1.9  Continue to trend CBC daily

## 2024-10-18 NOTE — RESTRAINT FACE TO FACE
Restraint Face to Face   Viktor Farfan 54 y.o. male MRN: [id number]  Unit/Bed#: ICU 10 Encounter: [id number]      Physical Evaluation: patient is very aggressive and combative toward staff. Attempting to kick and spit at them. Attempting to get up out of bed.   Purpose for Restraints/ Seclusion High risk for self harm, High risk for causing significant disruption of treatment environment , and High risk for harm to others  Patient's reaction to the intervention: initially patient was aggressive but now patient appears calmer after intervention  Patient's medical condition: alcohol withdrawal and hyponatremia  Patient's Behavioral condition:   Restraints to be Continued

## 2024-10-18 NOTE — PROGRESS NOTES
"Progress Note - Critical Care/ICU   Name: Viktor Farfan 54 y.o. male I MRN: [id number]  Unit/Bed#: ICU 10 I Date of Admission: 10/16/2024   Date of Service: 10/18/2024 I Hospital Day: 2      Assessment & Plan  Alcohol abuse  Patient is a 54-year-old male who reports that he first began drinking alcohol at the age of 16.  States that he typically drinks a 12 pack of 16 ounce beer daily and has been doing so for an indeterminate number of years.  States he has tried to quit drinking \"cold turkey\" in the past without success.  Patient denies drinking hard liquor or wine, but wife reports that patient frequently drinks multiple glasses of rum and coke in addition to the 12 pack of beer daily.  Wife reports patient typically gets violent after drinking but states he has never been hospitalized for alcohol use before.    PLAN:  Initiate CIWA protocol and give phenobarbital as indicated.  Currently has received 1860 of phenobarbital.  Will max at 2000 and then give Ativan or possible Haldol depending on CIWA scores and QTc  N.p.o. with fluid restriction due to beerpotomania with severe hyponatremia  Seizure precautions  Consider Precedex if patient becomes violent or experiences severe hallucinations even after receiving phenobarbital per CIWA protocol  Monitor I's and O's.  Most recent -6 on Bumex 1 mg twice daily.  Currently holding Bumex as sodium overcorrected rapidly  Ethanol level obtained on this admission within normal limits  Will continue to trend CMP  Right upper quadrant ultrasound to evaluate for cirrhosis  Echocardiogram to evaluate for alcoholic cardio myopathy.  Echocardiogram normal with ejection fraction of 60%.  However unable to assess diastolic function  Hyponatremia  Sodium found to be severely decreased at 99  Patient received 3% normal saline 150 mL at [place]  Sodium at [place] 103    PLAN  Goal correction 107 at 24 hours for 10/17 at noon  Goal achieved as of 6 AM 10/17.  Holding additional " fluids  Overnight 10/17 patient's sodium was 118 which was above goal of 115.  Nephrology was consulted and recommended a total of 3 doses of D5 200 mL/h over 3 hours.  Also received 2 doses of DDAVP 2 mcg.    Patient was also previously on Bumex 1 mg twice daily but has had significant urine output and is currently -6 L.  Holding Bumex and currently giving D5 250 mL/h over 3 hours per nephrology.  Stat BMP pending.  10/18 per nephrology goal sodium today 115-118 by noon.  Possible DDAVP at 11 AM if sodium continues to trend upward  BMP recheck every 4 hours  Consult placed to nephrology.  Appreciate recommendations.  Patient will be n.p.o. with fluid restriction of 1.8 L.  Will continue to monitor  Urine sodium 21, urine os 347  Falls  Has history of frequent falls and difficulties with balance.  Wife reports patient has been afraid to take showers due to concern for fall  Wife reports patient has had a head strike in the past  Patient reports that he has fallen and caught himself multiple times most recently injuring his right shoulder  Significant ataxia on finger-to-nose on exam    PLAN:  CT head without contrast negative for acute intracranial abnormality but did show right mastoid effusion  X-ray of right shoulder negative for acute fracture  Neurochecks to assess for ataxia  Right shoulder pain  X-ray of right shoulder negative for fracture  SOB (shortness of breath)  Patient reports he has been a smoker since the age of 16 or 17  States he has had significant shortness of breath for the past 3 days with associated white sputum production  Denies history of COPD but has not followed with a pulmonary physician  Chest x-ray significant for cardiomegaly with probable pulmonary edema and asymmetric left perihilar alveolar edema. Superimposed pneumonia cannot be completely excluded.   CT PE no emboli in main, right, and left pulmonary arteries.  Segmental and subsegmental emboli not excluded due to artifact.   Trace pleural effusions.  Multifocal bilateral upper lobe consolidation possible pneumonia or localized alveolar edema.  Mildly nodular liver suggestive of possible cirrhosis with trace ascites    PLAN:  Blood cultures negative for growth at 24 hours.  Sputum cultures pending as culture too young  Strep pneumo, Legionella, COVID, flu, RSV negative  RP 2 negative  Currently on 4 L oxygen nasal cannula.  Increase as needed.   10/18 during time of exam patient was found to be on 8 L oxygen nasal cannula.  Per staff this was due to patient's increased oxygen demand during sleep.  Decreased the nasal cannula oxygen to 4 L which is what patient was on yesterday.  Lung sounds clear to auscultation.  Trace pitting edema bilaterally in lower extremities.  Currently holding Bumex 1 mg twice daily due to overdiuresis resulting in net -6 L.  Will continue to monitor volume status as well as oxygen status and make adjustments as clinically indicated  Consider incentive spirometry if patient develops difficulty breathing  SIRS (systemic inflammatory response syndrome) (HCC)  Meet SIRS criteria for tachycardia and tachypnea  White blood cell count currently within normal limits  Patient currently afebrile  Cultures negative.  Currently on Rocephin.  Discontinued azithromycin and vancomycin as low suspicion for MRSA and strep pneumo, Legionella, and RP 2 panel were negative  MRSA cultures pending  Lactate 1.9  Continue to trend CBC daily  Tobacco abuse  Reports he has been smoking 1 pack daily since the age of 16  Nicotine patch ordered  Patient will be counseled on smoking cessation when more oriented and other more critical issues have been addressed  Abnormal acid-base balance  VBG significant for increased pH of 7.46  pCO2 decreased at 27.1  Metabolic acidosis due to ETOH with compensatory respiratory alkalosis  Normal anion gap  Hypomagnesemia  Magnesium decreased at 1.8.  Repleted.  Disposition: Critical care    ICU Core  Measures     A: Assess, Prevent, and Manage Pain Has pain been assessed? Yes  Need for changes to pain regimen? No   B: Both SAT/SAT  N/A   C: Choice of Sedation RASS Goal: N/A patient not on sedation  Need for changes to sedation or analgesia regimen? No   D: Delirium CAM-ICU: Negative   E: Early Mobility  Plan for early mobility? No   F: Family Engagement Plan for family engagement today? Yes       Antibiotic Review: Awaiting culture results.     Review of Invasive Devices:    Klein Plan: Continue for accurate I/O monitoring for 48 hours        Prophylaxis:  VTE VTE covered by:  enoxaparin, Subcutaneous, 40 mg at 10/17/24 0811       Stress Ulcer  covered byomeprazole (PriLOSEC) 20 mg delayed release capsule [[id number]] (Long-Term Med), pantoprazole (PROTONIX) EC tablet 40 mg [[id number]]         24 Hour Events : Overnight staff reports patient became very agitated and violent and was found to be kicking at staff.  Currently on hard restraints.  At time of exam this morning patient was sleeping and was on 8 L of oxygen nasal cannula.  Adjusted oxygen to 4 L nasal cannula as this is what the patient was on yesterday and lung sounds were clear to auscultation.  Continuing CIWA protocol.  Goal sodium today at noon between 115-118.  Subjective   Review of Systems: See HPI for Review of Systems    Objective :                   Vitals I/O      Most Recent Min/Max in 24hrs   Temp 98.5 °F (36.9 °C) Temp  Min: 97.7 °F (36.5 °C)  Max: 98.8 °F (37.1 °C)   Pulse 94 Pulse  Min: 78  Max: 114   Resp (!) 29 Resp  Min: 10  Max: 34   /66 BP  Min: 116/59  Max: 137/69   O2 Sat 94 % SpO2  Min: 91 %  Max: 98 %      Intake/Output Summary (Last 24 hours) at 10/18/2024 0855  Last data filed at 10/18/2024 0600  Gross per 24 hour   Intake 2036.66 ml   Output 7850 ml   Net -5813.34 ml       Diet Regular; Regular House    Invasive Monitoring           Physical Exam   Physical Exam  Eyes:      Extraocular Movements: Extraocular movements  intact.   Skin:     General: Skin is warm and dry.      Coloration: Skin is not jaundiced.   HENT:      Head: Normocephalic.      Right Ear: Hearing normal.      Left Ear: Hearing normal.      Nose: No congestion.      Mouth/Throat:      Mouth: No angioedema.   Neck:   no midline tenderness Cardiovascular:      Rate and Rhythm: Tachycardia present.   Musculoskeletal:         General: No signs of injury.      Right lower leg: Trace Edema present.      Left lower leg: Trace Edema present.   Abdominal:      Palpations: Abdomen is soft.      Tenderness: There is no abdominal tenderness.   Constitutional:       General: He is not in acute distress.  Pulmonary:      Effort: No respiratory distress.   Neurological:      Mental Status: He is agitated.   Genitourinary/Anorectal:  Klein present.        Diagnostic Studies        Lab Results: I have reviewed the following results:     Medications:  Scheduled PRN   amLODIPine, 10 mg, Daily  aspirin, 81 mg, Daily  atorvastatin, 20 mg, Daily  cefTRIAXone, 2,000 mg, Q24H  chlorhexidine, 15 mL, Q12H JUAN  cyanocobalamin, 1,000 mcg, Daily  Diclofenac Sodium, 2 g, 4x Daily  enoxaparin, 40 mg, Daily  fluticasone, 1 spray, Daily  folic acid, 1 mg, Daily  mirtazapine, 15 mg, HS  multivitamin-minerals, 1 tablet, Daily  nicotine, 21 mg, Daily  pantoprazole, 40 mg, Early Morning  polyethylene glycol, 17 g, Daily  potassium phosphate, 21 mmol, Once  thiamine, 500 mg, TID   Followed by  [START ON 10/19/2024] thiamine, 250 mg, TID   Followed by  [START ON 10/22/2024] thiamine, 100 mg, TID   Followed by  [START ON 10/26/2024] thiamine, 100 mg, Daily      acetaminophen, 650 mg, Q6H PRN  ondansetron, 4 mg, Q8H PRN  trimethobenzamide, 200 mg, Q6H PRN       Continuous    dextrose, 250 mL/hr, Last Rate: 250 mL/hr (10/18/24 0757)         Labs:   CBC    Recent Labs     10/17/24  0444 10/18/24  0407   WBC 10.17* 8.70   HGB 10.9* 10.3*   HCT 30.7* 29.5*   * 99*     BMP    Recent Labs      10/18/24  0358 10/18/24  0634   SODIUM 118* 118*   K 3.6 3.8   CL 88* 89*   CO2 22 23   AGAP 8 6   BUN 9 9   CREATININE 0.82 0.80   CALCIUM 7.7* 7.9*       Coags    Recent Labs     10/16/24  1528 10/18/24  0407   INR 1.27* 1.30*   PTT  --  35*        Additional Electrolytes  Recent Labs     10/17/24  0444 10/18/24  0407   MG 1.8* 1.9   PHOS 2.8 2.0*   CAIONIZED 1.04* 1.02*          Blood Gas    No recent results  Recent Labs     10/16/24  2348   PHVEN 7.462*   [id number] 27.1*   PO2VEN 42.6   FHF0IDZ 18.9*   BEVEN -3.6   [id number] 80.3*    LFTs  Recent Labs     10/16/24  1528 10/17/24  0444   ALT 19 20   AST 66* 65*   ALKPHOS 211* 179*   ALB 3.5 3.4*   TBILI 2.35* 1.95*       Infectious  No recent results  Glucose  Recent Labs     10/17/24  2012 10/18/24  0028 10/18/24  0358 10/18/24  0634   GLUC 180* 132 165* 147*

## 2024-10-18 NOTE — CASE MANAGEMENT
Case Management Assessment & Discharge Planning Note    Patient name Viktor Farfan  Location ICU 10/ICU 10 MRN [id number]  : 1970 Date 10/18/2024       Current Admission Date: 10/16/2024  Current Admission Diagnosis:Hyponatremia   Patient Active Problem List    Diagnosis Date Noted Date Diagnosed    Hyponatremia 10/16/2024     Falls 10/16/2024     Right shoulder pain 10/16/2024     SOB (shortness of breath) 10/16/2024     SIRS (systemic inflammatory response syndrome) (HCC) 10/16/2024     Alcohol abuse 10/16/2024     Tobacco abuse 10/16/2024     Abnormal acid-base balance 10/16/2024     Hypomagnesemia 10/16/2024     Primary osteoarthritis of left knee 01/10/2024     Chronic pain of left knee 2024       LOS (days): 2  Geometric Mean LOS (GMLOS) (days): 3.6  Days to GMLOS:1.6     OBJECTIVE:    Risk of Unplanned Readmission Score: 16.58         Current admission status: Inpatient       Preferred Pharmacy:   [facility] [address] [address]  Phone: [phone] Fax: [phone]    [facility] #[id number] - [place], [place] - [address]  [address] [address]  Phone: [phone] Fax: [phone]    Primary Care Provider: No primary care provider on file.    Primary Insurance: [employer]  Secondary Insurance:     ASSESSMENT:  Active Health Care Proxies    There are no active Health Care Proxies on file.       Patient Information  Admitted from:: Home  Mental Status: Confused  During Assessment patient was accompanied by: Not accompanied during assessment  Assessment information provided by:: Sister (Maxine)  Primary Caregiver: Self  Support Systems: Self, Spouse/significant other, Family members  County of Residence: [place]  What city do you live in?: [place]  Home entry access options. Select all that apply.: Stairs  Number of steps to enter home.: 3  Type of Current Residence:  Apartment  Floor Level: 1  Upon entering residence, is there a bedroom on the main floor (no further steps)?: Yes  Upon entering residence, is there a bathroom on the main floor (no further steps)?: Yes  Living Arrangements: Lives w/ Spouse/significant other (S/O Cherie)    Activities of Daily Living Prior to Admission  Functional Status: Independent  Completes ADLs independently?: Yes  Ambulates independently?: Yes  Does patient use assisted devices?: No  Does patient currently own DME?: No  Does patient have a history of Outpatient Therapy (PT/OT)?: No  Does the patient have a history of Short-Term Rehab?: No  Does patient have a history of HHC?: No  Does patient currently have HHC?: No    Patient Information Continued  Income Source: SSI/SSD (works on/off with family)  Does patient have prescription coverage?: Yes  Does patient receive dialysis treatments?: No  Does patient have a history of substance abuse?: Yes  Historical substance use preference: Alcohol/ETOH  Is patient currently in treatment for substance abuse?: Yes ([facility] referral)  Does patient have a history of Mental Health Diagnosis?: Yes  Is patient receiving treatment for mental health?: Yes  Has patient received inpatient treatment related to mental health in the last 2 years?: No    Means of Transportation  Means of Transport to Appts:: Drives Self    Social Determinants of Health (SDOH)      Flowsheet Row Most Recent Value   Housing Stability    In the last 12 months, was there a time when you were not able to pay the mortgage or rent on time? Pt Unable   At any time in the past 12 months, were you homeless or living in a shelter (including now)? Pt Unable   Transportation Needs    In the past 12 months, has lack of transportation kept you from medical appointments or from getting medications? Pt Unable   In the past 12 months, has lack of transportation kept you from meetings, work, or from getting things needed for daily living? Pt Unable   Food  Insecurity    Within the past 12 months, you worried that your food would run out before you got the money to buy more. Pt Unable   Within the past 12 months, the food you bought just didn't last and you didn't have money to get more. Pt Unable   Utilities    In the past 12 months has the electric, gas, oil, or water company threatened to shut off services in your home? Pt Unable            DISCHARGE DETAILS:    Discharge planning discussed with:: Pt's sisterMaxine    Contacts  Patient Contacts: SisterMaxine  Relationship to Patient:: Family  Contact Method: Phone  Reason/Outcome: Continuity of Care, Emergency Contact, Discharge Planning    Other Referral/Resources/Interventions Provided:  Interventions: Other (Specify)  Referral Comments: Pt confused. CM spoke with pt's sisterMaxine. Introduced self/role with dcp. Maxine reports pt resides with his s/o, Cherie, in a first floor apartment. Normally independent, but needed some help the past couple weeks. CM reviewed PT/OT initially worked with pt and recommending STR upon dc however, pt may progress with mobility and mental status while working with therapy. Aware CM will f/u with pt progress and recommendations. Maxine aware that [facility] will be involved and once pt is able will speak with him regarding treatment options. Maxine reports she understands it will be his decision however, his daughters are hoping pt will go to IP treatment for his alcohol abuse.

## 2024-10-18 NOTE — QUICK NOTE
Renal on-call note:    Sodium rising quickly to 114.  This was goal sodium by tomorrow morning.  Give D5W 200 cc/h for next 3 hours and continue to monitor BMP every 4 hours.    Addendum  Sodium rising quickly to 118 at midnight.  D5W 200 cc/h for 3 hours and DDAVP 2 mcg was given.    Addendum  Sodium at 118 without any further increase at 4 AM.  Very significant urine output.  Repeat D5W 200 cc/h for 3 hours and DDAVP 2 mcg.  Goal serum sodium around 114 by 10 a.m. today.

## 2024-10-18 NOTE — PHYSICAL THERAPY NOTE
PHYSICAL THERAPY CANCELLATION NOTE          Patient Name: Viktor Farfan  Today's Date: 10/18/2024           10/18/24 1035   Note Type   Note Type Cancelled Session   Cancel Reasons Other   Assessment   Assessment PT orders received, chart review performed. Spoke to PIA Rao during ICU mobility rounds. At this time pt is not appropriate for PT/mobility at this time, pt currently in 4 pointed locked restraints. PT will continue to follow pt as appropriate and as schedule allows.           Ольга Miller, PT, DPT  10/18/24

## 2024-10-18 NOTE — ASSESSMENT & PLAN NOTE
"Patient is a 54-year-old male who reports that he first began drinking alcohol at the age of 16.  States that he typically drinks a 12 pack of 16 ounce beer daily and has been doing so for an indeterminate number of years.  States he has tried to quit drinking \"cold turkey\" in the past without success.  Patient denies drinking hard liquor or wine, but wife reports that patient frequently drinks multiple glasses of rum and coke in addition to the 12 pack of beer daily.  Wife reports patient typically gets violent after drinking but states he has never been hospitalized for alcohol use before.    PLAN:  Initiate CIWA protocol and give phenobarbital as indicated.  Currently has received 1860 of phenobarbital.  Will max at 2000 and then give Ativan or possible Haldol depending on CIWA scores and QTc  N.p.o. with fluid restriction due to beerpotomania with severe hyponatremia  Seizure precautions  Consider Precedex if patient becomes violent or experiences severe hallucinations even after receiving phenobarbital per CIWA protocol  Monitor I's and O's.  Most recent -6 on Bumex 1 mg twice daily.  Currently holding Bumex as sodium overcorrected rapidly  Ethanol level obtained on this admission within normal limits  Will continue to trend CMP  Right upper quadrant ultrasound to evaluate for cirrhosis  Echocardiogram to evaluate for alcoholic cardio myopathy.  Echocardiogram normal with ejection fraction of 60%.  However unable to assess diastolic function  "

## 2024-10-18 NOTE — PLAN OF CARE
Problem: PAIN - ADULT  Goal: Verbalizes/displays adequate comfort level or baseline comfort level  Description: Interventions:  - Encourage patient to monitor pain and request assistance  - Assess pain using appropriate pain scale  - Administer analgesics based on type and severity of pain and evaluate response  - Implement non-pharmacological measures as appropriate and evaluate response  - Consider cultural and social influences on pain and pain management  - Notify physician/advanced practitioner if interventions unsuccessful or patient reports new pain  Outcome: Progressing     Problem: INFECTION - ADULT  Goal: Absence or prevention of progression during hospitalization  Description: INTERVENTIONS:  - Assess and monitor for signs and symptoms of infection  - Monitor lab/diagnostic results  - Monitor all insertion sites, i.e. indwelling lines, tubes, and drains  - Monitor endotracheal if appropriate and nasal secretions for changes in amount and color  - [place] appropriate cooling/warming therapies per order  - Administer medications as ordered  - Instruct and encourage patient and family to use good hand hygiene technique  - Identify and instruct in appropriate isolation precautions for identified infection/condition  Outcome: Progressing  Goal: Absence of fever/infection during neutropenic period  Description: INTERVENTIONS:  - Monitor WBC    Outcome: Progressing     Problem: SAFETY ADULT  Goal: Patient will remain free of falls  Description: INTERVENTIONS:  - Educate patient/family on patient safety including physical limitations  - Instruct patient to call for assistance with activity   - Consult OT/PT to assist with strengthening/mobility   - Keep Call bell within reach  - Keep bed low and locked with side rails adjusted as appropriate  - Keep care items and personal belongings within reach  - Initiate and maintain comfort rounds  - Make Fall Risk Sign visible to staff  - Apply yellow socks and bracelet  for high fall risk patients  - Consider moving patient to room near nurses station  Outcome: Progressing  Goal: Maintain or return to baseline ADL function  Description: INTERVENTIONS:  -  Assess patient's ability to carry out ADLs; assess patient's baseline for ADL function and identify physical deficits which impact ability to perform ADLs (bathing, care of mouth/teeth, toileting, grooming, dressing, etc.)  - Assess/evaluate cause of self-care deficits   - Assess range of motion  - Assess patient's mobility; develop plan if impaired  - Assess patient's need for assistive devices and provide as appropriate  - Encourage maximum independence but intervene and supervise when necessary  - Involve family in performance of ADLs  - Assess for home care needs following discharge   - Consider OT consult to assist with ADL evaluation and planning for discharge  - Provide patient education as appropriate  Outcome: Progressing  Goal: Maintains/Returns to pre admission functional level  Description: INTERVENTIONS:  - Perform AM-PAC 6 Click Basic Mobility/ Daily Activity assessment daily.  - Set and communicate daily mobility goal to care team and patient/family/caregiver.   - Collaborate with rehabilitation services on mobility goals if consulted  - Reposition patient every 2 hours.  - Out of bed to chair 3 times a day   - Out of bed for meals 3 times a day  - Out of bed for toileting  - Record patient progress and toleration of activity level   Outcome: Progressing     Problem: Knowledge Deficit  Goal: Patient/family/caregiver demonstrates understanding of disease process, treatment plan, medications, and discharge instructions  Description: Complete learning assessment and assess knowledge base.  Interventions:  - Provide teaching at level of understanding  - Provide teaching via preferred learning methods  Outcome: Progressing     Problem: DISCHARGE PLANNING  Goal: Discharge to home or other facility with appropriate  resources  Description: INTERVENTIONS:  - Identify barriers to discharge w/patient and caregiver  - Arrange for needed discharge resources and transportation as appropriate  - Identify discharge learning needs (meds, wound care, etc.)  - Arrange for interpretive services to assist at discharge as needed  - Refer to Case Management Department for coordinating discharge planning if the patient needs post-hospital services based on physician/advanced practitioner order or complex needs related to functional status, cognitive ability, or social support system  Outcome: Progressing     Problem: NEUROSENSORY - ADULT  Goal: Achieves stable or improved neurological status  Description: INTERVENTIONS  - Monitor and report changes in neurological status  - Monitor vital signs such as temperature, blood pressure, glucose, and any other labs ordered   - Initiate measures to prevent increased intracranial pressure  - Monitor for seizure activity and implement precautions if appropriate      Outcome: Progressing  Goal: Remains free of injury related to seizures activity  Description: INTERVENTIONS  - Maintain airway, patient safety  and administer oxygen as ordered  - Monitor patient for seizure activity, document and report duration and description of seizure to physician/advanced practitioner  - If seizure occurs,  ensure patient safety during seizure  - Reorient patient post seizure  - Seizure pads on all 4 side rails  - Instruct patient/family to notify RN of any seizure activity including if an aura is experienced  - Instruct patient/family to call for assistance with activity based on nursing assessment  - Administer anti-seizure medications if ordered    Outcome: Progressing  Goal: Achieves maximal functionality and self care  Description: INTERVENTIONS  - Monitor swallowing and airway patency with patient fatigue and changes in neurological status  - Encourage and assist patient to increase activity and self care.   -  Encourage visually impaired, hearing impaired and aphasic patients to use assistive/communication devices  Outcome: Progressing

## 2024-10-18 NOTE — ASSESSMENT & PLAN NOTE
Sodium on admission: 101mEq/L   Serum osm: 233  Urine osm: 347   Urine Sodium:21  Current sodium 107 mEq/L  Etiology: Multifactorial likely secondary to EtOH, poor solute intake for 3 days and fluid overload    Rate of correction: 101>99>104>107 >109>118  Overcorrection overnight received DDAVP x 2 and D5W     Plan:  Most recent sodium is 115, goal 1 15-1 18 for today  Holding Bumex for today due to excessive urinary output  Liberalize fluid restriction  Decrease D5 to 50 mL/h   Continue BMP Q4   Avoid overcorrection : no more than 6-8mEq in the next 24h  Goal is 1 15-1 84 today, 120 tomorrow morning   Monitor urinary output   Will monitor labs, call renal if SNa+ <115 or >120  Risk of ODS:  EtOH   Severe hyponatremia   malnourishment: serum alb 3.4

## 2024-10-18 NOTE — INCIDENTAL FINDINGS
The following findings require follow up:  Radiographic finding   Findin.4 cm L adrenal myelolipoma   Follow up required: follow up with PCP to discuss if further imaging is warranted   Follow up should be done within 6 month(s)    Please notify the following clinician to assist with the follow up:   Dr. Colvin    Incidental finding results were discussed with the Patient's POA by Sahra Duarte DO on 10/18/24.   They expressed understanding and all questions answered.

## 2024-10-18 NOTE — ASSESSMENT & PLAN NOTE
Patient reports he has been a smoker since the age of 16 or 17  States he has had significant shortness of breath for the past 3 days with associated white sputum production  Denies history of COPD but has not followed with a pulmonary physician  Chest x-ray significant for cardiomegaly with probable pulmonary edema and asymmetric left perihilar alveolar edema. Superimposed pneumonia cannot be completely excluded.   CT PE no emboli in main, right, and left pulmonary arteries.  Segmental and subsegmental emboli not excluded due to artifact.  Trace pleural effusions.  Multifocal bilateral upper lobe consolidation possible pneumonia or localized alveolar edema.  Mildly nodular liver suggestive of possible cirrhosis with trace ascites    PLAN:  Blood cultures negative for growth at 24 hours.  Sputum cultures pending as culture too young  Strep pneumo, Legionella, COVID, flu, RSV negative  RP 2 negative  Currently on 4 L oxygen nasal cannula.  Increase as needed.   10/18 during time of exam patient was found to be on 8 L oxygen nasal cannula.  Per staff this was due to patient's increased oxygen demand during sleep.  Decreased the nasal cannula oxygen to 4 L which is what patient was on yesterday.  Lung sounds clear to auscultation.  Trace pitting edema bilaterally in lower extremities.  Currently holding Bumex 1 mg twice daily due to overdiuresis resulting in net -6 L.  Will continue to monitor volume status as well as oxygen status and make adjustments as clinically indicated  Consider incentive spirometry if patient develops difficulty breathing

## 2024-10-18 NOTE — ASSESSMENT & PLAN NOTE
Has history of frequent falls and difficulties with balance.  Wife reports patient has been afraid to take showers due to concern for fall  Wife reports patient has had a head strike in the past  Patient reports that he has fallen and caught himself multiple times most recently injuring his right shoulder  Significant ataxia on finger-to-nose on exam    PLAN:  CT head without contrast negative for acute intracranial abnormality but did show right mastoid effusion  X-ray of right shoulder negative for acute fracture  Neurochecks to assess for ataxia

## 2024-10-18 NOTE — ASSESSMENT & PLAN NOTE
Short of breath  S/p IV diuretics and tolvaptan low-dose, urinary output more than 8 L  Holding Bumex for now

## 2024-10-19 ENCOUNTER — APPOINTMENT (INPATIENT)
Dept: RADIOLOGY | Facility: HOSPITAL | Age: 54
DRG: 720 | End: 2024-10-19
Payer: COMMERCIAL

## 2024-10-19 PROBLEM — R74.01 TRANSAMINITIS: Status: ACTIVE | Noted: 2024-10-19

## 2024-10-19 PROBLEM — E83.39 HYPOPHOSPHATASIA: Status: ACTIVE | Noted: 2024-10-19

## 2024-10-19 PROBLEM — E83.42 HYPOMAGNESEMIA: Status: RESOLVED | Noted: 2024-10-16 | Resolved: 2024-10-19

## 2024-10-19 PROBLEM — R29.6 FALLS: Status: RESOLVED | Noted: 2024-10-16 | Resolved: 2024-10-19

## 2024-10-19 PROBLEM — M25.511 RIGHT SHOULDER PAIN: Status: RESOLVED | Noted: 2024-10-16 | Resolved: 2024-10-19

## 2024-10-19 PROBLEM — E87.6 HYPOKALEMIA: Status: ACTIVE | Noted: 2024-10-19

## 2024-10-19 PROBLEM — D69.6 THROMBOCYTOPENIA (HCC): Status: ACTIVE | Noted: 2024-10-19

## 2024-10-19 LAB
ALBUMIN SERPL BCG-MCNC: 3.1 G/DL (ref 3.5–5)
ALP SERPL-CCNC: 180 U/L (ref 34–104)
ALT SERPL W P-5'-P-CCNC: 21 U/L (ref 7–52)
ANION GAP SERPL CALCULATED.3IONS-SCNC: 4 MMOL/L (ref 4–13)
ANION GAP SERPL CALCULATED.3IONS-SCNC: 6 MMOL/L (ref 4–13)
APTT PPP: 35 SECONDS (ref 23–34)
ARTERIAL PATENCY WRIST A: NO
AST SERPL W P-5'-P-CCNC: 57 U/L (ref 13–39)
BASE EX.OXY STD BLDV CALC-SCNC: 77.9 % (ref 60–80)
BASE EXCESS BLDV CALC-SCNC: 1.9 MMOL/L
BASOPHILS # BLD AUTO: 0.01 THOUSANDS/ΜL (ref 0–0.1)
BASOPHILS NFR BLD AUTO: 0 % (ref 0–1)
BILIRUB DIRECT SERPL-MCNC: 0.73 MG/DL (ref 0–0.2)
BILIRUB SERPL-MCNC: 1.43 MG/DL (ref 0.2–1)
BUN SERPL-MCNC: 5 MG/DL (ref 5–25)
BUN SERPL-MCNC: 5 MG/DL (ref 5–25)
BUN SERPL-MCNC: 6 MG/DL (ref 5–25)
CA-I BLD-SCNC: 1.05 MMOL/L (ref 1.12–1.32)
CALCIUM SERPL-MCNC: 7.7 MG/DL (ref 8.4–10.2)
CALCIUM SERPL-MCNC: 7.7 MG/DL (ref 8.4–10.2)
CALCIUM SERPL-MCNC: 7.8 MG/DL (ref 8.4–10.2)
CALCIUM SERPL-MCNC: 7.9 MG/DL (ref 8.4–10.2)
CALCIUM SERPL-MCNC: 8 MG/DL (ref 8.4–10.2)
CHLORIDE SERPL-SCNC: 89 MMOL/L (ref 96–108)
CHLORIDE SERPL-SCNC: 89 MMOL/L (ref 96–108)
CHLORIDE SERPL-SCNC: 90 MMOL/L (ref 96–108)
CHLORIDE SERPL-SCNC: 93 MMOL/L (ref 96–108)
CHLORIDE SERPL-SCNC: 94 MMOL/L (ref 96–108)
CO2 SERPL-SCNC: 23 MMOL/L (ref 21–32)
CO2 SERPL-SCNC: 24 MMOL/L (ref 21–32)
CO2 SERPL-SCNC: 25 MMOL/L (ref 21–32)
CO2 SERPL-SCNC: 25 MMOL/L (ref 21–32)
CO2 SERPL-SCNC: 27 MMOL/L (ref 21–32)
CREAT SERPL-MCNC: 0.55 MG/DL (ref 0.6–1.3)
CREAT SERPL-MCNC: 0.56 MG/DL (ref 0.6–1.3)
CREAT SERPL-MCNC: 0.65 MG/DL (ref 0.6–1.3)
CREAT SERPL-MCNC: 0.66 MG/DL (ref 0.6–1.3)
CREAT SERPL-MCNC: 0.68 MG/DL (ref 0.6–1.3)
EOSINOPHIL # BLD AUTO: 0.06 THOUSAND/ΜL (ref 0–0.61)
EOSINOPHIL NFR BLD AUTO: 1 % (ref 0–6)
ERYTHROCYTE [DISTWIDTH] IN BLOOD BY AUTOMATED COUNT: 15.8 % (ref 11.6–15.1)
GFR SERPL CREATININE-BSD FRML MDRD: 108 ML/MIN/1.73SQ M
GFR SERPL CREATININE-BSD FRML MDRD: 109 ML/MIN/1.73SQ M
GFR SERPL CREATININE-BSD FRML MDRD: 110 ML/MIN/1.73SQ M
GFR SERPL CREATININE-BSD FRML MDRD: 117 ML/MIN/1.73SQ M
GFR SERPL CREATININE-BSD FRML MDRD: 118 ML/MIN/1.73SQ M
GLUCOSE SERPL-MCNC: 107 MG/DL (ref 65–140)
GLUCOSE SERPL-MCNC: 126 MG/DL (ref 65–140)
GLUCOSE SERPL-MCNC: 126 MG/DL (ref 65–140)
GLUCOSE SERPL-MCNC: 139 MG/DL (ref 65–140)
GLUCOSE SERPL-MCNC: 90 MG/DL (ref 65–140)
HCO3 BLDV-SCNC: 26.4 MMOL/L (ref 24–30)
HCT VFR BLD AUTO: 29.8 % (ref 36.5–49.3)
HGB BLD-MCNC: 10.2 G/DL (ref 12–17)
IMM GRANULOCYTES # BLD AUTO: 0.05 THOUSAND/UL (ref 0–0.2)
IMM GRANULOCYTES NFR BLD AUTO: 1 % (ref 0–2)
INR PPP: 1.25 (ref 0.85–1.19)
LYMPHOCYTES # BLD AUTO: 0.95 THOUSANDS/ΜL (ref 0.6–4.47)
LYMPHOCYTES NFR BLD AUTO: 11 % (ref 14–44)
MAGNESIUM SERPL-MCNC: 2.1 MG/DL (ref 1.9–2.7)
MCH RBC QN AUTO: 28.7 PG (ref 26.8–34.3)
MCHC RBC AUTO-ENTMCNC: 34.2 G/DL (ref 31.4–37.4)
MCV RBC AUTO: 84 FL (ref 82–98)
MONOCYTES # BLD AUTO: 0.93 THOUSAND/ΜL (ref 0.17–1.22)
MONOCYTES NFR BLD AUTO: 10 % (ref 4–12)
NEUTROPHILS # BLD AUTO: 7.02 THOUSANDS/ΜL (ref 1.85–7.62)
NEUTS SEG NFR BLD AUTO: 77 % (ref 43–75)
NRBC BLD AUTO-RTO: 0 /100 WBCS
O2 CT BLDV-SCNC: 12.7 ML/DL
PCO2 BLDV: 41 MM HG (ref 42–50)
PH BLDV: 7.43 [PH] (ref 7.3–7.4)
PHOSPHATE SERPL-MCNC: 2.4 MG/DL (ref 2.7–4.5)
PLATELET # BLD AUTO: 95 THOUSANDS/UL (ref 149–390)
PMV BLD AUTO: 10.9 FL (ref 8.9–12.7)
PO2 BLDV: 42.2 MM HG (ref 35–45)
POTASSIUM SERPL-SCNC: 3.7 MMOL/L (ref 3.5–5.3)
POTASSIUM SERPL-SCNC: 3.7 MMOL/L (ref 3.5–5.3)
POTASSIUM SERPL-SCNC: 3.8 MMOL/L (ref 3.5–5.3)
POTASSIUM SERPL-SCNC: 3.8 MMOL/L (ref 3.5–5.3)
POTASSIUM SERPL-SCNC: 5.1 MMOL/L (ref 3.5–5.3)
PROT SERPL-MCNC: 6.7 G/DL (ref 6.4–8.4)
PROTHROMBIN TIME: 16.5 SECONDS (ref 12.3–15)
RBC # BLD AUTO: 3.56 MILLION/UL (ref 3.88–5.62)
SODIUM SERPL-SCNC: 119 MMOL/L (ref 135–147)
SODIUM SERPL-SCNC: 120 MMOL/L (ref 135–147)
SODIUM SERPL-SCNC: 121 MMOL/L (ref 135–147)
SODIUM SERPL-SCNC: 123 MMOL/L (ref 135–147)
SODIUM SERPL-SCNC: 124 MMOL/L (ref 135–147)
WBC # BLD AUTO: 9.02 THOUSAND/UL (ref 4.31–10.16)

## 2024-10-19 PROCEDURE — 80048 BASIC METABOLIC PNL TOTAL CA: CPT

## 2024-10-19 PROCEDURE — 84100 ASSAY OF PHOSPHORUS: CPT | Performed by: PHYSICIAN ASSISTANT

## 2024-10-19 PROCEDURE — 82805 BLOOD GASES W/O2 SATURATION: CPT

## 2024-10-19 PROCEDURE — 80048 BASIC METABOLIC PNL TOTAL CA: CPT | Performed by: PHYSICIAN ASSISTANT

## 2024-10-19 PROCEDURE — 83735 ASSAY OF MAGNESIUM: CPT | Performed by: PHYSICIAN ASSISTANT

## 2024-10-19 PROCEDURE — 94760 N-INVAS EAR/PLS OXIMETRY 1: CPT

## 2024-10-19 PROCEDURE — 94668 MNPJ CHEST WALL SBSQ: CPT

## 2024-10-19 PROCEDURE — 99232 SBSQ HOSP IP/OBS MODERATE 35: CPT | Performed by: INTERNAL MEDICINE

## 2024-10-19 PROCEDURE — 94669 MECHANICAL CHEST WALL OSCILL: CPT

## 2024-10-19 PROCEDURE — 85730 THROMBOPLASTIN TIME PARTIAL: CPT | Performed by: PHYSICIAN ASSISTANT

## 2024-10-19 PROCEDURE — 85610 PROTHROMBIN TIME: CPT | Performed by: PHYSICIAN ASSISTANT

## 2024-10-19 PROCEDURE — 71045 X-RAY EXAM CHEST 1 VIEW: CPT

## 2024-10-19 PROCEDURE — 85025 COMPLETE CBC W/AUTO DIFF WBC: CPT | Performed by: PHYSICIAN ASSISTANT

## 2024-10-19 PROCEDURE — 80048 BASIC METABOLIC PNL TOTAL CA: CPT | Performed by: INTERNAL MEDICINE

## 2024-10-19 PROCEDURE — 99232 SBSQ HOSP IP/OBS MODERATE 35: CPT | Performed by: STUDENT IN AN ORGANIZED HEALTH CARE EDUCATION/TRAINING PROGRAM

## 2024-10-19 PROCEDURE — 80076 HEPATIC FUNCTION PANEL: CPT | Performed by: PHYSICIAN ASSISTANT

## 2024-10-19 PROCEDURE — 82330 ASSAY OF CALCIUM: CPT | Performed by: PHYSICIAN ASSISTANT

## 2024-10-19 PROCEDURE — 94640 AIRWAY INHALATION TREATMENT: CPT

## 2024-10-19 PROCEDURE — 94664 DEMO&/EVAL PT USE INHALER: CPT

## 2024-10-19 RX ORDER — LIDOCAINE HYDROCHLORIDE 20 MG/ML
1 JELLY TOPICAL ONCE
Status: DISCONTINUED | OUTPATIENT
Start: 2024-10-19 | End: 2024-10-19

## 2024-10-19 RX ORDER — CALCIUM GLUCONATE 20 MG/ML
2 INJECTION, SOLUTION INTRAVENOUS ONCE
Status: COMPLETED | OUTPATIENT
Start: 2024-10-19 | End: 2024-10-19

## 2024-10-19 RX ORDER — IPRATROPIUM BROMIDE AND ALBUTEROL SULFATE 2.5; .5 MG/3ML; MG/3ML
3 SOLUTION RESPIRATORY (INHALATION) EVERY 6 HOURS PRN
Status: DISCONTINUED | OUTPATIENT
Start: 2024-10-19 | End: 2024-10-28 | Stop reason: HOSPADM

## 2024-10-19 RX ORDER — POTASSIUM CHLORIDE 1500 MG/1
40 TABLET, EXTENDED RELEASE ORAL ONCE
Status: COMPLETED | OUTPATIENT
Start: 2024-10-19 | End: 2024-10-19

## 2024-10-19 RX ORDER — DEXMEDETOMIDINE HYDROCHLORIDE 4 UG/ML
.1-.7 INJECTION, SOLUTION INTRAVENOUS
Status: DISCONTINUED | OUTPATIENT
Start: 2024-10-19 | End: 2024-10-22

## 2024-10-19 RX ADMIN — MIRTAZAPINE 15 MG: 15 TABLET, FILM COATED ORAL at 21:14

## 2024-10-19 RX ADMIN — PANTOPRAZOLE SODIUM 40 MG: 40 TABLET, DELAYED RELEASE ORAL at 05:20

## 2024-10-19 RX ADMIN — CYANOCOBALAMIN TAB 500 MCG 1000 MCG: 500 TAB at 09:31

## 2024-10-19 RX ADMIN — THIAMINE HYDROCHLORIDE 250 MG: 100 INJECTION, SOLUTION INTRAMUSCULAR; INTRAVENOUS at 16:25

## 2024-10-19 RX ADMIN — ASPIRIN 81 MG CHEWABLE TABLET 81 MG: 81 TABLET CHEWABLE at 09:31

## 2024-10-19 RX ADMIN — CALCIUM GLUCONATE 2 G: 20 INJECTION, SOLUTION INTRAVENOUS at 06:44

## 2024-10-19 RX ADMIN — DEXMEDETOMIDINE HYDROCHLORIDE 0.1 MCG/KG/HR: 4 INJECTION, SOLUTION INTRAVENOUS at 09:35

## 2024-10-19 RX ADMIN — MULTIPLE VITAMINS W/ MINERALS TAB 1 TABLET: TAB ORAL at 09:31

## 2024-10-19 RX ADMIN — FOLIC ACID 1 MG: 1 TABLET ORAL at 09:31

## 2024-10-19 RX ADMIN — POLYETHYLENE GLYCOL 3350 17 G: 17 POWDER, FOR SOLUTION ORAL at 09:32

## 2024-10-19 RX ADMIN — IPRATROPIUM BROMIDE AND ALBUTEROL SULFATE 3 ML: 2.5; .5 SOLUTION RESPIRATORY (INHALATION) at 14:32

## 2024-10-19 RX ADMIN — AMLODIPINE BESYLATE 10 MG: 5 TABLET ORAL at 09:31

## 2024-10-19 RX ADMIN — CHLORHEXIDINE GLUCONATE 15 ML: 1.2 RINSE ORAL at 21:14

## 2024-10-19 RX ADMIN — THIAMINE HYDROCHLORIDE 500 MG: 100 INJECTION, SOLUTION INTRAMUSCULAR; INTRAVENOUS at 09:46

## 2024-10-19 RX ADMIN — POTASSIUM PHOSPHATE, MONOBASIC AND POTASSIUM PHOSPHATE, DIBASIC 21 MMOL: 224; 236 INJECTION, SOLUTION, CONCENTRATE INTRAVENOUS at 07:27

## 2024-10-19 RX ADMIN — THIAMINE HYDROCHLORIDE 250 MG: 100 INJECTION, SOLUTION INTRAMUSCULAR; INTRAVENOUS at 21:15

## 2024-10-19 RX ADMIN — ENOXAPARIN SODIUM 40 MG: 40 INJECTION SUBCUTANEOUS at 09:32

## 2024-10-19 RX ADMIN — CEFTRIAXONE 2000 MG: 2 INJECTION, POWDER, FOR SOLUTION INTRAMUSCULAR; INTRAVENOUS at 17:29

## 2024-10-19 RX ADMIN — ATORVASTATIN CALCIUM 20 MG: 20 TABLET, FILM COATED ORAL at 09:31

## 2024-10-19 RX ADMIN — POTASSIUM CHLORIDE 40 MEQ: 1500 TABLET, EXTENDED RELEASE ORAL at 16:25

## 2024-10-19 RX ADMIN — CHLORHEXIDINE GLUCONATE 15 ML: 1.2 RINSE ORAL at 09:31

## 2024-10-19 RX ADMIN — NICOTINE 21 MG: 21 PATCH, EXTENDED RELEASE TRANSDERMAL at 09:32

## 2024-10-19 RX ADMIN — ACETAMINOPHEN 650 MG: 325 TABLET ORAL at 22:24

## 2024-10-19 NOTE — ASSESSMENT & PLAN NOTE
Liver enzymes elevated  AST almost 3 times greater than ALT.  AST currently 57  Alk phos elevated at 180

## 2024-10-19 NOTE — PROGRESS NOTES
"Progress Note - Critical Care/ICU   Name: Viktor Farfan 54 y.o. male I MRN: [id number]  Unit/Bed#: ICU 10 I Date of Admission: 10/16/2024   Date of Service: 10/19/2024 I Hospital Day: 3      Assessment & Plan  Alcohol abuse  Patient is a 54-year-old male who reports that he first began drinking alcohol at the age of 16.  States that he typically drinks a 12 pack of 16 ounce beer daily and has been doing so for an indeterminate number of years.  States he has tried to quit drinking \"cold turkey\" in the past without success.  Patient denies drinking hard liquor or wine, but wife reports that patient frequently drinks multiple glasses of rum and coke in addition to the 12 pack of beer daily.  Wife reports patient typically gets violent after drinking but states he has never been hospitalized for alcohol use before.    PLAN:  Initiate CIWA protocol and give phenobarbital as indicated.  Received max of phenobarbital overnight 10/18.  Patient was given Valium 5 mg due to agitation.  Consider Ativan versus Precedex if patient has repeat episodes of agitation  Seizure precautions  Ethanol level obtained on this admission within normal limits  Will continue to trend CMP  Right upper quadrant ultrasound consistent with likely cirrhosis  Echocardiogram to evaluate for alcoholic cardio myopathy.  Echocardiogram normal with ejection fraction of 60%.  However unable to assess diastolic function  Hyponatremia  Sodium found to be severely decreased at 99  Patient received 3% normal saline 150 mL at [facility]    PLAN  Patient given small dose of Lasix 20 mg 10/18 around 4 PM.  Also was encouraged to drink fluids and eat as he is able.  Sodium once again rapidly corrected after receiving a diuretic reaching his goal of 120 almost 12 hours earlier than anticipated.  Patient was placed on D5 at 50 mL/h from 10 PM to 6 AM  BMP recheck every 4 hours  Consult placed to nephrology.  Appreciate recommendations.  Patient " will be n.p.o. with fluid restriction of 1.8 L.  Will continue to monitor  Urine sodium 21, urine os 347  Falls (Resolved: 10/19/2024)  Has history of frequent falls and difficulties with balance.  Wife reports patient has been afraid to take showers due to concern for fall  Wife reports patient has had a head strike in the past  Patient reports that he has fallen and caught himself multiple times most recently injuring his right shoulder  Significant ataxia on finger-to-nose on exam    PLAN:  CT head without contrast negative for acute intracranial abnormality but did show right mastoid effusion  X-ray of right shoulder negative for acute fracture  Neurochecks to assess for ataxia  Patient currently on soft restraints and has not fallen since admission  Right shoulder pain (Resolved: 10/19/2024)  X-ray of right shoulder negative for fracture  SOB (shortness of breath)  Patient reports he has been a smoker since the age of 16 or 17  States he has had significant shortness of breath for the past 3 days with associated white sputum production  Denies history of COPD but has not followed with a pulmonary physician  Chest x-ray significant for cardiomegaly with probable pulmonary edema and asymmetric left perihilar alveolar edema. Superimposed pneumonia cannot be completely excluded.   CT PE no emboli in main, right, and left pulmonary arteries.  Segmental and subsegmental emboli not excluded due to artifact.  Trace pleural effusions.  Multifocal bilateral upper lobe consolidation possible pneumonia or localized alveolar edema.  Mildly nodular liver suggestive of possible cirrhosis with trace ascites    PLAN:  Blood cultures negative for growth at 24 hours.  Sputum cultures significant for mixed respiratory alexandra  Strep pneumo, Legionella, COVID, flu, RSV negative  RP 2 negative  Patient currently on 6 L of oxygen nasal cannula while sleeping.  Did receive a dose of Valium 5 mg overnight due to agitation and has been  somnolent since.  Requires increased need for oxygenation during sleeping as patient most likely has undiagnosed sleep apnea.  Typically requires 4 L of oxygen via nasal cannula or less while awake during admission.  Baseline is room air.  Lung sounds significant for bilateral rhonchi     SIRS (systemic inflammatory response syndrome) (HCC)  Meet SIRS criteria for tachycardia and tachypnea  White blood cell count currently within normal limits.  Currently 9K as of 10/19  Patient currently afebrile  Cultures negative.  Currently on Rocephin day 4.  Discontinued azithromycin and vancomycin as MRSA, strep pneumo, Legionella, and RP 2 panel were negative  Lactate 1.9  Continue to trend CBC daily  Tobacco abuse  Reports he has been smoking 1 pack daily since the age of 16  Nicotine patch ordered  Patient will be counseled on smoking cessation when more oriented and other more critical issues have been addressed  Abnormal acid-base balance  VBG significant for increased pH of 7.46  pCO2 decreased at 27.1  Metabolic acidosis due to ETOH with compensatory respiratory alkalosis  Normal anion gap  Hypomagnesemia (Resolved: 10/19/2024)  Magnesium normal at 2.1 as of 10/19  Thrombocytopenia (HCC)  Platelets stable at 95K  Continue to trend CBC  Transaminitis  Liver enzymes elevated  AST almost 3 times greater than ALT.  AST currently 57  Alk phos elevated at 180  Hypokalemia  Persistent hypokalemia requiring frequent repletion.  Most recent potassium 3.7.  Repleted  Hypophosphatasia  Most recent phosphate decreased to 2.4.  Repleted with potassium  Disposition: Critical care    ICU Core Measures     A: Assess, Prevent, and Manage Pain Has pain been assessed? Yes  Need for changes to pain regimen? No   B: Both SAT/SAT  N/A   C: Choice of Sedation RASS Goal: 0 Alert and Calm  Need for changes to sedation or analgesia regimen? No   D: Delirium CAM-ICU: Negative   E: Early Mobility  Plan for early mobility? No   F: Family  Engagement Plan for family engagement today? Yes       Antibiotic Review: Continue broad spectrum secondary to severity of illness.     Review of Invasive Devices:    Latoya Plan: Continue for accurate I/O monitoring for 48 hours        Prophylaxis:  VTE VTE covered by:  enoxaparin, Subcutaneous, 40 mg at 10/18/24 0951       Stress Ulcer  covered byomeprazole (PriLOSEC) 20 mg delayed release capsule [[id number]] (Long-Term Med), pantoprazole (PROTONIX) EC tablet 40 mg [[id number]]         24 Hour Events : Overnight sodium increased rapidly and met goal 12 hours earlier than anticipated after receiving Lasix 20 mg.  Patient was placed on D5 50 mL/h from 10 PM to 6 AM.  Goal for today is sodium of 120 by noon.  Overnight patient also became very agitated and is now maxed out on phenobarbital.  Was also given Valium 5 mg due to persistent agitation.    Subjective   Review of Systems: See HPI for Review of Systems    Objective :                   Vitals I/O      Most Recent Min/Max in 24hrs   Temp 97.5 °F (36.4 °C) Temp  Min: 97.3 °F (36.3 °C)  Max: 98.4 °F (36.9 °C)   Pulse 89 Pulse  Min: 82  Max: 96   Resp (!) 28 Resp  Min: 12  Max: 46   /74 BP  Min: 108/65  Max: 144/81   O2 Sat (!) 88 % SpO2  Min: 86 %  Max: 94 %      Intake/Output Summary (Last 24 hours) at 10/19/2024 0748  Last data filed at 10/19/2024 0623  Gross per 24 hour   Intake 2383.95 ml   Output 2465 ml   Net -81.05 ml       Diet Regular; Regular House    Invasive Monitoring           Physical Exam   Physical Exam  Eyes:      Conjunctiva/sclera: Conjunctivae normal.   Skin:     General: Skin is warm and dry.   HENT:      Head: Normocephalic.      Right Ear: Hearing normal.      Left Ear: Hearing normal.      Nose: No congestion.      Mouth/Throat:      Mouth: No angioedema.   Cardiovascular:      Rate and Rhythm: Normal rate.   Musculoskeletal:         General: No signs of injury.      Right lower leg: Trace Edema present.      Left lower leg: Trace  Edema present.   Abdominal:      Palpations: Abdomen is soft.      Tenderness: There is no abdominal tenderness.   Constitutional:       General: He is not in acute distress.  Pulmonary:      Effort: No respiratory distress.      Breath sounds: Rhonchi present.   Neurological:      Mental Status: He is somnolent.   Genitourinary/Anorectal:  Klein present.        Diagnostic Studies        Lab Results: I have reviewed the following results:     Medications:  Scheduled PRN   amLODIPine, 10 mg, Daily  aspirin, 81 mg, Daily  atorvastatin, 20 mg, Daily  cefTRIAXone, 2,000 mg, Q24H  chlorhexidine, 15 mL, Q12H JUAN  cyanocobalamin, 1,000 mcg, Daily  enoxaparin, 40 mg, Daily  folic acid, 1 mg, Daily  mirtazapine, 15 mg, HS  multivitamin-minerals, 1 tablet, Daily  nicotine, 21 mg, Daily  pantoprazole, 40 mg, Early Morning  polyethylene glycol, 17 g, Daily  potassium phosphate, 21 mmol, Once  thiamine, 500 mg, TID   Followed by  thiamine, 250 mg, TID   Followed by  [START ON 10/22/2024] thiamine, 100 mg, TID   Followed by  [START ON 10/26/2024] thiamine, 100 mg, Daily      acetaminophen, 650 mg, Q6H PRN  ondansetron, 4 mg, Q8H PRN  trimethobenzamide, 200 mg, Q6H PRN       Continuous          Labs:   CBC    Recent Labs     10/18/24  0407 10/19/24  0402   WBC 8.70 9.02   HGB 10.3* 10.2*   HCT 29.5* 29.8*   PLT 99* 95*     BMP    Recent Labs     10/19/24  0004 10/19/24  0403   SODIUM 119* 120*   K 3.8 3.7   CL 89* 89*   CO2 24 25   AGAP 6 6   BUN 6 6   CREATININE 0.65 0.66   CALCIUM 7.7* 7.7*       Coags    Recent Labs     10/18/24  0407 10/19/24  0402   INR 1.30* 1.25*   PTT 35* 35*        Additional Electrolytes  Recent Labs     10/18/24  0407 10/19/24  0402   MG 1.9 2.1   PHOS 2.0* 2.4*   CAIONIZED 1.02* 1.05*          Blood Gas    No recent results  Recent Labs     10/19/24  0258   PHVEN 7.427*   NIL5GGV 41.0*   PO2VEN 42.2   VLL1EJZ 26.4   BEVEN 1.9   E8NQVHJ 77.9    LFTs  Recent Labs     10/18/24  0407 10/19/24  0402   ALT  20 21   AST 64* 57*   ALKPHOS 165* 180*   ALB 3.1* 3.1*   TBILI 1.38* 1.43*       Infectious  Recent Labs     10/18/24  0634   PROCALCITONI 0.10     Glucose  Recent Labs     10/18/24  1611 10/18/24  2006 10/19/24  0004 10/19/24  0403   GLUC 135 116 126 126

## 2024-10-19 NOTE — ASSESSMENT & PLAN NOTE
Has history of frequent falls and difficulties with balance.  Wife reports patient has been afraid to take showers due to concern for fall  Wife reports patient has had a head strike in the past  Patient reports that he has fallen and caught himself multiple times most recently injuring his right shoulder  Significant ataxia on finger-to-nose on exam    PLAN:  CT head without contrast negative for acute intracranial abnormality but did show right mastoid effusion  X-ray of right shoulder negative for acute fracture  Neurochecks to assess for ataxia  Patient currently on soft restraints and has not fallen since admission

## 2024-10-19 NOTE — ASSESSMENT & PLAN NOTE
Secondary to hypervolemia   Status post diuretics with excessive urinary output of 8 L  Holding diuretics for now

## 2024-10-19 NOTE — PLAN OF CARE
Problem: PAIN - ADULT  Goal: Verbalizes/displays adequate comfort level or baseline comfort level  Description: Interventions:  - Encourage patient to monitor pain and request assistance  - Assess pain using appropriate pain scale  - Administer analgesics based on type and severity of pain and evaluate response  - Implement non-pharmacological measures as appropriate and evaluate response  - Consider cultural and social influences on pain and pain management  - Notify physician/advanced practitioner if interventions unsuccessful or patient reports new pain  Outcome: Progressing     Problem: INFECTION - ADULT  Goal: Absence or prevention of progression during hospitalization  Description: INTERVENTIONS:  - Assess and monitor for signs and symptoms of infection  - Monitor lab/diagnostic results  - Monitor all insertion sites, i.e. indwelling lines, tubes, and drains  - Monitor endotracheal if appropriate and nasal secretions for changes in amount and color  - [place] appropriate cooling/warming therapies per order  - Administer medications as ordered  - Instruct and encourage patient and family to use good hand hygiene technique  - Identify and instruct in appropriate isolation precautions for identified infection/condition  Outcome: Progressing  Goal: Absence of fever/infection during neutropenic period  Description: INTERVENTIONS:  - Monitor WBC    Outcome: Progressing     Problem: SAFETY ADULT  Goal: Patient will remain free of falls  Description: INTERVENTIONS:  - Educate patient/family on patient safety including physical limitations  - Instruct patient to call for assistance with activity   - Consult OT/PT to assist with strengthening/mobility   - Keep Call bell within reach  - Keep bed low and locked with side rails adjusted as appropriate  - Keep care items and personal belongings within reach  - Initiate and maintain comfort rounds  - Make Fall Risk Sign visible to staff  - Apply yellow socks and bracelet  for high fall risk patients  - Consider moving patient to room near nurses station  Outcome: Progressing  Goal: Maintain or return to baseline ADL function  Description: INTERVENTIONS:  -  Assess patient's ability to carry out ADLs; assess patient's baseline for ADL function and identify physical deficits which impact ability to perform ADLs (bathing, care of mouth/teeth, toileting, grooming, dressing, etc.)  - Assess/evaluate cause of self-care deficits   - Assess range of motion  - Assess patient's mobility; develop plan if impaired  - Assess patient's need for assistive devices and provide as appropriate  - Encourage maximum independence but intervene and supervise when necessary  - Involve family in performance of ADLs  - Assess for home care needs following discharge   - Consider OT consult to assist with ADL evaluation and planning for discharge  - Provide patient education as appropriate  Outcome: Progressing  Goal: Maintains/Returns to pre admission functional level  Description: INTERVENTIONS:  - Perform AM-PAC 6 Click Basic Mobility/ Daily Activity assessment daily.  - Set and communicate daily mobility goal to care team and patient/family/caregiver.   - Collaborate with rehabilitation services on mobility goals if consulted  - Out of bed for toileting  - Record patient progress and toleration of activity level   Outcome: Progressing     Problem: DISCHARGE PLANNING  Goal: Discharge to home or other facility with appropriate resources  Description: INTERVENTIONS:  - Identify barriers to discharge w/patient and caregiver  - Arrange for needed discharge resources and transportation as appropriate  - Identify discharge learning needs (meds, wound care, etc.)  - Arrange for interpretive services to assist at discharge as needed  - Refer to Case Management Department for coordinating discharge planning if the patient needs post-hospital services based on physician/advanced practitioner order or complex needs  related to functional status, cognitive ability, or social support system  Outcome: Progressing

## 2024-10-19 NOTE — RESPIRATORY THERAPY NOTE
RT Protocol Note  Viktor Farfan 54 y.o. male MRN: [id number]  Unit/Bed#: ICU 10 Encounter: [id number]    Assessment    Principal Problem:    Hyponatremia  Active Problems:    SOB (shortness of breath)    SIRS (systemic inflammatory response syndrome) (HCC)    Alcohol abuse    Tobacco abuse    Abnormal acid-base balance    Thrombocytopenia (HCC)    Transaminitis    Hypokalemia    Hypophosphatasia      Home Pulmonary Medications:  None        Past Medical History:   Diagnosis Date    Anxiety     Back pain     Chronic right-sided low back pain without sciatica 3/31/2018    Depression     Gastroesophageal reflux disease 3/31/2018    GERD (gastroesophageal reflux disease)     Right foot pain      Social History     Socioeconomic History    Marital status: Single     Spouse name: Not on file    Number of children: Not on file    Years of education: Not on file    Highest education level: Not on file   Occupational History    Not on file   Tobacco Use    Smoking status: Every Day     Current packs/day: 1.00     Average packs/day: 1 pack/day for 38.0 years (38.0 ttl pk-yrs)     Types: Cigarettes    Smokeless tobacco: Never   Vaping Use    Vaping status: Never Used   Substance and Sexual Activity    Alcohol use: Yes     Alcohol/week: 6.0 standard drinks of alcohol     Types: 6 Cans of beer per week     Comment: 12 pack of beer a night    Drug use: No    Sexual activity: Not on file   Other Topics Concern    Not on file   Social History Narrative    Not on file     Social Determinants of Health     Financial Resource Strain: Not on file   Food Insecurity: Patient Unable To Answer (10/18/2024)    Hunger Vital Sign     Worried About Running Out of Food in the Last Year: Patient unable to answer     Ran Out of Food in the Last Year: Patient unable to answer   Transportation Needs: Patient Unable To Answer (10/18/2024)    PRAPARE - Transportation     Lack of Transportation (Medical): Patient unable to answer     Lack of  "Transportation (Non-Medical): Patient unable to answer   Physical Activity: Not on file   Stress: Not on file   Social Connections: Not on file   Intimate Partner Violence: Not on file   Housing Stability: Patient Unable To Answer (10/18/2024)    Housing Stability Vital Sign     Unable to Pay for Housing in the Last Year: Patient unable to answer     Number of Times Moved in the Last Year: Not on file     Homeless in the Last Year: Patient unable to answer       Subjective         Objective    Physical Exam:   Assessment Type: During-treatment  General Appearance: Awake, Eyes open/responds to stimulus, Eyes open/responds to pain  Respiratory Pattern: Dyspnea at rest, Labored, Tachypneic  Chest Assessment: Chest expansion symmetrical  Bilateral Breath Sounds: Coarse, Rhonchi  Cough: Congested, Productive    Vitals:  Blood pressure 115/64, pulse 83, temperature 97.7 °F (36.5 °C), resp. rate 19, height 6' 2\" (1.88 m), weight 114 kg (252 lb 3.3 oz), SpO2 94%.    Results from last 7 days   Lab Units 10/19/24  0258   LEIGHA TEST  No       Imaging and other studies: Results Review Statement: No pertinent imaging studies reviewed.          Plan    Respiratory Plan: Moderate/Severe Distress pathway  Airway Clearance Plan: Flutter     Resp Comments: set up [facility]   "

## 2024-10-19 NOTE — ASSESSMENT & PLAN NOTE
Patient reports he has been a smoker since the age of 16 or 17  States he has had significant shortness of breath for the past 3 days with associated white sputum production  Denies history of COPD but has not followed with a pulmonary physician  Chest x-ray significant for cardiomegaly with probable pulmonary edema and asymmetric left perihilar alveolar edema. Superimposed pneumonia cannot be completely excluded.   CT PE no emboli in main, right, and left pulmonary arteries.  Segmental and subsegmental emboli not excluded due to artifact.  Trace pleural effusions.  Multifocal bilateral upper lobe consolidation possible pneumonia or localized alveolar edema.  Mildly nodular liver suggestive of possible cirrhosis with trace ascites    PLAN:  Blood cultures negative for growth at 24 hours.  Sputum cultures significant for mixed respiratory alexandra  Strep pneumo, Legionella, COVID, flu, RSV negative  RP 2 negative  Patient currently on 6 L of oxygen nasal cannula while sleeping.  Did receive a dose of Valium 5 mg overnight due to agitation and has been somnolent since.  Requires increased need for oxygenation during sleeping as patient most likely has undiagnosed sleep apnea.  Typically requires 4 L of oxygen via nasal cannula or less while awake during admission.  Baseline is room air.  Lung sounds significant for bilateral rhonchi

## 2024-10-19 NOTE — ASSESSMENT & PLAN NOTE
Sodium on admission: 101mEq/L   Serum osm: 233  Urine osm: 347   Urine Sodium:21  Current sodium 121 mEq/L  Etiology: Multifactorial likely secondary to EtOH, poor solute intake for 3 days and fluid overload    Initial overcorrection status post DDAVP and D5W     Plan:  Discontinue D5W   Lasix 20 mg this afternoon   Liberalize fluid restriction  BMP every 6 hours   Avoid overcorrection : no more than 6-8mEq in the next 24h, goal 128 by tomorrow morning  Monitor urinary output   Will monitor labs, call renal if SNa+ <120 or >128  Risk of ODS:  EtOH   Severe hyponatremia   malnourishment: serum alb 3.1

## 2024-10-19 NOTE — PLAN OF CARE
Problem: PAIN - ADULT  Goal: Verbalizes/displays adequate comfort level or baseline comfort level  Description: Interventions:  - Encourage patient to monitor pain and request assistance  - Assess pain using appropriate pain scale  - Administer analgesics based on type and severity of pain and evaluate response  - Implement non-pharmacological measures as appropriate and evaluate response  - Consider cultural and social influences on pain and pain management  - Notify physician/advanced practitioner if interventions unsuccessful or patient reports new pain  Outcome: Progressing     Problem: INFECTION - ADULT  Goal: Absence or prevention of progression during hospitalization  Description: INTERVENTIONS:  - Assess and monitor for signs and symptoms of infection  - Monitor lab/diagnostic results  - Monitor all insertion sites, i.e. indwelling lines, tubes, and drains  - Monitor endotracheal if appropriate and nasal secretions for changes in amount and color  - [place] appropriate cooling/warming therapies per order  - Administer medications as ordered  - Instruct and encourage patient and family to use good hand hygiene technique  - Identify and instruct in appropriate isolation precautions for identified infection/condition  Outcome: Progressing  Goal: Absence of fever/infection during neutropenic period  Description: INTERVENTIONS:  - Monitor WBC    Outcome: Progressing     Problem: SAFETY ADULT  Goal: Patient will remain free of falls  Description: INTERVENTIONS:  - Educate patient/family on patient safety including physical limitations  - Instruct patient to call for assistance with activity   - Consult OT/PT to assist with strengthening/mobility   - Keep Call bell within reach  - Keep bed low and locked with side rails adjusted as appropriate  - Keep care items and personal belongings within reach  - Initiate and maintain comfort rounds  - Make Fall Risk Sign visible to staff    - Apply yellow socks and  bracelet for high fall risk patients  - Consider moving patient to room near nurses station  Outcome: Progressing  Goal: Maintain or return to baseline ADL function  Description: INTERVENTIONS:  -  Assess patient's ability to carry out ADLs; assess patient's baseline for ADL function and identify physical deficits which impact ability to perform ADLs (bathing, care of mouth/teeth, toileting, grooming, dressing, etc.)  - Assess/evaluate cause of self-care deficits   - Assess range of motion  - Assess patient's mobility; develop plan if impaired  - Assess patient's need for assistive devices and provide as appropriate  - Encourage maximum independence but intervene and supervise when necessary  - Involve family in performance of ADLs  - Assess for home care needs following discharge   - Consider OT consult to assist with ADL evaluation and planning for discharge  - Provide patient education as appropriate  Outcome: Progressing  Goal: Maintains/Returns to pre admission functional level  Description: INTERVENTIONS:  - Perform AM-PAC 6 Click Basic Mobility/ Daily Activity assessment daily.  - Set and communicate daily mobility goal to care team and patient/family/caregiver.   - Collaborate with rehabilitation services on mobility goals if consulted    Problem: Knowledge Deficit  Goal: Patient/family/caregiver demonstrates understanding of disease process, treatment plan, medications, and discharge instructions  Description: Complete learning assessment and assess knowledge base.  Interventions:  - Provide teaching at level of understanding  - Provide teaching via preferred learning methods  Outcome: Progressing     Problem: NEUROSENSORY - ADULT  Goal: Achieves stable or improved neurological status  Description: INTERVENTIONS  - Monitor and report changes in neurological status  - Monitor vital signs such as temperature, blood pressure, glucose, and any other labs ordered   - Initiate measures to prevent increased  intracranial pressure  - Monitor for seizure activity and implement precautions if appropriate      Outcome: Progressing  Goal: Remains free of injury related to seizures activity  Description: INTERVENTIONS  - Maintain airway, patient safety  and administer oxygen as ordered  - Monitor patient for seizure activity, document and report duration and description of seizure to physician/advanced practitioner  - If seizure occurs,  ensure patient safety during seizure  - Reorient patient post seizure  - Seizure pads on all 4 side rails  - Instruct patient/family to notify RN of any seizure activity including if an aura is experienced  - Instruct patient/family to call for assistance with activity based on nursing assessment  - Administer anti-seizure medications if ordered    Outcome: Progressing  Goal: Achieves maximal functionality and self care  Description: INTERVENTIONS  - Monitor swallowing and airway patency with patient fatigue and changes in neurological status  - Encourage and assist patient to increase activity and self care.   - Encourage visually impaired, hearing impaired and aphasic patients to use assistive/communication devices  Outcome: Progressing     Problem: CARDIOVASCULAR - ADULT  Goal: Maintains optimal cardiac output and hemodynamic stability  Description: INTERVENTIONS:  - Monitor I/O, vital signs and rhythm  - Monitor for S/S and trends of decreased cardiac output  - Administer and titrate ordered vasoactive medications to optimize hemodynamic stability  - Assess quality of pulses, skin color and temperature  - Assess for signs of decreased coronary artery perfusion  - Instruct patient to report change in severity of symptoms  Outcome: Progressing  Goal: Absence of cardiac dysrhythmias or at baseline rhythm  Description: INTERVENTIONS:  - Continuous cardiac monitoring, vital signs, obtain 12 lead EKG if ordered  - Administer antiarrhythmic and heart rate control medications as ordered  -  Monitor electrolytes and administer replacement therapy as ordered  Outcome: Progressing     Problem: RESPIRATORY - ADULT  Goal: Achieves optimal ventilation and oxygenation  Description: INTERVENTIONS:  - Assess for changes in respiratory status  - Assess for changes in mentation and behavior  - Position to facilitate oxygenation and minimize respiratory effort  - Oxygen administered by appropriate delivery if ordered  - Initiate smoking cessation education as indicated  - Encourage broncho-pulmonary hygiene including cough, deep breathe, Incentive Spirometry  - Assess the need for suctioning and aspirate as needed  - Assess and instruct to report SOB or any respiratory difficulty  - Respiratory Therapy support as indicated  Outcome: Progressing     Problem: GASTROINTESTINAL - ADULT  Goal: Minimal or absence of nausea and/or vomiting  Description: INTERVENTIONS:  - Administer IV fluids if ordered to ensure adequate hydration  - Maintain NPO status until nausea and vomiting are resolved  - Nasogastric tube if ordered  - Administer ordered antiemetic medications as needed  - Provide nonpharmacologic comfort measures as appropriate  - Advance diet as tolerated, if ordered  - Consider nutrition services referral to assist patient with adequate nutrition and appropriate food choices  Outcome: Progressing  Goal: Maintains or returns to baseline bowel function  Description: INTERVENTIONS:  - Assess bowel function  - Encourage oral fluids to ensure adequate hydration  - Administer IV fluids if ordered to ensure adequate hydration  - Administer ordered medications as needed  - Encourage mobilization and activity  - Consider nutritional services referral to assist patient with adequate nutrition and appropriate food choices  Outcome: Progressing  Goal: Maintains adequate nutritional intake  Description: INTERVENTIONS:  - Monitor percentage of each meal consumed  - Identify factors contributing to decreased intake, treat as  appropriate  - Assist with meals as needed  - Monitor I&O, weight, and lab values if indicated  - Obtain nutrition services referral as needed  Outcome: Progressing  Goal: Establish and maintain optimal ostomy function  Description: INTERVENTIONS:  - Assess bowel function  - Encourage oral fluids to ensure adequate hydration  - Administer IV fluids if ordered to ensure adequate hydration   - Administer ordered medications as needed  - Encourage mobilization and activity  - Nutrition services referral to assist patient with appropriate food choices  - Assess stoma site  - Consider wound care consult   Outcome: Progressing  Goal: Oral mucous membranes remain intact  Description: INTERVENTIONS  - Assess oral mucosa and hygiene practices  - Implement preventative oral hygiene regimen  - Implement oral medicated treatments as ordered  - Initiate Nutrition services referral as needed  Outcome: Progressing     Problem: Prexisting or High Potential for Compromised Skin Integrity  Goal: Skin integrity is maintained or improved  Description: INTERVENTIONS:  - Identify patients at risk for skin breakdown  - Assess and monitor skin integrity  - Assess and monitor nutrition and hydration status  - Monitor labs   - Assess for incontinence   - Turn and reposition patient  - Assist with mobility/ambulation  - Relieve pressure over bony prominences  - Avoid friction and shearing  - Provide appropriate hygiene as needed including keeping skin clean and dry  - Evaluate need for skin moisturizer/barrier cream  - Collaborate with interdisciplinary team   - Patient/family teaching  - Consider wound care consult   Outcome: Progressing     Problem: SAFETY,RESTRAINT: NV/NON-SELF DESTRUCTIVE BEHAVIOR  Goal: Remains free of harm/injury (restraint for non violent/non self-detsructive behavior)  Description: INTERVENTIONS:  - Instruct patient/family regarding restraint use   - Assess and monitor physiologic and psychological status   - Provide  interventions and comfort measures to meet assessed patient needs   - Identify and implement measures to help patient regain control  - Assess readiness for release of restraint   Outcome: Progressing  Goal: Returns to optimal restraint-free functioning  Description: INTERVENTIONS:  - Assess the patient's behavior and symptoms that indicate continued need for restraint  - Identify and implement measures to help patient regain control  - Assess readiness for release of restraint   Outcome: Progressing     Problem: Nutrition/Hydration-ADULT  Goal: Nutrient/Hydration intake appropriate for improving, restoring or maintaining nutritional needs  Description: Monitor and assess patient's nutrition/hydration status for malnutrition. Collaborate with interdisciplinary team and initiate plan and interventions as ordered.  Monitor patient's weight and dietary intake as ordered or per policy. Utilize nutrition screening tool and intervene as necessary. Determine patient's food preferences and provide high-protein, high-caloric foods as appropriate.     INTERVENTIONS:  - Monitor oral intake, urinary output, labs, and treatment plans  - Assess nutrition and hydration status and recommend course of action  - Evaluate amount of meals eaten  - Assist patient with eating if necessary   - Allow adequate time for meals  - Recommend/ encourage appropriate diets, oral nutritional supplements, and vitamin/mineral supplements  - Order, calculate, and assess calorie counts as needed  - Recommend, monitor, and adjust tube feedings and TPN/PPN based on assessed needs  - Assess need for intravenous fluids  - Provide specific nutrition/hydration education as appropriate  - Include patient/family/caregiver in decisions related to nutrition  Outcome: Progressing     Problem: MUSCULOSKELETAL - ADULT  Goal: Maintain or return mobility to safest level of function  Description: INTERVENTIONS:  - Assess patient's ability to carry out ADLs; assess  patient's baseline for ADL function and identify physical deficits which impact ability to perform ADLs (bathing, care of mouth/teeth, toileting, grooming, dressing, etc.)  - Assess/evaluate cause of self-care deficits   - Assess range of motion  - Assess patient's mobility  - Assess patient's need for assistive devices and provide as appropriate  - Encourage maximum independence but intervene and supervise when necessary  - Involve family in performance of ADLs  - Assess for home care needs following discharge   - Consider OT consult to assist with ADL evaluation and planning for discharge  - Provide patient education as appropriate  Outcome: Progressing  Goal: Maintain proper alignment of affected body part  Description: INTERVENTIONS:  - Support, maintain and protect limb and body alignment  - Provide patient/ family with appropriate education  Outcome: Progressing     Problem: HEMATOLOGIC - ADULT  Goal: Maintains hematologic stability  Description: INTERVENTIONS  - Assess for signs and symptoms of bleeding or hemorrhage  - Monitor labs  - Administer supportive blood products/factors as ordered and appropriate  Outcome: Progressing     - Out of bed for toileting  - Record patient progress and toleration of activity level   Outcome: Progressing

## 2024-10-19 NOTE — ASSESSMENT & PLAN NOTE
Meet SIRS criteria for tachycardia and tachypnea  White blood cell count currently within normal limits.  Currently 9K as of 10/19  Patient currently afebrile  Cultures negative.  Currently on Rocephin day 4.  Discontinued azithromycin and vancomycin as MRSA, strep pneumo, Legionella, and RP 2 panel were negative  Lactate 1.9  Continue to trend CBC daily

## 2024-10-19 NOTE — ASSESSMENT & PLAN NOTE
"Patient is a 54-year-old male who reports that he first began drinking alcohol at the age of 16.  States that he typically drinks a 12 pack of 16 ounce beer daily and has been doing so for an indeterminate number of years.  States he has tried to quit drinking \"cold turkey\" in the past without success.  Patient denies drinking hard liquor or wine, but wife reports that patient frequently drinks multiple glasses of rum and coke in addition to the 12 pack of beer daily.  Wife reports patient typically gets violent after drinking but states he has never been hospitalized for alcohol use before.    PLAN:  Initiate CIWA protocol and give phenobarbital as indicated.  Received max of phenobarbital overnight 10/18.  Patient was given Valium 5 mg due to agitation.  Consider Ativan versus Precedex if patient has repeat episodes of agitation  Seizure precautions  Ethanol level obtained on this admission within normal limits  Will continue to trend CMP  Right upper quadrant ultrasound consistent with likely cirrhosis  Echocardiogram to evaluate for alcoholic cardio myopathy.  Echocardiogram normal with ejection fraction of 60%.  However unable to assess diastolic function  "

## 2024-10-19 NOTE — ASSESSMENT & PLAN NOTE
Serum bicarbonate 27mmol/L  Metabolic alkalosis likely secondary to vascular contraction in the settings of diuresis

## 2024-10-19 NOTE — PROGRESS NOTES
Progress Note - Nephrology   Name: Viktor Farfan 54 y.o. male I MRN: [id number]  Unit/Bed#: ICU 10 I Date of Admission: 10/16/2024   Date of Service: 10/19/2024 I Hospital Day: 3     Assessment & Plan  Hyponatremia  Sodium on admission: 101mEq/L   Serum osm: 233  Urine osm: 347   Urine Sodium:21  Current sodium 121 mEq/L  Etiology: Multifactorial likely secondary to EtOH, poor solute intake for 3 days and fluid overload    Initial overcorrection status post DDAVP and D5W     Plan:  Discontinue D5W   Lasix 20 mg this afternoon   Liberalize fluid restriction  BMP every 6 hours   Avoid overcorrection : no more than 6-8mEq in the next 24h, goal 128 by tomorrow morning  Monitor urinary output   Will monitor labs, call renal if SNa+ <120 or >128  Risk of ODS:  EtOH   Severe hyponatremia   malnourishment: serum alb 3.1       Abnormal acid-base balance  Serum bicarbonate 27mmol/L  Metabolic alkalosis likely secondary to vascular contraction in the settings of diuresis    Hypomagnesemia  Magnesium 2.1  Resolved  SOB (shortness of breath)  Secondary to hypervolemia   Status post diuretics with excessive urinary output of 8 L  Holding diuretics for now    Alcohol abuse  On CIWA protocol   Report drinking 10-12 beers per night for years    I have reviewed the nephrology recommendations including dc D5W and Lasix today , with ICU team, and we are in agreement with renal plan including the information outlined above. I have discussed the above management plan in detail with the primary service.     Subjective   Brief History of Admission - 55 yo man with PMH of obesity, EtOH p/w EtOH intoxication, confusion found to have sodium of 99. Nephrology is consulted for management of severe hyponatremia     Patient lethargic, on CIWA, appears short of breath.  Good urinary output    Objective :  Temp:  [97.3 °F (36.3 °C)-98.4 °F (36.9 °C)] 98.2 °F (36.8 °C)  HR:  [] 86  BP: (108-144)/(53-81) 124/69  Resp:  [12-46] 21  SpO2:  [86  %-94 %] 92 %  O2 Device: Nasal cannula  Nasal Cannula O2 Flow Rate (L/min):  [4 L/min-6 L/min] 6 L/min    Current Weight: Weight - Scale: 114 kg (252 lb 3.3 oz)  First Weight: Weight - Scale: 126 kg (278 lb 14.1 oz)  I/O         10/17 0701  10/18 0700 10/18 0701  10/19 0700    P.O. 400     I.V. (mL/kg) 1196.7 (9.8) 1714.6 (15)    IV Piggyback 477.1 551    Total Intake(mL/kg) 2073.7 (17) 2265.6 (19.9)    Urine (mL/kg/hr) 8350 (2.9) 2465 (0.9)    Total Output 8350 2465    Net -6276.3 -199.4                Physical Exam  General:  no acute distress at this time  Skin:  No acute rash  Eyes:  No scleral icterus and noninjected  ENT:  mucous membranes moist  Neck:  no carotid bruits  Chest: Bilateral crackles , good respiratory effort, no use of accessory respiratory muscles  CVS:  Regular rate and rhythm without rub   Abdomen:  soft and nontender   Extremities: lower extremity edema  Neuro:  No gross focality  Psych:   Lethargic    Medications:    Current Facility-Administered Medications:     acetaminophen (TYLENOL) tablet 650 mg, 650 mg, Oral, Q6H PRN, PARVEEN Dai, 650 mg at 10/17/24 0112    amLODIPine (NORVASC) tablet 10 mg, 10 mg, Oral, Daily, Madelaine Dejesus MD, 10 mg at 10/18/24 0944    aspirin chewable tablet 81 mg, 81 mg, Oral, Daily, Roscoe Raman DO, 81 mg at 10/18/24 0944    atorvastatin (LIPITOR) tablet 20 mg, 20 mg, Oral, Daily, Roscoe Raman DO, 20 mg at 10/18/24 0944    cefTRIAXone (ROCEPHIN) 2,000 mg in dextrose 5 % 50 mL IVPB, 2,000 mg, Intravenous, Q24H, Madelaine Dejesus MD, Stopped at 10/18/24 1625    chlorhexidine (PERIDEX) 0.12 % oral rinse 15 mL, 15 mL, Mouth/Throat, Q12H JUAN, Sahra Duarte DO, 15 mL at 10/18/24 2034    cyanocobalamin (VITAMIN B-12) tablet 1,000 mcg, 1,000 mcg, Oral, Daily, Madelaine Dejesus MD, 1,000 mcg at 10/18/24 0944    enoxaparin (LOVENOX) subcutaneous injection 40 mg, 40 mg, Subcutaneous, Daily, Sahra Duarte DO, 40 mg at  10/18/24 0951    folic acid (FOLVITE) tablet 1 mg, 1 mg, Oral, Daily, Madelaine Dejesus MD, 1 mg at 10/18/24 0944    mirtazapine (REMERON) tablet 15 mg, 15 mg, Oral, HS, Roscoe Raman DO, 15 mg at 10/18/24 2137    multivitamin-minerals (CENTRUM) tablet 1 tablet, 1 tablet, Oral, Daily, Sahra Duarte DO, 1 tablet at 10/18/24 0944    nicotine (NICODERM CQ) 21 mg/24 hr TD 24 hr patch 21 mg, 21 mg, Transdermal, Daily, Sahra Duarte DO, 21 mg at 10/18/24 0944    ondansetron (ZOFRAN) injection 4 mg, 4 mg, Intravenous, Q8H PRN, Roscoe aRman DO    pantoprazole (PROTONIX) EC tablet 40 mg, 40 mg, Oral, Early Morning, Madelaine Dejesus MD, 40 mg at 10/19/24 0520    polyethylene glycol (MIRALAX) packet 17 g, 17 g, Oral, Daily, Sahra Duarte DO, 17 g at 10/18/24 0945    thiamine (VITAMIN B1) 500 mg in sodium chloride 0.9 % 50 mL IVPB, 500 mg, Intravenous, TID, Stopped at 10/18/24 2115 **FOLLOWED BY** thiamine (VITAMIN B1) 250 mg in sodium chloride 0.9 % 50 mL IVPB, 250 mg, Intravenous, TID **FOLLOWED BY** [START ON 10/22/2024] thiamine (VITAMIN B1) 100 mg in sodium chloride 0.9 % 50 mL IVPB, 100 mg, Intravenous, TID **FOLLOWED BY** [START ON 10/26/2024] thiamine tablet 100 mg, 100 mg, Oral, Daily, Madelaine Dejesus MD    trimethobenzamide (TIGAN) IM injection 200 mg, 200 mg, Intramuscular, Q6H PRN, PARVEEN Schroeder      Lab Results: I have reviewed the following results:  Results from last 7 days   Lab Units 10/19/24  0403 10/19/24  0402 10/19/24  0004 10/18/24  2006 10/18/24  1611 10/18/24  1152 10/18/24  0852 10/18/24  0634 10/18/24  0407 10/17/24  0756 10/17/24  0444 10/16/24  2022 10/16/24  1528 10/16/24  1354 10/16/24  1141 10/16/24  1009   WBC Thousand/uL  --  9.02  --   --   --   --   --   --  8.70  --  10.17*  --  7.94  --   --  9.15   HEMOGLOBIN g/dL  --  10.2*  --   --   --   --   --   --  10.3*  --  10.9*  --  11.1*  --   --  11.3*   HEMATOCRIT %  --  29.8*  --   --   --   --   --  "  --  29.5*  --  30.7*  --  31.1*  --   --  31.7*   PLATELETS Thousands/uL  --  95*  --   --   --   --   --   --  99*  --  103*  --  105*  --   --  104*   POTASSIUM mmol/L 3.7  --  3.8 3.6 3.6 3.7 4.3 3.8  --    < > 4.6   < > 5.0   < > 5.3 5.5*   CHLORIDE mmol/L 89*  --  89* 88* 87* 87* 86* 89*  --    < > 81*   < > 76*   < > 74* 75*   CO2 mmol/L 25  --  24 24 23 24 24 23  --    < > 19*   < > 16*   < > 17* 17*   BUN mg/dL 6  --  6 6 7 7 8 9  --    < > 10   < > 7   < > 7 7   CREATININE mg/dL 0.66  --  0.65 0.65 0.66 0.67 0.72 0.80  --    < > 0.76   < > 0.67   < > 0.75 0.76   CALCIUM mg/dL 7.7*  --  7.7* 7.6* 7.7* 7.6* 7.4* 7.9*  --    < > 7.9*   < > 8.1*   < > 7.9* 8.6   MAGNESIUM mg/dL  --  2.1  --   --   --   --   --   --  1.9  --  1.8*  --  1.9  --   --   --    PHOSPHORUS mg/dL  --  2.4*  --   --   --   --   --   --  2.0*  --  2.8  --  2.4*  --   --   --    ALBUMIN g/dL  --  3.1*  --   --   --   --   --   --  3.1*  --  3.4*  --  3.5  --  3.3* 3.6    < > = values in this interval not displayed.       Administrative Statements     Portions of the record may have been created with voice recognition software. Occasional wrong word or \"sound a like\" substitutions may have occurred due to the inherent limitations of voice recognition software. Read the chart carefully and recognize, using context, where substitutions have occurred.If you have any questions, please contact the dictating provider.  "

## 2024-10-19 NOTE — ASSESSMENT & PLAN NOTE
Sodium found to be severely decreased at 99  Patient received 3% normal saline 150 mL at [place]  Sodium at [place] 103    PLAN  Patient given small dose of Lasix 20 mg 10/18 around 4 PM.  Also was encouraged to drink fluids and eat as he is able.  Sodium once again rapidly corrected after receiving a diuretic reaching his goal of 120 almost 12 hours earlier than anticipated.  Patient was placed on D5 at 50 mL/h from 10 PM to 6 AM  BMP recheck every 4 hours  Consult placed to nephrology.  Appreciate recommendations.  Patient will be n.p.o. with fluid restriction of 1.8 L.  Will continue to monitor  Urine sodium 21, urine os 347

## 2024-10-20 ENCOUNTER — APPOINTMENT (INPATIENT)
Dept: RADIOLOGY | Facility: HOSPITAL | Age: 54
DRG: 720 | End: 2024-10-20
Payer: COMMERCIAL

## 2024-10-20 PROBLEM — E87.4: Status: RESOLVED | Noted: 2024-10-16 | Resolved: 2024-10-20

## 2024-10-20 PROBLEM — E87.6 HYPOKALEMIA: Status: RESOLVED | Noted: 2024-10-19 | Resolved: 2024-10-20

## 2024-10-20 PROBLEM — E83.39 HYPOPHOSPHATASIA: Status: RESOLVED | Noted: 2024-10-19 | Resolved: 2024-10-20

## 2024-10-20 LAB
ALBUMIN SERPL BCG-MCNC: 2.9 G/DL (ref 3.5–5)
ALP SERPL-CCNC: 208 U/L (ref 34–104)
ALT SERPL W P-5'-P-CCNC: 22 U/L (ref 7–52)
ANION GAP SERPL CALCULATED.3IONS-SCNC: 5 MMOL/L (ref 4–13)
ANION GAP SERPL CALCULATED.3IONS-SCNC: 7 MMOL/L (ref 4–13)
ANION GAP SERPL CALCULATED.3IONS-SCNC: 7 MMOL/L (ref 4–13)
APTT PPP: 36 SECONDS (ref 23–34)
AST SERPL W P-5'-P-CCNC: 54 U/L (ref 13–39)
BACTERIA BLD CULT: NORMAL
BACTERIA BLD CULT: NORMAL
BASOPHILS # BLD AUTO: 0.06 THOUSANDS/ΜL (ref 0–0.1)
BASOPHILS NFR BLD AUTO: 1 % (ref 0–1)
BILIRUB DIRECT SERPL-MCNC: 0.49 MG/DL (ref 0–0.2)
BILIRUB SERPL-MCNC: 1.15 MG/DL (ref 0.2–1)
BUN SERPL-MCNC: 6 MG/DL (ref 5–25)
BUN SERPL-MCNC: 8 MG/DL (ref 5–25)
BUN SERPL-MCNC: 8 MG/DL (ref 5–25)
CA-I BLD-SCNC: 1.08 MMOL/L (ref 1.12–1.32)
CALCIUM SERPL-MCNC: 7.9 MG/DL (ref 8.4–10.2)
CALCIUM SERPL-MCNC: 8.1 MG/DL (ref 8.4–10.2)
CALCIUM SERPL-MCNC: 8.8 MG/DL (ref 8.4–10.2)
CHLORIDE SERPL-SCNC: 94 MMOL/L (ref 96–108)
CHLORIDE SERPL-SCNC: 95 MMOL/L (ref 96–108)
CHLORIDE SERPL-SCNC: 95 MMOL/L (ref 96–108)
CO2 SERPL-SCNC: 25 MMOL/L (ref 21–32)
CO2 SERPL-SCNC: 26 MMOL/L (ref 21–32)
CO2 SERPL-SCNC: 26 MMOL/L (ref 21–32)
CREAT SERPL-MCNC: 0.71 MG/DL (ref 0.6–1.3)
CREAT SERPL-MCNC: 0.75 MG/DL (ref 0.6–1.3)
CREAT SERPL-MCNC: 0.76 MG/DL (ref 0.6–1.3)
CRP SERPL QL: 115.6 MG/L
EOSINOPHIL # BLD AUTO: 0.12 THOUSAND/ΜL (ref 0–0.61)
EOSINOPHIL NFR BLD AUTO: 1 % (ref 0–6)
ERYTHROCYTE [DISTWIDTH] IN BLOOD BY AUTOMATED COUNT: 16.2 % (ref 11.6–15.1)
GFR SERPL CREATININE-BSD FRML MDRD: 103 ML/MIN/1.73SQ M
GFR SERPL CREATININE-BSD FRML MDRD: 103 ML/MIN/1.73SQ M
GFR SERPL CREATININE-BSD FRML MDRD: 106 ML/MIN/1.73SQ M
GLUCOSE SERPL-MCNC: 123 MG/DL (ref 65–140)
GLUCOSE SERPL-MCNC: 131 MG/DL (ref 65–140)
GLUCOSE SERPL-MCNC: 136 MG/DL (ref 65–140)
HCT VFR BLD AUTO: 30 % (ref 36.5–49.3)
HGB BLD-MCNC: 10 G/DL (ref 12–17)
IMM GRANULOCYTES # BLD AUTO: 0.06 THOUSAND/UL (ref 0–0.2)
IMM GRANULOCYTES NFR BLD AUTO: 1 % (ref 0–2)
INR PPP: 1.24 (ref 0.85–1.19)
LYMPHOCYTES # BLD AUTO: 1.08 THOUSANDS/ΜL (ref 0.6–4.47)
LYMPHOCYTES NFR BLD AUTO: 11 % (ref 14–44)
MAGNESIUM SERPL-MCNC: 2.1 MG/DL (ref 1.9–2.7)
MCH RBC QN AUTO: 28.5 PG (ref 26.8–34.3)
MCHC RBC AUTO-ENTMCNC: 33.3 G/DL (ref 31.4–37.4)
MCV RBC AUTO: 86 FL (ref 82–98)
MONOCYTES # BLD AUTO: 1.32 THOUSAND/ΜL (ref 0.17–1.22)
MONOCYTES NFR BLD AUTO: 14 % (ref 4–12)
NEUTROPHILS # BLD AUTO: 7.12 THOUSANDS/ΜL (ref 1.85–7.62)
NEUTS SEG NFR BLD AUTO: 72 % (ref 43–75)
NRBC BLD AUTO-RTO: 0 /100 WBCS
PHOSPHATE SERPL-MCNC: 3.3 MG/DL (ref 2.7–4.5)
PLATELET # BLD AUTO: 107 THOUSANDS/UL (ref 149–390)
PMV BLD AUTO: 10.2 FL (ref 8.9–12.7)
POTASSIUM SERPL-SCNC: 3.8 MMOL/L (ref 3.5–5.3)
POTASSIUM SERPL-SCNC: 4 MMOL/L (ref 3.5–5.3)
POTASSIUM SERPL-SCNC: 4.2 MMOL/L (ref 3.5–5.3)
PROT SERPL-MCNC: 6.7 G/DL (ref 6.4–8.4)
PROTHROMBIN TIME: 16.4 SECONDS (ref 12.3–15)
RBC # BLD AUTO: 3.51 MILLION/UL (ref 3.88–5.62)
SODIUM SERPL-SCNC: 125 MMOL/L (ref 135–147)
SODIUM SERPL-SCNC: 127 MMOL/L (ref 135–147)
SODIUM SERPL-SCNC: 128 MMOL/L (ref 135–147)
WBC # BLD AUTO: 9.76 THOUSAND/UL (ref 4.31–10.16)

## 2024-10-20 PROCEDURE — 82330 ASSAY OF CALCIUM: CPT | Performed by: PHYSICIAN ASSISTANT

## 2024-10-20 PROCEDURE — 83735 ASSAY OF MAGNESIUM: CPT | Performed by: PHYSICIAN ASSISTANT

## 2024-10-20 PROCEDURE — 99232 SBSQ HOSP IP/OBS MODERATE 35: CPT | Performed by: INTERNAL MEDICINE

## 2024-10-20 PROCEDURE — 86140 C-REACTIVE PROTEIN: CPT

## 2024-10-20 PROCEDURE — 94760 N-INVAS EAR/PLS OXIMETRY 1: CPT

## 2024-10-20 PROCEDURE — 80076 HEPATIC FUNCTION PANEL: CPT

## 2024-10-20 PROCEDURE — 99232 SBSQ HOSP IP/OBS MODERATE 35: CPT | Performed by: STUDENT IN AN ORGANIZED HEALTH CARE EDUCATION/TRAINING PROGRAM

## 2024-10-20 PROCEDURE — NC001 PR NO CHARGE: Performed by: INTERNAL MEDICINE

## 2024-10-20 PROCEDURE — 84100 ASSAY OF PHOSPHORUS: CPT | Performed by: PHYSICIAN ASSISTANT

## 2024-10-20 PROCEDURE — 85025 COMPLETE CBC W/AUTO DIFF WBC: CPT | Performed by: PHYSICIAN ASSISTANT

## 2024-10-20 PROCEDURE — 85730 THROMBOPLASTIN TIME PARTIAL: CPT | Performed by: PHYSICIAN ASSISTANT

## 2024-10-20 PROCEDURE — 71045 X-RAY EXAM CHEST 1 VIEW: CPT

## 2024-10-20 PROCEDURE — 80048 BASIC METABOLIC PNL TOTAL CA: CPT | Performed by: INTERNAL MEDICINE

## 2024-10-20 PROCEDURE — 85610 PROTHROMBIN TIME: CPT | Performed by: PHYSICIAN ASSISTANT

## 2024-10-20 RX ORDER — CALCIUM GLUCONATE 20 MG/ML
2 INJECTION, SOLUTION INTRAVENOUS ONCE
Status: COMPLETED | OUTPATIENT
Start: 2024-10-20 | End: 2024-10-20

## 2024-10-20 RX ORDER — FUROSEMIDE 10 MG/ML
20 INJECTION INTRAMUSCULAR; INTRAVENOUS
Status: DISCONTINUED | OUTPATIENT
Start: 2024-10-20 | End: 2024-10-22

## 2024-10-20 RX ORDER — IBUPROFEN 400 MG/1
600 TABLET, FILM COATED ORAL ONCE
Status: COMPLETED | OUTPATIENT
Start: 2024-10-20 | End: 2024-10-20

## 2024-10-20 RX ADMIN — CYANOCOBALAMIN TAB 500 MCG 1000 MCG: 500 TAB at 08:11

## 2024-10-20 RX ADMIN — THIAMINE HYDROCHLORIDE 250 MG: 100 INJECTION, SOLUTION INTRAMUSCULAR; INTRAVENOUS at 09:16

## 2024-10-20 RX ADMIN — MIRTAZAPINE 15 MG: 15 TABLET, FILM COATED ORAL at 21:28

## 2024-10-20 RX ADMIN — PANTOPRAZOLE SODIUM 40 MG: 40 TABLET, DELAYED RELEASE ORAL at 06:00

## 2024-10-20 RX ADMIN — POLYETHYLENE GLYCOL 3350 17 G: 17 POWDER, FOR SOLUTION ORAL at 08:10

## 2024-10-20 RX ADMIN — FOLIC ACID 1 MG: 1 TABLET ORAL at 08:11

## 2024-10-20 RX ADMIN — ENOXAPARIN SODIUM 40 MG: 40 INJECTION SUBCUTANEOUS at 08:10

## 2024-10-20 RX ADMIN — AMLODIPINE BESYLATE 10 MG: 5 TABLET ORAL at 08:11

## 2024-10-20 RX ADMIN — ACETAMINOPHEN 650 MG: 325 TABLET ORAL at 21:28

## 2024-10-20 RX ADMIN — NICOTINE 21 MG: 21 PATCH, EXTENDED RELEASE TRANSDERMAL at 08:17

## 2024-10-20 RX ADMIN — FUROSEMIDE 20 MG: 10 INJECTION, SOLUTION INTRAMUSCULAR; INTRAVENOUS at 16:39

## 2024-10-20 RX ADMIN — MULTIPLE VITAMINS W/ MINERALS TAB 1 TABLET: TAB ORAL at 08:11

## 2024-10-20 RX ADMIN — FUROSEMIDE 20 MG: 10 INJECTION, SOLUTION INTRAMUSCULAR; INTRAVENOUS at 07:38

## 2024-10-20 RX ADMIN — CHLORHEXIDINE GLUCONATE 15 ML: 1.2 RINSE ORAL at 08:10

## 2024-10-20 RX ADMIN — THIAMINE HYDROCHLORIDE 250 MG: 100 INJECTION, SOLUTION INTRAMUSCULAR; INTRAVENOUS at 16:50

## 2024-10-20 RX ADMIN — ASPIRIN 81 MG CHEWABLE TABLET 81 MG: 81 TABLET CHEWABLE at 08:11

## 2024-10-20 RX ADMIN — CEFTRIAXONE 2000 MG: 2 INJECTION, POWDER, FOR SOLUTION INTRAMUSCULAR; INTRAVENOUS at 16:33

## 2024-10-20 RX ADMIN — IBUPROFEN 600 MG: 400 TABLET, FILM COATED ORAL at 02:29

## 2024-10-20 RX ADMIN — THIAMINE HYDROCHLORIDE 250 MG: 100 INJECTION, SOLUTION INTRAMUSCULAR; INTRAVENOUS at 21:28

## 2024-10-20 RX ADMIN — CHLORHEXIDINE GLUCONATE 15 ML: 1.2 RINSE ORAL at 21:28

## 2024-10-20 RX ADMIN — ATORVASTATIN CALCIUM 20 MG: 20 TABLET, FILM COATED ORAL at 08:11

## 2024-10-20 RX ADMIN — CALCIUM GLUCONATE 2 G: 20 INJECTION, SOLUTION INTRAVENOUS at 13:42

## 2024-10-20 NOTE — ASSESSMENT & PLAN NOTE
Sodium on admission: 101mEq/L   Serum osm: 233  Urine osm: 347   Urine Sodium:21  Current sodium 125 mEq/L, appropriate correction  Etiology: Multifactorial likely secondary to EtOH, poor solute intake for 3 days and fluid overload    Initially overcorrected received DDAVP and D5W      Plan:  Agree with Lasix 20 mg twice daily to improve volume status  Liberalize fluid restriction  BMP Q 6 to 8 hours  Avoid overcorrection : no more than 6-8mEq in the next 24h, goal 131 by tomorrow morning  Monitor urinary output   Will monitor labs, call renal if SNa+ <126 or >131  Risk of ODS:  EtOH   Severe hyponatremia   malnourishment: serum alb 3.1

## 2024-10-20 NOTE — ASSESSMENT & PLAN NOTE
Sodium found to be severely decreased at 99  Patient received 3% normal saline 150 mL at [place]  Sodium at [place] 103    PLAN  As of this morning sodium 125.  Goal today around noon 128.  Given Lasix 20 mg twice daily per nephrology's recommendations.  Continue to recheck BMP every 6 hours.  Patient did have worsening hypoxia overnight requiring mid flow.  Likely secondary to fluid in his lungs.  In the past Lasix 20 mg has improved his oxygen requirements, however we had been holding diuretics as patient's corrects too quickly after receiving diuretics  Consult placed to nephrology.  Appreciate recommendations.  Urine sodium 21, urine os 347

## 2024-10-20 NOTE — ASSESSMENT & PLAN NOTE
Meet SIRS criteria for tachycardia and tachypnea  White blood cell count currently within normal limits.  Currently 9K as of 10/19  Patient currently afebrile  Cultures negative.  Currently on Rocephin day 5.  Discontinued azithromycin and vancomycin as MRSA, strep pneumo, Legionella, and RP 2 panel were negative  Continue to trend CBC daily

## 2024-10-20 NOTE — PROGRESS NOTES
Progress Note - Nephrology   Name: Viktor Farfan 54 y.o. male I MRN: [id number]  Unit/Bed#: ICU 10 I Date of Admission: 10/16/2024   Date of Service: 10/20/2024 I Hospital Day: 4     Assessment & Plan  Hyponatremia  Sodium on admission: 101mEq/L   Serum osm: 233  Urine osm: 347   Urine Sodium:21  Current sodium 125 mEq/L, appropriate correction  Etiology: Multifactorial likely secondary to EtOH, poor solute intake for 3 days and fluid overload    Initially overcorrected received DDAVP and D5W      Plan:  Agree with Lasix 20 mg twice daily to improve volume status  Liberalize fluid restriction  BMP Q 6 to 8 hours  Avoid overcorrection : no more than 6-8mEq in the next 24h, goal 131 by tomorrow morning  Monitor urinary output   Will monitor labs, call renal if SNa+ <126 or >131  Risk of ODS:  EtOH   Severe hyponatremia   malnourishment: serum alb 3.1       Abnormal acid-base balance  Serum bicarbonate 26mmol/L  [place] alkalosis resolving  SOB (shortness of breath)  Component of possible infection and hypervolemia  Can restart diuretics as above     Alcohol abuse  On [facility] protocol   Report drinking 10-12 beers per night for years  SIRS (systemic inflammatory response syndrome) (HCC)  -Lactic acid normal  -BC, strep pneumonia, Legionella pending  -CT concerning for pulmonary process, possible pneumonia  -Workup and management per primary team    I have reviewed the nephrology recommendations including Lasix 20 mg twice daily, with primary team, and we are in agreement with renal plan including the information outlined above. I have discussed the above management plan in detail with the primary service.     Subjective   Brief History of Admission - 53 yo man with PMH of obesity, EtOH p/w EtOH intoxication, confusion found to have sodium of 99. Nephrology is consulted for management of severe hyponatremia     Patient is more awake this morning, denies chest pain, no shortness of breath.    Objective :  Temp:  [97.5 °F  (36.4 °C)-101.1 °F (38.4 °C)] 97.9 °F (36.6 °C)  HR:  [83-98] 83  BP: (115-129)/(60-74) 129/67  Resp:  [12-29] 13  SpO2:  [89 %-94 %] 90 %  O2 Device: Mid flow nasal cannula  Nasal Cannula O2 Flow Rate (L/min):  [10 L/min-11 L/min] 11 L/min    Current Weight: Weight - Scale: 113 kg (250 lb)  First Weight: Weight - Scale: 126 kg (278 lb 14.1 oz)  I/O         10/18 0701  10/19 0700 10/19 0701  10/20 0700    P.O.  1000    I.V. (mL/kg) 1832.9 (16.1) 30 (0.3)    IV Piggyback 551 574.4    Total Intake(mL/kg) 2384 (20.9) 1604.4 (14.2)    Urine (mL/kg/hr) 2465 (0.9) 1780 (0.7)    Total Output 2465 1780    Net -81.1 -175.6                Physical Exam  General:  no acute distress at this time  Skin:  No acute rash  Eyes:  No scleral icterus and noninjected  ENT:  mucous membranes moist  Neck:  no carotid bruits  Chest: Bilateral crackles, good respiratory effort, no use of accessory respiratory muscles  CVS:  Regular rate and rhythm without rub   Abdomen:  soft and nontender   Extremities: no significant lower extremity edema  Neuro:  No gross focality  Psych:  Alert , cooperative     Medications:    Current Facility-Administered Medications:     acetaminophen (TYLENOL) tablet 650 mg, 650 mg, Oral, Q6H PRN, PARVEEN Dai, 650 mg at 10/19/24 2224    amLODIPine (NORVASC) tablet 10 mg, 10 mg, Oral, Daily, Madelaine Dejesus MD, 10 mg at 10/19/24 0931    aspirin chewable tablet 81 mg, 81 mg, Oral, Daily, Roscoe Raman DO, 81 mg at 10/19/24 0931    atorvastatin (LIPITOR) tablet 20 mg, 20 mg, Oral, Daily, Roscoe Raman DO, 20 mg at 10/19/24 0931    cefTRIAXone (ROCEPHIN) 2,000 mg in dextrose 5 % 50 mL IVPB, 2,000 mg, Intravenous, Q24H, Madelaine Dejesus MD, Stopped at 10/19/24 1833    chlorhexidine (PERIDEX) 0.12 % oral rinse 15 mL, 15 mL, Mouth/Throat, Q12H JUAN, Sahra Duarte DO, 15 mL at 10/19/24 2114    cyanocobalamin (VITAMIN B-12) tablet 1,000 mcg, 1,000 mcg, Oral, Daily, Madelaine Lennon  MD lOeg, 1,000 mcg at 10/19/24 0931    dexmedeTOMIDine (Precedex) 400 mcg in sodium chloride 0.9% 100 mL, 0.1-0.7 mcg/kg/hr, Intravenous, Titrated, Madelaine Dejesus MD, Held at 10/19/24 1206    enoxaparin (LOVENOX) subcutaneous injection 40 mg, 40 mg, Subcutaneous, Daily, Sahra Duarte DO, 40 mg at 10/19/24 0932    folic acid (FOLVITE) tablet 1 mg, 1 mg, Oral, Daily, Madelaine Dejesus MD, 1 mg at 10/19/24 0931    ipratropium-albuterol (DUO-NEB) 0.5-2.5 mg/3 mL inhalation solution 3 mL, 3 mL, Nebulization, Q6H PRN, Sahra Duarte DO, 3 mL at 10/19/24 1432    mirtazapine (REMERON) tablet 15 mg, 15 mg, Oral, HS, Roscoe Raman DO, 15 mg at 10/19/24 2114    multivitamin-minerals (CENTRUM) tablet 1 tablet, 1 tablet, Oral, Daily, Sahra Duarte DO, 1 tablet at 10/19/24 0931    nicotine (NICODERM CQ) 21 mg/24 hr TD 24 hr patch 21 mg, 21 mg, Transdermal, Daily, Sahra Duarte DO, 21 mg at 10/19/24 0932    ondansetron (ZOFRAN) injection 4 mg, 4 mg, Intravenous, Q8H PRN, Roscoe Raman DO    pantoprazole (PROTONIX) EC tablet 40 mg, 40 mg, Oral, Early Morning, Madelaine Dejesus MD, 40 mg at 10/20/24 0600    polyethylene glycol (MIRALAX) packet 17 g, 17 g, Oral, Daily, Sahra Duarte DO, 17 g at 10/19/24 0932    [COMPLETED] thiamine (VITAMIN B1) 500 mg in sodium chloride 0.9 % 50 mL IVPB, 500 mg, Intravenous, TID, Stopped at 10/19/24 1000 **FOLLOWED BY** thiamine (VITAMIN B1) 250 mg in sodium chloride 0.9 % 50 mL IVPB, 250 mg, Intravenous, TID, Last Rate: 100 mL/hr at 10/19/24 2115, 250 mg at 10/19/24 2115 **FOLLOWED BY** [START ON 10/22/2024] thiamine (VITAMIN B1) 100 mg in sodium chloride 0.9 % 50 mL IVPB, 100 mg, Intravenous, TID **FOLLOWED BY** [START ON 10/26/2024] thiamine tablet 100 mg, 100 mg, Oral, Daily, Madelaine Dejesus MD    trimethobenzamide (TIGAN) IM injection 200 mg, 200 mg, Intramuscular, Q6H PRN, PARVEEN Schroeder      Lab Results: I have reviewed the  "following results:  Results from last 7 days   Lab Units 10/20/24  0345 10/19/24  2210 10/19/24  1346 10/19/24  0900 10/19/24  0403 10/19/24  0402 10/19/24  0004 10/18/24  2006 10/18/24  0634 10/18/24  0407 10/17/24  0756 10/17/24  0444 10/16/24  2022 10/16/24  1528 10/16/24  1354 10/16/24  1141 10/16/24  1009   WBC Thousand/uL 9.76  --   --   --   --  9.02  --   --   --  8.70  --  10.17*  --  7.94  --   --  9.15   HEMOGLOBIN g/dL 10.0*  --   --   --   --  10.2*  --   --   --  10.3*  --  10.9*  --  11.1*  --   --  11.3*   HEMATOCRIT % 30.0*  --   --   --   --  29.8*  --   --   --  29.5*  --  30.7*  --  31.1*  --   --  31.7*   PLATELETS Thousands/uL 107*  --   --   --   --  95*  --   --   --  99*  --  103*  --  105*  --   --  104*   POTASSIUM mmol/L 4.0 5.1 3.8 3.7 3.7  --  3.8 3.6   < >  --    < > 4.6   < > 5.0   < > 5.3 5.5*   CHLORIDE mmol/L 94* 94* 93* 90* 89*  --  89* 88*   < >  --    < > 81*   < > 76*   < > 74* 75*   CO2 mmol/L 26 23 25 27 25  --  24 24   < >  --    < > 19*   < > 16*   < > 17* 17*   BUN mg/dL 6 6 5 5 6  --  6 6   < >  --    < > 10   < > 7   < > 7 7   CREATININE mg/dL 0.71 0.68 0.55* 0.56* 0.66  --  0.65 0.65   < >  --    < > 0.76   < > 0.67   < > 0.75 0.76   CALCIUM mg/dL 7.9* 7.9* 7.8* 8.0* 7.7*  --  7.7* 7.6*   < >  --    < > 7.9*   < > 8.1*   < > 7.9* 8.6   MAGNESIUM mg/dL 2.1  --   --   --   --  2.1  --   --   --  1.9  --  1.8*  --  1.9  --   --   --    PHOSPHORUS mg/dL 3.3  --   --   --   --  2.4*  --   --   --  2.0*  --  2.8  --  2.4*  --   --   --    ALBUMIN g/dL  --   --   --   --   --  3.1*  --   --   --  3.1*  --  3.4*  --  3.5  --  3.3* 3.6    < > = values in this interval not displayed.       Administrative Statements     Portions of the record may have been created with voice recognition software. Occasional wrong word or \"sound a like\" substitutions may have occurred due to the inherent limitations of voice recognition software. Read the chart carefully and recognize, using " context, where substitutions have occurred.If you have any questions, please contact the dictating provider.

## 2024-10-20 NOTE — ASSESSMENT & PLAN NOTE
"Patient is a 54-year-old male who reports that he first began drinking alcohol at the age of 16.  States that he typically drinks a 12 pack of 16 ounce beer daily and has been doing so for an indeterminate number of years.  States he has tried to quit drinking \"cold turkey\" in the past without success.  Patient denies drinking hard liquor or wine, but wife reports that patient frequently drinks multiple glasses of rum and coke in addition to the 12 pack of beer daily.  Wife reports patient typically gets violent after drinking but states he has never been hospitalized for alcohol use before.    PLAN:  Initiate CIWA protocol and give phenobarbital as indicated.  Received max of phenobarbital overnight 10/18.  Patient was given Valium 5 mg due to agitation.  Consider Ativan versus Precedex if patient has repeat episodes of agitation.  Of note Precedex was attempted 10/19 however patient became more somnolent so drip was stopped.  Would recommend as needed Ativan for repeat episodes of agitation  Seizure precautions  Ethanol level obtained on this admission within normal limits  Will continue to trend CMP  Right upper quadrant ultrasound consistent with likely cirrhosis  Echocardiogram to evaluate for alcoholic cardio myopathy.  Echocardiogram normal with ejection fraction of 60%.  However unable to assess diastolic function  "

## 2024-10-20 NOTE — PROGRESS NOTES
"Progress Note - Critical Care/ICU   Name: Viktor Farfan 54 y.o. male I MRN: [id number]  Unit/Bed#: ICU 10 I Date of Admission: 10/16/2024   Date of Service: 10/20/2024 I Hospital Day: 4      Assessment & Plan  Alcohol abuse  Patient is a 54-year-old male who reports that he first began drinking alcohol at the age of 16.  States that he typically drinks a 12 pack of 16 ounce beer daily and has been doing so for an indeterminate number of years.  States he has tried to quit drinking \"cold turkey\" in the past without success.  Patient denies drinking hard liquor or wine, but wife reports that patient frequently drinks multiple glasses of rum and coke in addition to the 12 pack of beer daily.  Wife reports patient typically gets violent after drinking but states he has never been hospitalized for alcohol use before.    PLAN:  Initiate CIWA protocol and give phenobarbital as indicated.  Received max of phenobarbital overnight 10/18.  Patient was given Valium 5 mg due to agitation.  Consider Ativan versus Precedex if patient has repeat episodes of agitation.  Of note Precedex was attempted 10/19 however patient became more somnolent so drip was stopped.  Would recommend as needed Ativan for repeat episodes of agitation  Seizure precautions  Ethanol level obtained on this admission within normal limits  Will continue to trend CMP  Right upper quadrant ultrasound consistent with likely cirrhosis  Echocardiogram to evaluate for alcoholic cardio myopathy.  Echocardiogram normal with ejection fraction of 60%.  However unable to assess diastolic function  Hyponatremia  Sodium found to be severely decreased at 99  Patient received 3% normal saline 150 mL at [place]  Sodium at [place] 103    PLAN  As of this morning sodium 125.  Goal today around noon 128.  Given Lasix 20 mg twice daily per nephrology's recommendations.  Continue to recheck BMP every 6 hours.  Patient did have worsening hypoxia overnight requiring mid flow.  " Likely secondary to fluid in his lungs.  In the past Lasix 20 mg has improved his oxygen requirements, however we had been holding diuretics as patient's corrects too quickly after receiving diuretics  Consult placed to nephrology.  Appreciate recommendations.  Urine sodium 21, urine os 347  SOB (shortness of breath)  Patient reports he has been a smoker since the age of 16 or 17  States he has had significant shortness of breath for the past 3 days with associated white sputum production  Denies history of COPD but has not followed with a pulmonary physician  Chest x-ray significant for cardiomegaly with probable pulmonary edema and asymmetric left perihilar alveolar edema. Superimposed pneumonia cannot be completely excluded.   CT PE no emboli in main, right, and left pulmonary arteries.  Segmental and subsegmental emboli not excluded due to artifact.  Trace pleural effusions.  Multifocal bilateral upper lobe consolidation possible pneumonia or localized alveolar edema.  Mildly nodular liver suggestive of possible cirrhosis with trace ascites    PLAN:  Blood cultures negative for growth at 24 hours.  Sputum cultures significant for mixed respiratory alexandra  Strep pneumo, Legionella, COVID, flu, RSV negative  RP 2 negative  Patient currently requires 10 L oxygen on mid flow.  Chest x-ray moderate bilateral consolidation, slightly increased on the right since 10/16/2024, which could be due to edema or pneumonia in the appropriate clinical setting.  Continue current antibiotic regimen  Lung sounds significant for bilateral rhonchi     SIRS (systemic inflammatory response syndrome) (HCC)  Meet SIRS criteria for tachycardia and tachypnea  White blood cell count currently within normal limits.  Currently 9K as of 10/19  Patient currently afebrile  Cultures negative.  Currently on Rocephin day 5.  Discontinued azithromycin and vancomycin as MRSA, strep pneumo, Legionella, and RP 2 panel were negative  Continue to trend  CBC daily  Tobacco abuse  Reports he has been smoking 1 pack daily since the age of 16  Nicotine patch ordered  Patient will be counseled on smoking cessation when more oriented and other more critical issues have been addressed  Abnormal acid-base balance (Resolved: 10/20/2024)  VBG significant for increased pH of 7.46  pCO2 decreased at 27.1  Metabolic acidosis due to ETOH with compensatory respiratory alkalosis  Normal anion gap  Thrombocytopenia (HCC)  Platelets stable at 95K  Continue to trend CBC  Transaminitis  Liver enzymes elevated  AST almost 3 times greater than ALT.consistent with alcohol use  Hypokalemia (Resolved: 10/20/2024)  No repletion required 10/20  K 4.0  Disposition: Stepdown Level 1    ICU Core Measures     A: Assess, Prevent, and Manage Pain Has pain been assessed? Yes  Need for changes to pain regimen? No   B: Both SAT/SAT  N/A   C: Choice of Sedation RASS Goal: 0 Alert and Calm  Need for changes to sedation or analgesia regimen? No   D: Delirium CAM-ICU: Negative   E: Early Mobility  Plan for early mobility? Yes   F: Family Engagement Plan for family engagement today? Yes       Antibiotic Review: Patient on appropriate coverage based on culture data.     Review of Invasive Devices:    Klein Plan: Voiding trial after improvement in ambulation         Prophylaxis:  VTE VTE covered by:  enoxaparin, Subcutaneous, 40 mg at 10/20/24 0810       Stress Ulcer  covered byomeprazole (PriLOSEC) 20 mg delayed release capsule [[id number]] (Long-Term Med), pantoprazole (PROTONIX) EC tablet 40 mg [[id number]]         24 Hour Events : Patient had no episodes of agitation overnight.  Did not require any benzodiazepines.  Patient reports he is feeling well overall and has continuous improvement in sodium. Likely will be stable for medsurg today.     Subjective       Objective :                   Vitals I/O      Most Recent Min/Max in 24hrs   Temp 97.5 °F (36.4 °C) Temp  Min: 97.5 °F (36.4 °C)  Max: 101.1 °F  (38.4 °C)   Pulse 83 Pulse  Min: 83  Max: 98   Resp 14 Resp  Min: 12  Max: 29   /62 BP  Min: 115/60  Max: 129/67   O2 Sat 91 % SpO2  Min: 89 %  Max: 94 %      Intake/Output Summary (Last 24 hours) at 10/20/2024 0907  Last data filed at 10/20/2024 0601  Gross per 24 hour   Intake 1604.38 ml   Output 1530 ml   Net 74.38 ml       Diet Regular; Regular House    Invasive Monitoring           Physical Exam   Physical Exam  Eyes:      Extraocular Movements: Extraocular movements intact.   Skin:     General: Skin is warm and dry.      Coloration: Skin is not jaundiced.   HENT:      Head: Normocephalic.      Right Ear: Hearing normal.      Left Ear: Hearing normal.      Nose: No congestion.      Mouth/Throat:      Mouth: No angioedema.   Cardiovascular:      Rate and Rhythm: Normal rate.   Musculoskeletal:         General: No signs of injury.      Right lower leg: Trace Edema present.      Left lower leg: Trace Edema present.   Abdominal:      Palpations: Abdomen is soft.      Tenderness: There is no abdominal tenderness.   Constitutional:       General: He is not in acute distress.     Appearance: He is well-developed.   Pulmonary:      Effort: Tachypnea present.      Breath sounds: Rhonchi present.   Neurological:      Mental Status: He is alert and oriented to person, place and time.   Genitourinary/Anorectal:  Klein present.        Diagnostic Studies        Lab Results: I have reviewed the following results:     Medications:  Scheduled PRN   amLODIPine, 10 mg, Daily  aspirin, 81 mg, Daily  atorvastatin, 20 mg, Daily  cefTRIAXone, 2,000 mg, Q24H  chlorhexidine, 15 mL, Q12H JUAN  cyanocobalamin, 1,000 mcg, Daily  enoxaparin, 40 mg, Daily  folic acid, 1 mg, Daily  furosemide, 20 mg, BID (diuretic)  mirtazapine, 15 mg, HS  multivitamin-minerals, 1 tablet, Daily  nicotine, 21 mg, Daily  pantoprazole, 40 mg, Early Morning  polyethylene glycol, 17 g, Daily  thiamine, 250 mg, TID   Followed by  [START ON 10/22/2024]  thiamine, 100 mg, TID   Followed by  [START ON 10/26/2024] thiamine, 100 mg, Daily      acetaminophen, 650 mg, Q6H PRN  ipratropium-albuterol, 3 mL, Q6H PRN  ondansetron, 4 mg, Q8H PRN  trimethobenzamide, 200 mg, Q6H PRN       Continuous    dexmedetomidine, 0.1-0.7 mcg/kg/hr, Last Rate: Stopped (10/19/24 1206)         Labs:   CBC    Recent Labs     10/19/24  0402 10/20/24  0345   WBC 9.02 9.76   HGB 10.2* 10.0*   HCT 29.8* 30.0*   PLT 95* 107*     BMP    Recent Labs     10/19/24  2210 10/20/24  0345   SODIUM 123* 125*   K 5.1 4.0   CL 94* 94*   CO2 23 26   AGAP 6 5   BUN 6 6   CREATININE 0.68 0.71   CALCIUM 7.9* 7.9*       Coags    Recent Labs     10/19/24  0402 10/20/24  0345   INR 1.25* 1.24*   PTT 35* 36*        Additional Electrolytes  Recent Labs     10/19/24  0402 10/20/24  0345   MG 2.1 2.1   PHOS 2.4* 3.3   CAIONIZED 1.05* 1.08*          Blood Gas    No recent results  Recent Labs     10/19/24  0258   PHVEN 7.427*   TER9HRG 41.0*   PO2VEN 42.2   TSU5YGN 26.4   BEVEN 1.9   F5GKSCE 77.9    LFTs  Recent Labs     10/19/24  0402   ALT 21   AST 57*   ALKPHOS 180*   ALB 3.1*   TBILI 1.43*       Infectious  No recent results  Glucose  Recent Labs     10/19/24  0900 10/19/24  1346 10/19/24  2210 10/20/24  0345   GLUC 107 90 139 136

## 2024-10-20 NOTE — PLAN OF CARE
Problem: PAIN - ADULT  Goal: Verbalizes/displays adequate comfort level or baseline comfort level  Description: Interventions:  - Encourage patient to monitor pain and request assistance  - Assess pain using appropriate pain scale  - Administer analgesics based on type and severity of pain and evaluate response  - Implement non-pharmacological measures as appropriate and evaluate response  - Consider cultural and social influences on pain and pain management  - Notify physician/advanced practitioner if interventions unsuccessful or patient reports new pain  Outcome: Progressing     Problem: INFECTION - ADULT  Goal: Absence or prevention of progression during hospitalization  Description: INTERVENTIONS:  - Assess and monitor for signs and symptoms of infection  - Monitor lab/diagnostic results  - Monitor all insertion sites, i.e. indwelling lines, tubes, and drains  - Monitor endotracheal if appropriate and nasal secretions for changes in amount and color  - Clyo appropriate cooling/warming therapies per order  - Administer medications as ordered  - Instruct and encourage patient and family to use good hand hygiene technique  - Identify and instruct in appropriate isolation precautions for identified infection/condition  Outcome: Progressing  Goal: Absence of fever/infection during neutropenic period  Description: INTERVENTIONS:  - Monitor WBC    Outcome: Progressing     Problem: DISCHARGE PLANNING  Goal: Discharge to home or other facility with appropriate resources  Description: INTERVENTIONS:  - Identify barriers to discharge w/patient and caregiver  - Arrange for needed discharge resources and transportation as appropriate  - Identify discharge learning needs (meds, wound care, etc.)  - Arrange for interpretive services to assist at discharge as needed  - Refer to Case Management Department for coordinating discharge planning if the patient needs post-hospital services based on physician/advanced  practitioner order or complex needs related to functional status, cognitive ability, or social support system  Outcome: Progressing     Problem: Knowledge Deficit  Goal: Patient/family/caregiver demonstrates understanding of disease process, treatment plan, medications, and discharge instructions  Description: Complete learning assessment and assess knowledge base.  Interventions:  - Provide teaching at level of understanding  - Provide teaching via preferred learning methods  Outcome: Progressing     Problem: NEUROSENSORY - ADULT  Goal: Achieves stable or improved neurological status  Description: INTERVENTIONS  - Monitor and report changes in neurological status  - Monitor vital signs such as temperature, blood pressure, glucose, and any other labs ordered   - Initiate measures to prevent increased intracranial pressure  - Monitor for seizure activity and implement precautions if appropriate      Outcome: Progressing  Goal: Remains free of injury related to seizures activity  Description: INTERVENTIONS  - Maintain airway, patient safety  and administer oxygen as ordered  - Monitor patient for seizure activity, document and report duration and description of seizure to physician/advanced practitioner  - If seizure occurs,  ensure patient safety during seizure  - Reorient patient post seizure  - Seizure pads on all 4 side rails  - Instruct patient/family to notify RN of any seizure activity including if an aura is experienced  - Instruct patient/family to call for assistance with activity based on nursing assessment  - Administer anti-seizure medications if ordered    Outcome: Progressing  Goal: Achieves maximal functionality and self care  Description: INTERVENTIONS  - Monitor swallowing and airway patency with patient fatigue and changes in neurological status  - Encourage and assist patient to increase activity and self care.   - Encourage visually impaired, hearing impaired and aphasic patients to use  assistive/communication devices  Outcome: Progressing     Problem: CARDIOVASCULAR - ADULT  Goal: Maintains optimal cardiac output and hemodynamic stability  Description: INTERVENTIONS:  - Monitor I/O, vital signs and rhythm  - Monitor for S/S and trends of decreased cardiac output  - Administer and titrate ordered vasoactive medications to optimize hemodynamic stability  - Assess quality of pulses, skin color and temperature  - Assess for signs of decreased coronary artery perfusion  - Instruct patient to report change in severity of symptoms  Outcome: Progressing  Goal: Absence of cardiac dysrhythmias or at baseline rhythm  Description: INTERVENTIONS:  - Continuous cardiac monitoring, vital signs, obtain 12 lead EKG if ordered  - Administer antiarrhythmic and heart rate control medications as ordered  - Monitor electrolytes and administer replacement therapy as ordered  Outcome: Progressing     Problem: RESPIRATORY - ADULT  Goal: Achieves optimal ventilation and oxygenation  Description: INTERVENTIONS:  - Assess for changes in respiratory status  - Assess for changes in mentation and behavior  - Position to facilitate oxygenation and minimize respiratory effort  - Oxygen administered by appropriate delivery if ordered  - Initiate smoking cessation education as indicated  - Encourage broncho-pulmonary hygiene including cough, deep breathe, Incentive Spirometry  - Assess the need for suctioning and aspirate as needed  - Assess and instruct to report SOB or any respiratory difficulty  - Respiratory Therapy support as indicated  Outcome: Progressing     Problem: GASTROINTESTINAL - ADULT  Goal: Minimal or absence of nausea and/or vomiting  Description: INTERVENTIONS:  - Administer IV fluids if ordered to ensure adequate hydration  - Maintain NPO status until nausea and vomiting are resolved  - Nasogastric tube if ordered  - Administer ordered antiemetic medications as needed  - Provide nonpharmacologic comfort measures  as appropriate  - Advance diet as tolerated, if ordered  - Consider nutrition services referral to assist patient with adequate nutrition and appropriate food choices  Outcome: Progressing  Goal: Maintains or returns to baseline bowel function  Description: INTERVENTIONS:  - Assess bowel function  - Encourage oral fluids to ensure adequate hydration  - Administer IV fluids if ordered to ensure adequate hydration  - Administer ordered medications as needed  - Encourage mobilization and activity  - Consider nutritional services referral to assist patient with adequate nutrition and appropriate food choices  Outcome: Progressing  Goal: Maintains adequate nutritional intake  Description: INTERVENTIONS:  - Monitor percentage of each meal consumed  - Identify factors contributing to decreased intake, treat as appropriate  - Assist with meals as needed  - Monitor I&O, weight, and lab values if indicated  - Obtain nutrition services referral as needed  Outcome: Progressing  Goal: Establish and maintain optimal ostomy function  Description: INTERVENTIONS:  - Assess bowel function  - Encourage oral fluids to ensure adequate hydration  - Administer IV fluids if ordered to ensure adequate hydration   - Administer ordered medications as needed  - Encourage mobilization and activity  - Nutrition services referral to assist patient with appropriate food choices  - Assess stoma site  - Consider wound care consult   Outcome: Progressing  Goal: Oral mucous membranes remain intact  Description: INTERVENTIONS  - Assess oral mucosa and hygiene practices  - Implement preventative oral hygiene regimen  - Implement oral medicated treatments as ordered  - Initiate Nutrition services referral as needed  Outcome: Progressing     Problem: GENITOURINARY - ADULT  Goal: Maintains or returns to baseline urinary function  Description: INTERVENTIONS:  - Assess urinary function  - Encourage oral fluids to ensure adequate hydration if ordered  -  Administer IV fluids as ordered to ensure adequate hydration  - Administer ordered medications as needed  - Offer frequent toileting  - Follow urinary retention protocol if ordered  Outcome: Progressing  Goal: Absence of urinary retention  Description: INTERVENTIONS:  - Assess patient’s ability to void and empty bladder  - Monitor I/O  - Bladder scan as needed  - Discuss with physician/AP medications to alleviate retention as needed  - Discuss catheterization for long term situations as appropriate  Outcome: Progressing  Goal: Urinary catheter remains patent  Description: INTERVENTIONS:  - Assess patency of urinary catheter  - If patient has a chronic shah, consider changing catheter if non-functioning  - Follow guidelines for intermittent irrigation of non-functioning urinary catheter  Outcome: Progressing     Problem: METABOLIC, FLUID AND ELECTROLYTES - ADULT  Goal: Electrolytes maintained within normal limits  Description: INTERVENTIONS:  - Monitor labs and assess patient for signs and symptoms of electrolyte imbalances  - Administer electrolyte replacement as ordered  - Monitor response to electrolyte replacements, including repeat lab results as appropriate  - Instruct patient on fluid and nutrition as appropriate  Outcome: Progressing  Goal: Fluid balance maintained  Description: INTERVENTIONS:  - Monitor labs   - Monitor I/O and WT  - Instruct patient on fluid and nutrition as appropriate  - Assess for signs & symptoms of volume excess or deficit  Outcome: Progressing  Goal: Glucose maintained within target range  Description: INTERVENTIONS:  - Monitor Blood Glucose as ordered  - Assess for signs and symptoms of hyperglycemia and hypoglycemia  - Administer ordered medications to maintain glucose within target range  - Assess nutritional intake and initiate nutrition service referral as needed  Outcome: Progressing     Problem: Nutrition/Hydration-ADULT  Goal: Nutrient/Hydration intake appropriate for  improving, restoring or maintaining nutritional needs  Description: Monitor and assess patient's nutrition/hydration status for malnutrition. Collaborate with interdisciplinary team and initiate plan and interventions as ordered.  Monitor patient's weight and dietary intake as ordered or per policy. Utilize nutrition screening tool and intervene as necessary. Determine patient's food preferences and provide high-protein, high-caloric foods as appropriate.     INTERVENTIONS:  - Monitor oral intake, urinary output, labs, and treatment plans  - Assess nutrition and hydration status and recommend course of action  - Evaluate amount of meals eaten  - Assist patient with eating if necessary   - Allow adequate time for meals  - Recommend/ encourage appropriate diets, oral nutritional supplements, and vitamin/mineral supplements  - Order, calculate, and assess calorie counts as needed  - Recommend, monitor, and adjust tube feedings and TPN/PPN based on assessed needs  - Assess need for intravenous fluids  - Provide specific nutrition/hydration education as appropriate  - Include patient/family/caregiver in decisions related to nutrition  Outcome: Progressing     Problem: Prexisting or High Potential for Compromised Skin Integrity  Goal: Skin integrity is maintained or improved  Description: INTERVENTIONS:  - Identify patients at risk for skin breakdown  - Assess and monitor skin integrity  - Assess and monitor nutrition and hydration status  - Monitor labs   - Assess for incontinence   - Turn and reposition patient  - Assist with mobility/ambulation  - Relieve pressure over bony prominences  - Avoid friction and shearing  - Provide appropriate hygiene as needed including keeping skin clean and dry  - Evaluate need for skin moisturizer/barrier cream  - Collaborate with interdisciplinary team   - Patient/family teaching  - Consider wound care consult   Outcome: Progressing     Problem: SAFETY,RESTRAINT: NV/NON-SELF  DESTRUCTIVE BEHAVIOR  Goal: Remains free of harm/injury (restraint for non violent/non self-detsructive behavior)  Description: INTERVENTIONS:  - Instruct patient/family regarding restraint use   - Assess and monitor physiologic and psychological status   - Provide interventions and comfort measures to meet assessed patient needs   - Identify and implement measures to help patient regain control  - Assess readiness for release of restraint   Outcome: Progressing  Goal: Returns to optimal restraint-free functioning  Description: INTERVENTIONS:  - Assess the patient's behavior and symptoms that indicate continued need for restraint  - Identify and implement measures to help patient regain control  - Assess readiness for release of restraint   Outcome: Progressing

## 2024-10-20 NOTE — PROGRESS NOTES
Progress Note - Critical Care/ICU   Name: Viktor Farfan 54 y.o. male I MRN: [id number]  Unit/Bed#: ICU 10 I Date of Admission: 10/16/2024   Date of Service: 10/20/2024 I Hospital Day: 4      Critical Care Interval Transfer Note:    Brief Hospital Summary: Patient is a 54-year-old male past medical history of alcohol abuse, tobacco abuse who presented to [facility] ED with a chief complaint of shortness of breath and increased sputum production.  Patient was found to have a sodium of 99.  Reports he typically drinks a 12 pack of 16 ounces of beer with several glasses of rum and coke daily.  Patient was transferred to [facility] intensive care unit.  Nephrology on board.  Sodium gradually improved and is currently 127.  Patient received the max dose of phenobarbital and only required 1 dose of Valium for agitation during his 5 days in the ICU.  During hospitalization patient was also found to have suspected pneumonia and completed 5 out of 5 days of Rocephin.  Currently still requiring mid flow nasal cannula but improving on Lasix 20 mg twice daily.  Previously had to judiciously use diuretics as they overcorrected patient's sodium.    Barriers to discharge:   Hyponatremia  Alcohol abuse -consider detox unit on discharge  Requiring oxygen.  Does not typically use oxygen at baseline     Consults: IP CONSULT TO NEPHROLOGY  IP CONSULT TO CASE MANAGEMENT  IP CONSULT TO PHARMACY    Recommended to review admission imaging for incidental findings and document in discharge navigator: Incidental findings have been documented in discharge navigator. Patient and/or family was informed and they expressed understanding.      Discharge Plan: Anticipate discharge in 24-48 hrs to inpatient psych.  [place] Plan: Voiding trial after improvement in ambulation        Patient seen and evaluated by Critical Care today and deemed to be appropriate for transfer to [facility]. Spoke to Dr. Thibodeaux from [facility] to accept transfer. Critical care can be  contacted via [employer] with any questions or concerns.

## 2024-10-20 NOTE — ASSESSMENT & PLAN NOTE
Patient reports he has been a smoker since the age of 16 or 17  States he has had significant shortness of breath for the past 3 days with associated white sputum production  Denies history of COPD but has not followed with a pulmonary physician  Chest x-ray significant for cardiomegaly with probable pulmonary edema and asymmetric left perihilar alveolar edema. Superimposed pneumonia cannot be completely excluded.   CT PE no emboli in main, right, and left pulmonary arteries.  Segmental and subsegmental emboli not excluded due to artifact.  Trace pleural effusions.  Multifocal bilateral upper lobe consolidation possible pneumonia or localized alveolar edema.  Mildly nodular liver suggestive of possible cirrhosis with trace ascites    PLAN:  Blood cultures negative for growth at 24 hours.  Sputum cultures significant for mixed respiratory alexandra  Strep pneumo, Legionella, COVID, flu, RSV negative  RP 2 negative  Patient currently requires 10 L oxygen on mid flow.  Chest x-ray moderate bilateral consolidation, slightly increased on the right since 10/16/2024, which could be due to edema or pneumonia in the appropriate clinical setting.  Continue current antibiotic regimen  Lung sounds significant for bilateral rhonchi

## 2024-10-21 PROBLEM — J18.9 PNEUMONIA: Status: ACTIVE | Noted: 2024-10-21

## 2024-10-21 LAB
ALBUMIN SERPL BCG-MCNC: 2.8 G/DL (ref 3.5–5)
ALP SERPL-CCNC: 242 U/L (ref 34–104)
ALT SERPL W P-5'-P-CCNC: 30 U/L (ref 7–52)
ANION GAP SERPL CALCULATED.3IONS-SCNC: 5 MMOL/L (ref 4–13)
ANION GAP SERPL CALCULATED.3IONS-SCNC: 6 MMOL/L (ref 4–13)
ANION GAP SERPL CALCULATED.3IONS-SCNC: 8 MMOL/L (ref 4–13)
APTT PPP: 40 SECONDS (ref 23–34)
AST SERPL W P-5'-P-CCNC: 76 U/L (ref 13–39)
BACTERIA BLD CULT: NORMAL
BASOPHILS # BLD AUTO: 0.08 THOUSANDS/ΜL (ref 0–0.1)
BASOPHILS NFR BLD AUTO: 1 % (ref 0–1)
BILIRUB SERPL-MCNC: 1.27 MG/DL (ref 0.2–1)
BUN SERPL-MCNC: 10 MG/DL (ref 5–25)
BUN SERPL-MCNC: 9 MG/DL (ref 5–25)
CA-I BLD-SCNC: 0.97 MMOL/L (ref 1.12–1.32)
CALCIUM ALBUM COR SERPL-MCNC: 9.1 MG/DL (ref 8.3–10.1)
CALCIUM SERPL-MCNC: 8.1 MG/DL (ref 8.4–10.2)
CALCIUM SERPL-MCNC: 8.2 MG/DL (ref 8.4–10.2)
CALCIUM SERPL-MCNC: 8.4 MG/DL (ref 8.4–10.2)
CALCIUM SERPL-MCNC: 8.5 MG/DL (ref 8.4–10.2)
CALCIUM SERPL-MCNC: 8.9 MG/DL (ref 8.4–10.2)
CHLORIDE SERPL-SCNC: 96 MMOL/L (ref 96–108)
CHLORIDE SERPL-SCNC: 97 MMOL/L (ref 96–108)
CO2 SERPL-SCNC: 26 MMOL/L (ref 21–32)
CO2 SERPL-SCNC: 28 MMOL/L (ref 21–32)
CO2 SERPL-SCNC: 28 MMOL/L (ref 21–32)
CO2 SERPL-SCNC: 29 MMOL/L (ref 21–32)
CO2 SERPL-SCNC: 30 MMOL/L (ref 21–32)
CREAT SERPL-MCNC: 0.62 MG/DL (ref 0.6–1.3)
CREAT SERPL-MCNC: 0.65 MG/DL (ref 0.6–1.3)
CREAT SERPL-MCNC: 0.66 MG/DL (ref 0.6–1.3)
CREAT SERPL-MCNC: 0.73 MG/DL (ref 0.6–1.3)
CREAT SERPL-MCNC: 0.8 MG/DL (ref 0.6–1.3)
EOSINOPHIL # BLD AUTO: 0.29 THOUSAND/ΜL (ref 0–0.61)
EOSINOPHIL NFR BLD AUTO: 3 % (ref 0–6)
ERYTHROCYTE [DISTWIDTH] IN BLOOD BY AUTOMATED COUNT: 16.7 % (ref 11.6–15.1)
GFR SERPL CREATININE-BSD FRML MDRD: 101 ML/MIN/1.73SQ M
GFR SERPL CREATININE-BSD FRML MDRD: 105 ML/MIN/1.73SQ M
GFR SERPL CREATININE-BSD FRML MDRD: 109 ML/MIN/1.73SQ M
GFR SERPL CREATININE-BSD FRML MDRD: 110 ML/MIN/1.73SQ M
GFR SERPL CREATININE-BSD FRML MDRD: 112 ML/MIN/1.73SQ M
GLUCOSE SERPL-MCNC: 117 MG/DL (ref 65–140)
GLUCOSE SERPL-MCNC: 126 MG/DL (ref 65–140)
GLUCOSE SERPL-MCNC: 130 MG/DL (ref 65–140)
GLUCOSE SERPL-MCNC: 182 MG/DL (ref 65–140)
GLUCOSE SERPL-MCNC: 229 MG/DL (ref 65–140)
HCT VFR BLD AUTO: 31.9 % (ref 36.5–49.3)
HGB BLD-MCNC: 10.3 G/DL (ref 12–17)
IMM GRANULOCYTES # BLD AUTO: 0.08 THOUSAND/UL (ref 0–0.2)
IMM GRANULOCYTES NFR BLD AUTO: 1 % (ref 0–2)
INR PPP: 1.37 (ref 0.85–1.19)
LYMPHOCYTES # BLD AUTO: 1.03 THOUSANDS/ΜL (ref 0.6–4.47)
LYMPHOCYTES NFR BLD AUTO: 11 % (ref 14–44)
MAGNESIUM SERPL-MCNC: 2.1 MG/DL (ref 1.9–2.7)
MCH RBC QN AUTO: 28.3 PG (ref 26.8–34.3)
MCHC RBC AUTO-ENTMCNC: 32.3 G/DL (ref 31.4–37.4)
MCV RBC AUTO: 88 FL (ref 82–98)
MONOCYTES # BLD AUTO: 1.05 THOUSAND/ΜL (ref 0.17–1.22)
MONOCYTES NFR BLD AUTO: 12 % (ref 4–12)
NEUTROPHILS # BLD AUTO: 6.59 THOUSANDS/ΜL (ref 1.85–7.62)
NEUTS SEG NFR BLD AUTO: 72 % (ref 43–75)
NRBC BLD AUTO-RTO: 0 /100 WBCS
PHOSPHATE SERPL-MCNC: 3.4 MG/DL (ref 2.7–4.5)
PLATELET # BLD AUTO: 102 THOUSANDS/UL (ref 149–390)
PMV BLD AUTO: 12 FL (ref 8.9–12.7)
POTASSIUM SERPL-SCNC: 3.8 MMOL/L (ref 3.5–5.3)
POTASSIUM SERPL-SCNC: 3.9 MMOL/L (ref 3.5–5.3)
POTASSIUM SERPL-SCNC: 3.9 MMOL/L (ref 3.5–5.3)
POTASSIUM SERPL-SCNC: 4 MMOL/L (ref 3.5–5.3)
POTASSIUM SERPL-SCNC: 4.9 MMOL/L (ref 3.5–5.3)
PROT SERPL-MCNC: 6.6 G/DL (ref 6.4–8.4)
PROTHROMBIN TIME: 17.6 SECONDS (ref 12.3–15)
RBC # BLD AUTO: 3.64 MILLION/UL (ref 3.88–5.62)
SODIUM SERPL-SCNC: 129 MMOL/L (ref 135–147)
SODIUM SERPL-SCNC: 130 MMOL/L (ref 135–147)
SODIUM SERPL-SCNC: 131 MMOL/L (ref 135–147)
SODIUM SERPL-SCNC: 132 MMOL/L (ref 135–147)
SODIUM SERPL-SCNC: 132 MMOL/L (ref 135–147)
WBC # BLD AUTO: 9.12 THOUSAND/UL (ref 4.31–10.16)

## 2024-10-21 PROCEDURE — 99233 SBSQ HOSP IP/OBS HIGH 50: CPT | Performed by: INTERNAL MEDICINE

## 2024-10-21 PROCEDURE — 94760 N-INVAS EAR/PLS OXIMETRY 1: CPT

## 2024-10-21 PROCEDURE — 80048 BASIC METABOLIC PNL TOTAL CA: CPT

## 2024-10-21 PROCEDURE — 80053 COMPREHEN METABOLIC PANEL: CPT

## 2024-10-21 PROCEDURE — 83735 ASSAY OF MAGNESIUM: CPT

## 2024-10-21 PROCEDURE — 85610 PROTHROMBIN TIME: CPT

## 2024-10-21 PROCEDURE — 99232 SBSQ HOSP IP/OBS MODERATE 35: CPT | Performed by: INTERNAL MEDICINE

## 2024-10-21 PROCEDURE — 80048 BASIC METABOLIC PNL TOTAL CA: CPT | Performed by: INTERNAL MEDICINE

## 2024-10-21 PROCEDURE — 85730 THROMBOPLASTIN TIME PARTIAL: CPT

## 2024-10-21 PROCEDURE — 82330 ASSAY OF CALCIUM: CPT

## 2024-10-21 PROCEDURE — 85025 COMPLETE CBC W/AUTO DIFF WBC: CPT

## 2024-10-21 PROCEDURE — 84100 ASSAY OF PHOSPHORUS: CPT

## 2024-10-21 RX ORDER — CALCIUM GLUCONATE 20 MG/ML
2 INJECTION, SOLUTION INTRAVENOUS ONCE
Status: COMPLETED | OUTPATIENT
Start: 2024-10-21 | End: 2024-10-21

## 2024-10-21 RX ORDER — AMLODIPINE BESYLATE 10 MG/1
10 TABLET ORAL DAILY
Status: DISCONTINUED | OUTPATIENT
Start: 2024-10-22 | End: 2024-10-28 | Stop reason: HOSPADM

## 2024-10-21 RX ADMIN — CYANOCOBALAMIN TAB 500 MCG 1000 MCG: 500 TAB at 08:36

## 2024-10-21 RX ADMIN — ENOXAPARIN SODIUM 40 MG: 40 INJECTION SUBCUTANEOUS at 08:36

## 2024-10-21 RX ADMIN — ASPIRIN 81 MG CHEWABLE TABLET 81 MG: 81 TABLET CHEWABLE at 08:36

## 2024-10-21 RX ADMIN — THIAMINE HYDROCHLORIDE 250 MG: 100 INJECTION, SOLUTION INTRAMUSCULAR; INTRAVENOUS at 08:47

## 2024-10-21 RX ADMIN — NICOTINE 21 MG: 21 PATCH, EXTENDED RELEASE TRANSDERMAL at 08:37

## 2024-10-21 RX ADMIN — CALCIUM GLUCONATE 2 G: 20 INJECTION, SOLUTION INTRAVENOUS at 10:30

## 2024-10-21 RX ADMIN — MULTIPLE VITAMINS W/ MINERALS TAB 1 TABLET: TAB ORAL at 08:36

## 2024-10-21 RX ADMIN — THIAMINE HYDROCHLORIDE 250 MG: 100 INJECTION, SOLUTION INTRAMUSCULAR; INTRAVENOUS at 16:39

## 2024-10-21 RX ADMIN — THIAMINE HYDROCHLORIDE 250 MG: 100 INJECTION, SOLUTION INTRAMUSCULAR; INTRAVENOUS at 20:56

## 2024-10-21 RX ADMIN — MIRTAZAPINE 15 MG: 15 TABLET, FILM COATED ORAL at 20:56

## 2024-10-21 RX ADMIN — CHLORHEXIDINE GLUCONATE 15 ML: 1.2 RINSE ORAL at 08:36

## 2024-10-21 RX ADMIN — FOLIC ACID 1 MG: 1 TABLET ORAL at 08:36

## 2024-10-21 RX ADMIN — FUROSEMIDE 20 MG: 10 INJECTION, SOLUTION INTRAMUSCULAR; INTRAVENOUS at 08:36

## 2024-10-21 RX ADMIN — POLYETHYLENE GLYCOL 3350 17 G: 17 POWDER, FOR SOLUTION ORAL at 08:36

## 2024-10-21 RX ADMIN — PANTOPRAZOLE SODIUM 40 MG: 40 TABLET, DELAYED RELEASE ORAL at 06:09

## 2024-10-21 RX ADMIN — ATORVASTATIN CALCIUM 20 MG: 20 TABLET, FILM COATED ORAL at 08:36

## 2024-10-21 RX ADMIN — CHLORHEXIDINE GLUCONATE 15 ML: 1.2 RINSE ORAL at 20:56

## 2024-10-21 RX ADMIN — AMLODIPINE BESYLATE 10 MG: 5 TABLET ORAL at 08:37

## 2024-10-21 RX ADMIN — FUROSEMIDE 20 MG: 10 INJECTION, SOLUTION INTRAMUSCULAR; INTRAVENOUS at 16:37

## 2024-10-21 NOTE — ASSESSMENT & PLAN NOTE
Etiology: Multifactorial likely secondary to EtOH, poor solute intake for 3 days and fluid overload   Sodium on admission: 101-->99 on 10/16/2024  Current sodium 130 mEq/L, appropriate correction   Workup: Serum osm: 233, Urine osm: 347, Urine Sodium:21  Initially overcorrected received DDAVP and D5W      Continue Lasix 20 mg IV twice daily to improve volume status and in setting of increasing O2 demands today  Liberalize fluid restriction  BMP Q 8 hours  Avoid overcorrection : no more than 6-8mEq in the next 24h, goal 138 by tomorrow morning  Monitor urinary output   Will monitor labs, call renal if SNa+ <130 or >138  Normal renal function and creatinine remains at baseline  Risk of ODS:  EtOH   Severe hyponatremia   malnourishment: serum alb 3.1

## 2024-10-21 NOTE — ASSESSMENT & PLAN NOTE
-Lactic acid normal  -BC no growth x 4 days  -strep pneumonia, Legionella negative  -sputum culture mixed respiratory alexandra  -CT concerning for pulmonary process, possible pneumonia  -management per primary team

## 2024-10-21 NOTE — UTILIZATION REVIEW
Continued Stay Review    Date: 10/21/24                           Current Patient Class: Inpatient  Current Level of Care: MS as of 10/20 from level 2 SD. On 10/21, pt remains located in ICU     HPI:54 y.o. male initially admitted on 10/16 as transfer from [facility]  . Hyponatremia - multifactorial in setting of beer [place], alcohol abuse, poor solute intake. Also there is NSAID use which is likely contributing. Admission sodium 101 and repeat was 99 at 11 AM on 10/16 (received hypertonic 3% saline)  . Pt was started on 1.8% saline and was given DDAVP x 1 on 10/16 to prevent rapid correction. Subsequently patient was started on Bumex twice daily 10/17 and free water restriction with improvement in sodium level appropriately to 107. Subsequently sodium level evening of 10/17 was rising slightly rapidly requiring brief D5W and DDAVP X2 overnight into 10/18. And on 10/18, Bumex was held due to brisk urine output and rapid rise in sodium day prior. The rise in sodium was slowed appropriately to 115 on 10/18. By 10/19, sodium level is rising appropriately to 121. Was still volume overloaded and received Lasix. And by 10/20, sodium levels improving appropriately to 125 and in the evening was 129.   Assessment/Plan: 10/21   Sodium gradually improved and is currently 131. Per nephrology, this evening, if the sodium level is rising above 133, would continue diuretic but would consider giving D5W. Patient is high risk of demyelination with rapid correction and hyponatremia.  Patient received the max dose of phenobarbital and only required 1 dose of Valium for agitation during his 5 days in the ICU.  Pt was also found to have suspected pneumonia and completed 5 out of 5 days of Rocephin. Does not use O2 as baseline  and is currently still requiring mid flow nasal cannula , 12 L ,  despite negative fluid balance (-8.8L total) . Pt  improving on Lasix 20 mg IV BID , ok to continue cautious use  Lasix per nephrology .   Continue liberalize free water intake for now .BMP q8h . .   Previously had to judiciously use diuretics as they overcorrected patient's sodium.  Consider detox unit on discharge .          Medications:   Scheduled Medications:  [START ON 10/22/2024] amLODIPine, 10 mg, Oral, Daily  aspirin, 81 mg, Oral, Daily  atorvastatin, 20 mg, Oral, Daily  chlorhexidine, 15 mL, Mouth/Throat, Q12H JUAN  cyanocobalamin, 1,000 mcg, Oral, Daily  enoxaparin, 40 mg, Subcutaneous, Daily  folic acid, 1 mg, Oral, Daily  furosemide, 20 mg, Intravenous, BID (diuretic)  mirtazapine, 15 mg, Oral, HS  multivitamin-minerals, 1 tablet, Oral, Daily  nicotine, 21 mg, Transdermal, Daily  pantoprazole, 40 mg, Oral, Early Morning  polyethylene glycol, 17 g, Oral, Daily  thiamine, 250 mg, Intravenous, TID   Followed by  [START ON 10/22/2024] thiamine, 100 mg, Intravenous, TID   Followed by  [START ON 10/26/2024] thiamine, 100 mg, Oral, Daily    cefTRIAXone (ROCEPHIN) 2,000 mg in dextrose 5 % 50 mL IVPB  Dose: 2,000 mg  Freq: Every 24 hours Route: IV  Last Dose: Stopped (10/19/24 1833)  Start: 10/17/24 1600 End: 10/20/24 1208  cefTRIAXone (ROCEPHIN) 2,000 mg in dextrose 5 % 50 mL IVPB  Dose: 2,000 mg  Freq: Every 24 hours Route: IV  Last Dose: 2,000 mg (10/20/24 1633)  Start: 10/20/24 1600 End: 10/20/24 1703  calcium gluconate 2 g in sodium chloride 0.9% 100 mL (premix)  Dose: 2 g  Freq: Once Route: IV  Last Dose: Stopped (10/21/24 1301)  Start: 10/21/24 0930 End: 10/21/24 1301  amLODIPine (NORVASC) tablet 10 mg  Dose: 10 mg  Freq: Daily Route: PO  Start: 10/18/24 0900 End: 10/21/24 1310      Continuous IV Infusions:  dextrose 5 % infusion  Rate: 50 mL/hr Dose: 50 mL/hr  Freq: Continuous Route: IV  Last Dose: Stopped (10/19/24 0623)  Start: 10/18/24 2200 End: 10/19/24 0559  PRN Meds:  acetaminophen, 650 mg, Oral, Q6H PRN x1 10/20   ipratropium-albuterol, 3 mL, Nebulization, Q6H PRN  ondansetron, 4 mg, Intravenous, Q8H PRN  trimethobenzamide, 200 mg,  Intramuscular, Q6H PRN      Discharge Plan: TBD    Vital Signs (last 3 days)       Date/Time Temp Pulse Resp BP MAP (mmHg) SpO2 Calculated FIO2 (%) - Nasal Cannula O2 Flow Rate (L/min) Nasal Cannula O2 Flow Rate (L/min) O2 Device O2 Interface Device Patient Position - Orthostatic VS Dat Coma Scale Score CIWA-Ar Total Pain    10/21/24 1500 99.8 °F (37.7 °C) 97 21 131/71 94 94 % -- -- -- -- -- -- -- -- --    10/21/24 1409 -- -- -- -- -- -- 60 -- 10 L/min -- -- -- -- -- --    10/21/24 1100 -- 91 16 137/75 99 94 % -- -- -- -- -- -- -- -- --    10/21/24 1059 99.3 °F (37.4 °C) -- -- 137/75 99 -- 68 -- 12 L/min Mid flow nasal cannula -- Sitting -- -- --    10/21/24 0837 -- -- -- 126/73 -- -- -- -- -- -- -- -- -- -- --    10/21/24 0800 -- -- -- -- -- -- -- -- -- -- -- -- 14 -- No Pain    10/21/24 0759 -- -- -- -- -- 93 % 80 -- 15 L/min Mid flow nasal cannula -- -- -- -- --    10/21/24 0728 -- -- -- 116/75 89 -- -- -- -- Mid flow nasal cannula -- Lying -- -- --    10/21/24 0300 -- -- -- -- -- 89 % -- 15 L/min -- Mid flow nasal cannula MFNC prongs -- -- -- --    10/21/24 0115 -- 94 30 131/63 86 91 % -- -- -- -- -- -- -- -- --    10/21/24 0000 -- -- -- -- -- -- -- -- -- -- -- -- 14 -- --    10/20/24 2311 98.9 °F (37.2 °C) -- -- -- -- -- -- -- -- -- -- -- -- -- --    10/20/24 2200 -- 103 25 127/64 88 91 % -- -- -- -- -- -- -- -- --    10/20/24 2000 -- -- -- -- -- -- -- -- -- -- -- -- 14 -- No Pain    10/20/24 1948 -- -- -- -- -- 90 % -- 12 L/min -- Mid flow nasal cannula MFNC prongs -- -- -- --    10/20/24 1900 -- 101 28 130/67 91 92 % -- -- -- -- -- -- -- -- --    10/20/24 1851 99.5 °F (37.5 °C) -- -- -- -- 92 % -- -- -- -- -- Sitting -- -- --    10/20/24 1600 -- -- -- -- -- -- -- -- -- -- -- -- 14 0 No Pain    10/20/24 1500 97.9 °F (36.6 °C) 96 20 125/68 90 93 % -- -- -- -- -- Sitting -- -- --    10/20/24 1340 -- -- -- -- -- -- 60 -- 10 L/min Mid flow nasal cannula -- -- -- -- --    10/20/24 1200 -- -- -- -- -- -- -- --  -- -- -- -- 14 -- --    10/20/24 1100 -- -- 28 116/69 85 -- -- -- -- -- -- Sitting -- -- --    10/20/24 1003 -- -- -- -- -- -- 68 -- 12 L/min Mid flow nasal cannula -- -- -- -- --    10/20/24 0800 -- 85 -- -- -- -- -- -- -- -- -- -- -- 0 --    10/20/24 0741 97.5 °F (36.4 °C) 83 14 117/62 82 91 % 64 -- 11 L/min Mid flow nasal cannula -- -- -- -- No Pain    10/20/24 0730 -- -- -- -- -- -- -- -- -- -- -- -- 14 -- --    10/20/24 0600 97.9 °F (36.6 °C) 83 13 -- -- 90 % -- -- -- -- -- -- -- -- --    10/20/24 0500 98.2 °F (36.8 °C) 85 12 -- -- 92 % -- -- -- -- -- -- -- -- --    10/20/24 0400 98.6 °F (37 °C) 86 13 -- -- 92 % -- -- -- -- -- -- -- -- --    10/20/24 0300 99.1 °F (37.3 °C) 93 16 -- -- 91 % -- -- -- -- -- -- 15 1 No Pain    10/20/24 0100 100 °F (37.8 °C) 90 13 -- -- 92 % -- -- -- -- -- -- -- -- --    10/20/24 0000 100.6 °F (38.1 °C) 98 17 -- -- 93 % -- -- -- -- -- -- -- -- --    10/19/24 2235 -- -- -- 129/67 -- -- -- -- -- -- -- -- -- -- --    10/19/24 2224 -- -- -- -- -- -- -- -- -- -- -- -- -- -- Med Not Given for Pain - for MAR use only    10/19/24 2100 101.1 °F (38.4 °C) 96 18 -- -- 94 % -- -- -- -- -- -- -- -- --    10/19/24 2000 100.9 °F (38.3 °C) 93 17 -- -- 93 % -- -- -- -- -- -- -- -- --    10/19/24 1936 -- -- -- 115/60 -- -- -- -- -- -- -- -- 14 0 No Pain    10/19/24 1900 100.4 °F (38 °C) 97 29 -- -- 94 % 64 -- 11 L/min -- -- -- -- -- --    10/19/24 1600 -- -- -- 121/66 -- -- 64 -- 11 L/min Mid flow nasal cannula -- -- -- 0 --    10/19/24 1438 -- -- -- -- -- 94 % 60 -- 10 L/min Mid flow nasal cannula -- -- -- -- --    10/19/24 1432 -- -- -- -- -- 93 % -- -- -- -- -- -- -- -- --    10/19/24 1200 -- -- -- -- -- -- -- -- -- -- -- -- 14 -- --    10/19/24 1030 97.7 °F (36.5 °C) 83 19 115/64 82 89 % -- -- -- -- -- -- -- -- --    10/19/24 0900 -- -- -- -- -- -- -- -- -- -- -- -- -- -- No Pain    10/19/24 0800 -- -- -- -- -- -- -- -- -- -- -- -- 14 -- --    10/19/24 0700 97.5 °F (36.4 °C) 88 17 117/74 91 90 %  -- -- -- Nasal cannula -- Lying -- 2 No Pain    10/19/24 0600 97.5 °F (36.4 °C) 89 28 -- -- 88 % -- -- -- -- -- -- -- -- --    10/19/24 0540 97.7 °F (36.5 °C) 88 24 115/70 87 89 % -- -- -- -- -- -- -- -- --    10/19/24 0500 97.5 °F (36.4 °C) 87 30 -- -- 90 % -- -- -- -- -- -- -- -- --    10/19/24 0400 -- -- -- -- -- -- -- -- -- -- -- -- 14 -- --    10/19/24 0300 98.2 °F (36.8 °C) 86 21 124/69 89 92 % -- -- -- -- -- -- -- -- --    10/19/24 0000 98.1 °F (36.7 °C) 94 46 -- -- 89 % -- -- -- -- -- -- 14 -- --    10/18/24 2310 98.2 °F (36.8 °C) 90 22 110/64 78 92 % -- -- -- -- -- -- -- -- --    10/18/24 2300 98.1 °F (36.7 °C) 88 16 -- -- 91 % -- -- -- -- -- -- -- -- --    10/18/24 2200 98.2 °F (36.8 °C) 88 21 113/53 76 91 % -- -- -- -- -- -- -- -- --    10/18/24 2100 98.2 °F (36.8 °C) 87 44 110/74 88 90 % -- -- -- -- -- -- -- -- --    10/18/24 2000 98.2 °F (36.8 °C) 82 24 124/66 86 88 % -- -- -- -- -- -- 14 -- No Pain    10/18/24 1900 98.1 °F (36.7 °C) 89 26 127/64 88 90 % -- -- -- -- -- -- -- -- --    10/18/24 1800 97.5 °F (36.4 °C) 88 31 108/65 81 90 % -- -- -- -- -- -- -- -- --    10/18/24 1700 97.3 °F (36.3 °C) 82 12 130/72 93 86 % -- -- -- -- -- -- -- -- --    10/18/24 1600 -- 88 37 144/81 99 91 % -- -- -- -- -- -- 14 -- --    10/18/24 1500 -- 82 19 123/71 92 90 % 44 -- 6 L/min Nasal cannula -- -- -- -- --    10/18/24 1400 -- 88 24 120/71 90 89 % -- -- -- -- -- -- -- -- --    10/18/24 1315 -- 86 19 -- -- 90 % -- -- -- -- -- -- -- -- --    10/18/24 1300 -- 96 26 118/70 89 -- -- -- -- -- -- -- -- 8 --    10/18/24 1200 -- 86 12 113/71 88 89 % -- -- -- -- -- -- 14 -- No Pain    10/18/24 1108 -- -- -- -- -- 90 % -- -- -- -- -- -- -- -- --    10/18/24 1100 98.4 °F (36.9 °C) 88 44 108/66 82 -- -- -- -- Nasal cannula -- Lying -- -- --    10/18/24 1000 -- 90 13 121/72 91 92 % -- -- -- -- -- -- -- -- --    10/18/24 0900 -- 90 17 129/72 95 90 % 36 4 L/min 4 L/min Nasal cannula -- -- -- 18 --    10/18/24 0800 -- 94 -- 120/66 84  94 % -- -- -- -- -- -- 14 -- No Pain    10/18/24 0700 -- 101 29 125/63 88 92 % -- -- -- -- -- -- -- -- --    10/18/24 0600 -- 101 20 118/62 82 96 % -- -- -- -- -- -- -- -- --    10/18/24 0500 -- 108 20 123/69 90 93 % -- -- -- -- -- -- -- -- --    10/18/24 0400 -- 100 17 116/59 80 97 % -- -- -- -- -- -- 14 -- No Pain    10/18/24 0320 98.5 °F (36.9 °C) 103 21 126/63 87 96 % -- -- -- Nasal cannula -- Lying -- 20 --    10/18/24 0200 -- 114 -- -- -- -- -- -- -- -- -- -- -- -- --    10/18/24 0100 -- 98 14 120/76 94 -- -- -- -- -- -- -- -- 26 --    10/18/24 0000 -- 92 10 120/66 88 91 % -- -- -- -- -- -- 15 -- No Pain          Weight (last 2 days)       Date/Time Weight    10/20/24 0600 113 (250)    10/19/24 0533 114 (252.21)           CIWA-Ar Score       Row Name 10/20/24 1600 10/20/24 0800 10/20/24 0300       CIWA-Ar    Pulse -- 85 --    Nausea and Vomiting 0 0 0    Tactile Disturbances 0 0 0    Tremor 0 0 0    Auditory Disturbances 0 0 0    Paroxysmal Sweats 0 0 0    Visual Disturbances 0 0 0    Anxiety 0 0 0    Headache, Fullness in Head 0 0 0    Agitation 0 0 0    Orientation and Clouding of Sensorium 0 0 1    CIWA-Ar Total 0 0 1      Row Name 10/19/24 1936 10/19/24 1600 10/19/24 0700       CIWA-Ar    Nausea and Vomiting 0 0 0    Tactile Disturbances 0 0 0    Tremor 0 0 0    Auditory Disturbances 0 0 0    Paroxysmal Sweats 0 0 2    Visual Disturbances 0 0 0    Anxiety 0 0 0    Headache, Fullness in Head 0 0 0    Agitation 0 0 0    Orientation and Clouding of Sensorium 0 0 0    CIWA-Ar Total 0 0 2                  Pertinent Labs/Diagnostic Results:   Radiology:  XR chest portable ICU   Final Interpretation by Db Berrios DO (10/21 2370)      Bilateral coalescent alveolar consolidations redemonstrated appears slightly more conspicuous which may be technical versus slight worsening compared to the most recent study. This could be secondary to pulmonary edema or multifocal pneumonia.            Resident: [place]  Jose E PHELPS, the attending radiologist, have reviewed the images and agree with the final report above.      Workstation performed: [id number]         XR chest portable ICU   Final Interpretation by Christina Rodriguez MD (10/19 2010)      Moderate bilateral consolidation, slightly increased on the right since 10/16/2024, which could be due to edema or pneumonia in the appropriate clinical setting.            Workstation performed: [id number]         US right upper quadrant   Final Interpretation by Seun Davies MD (10/18 0811)      Hepatic steatosis with morphologic features concerning for cirrhosis.      Small volume right upper quadrant ascites.      Workstation performed: [id number]         XR chest portable ICU   Final Interpretation by Joi Beck MD (10/17 1647)   Increasing consolidation and groundglass density in the left lung in the perihilar region, suggest worsening pneumonia            Workstation performed: [id number]         CT head wo contrast   Final Interpretation by Víctor Tong MD (10/17 0139)      No acute intracranial abnormality.                  Workstation performed: [id number]         XR shoulder 2+ vw right   Final Interpretation by Jio Beck MD (10/16 9409)   No acute displaced fracture   Patchy opacity right lung may be due to congestion/pneumonia      Computerized Assisted Algorithm (CAA) may have been used to analyze all applicable images.         Workstation performed: [id number]           Cardiology:  Echo complete w/ contrast if indicated   Final Result by Wilfrido Lopez MD (10/17 1022)        Left Ventricle: Left ventricular cavity size is normal. Wall thickness    is normal. The left ventricular ejection fraction is 60%. Systolic    function is normal. Wall motion is normal. Unable to assess diastolic    function due to E/A fusion due to tachycardia.     Tricuspid Valve: There is mild regurgitation. The right ventricular    systolic pressure  is mildly elevated. The estimated right ventricular    systolic pressure is 38.00 mmHg.         ECG 12 lead   Final Result by Malaika Harding MD (10/17 6566)   Normal sinus rhythm   Right axis deviation   Nonspecific T wave abnormality   Abnormal ECG   When compared with ECG of 16-OCT-2024 09:59,   QRS axis Shifted right   T wave inversion now evident in Inferior leads   Confirmed by Malaika Harding ([id number]) on 10/17/2024 11:16:03 PM        GI:  No orders to display       Results from last 7 days   Lab Units 10/17/24  0756   SARS-COV-2  Not Detected     Results from last 7 days   Lab Units 10/21/24  0620 10/20/24  0345 10/19/24  0402 10/18/24  0407 10/17/24  0444   WBC Thousand/uL 9.12 9.76 9.02 8.70 10.17*   HEMOGLOBIN g/dL 10.3* 10.0* 10.2* 10.3* 10.9*   HEMATOCRIT % 31.9* 30.0* 29.8* 29.5* 30.7*   PLATELETS Thousands/uL 102* 107* 95* 99* 103*   TOTAL NEUT ABS Thousands/µL 6.59 7.12 7.02 6.93 8.68*         Results from last 7 days   Lab Units 10/21/24  1056 10/21/24  0620 10/21/24  0525 10/20/24  2340 10/20/24  1623 10/20/24  1054 10/20/24  0345 10/19/24  0403 10/19/24  0402 10/18/24  0634 10/18/24  0407 10/17/24  0756 10/17/24  0444   SODIUM mmol/L 131*  --  130* 129* 128* 127* 125*   < >  --    < >  --    < > 107*   POTASSIUM mmol/L 3.8  --  4.9 4.0 4.2 3.8 4.0   < >  --    < >  --    < > 4.6   CHLORIDE mmol/L 97  --  97 97 95* 95* 94*   < >  --    < >  --    < > 81*   CO2 mmol/L 29  --  28 26 26 25 26   < >  --    < >  --    < > 19*   ANION GAP mmol/L 5  --  5 6 7 7 5   < >  --    < >  --    < > 7   BUN mg/dL 9  --  9 10 8 8 6   < >  --    < >  --    < > 10   CREATININE mg/dL 0.66  --  0.62 0.73 0.76 0.75 0.71   < >  --    < >  --    < > 0.76   EGFR ml/min/1.73sq m 109  --  112 105 103 103 106   < >  --    < >  --    < > 103   CALCIUM mg/dL 8.4  --  8.1* 8.2* 8.8 8.1* 7.9*   < >  --    < >  --    < > 7.9*   CALCIUM, IONIZED mmol/L  --  0.97*  --   --   --   --  1.08*  --  1.05*  --  1.02*  --  1.04*   MAGNESIUM  "mg/dL  --   --  2.1  --   --   --  2.1  --  2.1  --  1.9  --  1.8*   PHOSPHORUS mg/dL  --   --  3.4  --   --   --  3.3  --  2.4*  --  2.0*  --  2.8    < > = values in this interval not displayed.     Results from last 7 days   Lab Units 10/21/24  0525 10/20/24  0345 10/19/24  0402 10/18/24  0407 10/17/24  0444   AST U/L 76* 54* 57* 64* 65*   ALT U/L 30 22 21 20 20   ALK PHOS U/L 242* 208* 180* 165* 179*   TOTAL PROTEIN g/dL 6.6 6.7 6.7 7.0 7.5   ALBUMIN g/dL 2.8* 2.9* 3.1* 3.1* 3.4*   TOTAL BILIRUBIN mg/dL 1.27* 1.15* 1.43* 1.38* 1.95*   BILIRUBIN DIRECT mg/dL  --  0.49* 0.73* 0.52* 0.85*         Results from last 7 days   Lab Units 10/21/24  1056 10/21/24  0525 10/20/24  2340 10/20/24  1623 10/20/24  1054 10/20/24  0345 10/19/24  2210 10/19/24  1346 10/19/24  0900 10/19/24  0403 10/19/24  0004 10/18/24  2006   GLUCOSE RANDOM mg/dL 182* 126 130 131 123 136 139 90 107 126 126 116     Results from last 7 days   Lab Units 10/16/24  1528   OSMOLALITY, SERUM mmol/*         No results found for: \"BETA-HYDROXYBUTYRATE\"       Results from last 7 days   Lab Units 10/19/24  0258 10/16/24  2348 10/16/24  1528   PH REBECCA  7.427* 7.462* 7.494*   PCO2 REBECCA mm Hg 41.0* 27.1* 24.1*   PO2 REBECCA mm Hg 42.2 42.6 76.9*   HCO3 REBECCA mmol/L 26.4 18.9* 18.1*   BASE EXC REBECCA mmol/L 1.9 -3.6 -3.1   O2 CONTENT REBECCA ml/dL 12.7 13.4 20.2   O2 HGB, VENOUS % 77.9 80.3* 93.7*                 Results from last 7 days   Lab Units 10/16/24  1009   D-DIMER QUANTITATIVE ug/ml FEU 1.85*     Results from last 7 days   Lab Units 10/21/24  0620 10/20/24  0345 10/19/24  0402   PROTIME seconds 17.6* 16.4* 16.5*   INR  1.37* 1.24* 1.25*   PTT seconds 40* 36* 35*     Results from last 7 days   Lab Units 10/16/24  1528   TSH 3RD GENERATON uIU/mL 1.353     Results from last 7 days   Lab Units 10/18/24  0634   PROCALCITONIN ng/ml 0.10     Results from last 7 days   Lab Units 10/16/24  1528   LACTIC ACID mmol/L 1.9             Results from last 7 days   Lab Units " 10/16/24  1009   BNP pg/mL 308*     Results from last 7 days   Lab Units 10/16/24  1528   FERRITIN ng/mL 62   IRON SATURATION % 32   IRON ug/dL 123   TIBC ug/dL 384                 Results from last 7 days   Lab Units 10/16/24  1528   LIPASE u/L 57     Results from last 7 days   Lab Units 10/20/24  0345   CRP mg/L 115.6*         Results from last 7 days   Lab Units 10/16/24  1528   OSMOLALITY, SERUM mmol/*   OSMO UR mmol/     Results from last 7 days   Lab Units 10/16/24  1528   CLARITY UA  Clear   COLOR UA  Light Yellow   SPEC GRAV UA  1.031*   PH UA  5.5   GLUCOSE UA mg/dl Negative   KETONES UA mg/dl 10 (1+)*   BLOOD UA  Negative   PROTEIN UA mg/dl Negative   NITRITE UA  Negative   BILIRUBIN UA  Negative   UROBILINOGEN UA (BE) mg/dl <2.0   LEUKOCYTES UA  Negative   WBC UA /hpf 1-2   RBC UA /hpf None Seen   BACTERIA UA /hpf None Seen   EPITHELIAL CELLS WET PREP /hpf None Seen   SODIUM UR  21     Results from last 7 days   Lab Units 10/17/24  0756 10/16/24  1609   STREP PNEUMONIAE ANTIGEN, URINE   --  Negative   LEGIONELLA URINARY ANTIGEN   --  Negative   INFLUENZA B  Not Detected  --    RESPIRATORY SYNCYTIAL VIRUS  Not Detected  --      Results from last 7 days   Lab Units 10/17/24  0756   ADENOVIRUS  Not Detected   BORDETELLA PARAPERTUSSIS  Not Detected   BORDETELLA PERTUSSIS  Not Detected   CHLAMYDIA PNEUMONIAE  Not Detected   CORONAVIRUS 229E  Not Detected   CORONAVIRUS HKU1  Not Detected   CORONAVIRUS NL63  Not Detected   CORONAVIRUS OC43  Not Detected   METAPNEUMOVIRUS  Not Detected   RHINOVIRUS  Not Detected   MYCOPLASMA PNEUMONIAE  Not Detected   PARAINFLUENZA 1  Not Detected   PARAINFLUENZA 2  Not Detected   PARAINFLUENZA 3  Not Detected   PARAINFLUENZA 4  Not Detected         Results from last 7 days   Lab Units 10/16/24  1528   ETHANOL LVL mg/dL <10                 Results from last 7 days   Lab Units 10/16/24  1728 10/16/24  1533 10/16/24  1530 10/16/24  1119   BLOOD CULTURE   --  No Growth  After 4 Days. No Growth After 4 Days. No Growth After 5 Days.  No Growth After 5 Days.   SPUTUM CULTURE  4+ Growth of  --   --   --    GRAM STAIN RESULT  1+ Epithelial cells per low power field*  No polys seen*  1+ Gram positive cocci in pairs*  Rare Gram negative rods*  Rare Gram positive rods*  --   --   --              Results from last 7 days   Lab Units 10/17/24  0756   VANCOMYCIN RM ug/mL 16.3       Network Utilization Review Department  ATTENTION: Please call with any questions or concerns to [phone] and carefully listen to the prompts so that you are directed to the right person. All voicemails are confidential.   For Discharge needs, contact Care Management DC Support Team at [phone] opt. [phone]  Send all requests for admission clinical reviews, approved or denied determinations and any other requests to dedicated fax number below belonging to the campus where the patient is receiving treatment. List of dedicated fax numbers for the Facilities:  FACILITY NAME UR FAX NUMBER   ADMISSION DENIALS (Administrative/Medical Necessity) [phone]   DISCHARGE SUPPORT TEAM (NETWORK) [phone]   PARENT CHILD HEALTH (Maternity/NICU/Pediatrics) [phone]   [facility] [phone]   [facility] [phone]   [facility] [phone]   [facility] [phone]   [facility] [phone]   [facility] [phone]   [facility] [phone]   [facility] [phone]   [facility] [phone]   [facility] [phone]   [facility] [phone]   [facility] [phone]

## 2024-10-21 NOTE — CASE MANAGEMENT
Case Management Progress Note    Patient name Viktor Farfan  Location ICU 10/ICU 10 MRN [id number]  : 1970 Date 10/21/2024       LOS (days): 5  Geometric Mean LOS (GMLOS) (days): 3.6  Days to GMLOS:-1.5        OBJECTIVE:        Current admission status: Inpatient  Preferred Pharmacy:   [facility] [place], [place] - [address] [place] [id number]  Phone: [phone] Fax: [phone]    [id number] [place] - [address]  [address] [address]  Phone: [phone] Fax: [phone]    Primary Care Provider: No primary care provider on file.    Primary Insurance: [employer]  Secondary Insurance:     PROGRESS NOTE:    CM notified by [facility] that they did meet with pt and he continues to refuse treatment for alcohol abuse.

## 2024-10-21 NOTE — ASSESSMENT & PLAN NOTE
Secondary to iatrogenic volume overload in the setting from trying to correct hyponatremia  With acute hypoxic respiratory failure.  Currently on 10 L mid flow    Plan:  Incentive spirometry  Encourage out of bed  Wean oxygen as tolerated

## 2024-10-21 NOTE — ASSESSMENT & PLAN NOTE
Met sepsis criteria  With acute hypoxic respiratory failure.  Currently on 10 L midlfow  CTA chest - multifocal bilateral upper lobe consolidation which could be due to pneumonia  Blood cultures - no growth after 4 days  Sputum culture - mixed respiratory alexandra  Completed 5-day course of ceftriaxone from 10/16 to 10/20  CXR (10/16) - bilateral consolidation, slightly increased in the right side since 10/16/2024.  Could be edema or pneumonia    Plan:  Incentive spirometry  Encourage out of bed  Wean oxygen as tolerated

## 2024-10-21 NOTE — ASSESSMENT & PLAN NOTE
Component of possible infection and hypervolemia  CT with no PE but unable fully assess segmental/subsegmental arteries due to motion artifact. +bilat upper lobe consolidations.  Off abx  Continue Lasix 20 mg IV twice daily  Requiring 12 L MFNC O2 despite negative fluid balance (-8.8L total)  Management per primary team

## 2024-10-21 NOTE — PLAN OF CARE
Problem: SAFETY ADULT  Goal: Patient will remain free of falls  Description: INTERVENTIONS:  - Instruct patient to call for assistance with activity   - Consult OT/PT to assist with strengthening/mobility   - Keep Call bell within reach  - Keep bed low and locked with side rails adjusted as appropriate  - Keep care items and personal belongings within reach  - Initiate and maintain comfort rounds  - Make Fall Risk Sign visible to staff  - Offer Toileting every 1 Hours, in advance of need  - Initiate/Maintain alarm  - Obtain necessary fall risk management equipment  - Apply yellow socks and bracelet for high fall risk patients  Outcome: Progressing     Problem: SAFETY ADULT  Goal: Maintains/Returns to pre admission functional level  Description: INTERVENTIONS:  - Perform AM-PAC 6 Click Basic Mobility/ Daily Activity assessment daily.  - Set and communicate daily mobility goal to care team and patient/family/caregiver.   - Collaborate with rehabilitation services on mobility goals if consulted  - Reposition patient every 2 hours.  - Record patient progress and toleration of activity level   Outcome: Progressing     Problem: NEUROSENSORY - ADULT  Goal: Achieves stable or improved neurological status  Description: INTERVENTIONS  - Monitor and report changes in neurological status  - Monitor vital signs such as temperature, blood pressure, glucose, and any other labs ordered   - Monitor for seizure activity and implement precautions if appropriate      Outcome: Progressing     Problem: CARDIOVASCULAR - ADULT  Goal: Maintains optimal cardiac output and hemodynamic stability  Description: INTERVENTIONS:  - Monitor I/O, vital signs and rhythm  - Monitor for S/S and trends of decreased cardiac output  - Assess quality of pulses, skin color and temperature    Outcome: Progressing     Problem: GASTROINTESTINAL - ADULT  Goal: Maintains or returns to baseline bowel function  Description: INTERVENTIONS:  - Assess bowel  function  - Encourage oral fluids to ensure adequate hydration  - Administer IV fluids if ordered to ensure adequate hydration  - Administer ordered medications as needed  - Encourage mobilization and activity  - Consider nutritional services referral to assist patient with adequate nutrition and appropriate food choices  Outcome: Progressing     Problem: GENITOURINARY - ADULT  Goal: Maintains or returns to baseline urinary function  Description: INTERVENTIONS:  - Assess urinary function  - Encourage oral fluids to ensure adequate hydration if ordered  - Offer frequent toileting  - Follow urinary retention protocol if ordered  Outcome: Progressing     Problem: METABOLIC, FLUID AND ELECTROLYTES - ADULT  Goal: Electrolytes maintained within normal limits  Description: INTERVENTIONS:  - Monitor labs and assess patient for signs and symptoms of electrolyte imbalances  - Administer electrolyte replacement as ordered  - Monitor response to electrolyte replacements, including repeat lab results as appropriate  Outcome: Progressing     Problem: SKIN/TISSUE INTEGRITY - ADULT  Goal: Skin Integrity remains intact(Skin Breakdown Prevention)  Description: Assess:  -Perform [place] assessment   -Clean and moisturize skin prn  -Inspect skin when repositioning, toileting, and assisting with ADLS  -Assess under medical devices s  Assess extremities for adequate circulation and sensation     Activity:  encourage or provide ROM exercises   -Turn and reposition patient every 2 Hours  -Use appropriate equipment to lift or move patient in bed  -Instruct/ Assist with weight shifting  when out of bed in chair  Outcome: Progressing

## 2024-10-21 NOTE — PROGRESS NOTES
Progress Note - Nephrology   Name: Viktor Farfan 54 y.o. male I MRN: [id number]  Unit/Bed#: ICU 10 I Date of Admission: 10/16/2024   Date of Service: 10/21/2024 I Hospital Day: 5     54-year-old male with obesity, EtOH abuse p/w confusion and severe acute hyponatremia.  Nephrology consulted for management of severe hyponatremia.  Assessment & Plan  Hyponatremia  Etiology: Multifactorial likely secondary to EtOH, poor solute intake for 3 days and fluid overload   Sodium on admission: 101-->99 on 10/16/2024  Current sodium 130 mEq/L, appropriate correction   Workup: Serum osm: 233, Urine osm: 347, Urine Sodium:21  Initially overcorrected received DDAVP and D5W      Continue Lasix 20 mg IV twice daily to improve volume status and in setting of increasing O2 demands today  Liberalize fluid restriction  BMP Q 8 hours  Avoid overcorrection : no more than 6-8mEq in the next 24h, goal 138 by tomorrow morning  Monitor urinary output   Will monitor labs, call renal if SNa+ <130 or >138  Normal renal function and creatinine remains at baseline  Risk of ODS:  EtOH   Severe hyponatremia   malnourishment: serum alb 3.1         SOB (shortness of breath)  Component of possible infection and hypervolemia  CT with no PE but unable fully assess segmental/subsegmental arteries due to motion artifact. +bilat upper lobe consolidations.  Off abx  Continue Lasix 20 mg IV twice daily  Requiring 12 L MFNC O2 despite negative fluid balance (-8.8L total)  Management per primary team    Alcohol abuse  On [facility] protocol   Report drinking 10-12 beers per night for years  SIRS (systemic inflammatory response syndrome) (HCC)  -Lactic acid normal  -BC no growth x 4 days  -strep pneumonia, Legionella negative  -sputum culture mixed respiratory alexandra  -CT concerning for pulmonary process, possible pneumonia  -management per primary team  Thrombocytopenia (HCC)  Stable  Platelets 102,000  Continue to monitor  Transaminitis  Stable  Consistent with ETOH  use  Management per primary team    Plan Summary:  -serum sodium stable and appropriate rate of correction  -continue Lasix 20 mg IV BID for volume status  -BMP Q 8 hrs    SUBJECTIVE:  Pt awake, alert.  Increasing O2 demands despite improving volume status and ongoing negative fluid balance.  Serum sodium improving at 130 today.  Stable renal function.  VSS.    A complete review of systems was performed. Pertinent positives and negatives noted in the HPI. Otherwise the review of systems was negative.  OBJECTIVE:  Current Weight: Weight - Scale: 113 kg (250 lb)  Vitals:    10/21/24 0728 10/21/24 0759 10/21/24 0837 10/21/24 1059   BP: 116/75  126/73 137/75   BP Location: Left arm   Left arm   Pulse:       Resp:       Temp:    99.3 °F (37.4 °C)   TempSrc:    Axillary   SpO2:  93%     Weight:       Height:           Intake/Output Summary (Last 24 hours) at 10/21/2024 1151  Last data filed at 10/21/2024 0200  Gross per 24 hour   Intake 200 ml   Output 1230 ml   Net -1030 ml       General:  Awake, alert, appears comfortable and in mild respiratory distress.    Skin:  No rash, warm, good skin turgor   Eyes:  PERRL, EOMI, sclerae nonicteric.  no periorbital edema   ENT:  Moist mucous membranes  Neck:  Trachea midline, symmetric.  No JVD.  Chest:  coarse breath sounds bilaterally, scattered rhonchi.   CVS:  Regular rate and rhythm without murmur, gallop or rub.  S1 and S2 identified and normal.  No S3, S4.   Abdomen:  Soft, nontender, nondistended without masses.  Normal bowel sounds x 4 quadrants.  No bruit.  Extremities:  Warm, pink, motor and sensory intact and well perfused.  No cyanosis, pallor.  trace BLE edema.  Neuro:  Awake, alert, oriented x3.  Grossly intact  Psych:  Appropriate affect.  Mentating appropriately.  Normal mental status exam      Medications:    Current Facility-Administered Medications:     acetaminophen (TYLENOL) tablet 650 mg, 650 mg, Oral, Q6H PRN, PARVEEN Dai, 650 mg at 10/20/24  2128    amLODIPine (NORVASC) tablet 10 mg, 10 mg, Oral, Daily, Madelaine Dejesus MD, 10 mg at 10/21/24 0837    aspirin chewable tablet 81 mg, 81 mg, Oral, Daily, Roscoe Raman DO, 81 mg at 10/21/24 0836    atorvastatin (LIPITOR) tablet 20 mg, 20 mg, Oral, Daily, Roscoe Raman DO, 20 mg at 10/21/24 0836    chlorhexidine (PERIDEX) 0.12 % oral rinse 15 mL, 15 mL, Mouth/Throat, Q12H JUAN, Sahra Duarte DO, 15 mL at 10/21/24 0836    cyanocobalamin (VITAMIN B-12) tablet 1,000 mcg, 1,000 mcg, Oral, Daily, Madelaine Dejesus MD, 1,000 mcg at 10/21/24 0836    dexmedeTOMIDine (Precedex) 400 mcg in sodium chloride 0.9% 100 mL, 0.1-0.7 mcg/kg/hr, Intravenous, Titrated, Madelaine Dejesus MD, Held at 10/19/24 1206    enoxaparin (LOVENOX) subcutaneous injection 40 mg, 40 mg, Subcutaneous, Daily, Sahra Duarte DO, 40 mg at 10/21/24 0836    folic acid (FOLVITE) tablet 1 mg, 1 mg, Oral, Daily, Madelaine Dejesus MD, 1 mg at 10/21/24 0836    furosemide (LASIX) injection 20 mg, 20 mg, Intravenous, BID (diuretic), Sahra Duarte DO, 20 mg at 10/21/24 0836    ipratropium-albuterol (DUO-NEB) 0.5-2.5 mg/3 mL inhalation solution 3 mL, 3 mL, Nebulization, Q6H PRN, Sahra Duarte DO, 3 mL at 10/19/24 1432    mirtazapine (REMERON) tablet 15 mg, 15 mg, Oral, HS, Roscoe Raman DO, 15 mg at 10/20/24 2128    multivitamin-minerals (CENTRUM) tablet 1 tablet, 1 tablet, Oral, Daily, Sahra Duarte DO, 1 tablet at 10/21/24 0836    nicotine (NICODERM CQ) 21 mg/24 hr TD 24 hr patch 21 mg, 21 mg, Transdermal, Daily, Sahra Duarte DO, 21 mg at 10/21/24 0837    ondansetron (ZOFRAN) injection 4 mg, 4 mg, Intravenous, Q8H PRN, Roscoe Raman DO    pantoprazole (PROTONIX) EC tablet 40 mg, 40 mg, Oral, Early Morning, Madelaine Dejesus MD, 40 mg at 10/21/24 0609    polyethylene glycol (MIRALAX) packet 17 g, 17 g, Oral, Daily, Sahra Duarte DO, 17 g at 10/21/24 0836    [COMPLETED] thiamine (VITAMIN B1) 500 mg in  sodium chloride 0.9 % 50 mL IVPB, 500 mg, Intravenous, TID, Stopped at 10/19/24 1000 **FOLLOWED BY** thiamine (VITAMIN B1) 250 mg in sodium chloride 0.9 % 50 mL IVPB, 250 mg, Intravenous, TID, Last Rate: 100 mL/hr at 10/21/24 0847, 250 mg at 10/21/24 0847 **FOLLOWED BY** [START ON 10/22/2024] thiamine (VITAMIN B1) 100 mg in sodium chloride 0.9 % 50 mL IVPB, 100 mg, Intravenous, TID **FOLLOWED BY** [START ON 10/26/2024] thiamine tablet 100 mg, 100 mg, Oral, Daily, Madelaine Dejesus MD    trimethobenzamide (TIGAN) IM injection 200 mg, 200 mg, Intramuscular, Q6H PRN, PARVEEN Schroeder    Laboratory Results:  Results from last 7 days   Lab Units 10/21/24  1056 10/21/24  0620 10/21/24  0525 10/20/24  2340 10/20/24  1623 10/20/24  1054 10/20/24  0345 10/19/24  2210 10/19/24  0403 10/19/24  0402 10/18/24  0634 10/18/24  0407 10/17/24  0756 10/17/24  0444 10/16/24  2022 10/16/24  1528 10/16/24  1141 10/16/24  1009   WBC Thousand/uL  --  9.12  --   --   --   --  9.76  --   --  9.02  --  8.70  --  10.17*  --  7.94  --  9.15   HEMOGLOBIN g/dL  --  10.3*  --   --   --   --  10.0*  --   --  10.2*  --  10.3*  --  10.9*  --  11.1*  --  11.3*   HEMATOCRIT %  --  31.9*  --   --   --   --  30.0*  --   --  29.8*  --  29.5*  --  30.7*  --  31.1*  --  31.7*   PLATELETS Thousands/uL  --  102*  --   --   --   --  107*  --   --  95*  --  99*  --  103*  --  105*  --  104*   SODIUM mmol/L 131*  --  130* 129* 128* 127* 125* 123*   < >  --    < >  --    < > 107*   < > 103*   < > 101*   POTASSIUM mmol/L 3.8  --  4.9 4.0 4.2 3.8 4.0 5.1   < >  --    < >  --    < > 4.6   < > 5.0   < > 5.5*   CHLORIDE mmol/L 97  --  97 97 95* 95* 94* 94*   < >  --    < >  --    < > 81*   < > 76*   < > 75*   CO2 mmol/L 29  --  28 26 26 25 26 23   < >  --    < >  --    < > 19*   < > 16*   < > 17*   BUN mg/dL 9  --  9 10 8 8 6 6   < >  --    < >  --    < > 10   < > 7   < > 7   CREATININE mg/dL 0.66  --  0.62 0.73 0.76 0.75 0.71 0.68   < >  --    < >  --     < > 0.76   < > 0.67   < > 0.76   CALCIUM mg/dL 8.4  --  8.1* 8.2* 8.8 8.1* 7.9* 7.9*   < >  --    < >  --    < > 7.9*   < > 8.1*   < > 8.6   MAGNESIUM mg/dL  --   --  2.1  --   --   --  2.1  --   --  2.1  --  1.9  --  1.8*  --  1.9  --   --    PHOSPHORUS mg/dL  --   --  3.4  --   --   --  3.3  --   --  2.4*  --  2.0*  --  2.8  --  2.4*  --   --     < > = values in this interval not displayed.       I have personally reviewed the blood work as stated above and in my note.  I have personally reviewed internal Medicine, co-consultants and previous nephrology notes.

## 2024-10-21 NOTE — PROGRESS NOTES
Progress Note - Hospitalist   Name: Viktor Farfan 54 y.o. male I MRN: [id number]  Unit/Bed#: ICU 10 I Date of Admission: 10/16/2024   Date of Service: 10/21/2024 I Hospital Day: 5    Assessment & Plan  Hyponatremia  Presented to [facility] with sodium 99.  Transferred to ICU [place].  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  Did initially overcorrect and receive DDAVP and D5W.  Getting IV Lasix to help with volume status  Received a maximum dose of phenobarbital and 1 dose of Valium for agitation  Downgraded from ICU on 10/20 with sodium 127    Plan:  BMP q8h. If sodium If Na > 133, then call nephrology on-call  SOB (shortness of breath)  Secondary to iatrogenic volume overload in the setting from trying to correct hyponatremia  With acute hypoxic respiratory failure.  Currently on 10 L mid flow    Plan:  Incentive spirometry  Encourage out of bed  Wean oxygen as tolerated  Alcohol abuse  Drinks 12 pack beer plus several glasses of rum and coke daily  Catch program upon discharge  Tobacco abuse  Smokes 1 pack/day  Encourage smoking cessation  Thrombocytopenia (HCC)  Platelets 102 today, stable  Monitor daily  No active bleeding  Transaminitis  AST 76, ALT 30, , T bilirubin 1.27.  Stable  Monitor daily  Pneumonia  Met sepsis criteria  With acute hypoxic respiratory failure.  Currently on 10 L midlfow  CTA chest - multifocal bilateral upper lobe consolidation which could be due to pneumonia  Blood cultures - no growth after 4 days  Sputum culture - mixed respiratory alexandra  Completed 5-day course of ceftriaxone from 10/16 to 10/20  CXR (10/16) - bilateral consolidation, slightly increased in the right side since 10/16/2024.  Could be edema or pneumonia    Plan:  Incentive spirometry  Encourage out of bed  Wean oxygen as tolerated    VTE Pharmacologic Prophylaxis: VTE Score: 2 Moderate Risk (Score 3-4) - Pharmacological DVT Prophylaxis Ordered: enoxaparin  (Lovenox).    Mobility:   Basic Mobility Inpatient Raw Score: 14  JH-HLM Goal: 4: Move to chair/commode  JH-HLM Achieved: 5: Stand (1 or more minutes)  JH-HLM Goal achieved. Continue to encourage appropriate mobility.    Patient Centered Rounds: I performed bedside rounds with nursing staff today.   Discussions with Specialists or Other Care Team Provider: Nephrology    Education and Discussions with Family / Patient: Updated  (daughter) via phone.    Current Length of Stay: 5 day(s)  Current Patient Status: Inpatient   Certification Statement: The patient will continue to require additional inpatient hospital stay due to hyponatremia  Discharge Plan: Anticipate discharge in 24-48 hrs to discharge location to be determined pending rehab evaluations.    Code Status: Level 1 - Full Code    Subjective   Patient was seen and examined at bedside.  He was sleeping comfortably and waking up just prior to this time my examination.  He does not complain of any symptoms at this time.  He does not have any shortness of breath, chest pain, lightheadedness, headache.  He is currently on 15 L mid flow saturating in the upper 90%.  He denies any chills or diaphoresis.  He has been afebrile for more than 24 hours.    Objective :  Temp:  [98.9 °F (37.2 °C)-99.8 °F (37.7 °C)] 99.8 °F (37.7 °C)  HR:  [] 97  BP: (116-137)/(63-75) 131/71  Resp:  [16-30] 21  SpO2:  [89 %-94 %] 94 %  O2 Device: Mid flow nasal cannula  Nasal Cannula O2 Flow Rate (L/min):  [10 L/min-15 L/min] 10 L/min    Body mass index is 32.1 kg/m².     Input and Output Summary (last 24 hours):     Intake/Output Summary (Last 24 hours) at 10/21/2024 1717  Last data filed at 10/21/2024 1546  Gross per 24 hour   Intake 300 ml   Output 2037 ml   Net -1737 ml       Physical Exam  Constitutional:       General: He is not in acute distress.     Appearance: Normal appearance. He is obese. He is not toxic-appearing or diaphoretic.   HENT:      Head:  Normocephalic and atraumatic.      Comments: Poor dentition  Pulmonary:      Effort: No respiratory distress.      Breath sounds: Rhonchi present. No wheezing or rales.      Comments: Coarse breath sounds. On 15 L mid flow  Abdominal:      General: Bowel sounds are normal. There is no distension.      Palpations: Abdomen is soft.      Tenderness: There is no abdominal tenderness. There is no guarding or rebound.   Musculoskeletal:         General: No swelling. Normal range of motion.      Right lower leg: Edema (Trace) present.      Left lower leg: Edema (Trace) present.   Skin:     Capillary Refill: Capillary refill takes less than 2 seconds.      Findings: No erythema or rash.   Neurological:      General: No focal deficit present.      Mental Status: He is alert and oriented to person, place, and time.       Lines/Drains:      Lab Results: I have reviewed the following results:   Results from last 7 days   Lab Units 10/21/24  0620   WBC Thousand/uL 9.12   HEMOGLOBIN g/dL 10.3*   HEMATOCRIT % 31.9*   PLATELETS Thousands/uL 102*   SEGS PCT % 72   LYMPHO PCT % 11*   MONO PCT % 12   EOS PCT % 3     Results from last 7 days   Lab Units 10/21/24  1629 10/21/24  1056 10/21/24  0525   SODIUM mmol/L 132*   < > 130*   POTASSIUM mmol/L 3.9   < > 4.9   CHLORIDE mmol/L 97   < > 97   CO2 mmol/L 30   < > 28   BUN mg/dL 9   < > 9   CREATININE mg/dL 0.65   < > 0.62   ANION GAP mmol/L 5   < > 5   CALCIUM mg/dL 8.9   < > 8.1*   ALBUMIN g/dL  --   --  2.8*   TOTAL BILIRUBIN mg/dL  --   --  1.27*   ALK PHOS U/L  --   --  242*   ALT U/L  --   --  30   AST U/L  --   --  76*   GLUCOSE RANDOM mg/dL 117   < > 126    < > = values in this interval not displayed.     Results from last 7 days   Lab Units 10/21/24  0620   INR  1.37*             Results from last 7 days   Lab Units 10/18/24  0634 10/16/24  1528   LACTIC ACID mmol/L  --  1.9   PROCALCITONIN ng/ml 0.10  --        Recent Cultures (last 7 days):   Results from last 7 days   Lab  Units 10/16/24  1728 10/16/24  1609 10/16/24  1533 10/16/24  1530 10/16/24  1119   BLOOD CULTURE   --   --  No Growth After 4 Days. No Growth After 4 Days. No Growth After 5 Days.  No Growth After 5 Days.   SPUTUM CULTURE  4+ Growth of  --   --   --   --    GRAM STAIN RESULT  1+ Epithelial cells per low power field*  No polys seen*  1+ Gram positive cocci in pairs*  Rare Gram negative rods*  Rare Gram positive rods*  --   --   --   --    LEGIONELLA URINARY ANTIGEN   --  Negative  --   --   --              Last 24 Hours Medication List:     Current Facility-Administered Medications:     acetaminophen (TYLENOL) tablet 650 mg, Q6H PRN    [START ON 10/22/2024] amLODIPine (NORVASC) tablet 10 mg, Daily    aspirin chewable tablet 81 mg, Daily    atorvastatin (LIPITOR) tablet 20 mg, Daily    chlorhexidine (PERIDEX) 0.12 % oral rinse 15 mL, Q12H JUAN    cyanocobalamin (VITAMIN B-12) tablet 1,000 mcg, Daily    dexmedeTOMIDine (Precedex) 400 mcg in sodium chloride 0.9% 100 mL, Titrated, Last Rate: Stopped (10/19/24 1206)    enoxaparin (LOVENOX) subcutaneous injection 40 mg, Daily    folic acid (FOLVITE) tablet 1 mg, Daily    furosemide (LASIX) injection 20 mg, BID (diuretic)    ipratropium-albuterol (DUO-NEB) 0.5-2.5 mg/3 mL inhalation solution 3 mL, Q6H PRN    mirtazapine (REMERON) tablet 15 mg, HS    multivitamin-minerals (CENTRUM) tablet 1 tablet, Daily    nicotine (NICODERM CQ) 21 mg/24 hr TD 24 hr patch 21 mg, Daily    ondansetron (ZOFRAN) injection 4 mg, Q8H PRN    pantoprazole (PROTONIX) EC tablet 40 mg, Early Morning    polyethylene glycol (MIRALAX) packet 17 g, Daily    [COMPLETED] thiamine (VITAMIN B1) 500 mg in sodium chloride 0.9 % 50 mL IVPB, TID, Last Rate: Stopped (10/19/24 1000) **FOLLOWED BY** thiamine (VITAMIN B1) 250 mg in sodium chloride 0.9 % 50 mL IVPB, TID, Last Rate: 250 mg (10/21/24 1639) **FOLLOWED BY** [START ON 10/22/2024] thiamine (VITAMIN B1) 100 mg in sodium chloride 0.9 % 50 mL IVPB, TID  **FOLLOWED BY** [START ON 10/26/2024] thiamine tablet 100 mg, Daily    trimethobenzamide (TIGAN) IM injection 200 mg, Q6H PRN    Administrative Statements   Today, Patient Was Seen By: Duyen George DO      **Please Note: This note may have been constructed using a voice recognition system.**

## 2024-10-22 ENCOUNTER — APPOINTMENT (INPATIENT)
Dept: RADIOLOGY | Facility: HOSPITAL | Age: 54
DRG: 720 | End: 2024-10-22
Payer: COMMERCIAL

## 2024-10-22 PROBLEM — I10 HTN (HYPERTENSION): Status: ACTIVE | Noted: 2024-10-22

## 2024-10-22 PROBLEM — E53.8 FOLATE DEFICIENCY: Status: ACTIVE | Noted: 2024-10-22

## 2024-10-22 PROBLEM — J96.01 ACUTE HYPOXIC RESPIRATORY FAILURE (HCC): Status: ACTIVE | Noted: 2024-10-16

## 2024-10-22 PROBLEM — R33.9 URINARY RETENTION: Status: ACTIVE | Noted: 2024-10-22

## 2024-10-22 PROBLEM — A41.9 SEPSIS DUE TO PNEUMONIA (HCC): Status: ACTIVE | Noted: 2024-10-22

## 2024-10-22 PROBLEM — D64.9 ANEMIA: Status: ACTIVE | Noted: 2024-10-22

## 2024-10-22 PROBLEM — J18.9 SEPSIS DUE TO PNEUMONIA (HCC): Status: ACTIVE | Noted: 2024-10-22

## 2024-10-22 PROBLEM — E53.8 VITAMIN B12 DEFICIENCY: Status: ACTIVE | Noted: 2024-10-22

## 2024-10-22 PROBLEM — Z91.89 AT RISK FOR ACID-BASE IMBALANCE: Status: ACTIVE | Noted: 2024-10-22

## 2024-10-22 PROBLEM — Z91.89 ELECTROLYTE IMBALANCE RISK: Status: ACTIVE | Noted: 2024-10-22

## 2024-10-22 LAB
ANION GAP SERPL CALCULATED.3IONS-SCNC: 4 MMOL/L (ref 4–13)
ANION GAP SERPL CALCULATED.3IONS-SCNC: 6 MMOL/L (ref 4–13)
BASOPHILS # BLD AUTO: 0.09 THOUSANDS/ΜL (ref 0–0.1)
BASOPHILS NFR BLD AUTO: 1 % (ref 0–1)
BUN SERPL-MCNC: 9 MG/DL (ref 5–25)
BUN SERPL-MCNC: 9 MG/DL (ref 5–25)
CALCIUM SERPL-MCNC: 8.4 MG/DL (ref 8.4–10.2)
CALCIUM SERPL-MCNC: 8.6 MG/DL (ref 8.4–10.2)
CHLORIDE SERPL-SCNC: 97 MMOL/L (ref 96–108)
CHLORIDE SERPL-SCNC: 98 MMOL/L (ref 96–108)
CO2 SERPL-SCNC: 30 MMOL/L (ref 21–32)
CO2 SERPL-SCNC: 30 MMOL/L (ref 21–32)
CREAT SERPL-MCNC: 0.67 MG/DL (ref 0.6–1.3)
CREAT SERPL-MCNC: 0.73 MG/DL (ref 0.6–1.3)
EOSINOPHIL # BLD AUTO: 0.29 THOUSAND/ΜL (ref 0–0.61)
EOSINOPHIL NFR BLD AUTO: 3 % (ref 0–6)
ERYTHROCYTE [DISTWIDTH] IN BLOOD BY AUTOMATED COUNT: 16.6 % (ref 11.6–15.1)
GFR SERPL CREATININE-BSD FRML MDRD: 105 ML/MIN/1.73SQ M
GFR SERPL CREATININE-BSD FRML MDRD: 108 ML/MIN/1.73SQ M
GLUCOSE SERPL-MCNC: 112 MG/DL (ref 65–140)
GLUCOSE SERPL-MCNC: 116 MG/DL (ref 65–140)
HCT VFR BLD AUTO: 30.8 % (ref 36.5–49.3)
HGB BLD-MCNC: 9.9 G/DL (ref 12–17)
IMM GRANULOCYTES # BLD AUTO: 0.05 THOUSAND/UL (ref 0–0.2)
IMM GRANULOCYTES NFR BLD AUTO: 1 % (ref 0–2)
LYMPHOCYTES # BLD AUTO: 1.23 THOUSANDS/ΜL (ref 0.6–4.47)
LYMPHOCYTES NFR BLD AUTO: 14 % (ref 14–44)
MCH RBC QN AUTO: 28 PG (ref 26.8–34.3)
MCHC RBC AUTO-ENTMCNC: 32.1 G/DL (ref 31.4–37.4)
MCV RBC AUTO: 87 FL (ref 82–98)
MONOCYTES # BLD AUTO: 0.98 THOUSAND/ΜL (ref 0.17–1.22)
MONOCYTES NFR BLD AUTO: 12 % (ref 4–12)
NEUTROPHILS # BLD AUTO: 5.88 THOUSANDS/ΜL (ref 1.85–7.62)
NEUTS SEG NFR BLD AUTO: 69 % (ref 43–75)
NRBC BLD AUTO-RTO: 0 /100 WBCS
PLATELET # BLD AUTO: 155 THOUSANDS/UL (ref 149–390)
PMV BLD AUTO: 10.3 FL (ref 8.9–12.7)
POTASSIUM SERPL-SCNC: 3.9 MMOL/L (ref 3.5–5.3)
POTASSIUM SERPL-SCNC: 4.1 MMOL/L (ref 3.5–5.3)
RBC # BLD AUTO: 3.54 MILLION/UL (ref 3.88–5.62)
SODIUM SERPL-SCNC: 132 MMOL/L (ref 135–147)
SODIUM SERPL-SCNC: 133 MMOL/L (ref 135–147)
WBC # BLD AUTO: 8.52 THOUSAND/UL (ref 4.31–10.16)

## 2024-10-22 PROCEDURE — 87505 NFCT AGENT DETECTION GI: CPT

## 2024-10-22 PROCEDURE — 97535 SELF CARE MNGMENT TRAINING: CPT

## 2024-10-22 PROCEDURE — 94760 N-INVAS EAR/PLS OXIMETRY 1: CPT

## 2024-10-22 PROCEDURE — 71045 X-RAY EXAM CHEST 1 VIEW: CPT

## 2024-10-22 PROCEDURE — 99232 SBSQ HOSP IP/OBS MODERATE 35: CPT

## 2024-10-22 PROCEDURE — 99232 SBSQ HOSP IP/OBS MODERATE 35: CPT | Performed by: INTERNAL MEDICINE

## 2024-10-22 PROCEDURE — 80048 BASIC METABOLIC PNL TOTAL CA: CPT | Performed by: INTERNAL MEDICINE

## 2024-10-22 PROCEDURE — 85025 COMPLETE CBC W/AUTO DIFF WBC: CPT

## 2024-10-22 PROCEDURE — 80048 BASIC METABOLIC PNL TOTAL CA: CPT

## 2024-10-22 RX ORDER — ESCITALOPRAM OXALATE 20 MG/1
20 TABLET ORAL DAILY
Status: DISCONTINUED | OUTPATIENT
Start: 2024-10-22 | End: 2024-10-28 | Stop reason: HOSPADM

## 2024-10-22 RX ORDER — POTASSIUM CHLORIDE 1500 MG/1
40 TABLET, EXTENDED RELEASE ORAL ONCE
Status: COMPLETED | OUTPATIENT
Start: 2024-10-22 | End: 2024-10-22

## 2024-10-22 RX ORDER — FUROSEMIDE 10 MG/ML
40 INJECTION INTRAMUSCULAR; INTRAVENOUS
Status: DISCONTINUED | OUTPATIENT
Start: 2024-10-22 | End: 2024-10-27

## 2024-10-22 RX ORDER — AMIODARONE HYDROCHLORIDE 150 MG/3ML
INJECTION, SOLUTION INTRAVENOUS
Status: DISPENSED
Start: 2024-10-22 | End: 2024-10-22

## 2024-10-22 RX ADMIN — ATORVASTATIN CALCIUM 20 MG: 20 TABLET, FILM COATED ORAL at 09:47

## 2024-10-22 RX ADMIN — CHLORHEXIDINE GLUCONATE 15 ML: 1.2 RINSE ORAL at 09:46

## 2024-10-22 RX ADMIN — POTASSIUM CHLORIDE 40 MEQ: 1500 TABLET, EXTENDED RELEASE ORAL at 06:33

## 2024-10-22 RX ADMIN — FUROSEMIDE 40 MG: 10 INJECTION, SOLUTION INTRAMUSCULAR; INTRAVENOUS at 15:49

## 2024-10-22 RX ADMIN — FOLIC ACID 1 MG: 1 TABLET ORAL at 09:47

## 2024-10-22 RX ADMIN — THIAMINE HYDROCHLORIDE 100 MG: 100 INJECTION, SOLUTION INTRAMUSCULAR; INTRAVENOUS at 16:57

## 2024-10-22 RX ADMIN — FUROSEMIDE 20 MG: 10 INJECTION, SOLUTION INTRAMUSCULAR; INTRAVENOUS at 09:51

## 2024-10-22 RX ADMIN — MIRTAZAPINE 15 MG: 15 TABLET, FILM COATED ORAL at 21:00

## 2024-10-22 RX ADMIN — ENOXAPARIN SODIUM 40 MG: 40 INJECTION SUBCUTANEOUS at 09:48

## 2024-10-22 RX ADMIN — MULTIPLE VITAMINS W/ MINERALS TAB 1 TABLET: TAB ORAL at 09:46

## 2024-10-22 RX ADMIN — CYANOCOBALAMIN TAB 500 MCG 1000 MCG: 500 TAB at 09:47

## 2024-10-22 RX ADMIN — ASPIRIN 81 MG CHEWABLE TABLET 81 MG: 81 TABLET CHEWABLE at 09:46

## 2024-10-22 RX ADMIN — ESCITALOPRAM OXALATE 20 MG: 20 TABLET ORAL at 15:50

## 2024-10-22 RX ADMIN — CHLORHEXIDINE GLUCONATE 15 ML: 1.2 RINSE ORAL at 20:59

## 2024-10-22 RX ADMIN — THIAMINE HYDROCHLORIDE 250 MG: 100 INJECTION, SOLUTION INTRAMUSCULAR; INTRAVENOUS at 12:24

## 2024-10-22 RX ADMIN — NICOTINE 21 MG: 21 PATCH, EXTENDED RELEASE TRANSDERMAL at 09:48

## 2024-10-22 RX ADMIN — THIAMINE HYDROCHLORIDE 100 MG: 100 INJECTION, SOLUTION INTRAMUSCULAR; INTRAVENOUS at 20:59

## 2024-10-22 NOTE — PROGRESS NOTES
Progress Note - Nephrology   Name: Viktor Farfan 54 y.o. male I MRN: [id number]  Unit/Bed#: S -01 I Date of Admission: 10/16/2024   Date of Service: 10/22/2024 I Hospital Day: 6    Assessment & Plan  Hyponatremia  Etiology-multifactorial in setting of beer [place] sap[place], alcohol abuse, poor solute intake.  Also there is NSAID use which is likely contributing.     - Admission sodium 101 and repeat was 99 at 11 AM on 10/16 (received hypertonic 3% saline)  - Prior baseline sodium mid 130s  - Serum osmolarity-233  - Urine osmolarity-347  - Urine sodium-21  - TSH-normal  - Imaging-CT of the chest with patchy multifocal consolidation of upper lobes and groundglass opacities     Course of stay-initially received hypertonic 3% saline 150 cc.  Subsequently was started on 1.8% saline and was given DDAVP x 1 on 10/16 to prevent rapid correction.  Subsequently patient was started on Bumex twice daily 10/17 and free water restriction with improvement in sodium level appropriately to 107.  Subsequently sodium level evening of 10/17 was rising slightly rapidly requiring brief D5W and DDAVP X2 overnight into 10/18.  And on 10/18, Bumex was held due to brisk urine output and rapid rise in sodium day prior.  The rise in sodium was slowed appropriately to 115 on 10/18.  By 10/19, sodium level is rising appropriately to 121.  Was still volume overloaded and received Lasix.  And by 10/20, sodium levels improving appropriately to 125 and in the evening was 129.  On 10/21, sodium level rising to 131 and when corrected for blood sugar is approximately 1 31-1 32.  On 10/22, sodium level is improving appropriately to 133.  Potassium and bicarbonate are appropriate.  Creatinine remains at baseline 0.6 mg/dL.     Plan  - I/os; avoid nephrotoxic agents  - Avoid hypotension  -Can change lab work to daily  - Continue Lasix and no objection to titrate higher if needed  - Consider rechecking chest x-ray  - Check urine protein creatinine ratio  -  Continue liberalize free water intake for now  - Consider checking ABG given lethargy    At risk for acid-base imbalance  - Initiate respiratory alkalosis likely in setting of alcohol abuse and liver disease   Electrolyte imbalance risk  hyponatremia as above. Potassium is stable.   SOB (shortness of breath)  - Initially there was concern for pneumonia and pulmonary edema  - Requiring oxygen  - Diuresis on board  Alcohol abuse  - Per primary team. Patient needs to cease alcohol abuse. Monitor for withdrawal per primary team   Thrombocytopenia (HCC)  likely in setting of alcohol abuse liver disease. Per primary team.   Transaminitis  -Per primary team  HTN (hypertension)  - change hold parameters on amlodipine to avoid hypotension.     I have reviewed the nephrology recommendations including liberalize free water intake orally., with primary team resident, and we are in agreement with renal plan including the information outlined above.     Subjective   Brief History of Admission - 54-year-old male with a past medical history of alcohol abuse, anxiety, GERD, elevated BMI who initially presents with shortness of breath, cough, confusion, malaise, poor p.o. intake, decreased urine output. Was initially noted to have a sodium level of 101 and repeat was 99. Was given 3% hypertonic saline at [facility] and transferred to [facility] for further evaluation     Still requiring 10 L mid flow.  Blood pressures 1 16-1 26.  Mid flow requirements are improved from yesterday.  Urine output 1.8 L with 1 time not recorded.  Did require straight catheterization yesterday.  Net -10 L this admission.    Objective :  Temp:  [98 °F (36.7 °C)-99.8 °F (37.7 °C)] 98 °F (36.7 °C)  HR:  [] 95  BP: (116-132)/(64-75) 128/72  Resp:  [17-22] 17  SpO2:  [90 %-95 %] 93 %  O2 Device: Mid flow nasal cannula  Nasal Cannula O2 Flow Rate (L/min):  [10 L/min] 10 L/min    Current Weight: Weight - Scale: 113 kg (250 lb)  First  Weight: Weight - Scale: 126 kg (278 lb 14.1 oz)  I/O         10/20 0701  10/21 0700 10/21 0701  10/22 0700 10/22 0701  10/23 0700    P.O. 400 90     I.V. (mL/kg)  90 (0.8)     IV Piggyback 80 280     Total Intake(mL/kg) 480 (4.2) 460 (4.1)     Urine (mL/kg/hr) 1630 (0.6) 1832 (0.7)     Stool 0 0     Total Output 1630 1832     Net -1150 -1372            Unmeasured Urine Occurrence  1 x     Unmeasured Stool Occurrence 2 x 5 x           Physical Exam  General: NAD but appears lethargic  Skin: no rash  Eyes: anicteric sclera  ENT: Dry mucous membrane  Neck: supple  Chest: CTA b/l, no ronchii, no wheeze, no rubs, no rales but diminished intake bases  CVS: s1s2, no murmur, no gallop, no rub  Abdomen: soft, nontender, nl sounds, distended  Extremities: 1+ edema LE b/l  : no shah  Neuro: AAOX3  Psych: normal affect    Medications:    Current Facility-Administered Medications:     acetaminophen (TYLENOL) tablet 650 mg, 650 mg, Oral, Q6H PRN, Shreyan George, DO, 650 mg at 10/20/24 2128    amiodarone 150 mg/3 mL injection **ADS Override Pull**, , , ,     amLODIPine (NORVASC) tablet 10 mg, 10 mg, Oral, Daily, Shreyan George, DO    aspirin chewable tablet 81 mg, 81 mg, Oral, Daily, Shreyan George, DO, 81 mg at 10/22/24 0946    atorvastatin (LIPITOR) tablet 20 mg, 20 mg, Oral, Daily, Shreyan George, DO, 20 mg at 10/22/24 0947    chlorhexidine (PERIDEX) 0.12 % oral rinse 15 mL, 15 mL, Mouth/Throat, Q12H JUAN, Shreyan George, DO, 15 mL at 10/22/24 0946    cyanocobalamin (VITAMIN B-12) tablet 1,000 mcg, 1,000 mcg, Oral, Daily, Shreyan George, DO, 1,000 mcg at 10/22/24 0947    enoxaparin (LOVENOX) subcutaneous injection 40 mg, 40 mg, Subcutaneous, Daily, Duyen George DO, 40 mg at 10/22/24 0948    folic acid (FOLVITE) tablet 1 mg, 1 mg, Oral, Daily, Duyen George DO, 1 mg at 10/22/24 0947    furosemide (LASIX) injection 20 mg, 20 mg, Intravenous, BID (diuretic), Duyen George DO, 20 mg at 10/22/24 0951    ipratropium-albuterol  (DUO-NEB) 0.5-2.5 mg/3 mL inhalation solution 3 mL, 3 mL, Nebulization, Q6H PRN, Shreyan George, DO, 3 mL at 10/19/24 1432    mirtazapine (REMERON) tablet 15 mg, 15 mg, Oral, HS, Shreyan George, DO, 15 mg at 10/21/24 2056    multivitamin-minerals (CENTRUM) tablet 1 tablet, 1 tablet, Oral, Daily, Shreyan George, DO, 1 tablet at 10/22/24 0946    nicotine (NICODERM CQ) 21 mg/24 hr TD 24 hr patch 21 mg, 21 mg, Transdermal, Daily, Shreyan George, DO, 21 mg at 10/22/24 0948    ondansetron (ZOFRAN) injection 4 mg, 4 mg, Intravenous, Q8H PRN, Shreyan George, DO    pantoprazole (PROTONIX) EC tablet 40 mg, 40 mg, Oral, Early Morning, Shreyan George, DO, 40 mg at 10/21/24 0609    polyethylene glycol (MIRALAX) packet 17 g, 17 g, Oral, Daily, Shreyan George, DO, 17 g at 10/21/24 0836    [COMPLETED] thiamine (VITAMIN B1) 500 mg in sodium chloride 0.9 % 50 mL IVPB, 500 mg, Intravenous, TID, Stopped at 10/19/24 1000 **FOLLOWED BY** thiamine (VITAMIN B1) 250 mg in sodium chloride 0.9 % 50 mL IVPB, 250 mg, Intravenous, TID, Stopped at 10/21/24 2130 **FOLLOWED BY** thiamine (VITAMIN B1) 100 mg in sodium chloride 0.9 % 50 mL IVPB, 100 mg, Intravenous, TID **FOLLOWED BY** [START ON 10/26/2024] thiamine tablet 100 mg, 100 mg, Oral, Daily, Shreyan George, DO    trimethobenzamide (TIGAN) IM injection 200 mg, 200 mg, Intramuscular, Q6H PRN, Shreyan George, DO      Lab Results: I have reviewed the following results:  Results from last 7 days   Lab Units 10/22/24  0828 10/22/24  0423 10/22/24  0012 10/21/24  1929 10/21/24  1629 10/21/24  1056 10/21/24  0620 10/21/24  0525 10/20/24  2340 10/20/24  1054 10/20/24  0345 10/19/24  0403 10/19/24  0402 10/18/24  0634 10/18/24  0407 10/17/24  0756 10/17/24  0444 10/16/24  2022 10/16/24  1528 10/16/24  1354 10/16/24  1141   WBC Thousand/uL  --  8.52  --   --   --   --  9.12  --   --   --  9.76  --  9.02  --  8.70  --  10.17*  --  7.94  --   --    HEMOGLOBIN g/dL  --  9.9*  --   --   --   --  10.3*  --   --    "--  10.0*  --  10.2*  --  10.3*  --  10.9*  --  11.1*  --   --    HEMATOCRIT %  --  30.8*  --   --   --   --  31.9*  --   --   --  30.0*  --  29.8*  --  29.5*  --  30.7*  --  31.1*  --   --    PLATELETS Thousands/uL  --  155  --   --   --   --  102*  --   --   --  107*  --  95*  --  99*  --  103*  --  105*  --   --    POTASSIUM mmol/L 4.1  --  3.9 3.9 3.9 3.8  --  4.9 4.0   < > 4.0   < >  --    < >  --    < > 4.6   < > 5.0   < > 5.3   CHLORIDE mmol/L 97  --  98 96 97 97  --  97 97   < > 94*   < >  --    < >  --    < > 81*   < > 76*   < > 74*   CO2 mmol/L 30  --  30 28 30 29  --  28 26   < > 26   < >  --    < >  --    < > 19*   < > 16*   < > 17*   BUN mg/dL 9  --  9 9 9 9  --  9 10   < > 6   < >  --    < >  --    < > 10   < > 7   < > 7   CREATININE mg/dL 0.67  --  0.73 0.80 0.65 0.66  --  0.62 0.73   < > 0.71   < >  --    < >  --    < > 0.76   < > 0.67   < > 0.75   CALCIUM mg/dL 8.4  --  8.6 8.5 8.9 8.4  --  8.1* 8.2*   < > 7.9*   < >  --    < >  --    < > 7.9*   < > 8.1*   < > 7.9*   MAGNESIUM mg/dL  --   --   --   --   --   --   --  2.1  --   --  2.1  --  2.1  --  1.9  --  1.8*  --  1.9  --   --    PHOSPHORUS mg/dL  --   --   --   --   --   --   --  3.4  --   --  3.3  --  2.4*  --  2.0*  --  2.8  --  2.4*  --   --    ALBUMIN g/dL  --   --   --   --   --   --   --  2.8*  --   --  2.9*  --  3.1*  --  3.1*  --  3.4*  --  3.5  --  3.3*    < > = values in this interval not displayed.       Administrative Statements     Portions of the record may have been created with voice recognition software. Occasional wrong word or \"sound a like\" substitutions may have occurred due to the inherent limitations of voice recognition software. Read the chart carefully and recognize, using context, where substitutions have occurred.If you have any questions, please contact the dictating provider.  "

## 2024-10-22 NOTE — ASSESSMENT & PLAN NOTE
Component of possible infection and hypervolemia  CT with no PE but unable fully assess segmental/subsegmental arteries due to motion artifact. +bilat upper lobe consolidations.  Off abx  Continue Lasix 20 mg IV twice daily  Continues to require 10L MFNC O2 despite negative fluid balance (-10L total)  Management per primary team

## 2024-10-22 NOTE — ASSESSMENT & PLAN NOTE
Lab Results   Component Value Date    HGB 9.9 (L) 10/22/2024    HGB 10.3 (L) 10/21/2024    HGB 10.0 (L) 10/20/2024   Baseline is 10-11  Microcytic  No active bleeding  Iron panel - iron 123, ferritin 62, TIBC 384  Vitamin B12 330, folate 5.9  Suspect secondary to bone marrow suppression from alcohol use

## 2024-10-22 NOTE — ASSESSMENT & PLAN NOTE
Platelets 102 today, stable  Monitor daily  No active bleeding  Likely secondary to bone marrow suppression from alcohol use

## 2024-10-22 NOTE — PROGRESS NOTES
Progress Note - Hospitalist   Name: Viktor Farfan 54 y.o. male I MRN: [id number]  Unit/Bed#: S -01 I Date of Admission: 10/16/2024   Date of Service: 10/22/2024 I Hospital Day: 6    Assessment & Plan  Acute hypoxic respiratory failure (HCC)  Secondary to iatrogenic volume overload in the setting from trying to correct hyponatremia  Currently on 10 L MFNC    Plan:  Repeat CXR  Current diuresis with IV Lasix 20 mg BID. Increase to Lasix 40 mg BID  Incentive spirometry  Encourage out of bed  Wean oxygen as tolerated  Hyponatremia  Lab Results   Component Value Date    SODIUM 133 (L) 10/22/2024    SODIUM 132 (L) 10/22/2024    SODIUM 132 (L) 10/21/2024     Presented to [facility] with sodium 99. Transferred to ICU [place].  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  Did initially overcorrect and receive DDAVP and D5W.  Getting IV Lasix to help with volume status  Received a maximum dose of phenobarbital and 1 dose of Valium for agitation  Downgraded from ICU on 10/20 with sodium 127    Plan:  Nephrology following, appreciate recommendations  Stable from nephrology perspective  BMP in the morning  Alcohol abuse  Drinks 12 pack beer plus several glasses of rum and coke daily  Catch program upon discharge  Tobacco abuse  Smokes 1 pack/day  Encourage smoking cessation  Thrombocytopenia (HCC)  Platelets 102 today, stable  Monitor daily  No active bleeding  Likely secondary to bone marrow suppression from alcohol use  Transaminitis  AST 76, ALT 30, , T bilirubin 1.27.  Stable  Monitor daily  Pneumonia  Met sepsis criteria  With acute hypoxic respiratory failure.  Currently on 10 L midlfow  CTA chest - multifocal bilateral upper lobe consolidation which could be due to pneumonia  Blood cultures - no growth after 4 days  Sputum culture - mixed respiratory alexandra  Completed 5-day course of ceftriaxone from 10/16 to 10/20  CXR (10/16) - bilateral consolidation, slightly  increased in the right side since 10/16/2024.  Could be edema or pneumonia    Plan:  Repeat CXR  Incentive spirometry  Encourage out of bed  Wean oxygen as tolerated  HTN (hypertension)  Continue home amlodipine 10 mg  Anemia  Lab Results   Component Value Date    HGB 9.9 (L) 10/22/2024    HGB 10.3 (L) 10/21/2024    HGB 10.0 (L) 10/20/2024   Baseline is 10-11  Microcytic  No active bleeding  Iron panel - iron 123, ferritin 62, TIBC 384  Vitamin B12 330, folate 5.9  Suspect secondary to bone marrow suppression from alcohol use  Vitamin B12 deficiency  Vitamin B12 level 330  Secondary to alcohol use  Continue oral vitamin B12 1000 mcg supplementation  Folate deficiency  Folate level 5.9  Continue oral folic acid supplementation  Urinary retention  Required intermittent straight cath  Urinary retention protocol  Sepsis due to pneumonia (HCC)  See plan under pneumonia    VTE Pharmacologic Prophylaxis: VTE Score: 2 Moderate Risk (Score 3-4) - Pharmacological DVT Prophylaxis Ordered: enoxaparin (Lovenox).    Mobility:   Basic Mobility Inpatient Raw Score: 15  -HLM Goal: 4: Move to chair/commode  JH-HLM Achieved: 5: Stand (1 or more minutes)  JH-HLM Goal achieved. Continue to encourage appropriate mobility.    Patient Centered Rounds: I performed bedside rounds with nursing staff today.   Discussions with Specialists or Other Care Team Provider: Nephrology    Education and Discussions with Family / Patient: Updated  (daughter) via phone.    Current Length of Stay: 6 day(s)  Current Patient Status: Inpatient   Certification Statement: The patient will continue to require additional inpatient hospital stay due to acute hypoxic respiratory failure requiring up to 10L MFNC  Discharge Plan: Anticipate discharge in 24-48 hrs to discharge location to be determined pending rehab evaluations.    Code Status: Level 1 - Full Code    Subjective   Overnight, patient remained on 10 L MFNC.  He has diuresed -1.372 L in  the last 24 hours.  He has diuresed -10.2 L since admission.  Patient was seen and examined at bedside.  He was sleeping comfortably at the time my examination.  He appears to be in heavy sleeper but awoke and nodded no to having any shortness of breath or pain anywhere.    Objective :  Temp:  [98 °F (36.7 °C)-99.6 °F (37.6 °C)] 98.3 °F (36.8 °C)  HR:  [] 89  BP: (116-132)/(64-75) 124/72  Resp:  [13-22] 13  SpO2:  [90 %-95 %] 95 %  O2 Device: Mid flow nasal cannula  Nasal Cannula O2 Flow Rate (L/min):  [10 L/min] 10 L/min    Body mass index is 32.1 kg/m².     Input and Output Summary (last 24 hours):     Intake/Output Summary (Last 24 hours) at 10/22/2024 1520  Last data filed at 10/22/2024 1420  Gross per 24 hour   Intake 160 ml   Output 2332 ml   Net -2172 ml       Physical Exam  Constitutional:       General: He is not in acute distress.     Appearance: Normal appearance. He is obese. He is not toxic-appearing or diaphoretic.   HENT:      Head: Normocephalic and atraumatic.      Comments: Poor dentition  Pulmonary:      Effort: No respiratory distress.      Breath sounds: Rhonchi present. No wheezing or rales.      Comments: Coarse breath sounds. On 10 L mid flow  Abdominal:      General: Bowel sounds are normal. There is no distension.      Palpations: Abdomen is soft.      Tenderness: There is no abdominal tenderness. There is no guarding or rebound.   Musculoskeletal:         General: Swelling present. Normal range of motion.      Right lower leg: Edema present.      Left lower leg: Edema present.   Skin:     Capillary Refill: Capillary refill takes less than 2 seconds.      Findings: No erythema or rash.   Neurological:      General: No focal deficit present.      Mental Status: He is alert and oriented to person, place, and time.       Lines/Drains:        Lab Results: I have reviewed the following results:   Results from last 7 days   Lab Units 10/22/24  0423   WBC Thousand/uL 8.52   HEMOGLOBIN g/dL  9.9*   HEMATOCRIT % 30.8*   PLATELETS Thousands/uL 155   SEGS PCT % 69   LYMPHO PCT % 14   MONO PCT % 12   EOS PCT % 3     Results from last 7 days   Lab Units 10/22/24  0828 10/21/24  1056 10/21/24  0525   SODIUM mmol/L 133*   < > 130*   POTASSIUM mmol/L 4.1   < > 4.9   CHLORIDE mmol/L 97   < > 97   CO2 mmol/L 30   < > 28   BUN mg/dL 9   < > 9   CREATININE mg/dL 0.67   < > 0.62   ANION GAP mmol/L 6   < > 5   CALCIUM mg/dL 8.4   < > 8.1*   ALBUMIN g/dL  --   --  2.8*   TOTAL BILIRUBIN mg/dL  --   --  1.27*   ALK PHOS U/L  --   --  242*   ALT U/L  --   --  30   AST U/L  --   --  76*   GLUCOSE RANDOM mg/dL 116   < > 126    < > = values in this interval not displayed.     Results from last 7 days   Lab Units 10/21/24  0620   INR  1.37*             Results from last 7 days   Lab Units 10/18/24  0634 10/16/24  1528   LACTIC ACID mmol/L  --  1.9   PROCALCITONIN ng/ml 0.10  --        Recent Cultures (last 7 days):   Results from last 7 days   Lab Units 10/16/24  1728 10/16/24  1609 10/16/24  1533 10/16/24  1530 10/16/24  1119   BLOOD CULTURE   --   --  No Growth After 5 Days. No Growth After 5 Days. No Growth After 5 Days.  No Growth After 5 Days.   SPUTUM CULTURE  4+ Growth of  --   --   --   --    GRAM STAIN RESULT  1+ Epithelial cells per low power field*  No polys seen*  1+ Gram positive cocci in pairs*  Rare Gram negative rods*  Rare Gram positive rods*  --   --   --   --    LEGIONELLA URINARY ANTIGEN   --  Negative  --   --   --              Last 24 Hours Medication List:     Current Facility-Administered Medications:     acetaminophen (TYLENOL) tablet 650 mg, Q6H PRN    amiodarone 150 mg/3 mL injection **ADS Override Pull**,     amLODIPine (NORVASC) tablet 10 mg, Daily    aspirin chewable tablet 81 mg, Daily    atorvastatin (LIPITOR) tablet 20 mg, Daily    chlorhexidine (PERIDEX) 0.12 % oral rinse 15 mL, Q12H JUAN    cyanocobalamin (VITAMIN B-12) tablet 1,000 mcg, Daily    enoxaparin (LOVENOX) subcutaneous  injection 40 mg, Daily    escitalopram (LEXAPRO) tablet 20 mg, Daily    folic acid (FOLVITE) tablet 1 mg, Daily    furosemide (LASIX) injection 40 mg, BID (diuretic)    ipratropium-albuterol (DUO-NEB) 0.5-2.5 mg/3 mL inhalation solution 3 mL, Q6H PRN    mirtazapine (REMERON) tablet 15 mg, HS    multivitamin-minerals (CENTRUM) tablet 1 tablet, Daily    nicotine (NICODERM CQ) 21 mg/24 hr TD 24 hr patch 21 mg, Daily    ondansetron (ZOFRAN) injection 4 mg, Q8H PRN    pantoprazole (PROTONIX) EC tablet 40 mg, Early Morning    [COMPLETED] thiamine (VITAMIN B1) 500 mg in sodium chloride 0.9 % 50 mL IVPB, TID, Last Rate: Stopped (10/19/24 1000) **FOLLOWED BY** [COMPLETED] thiamine (VITAMIN B1) 250 mg in sodium chloride 0.9 % 50 mL IVPB, TID, Last Rate: 250 mg (10/22/24 1224) **FOLLOWED BY** thiamine (VITAMIN B1) 100 mg in sodium chloride 0.9 % 50 mL IVPB, TID **FOLLOWED BY** [START ON 10/26/2024] thiamine tablet 100 mg, Daily    trimethobenzamide (TIGAN) IM injection 200 mg, Q6H PRN    Administrative Statements   Today, Patient Was Seen By: Duyen George DO      **Please Note: This note may have been constructed using a voice recognition system.**

## 2024-10-22 NOTE — ASSESSMENT & PLAN NOTE
Vitamin B12 level 330  Secondary to alcohol use  Continue oral vitamin B12 1000 mcg supplementation

## 2024-10-22 NOTE — ASSESSMENT & PLAN NOTE
-Lactic acid normal  -BC no growth x 5 days  -strep pneumonia, Legionella negative  -sputum culture mixed respiratory alexandra  -CT concerning for pulmonary process, possible pneumonia  -management per primary team

## 2024-10-22 NOTE — ASSESSMENT & PLAN NOTE
Etiology: Multifactorial likely secondary to EtOH, poor solute intake for 3 days and fluid overload   Sodium on admission: 101-->99 on 10/16/2024  Current sodium 133 mEq/L, appropriate rate of correction   Workup: Serum osm: 233, Urine osm: 347, Urine Sodium:21  Imaging:  CTC with patchy multifocal consolidation bilat upper lobes and GGO  Initially overcorrected received DDAVP and D5W      Continue Lasix 20 mg IV twice daily for now.  Possible transition to oral diuretics next 24-48 hours  Remain off fluid restriction  BMP Q 8 hours  Avoid overcorrection : no more than 6-8mEq in the next 24h, goal serum sodium no higher than 139-141 by tomorrow morning  Monitor urinary output   Will monitor labs, call renal if SNa+ <133 or >141  Normal renal function and creatinine remains at baseline  Risk of ODS:  EtOH   Severe hyponatremia   malnourishment: serum alb 3.1

## 2024-10-22 NOTE — ASSESSMENT & PLAN NOTE
Met sepsis criteria  With acute hypoxic respiratory failure.  Currently on 10 L midlfow  CTA chest - multifocal bilateral upper lobe consolidation which could be due to pneumonia  Blood cultures - no growth after 4 days  Sputum culture - mixed respiratory alexandra  Completed 5-day course of ceftriaxone from 10/16 to 10/20  CXR (10/16) - bilateral consolidation, slightly increased in the right side since 10/16/2024.  Could be edema or pneumonia    Plan:  Repeat CXR  Incentive spirometry  Encourage out of bed  Wean oxygen as tolerated

## 2024-10-22 NOTE — CASE MANAGEMENT
Case Management Discharge Planning Note    Patient name Viktor Farfan  Location S /S - MRN [id number]  : 1970 Date 10/22/2024       Current Admission Date: 10/16/2024  Current Admission Diagnosis:Hyponatremia   Patient Active Problem List    Diagnosis Date Noted Date Diagnosed    Pneumonia 10/21/2024     Thrombocytopenia (HCC) 10/19/2024     Transaminitis 10/19/2024     Hyponatremia 10/16/2024     SOB (shortness of breath) 10/16/2024     SIRS (systemic inflammatory response syndrome) (HCC) 10/16/2024     Alcohol abuse 10/16/2024     Tobacco abuse 10/16/2024     Primary osteoarthritis of left knee 01/10/2024     Chronic pain of left knee 2024       LOS (days): 6  Geometric Mean LOS (GMLOS) (days): 3.6  Days to GMLOS:-2.2     OBJECTIVE:  Risk of Unplanned Readmission Score: 13.97     Current admission status: Inpatient   Preferred Pharmacy:   [facility] [address] [address]  Phone: [phone] Fax: [phone]    [id number] - [place], [place] - [address]  [address] [address]  Phone: [phone] Fax: [phone]    Primary Care Provider: No primary care provider on file.    Primary Insurance: AMKAI  Secondary Insurance:     DISCHARGE DETAILS:    Discharge planning discussed with:: Pt's Linda meneses  Freedom of Choice: Yes  Comments - Freedom of Choice: [facility] preference vs sub acute    Contacts  Patient Contacts: Linda Meneses  Relationship to Patient:: Family  Contact Method: Phone  Reason/Outcome: Continuity of Care, Emergency Contact, Discharge Planning, Referral    Other Referral/Resources/Interventions Provided:  Interventions: Acute Rehab, Short Term Rehab  Referral Comments: CM spoke with pt's daughterJackie, to discuss STR. CM reviewed [facility] vs sub acute. Linda and her sister are employed with  and preference is for [facility] for Acute  rehab if pt is approved. CM reviewed if denied by [facility] or insurance would need to consider sub acute. She is agreeable to referrals for a secondary plan. Linda also interested in pt getting treatment for his alcohol abuse. CM did review that pt would need to be agreeable to treatment. Reviewed that PT/OT needs would need to be addressed first. Reviewed that [facility] with [facility] can f/u up with family/pt upon dc from [facility] for treatment. CM also reviewed if pt were to improve well enough to return home that [facility] can f/u with pt/family on options from the hospital. Linda also requested CM update pt's brother, James. CM did speak with James and updated him on above. Referrals placed in [facility].

## 2024-10-22 NOTE — ASSESSMENT & PLAN NOTE
- Per primary team. Patient needs to cease alcohol abuse. Monitor for withdrawal per primary team

## 2024-10-22 NOTE — PROGRESS NOTES
Progress Note - Nephrology   Name: Viktor Farfan 54 y.o. male I MRN: [id number]  Unit/Bed#: S -01 I Date of Admission: 10/16/2024   Date of Service: 10/22/2024 I Hospital Day: 6       54-year-old male with obesity, EtOH abuse p/w confusion and severe acute hyponatremia.  Nephrology consulted for management of severe hyponatremia.   Assessment & Plan  Hyponatremia  Etiology: Multifactorial likely secondary to EtOH, poor solute intake for 3 days and fluid overload   Sodium on admission: 101-->99 on 10/16/2024  Current sodium 133 mEq/L, appropriate rate of correction   Workup: Serum osm: 233, Urine osm: 347, Urine Sodium:21  Imaging:  CTC with patchy multifocal consolidation bilat upper lobes and GGO  Initially overcorrected received DDAVP and D5W      Continue Lasix 20 mg IV twice daily for now.  Possible transition to oral diuretics next 24-48 hours  Remain off fluid restriction  BMP Q 8 hours  Avoid overcorrection : no more than 6-8mEq in the next 24h, goal serum sodium no higher than 139-141 by tomorrow morning  Monitor urinary output   Will monitor labs, call renal if SNa+ <133 or >141  Normal renal function and creatinine remains at baseline  Risk of ODS:  EtOH   Severe hyponatremia   malnourishment: serum alb 3.1         SOB (shortness of breath)  Component of possible infection and hypervolemia  CT with no PE but unable fully assess segmental/subsegmental arteries due to motion artifact. +bilat upper lobe consolidations.  Off abx  Continue Lasix 20 mg IV twice daily  Continues to require 10L MFNC O2 despite negative fluid balance (-10L total)  Management per primary team    Alcohol abuse  On WA protocol   Report drinking 10-12 beers per night for years  Refusing treatment for alcohol abuse  Management per primary team  SIRS (systemic inflammatory response syndrome) (HCC)  -Lactic acid normal  -BC no growth x 5 days  -strep pneumonia, Legionella negative  -sputum culture mixed respiratory alexandra  -CT  concerning for pulmonary process, possible pneumonia  -management per primary team  Pneumonia  CTC as above with bilat consolidation and GGO  Completed 5 day course ceftriaxone  Pulmonary toilet  Management per primary team  Thrombocytopenia (HCC)  Stable and improving  Platelets 155,000  Continue to monitor  Transaminitis  Stable  Consistent with ETOH use  Management per primary team    Plan Summary:  -serum sodium stable and stable from nephrology standpoint  -continue lasix 20 mg IV BID for now for volume management  -BMP q 8 hrs today    SUBJECTIVE:  Pt lethargic, difficult to arouse this am.  Serum sodium stable at 133, appropriate rate of correction.  Neg fluid balance with adequate diuretic response but continues to require 10L MF NCO2. VSS    A complete review of systems was performed. Pertinent positives and negatives noted in the HPI. Otherwise the review of systems was negative.  OBJECTIVE:  Current Weight: Weight - Scale: 113 kg (250 lb)  Vitals:    10/22/24 0118 10/22/24 0532 10/22/24 0745 10/22/24 0812   BP:  116/75  128/72   BP Location:  Right arm     Pulse:  95  95   Resp:  22  17   Temp:  98.5 °F (36.9 °C)  98 °F (36.7 °C)   TempSrc:  Axillary     SpO2: 90% 92% 90% 93%   Weight:       Height:           Intake/Output Summary (Last 24 hours) at 10/22/2024 1143  Last data filed at 10/22/2024 0020  Gross per 24 hour   Intake 380 ml   Output 1832 ml   Net -1452 ml       General:  lethargic appears comfortable and in no acute distress.  .  Skin:  No rash, warm, good skin turgor   Eyes:  PERRL, EOMI, sclerae nonicteric.  no periorbital edema   ENT:  Moist mucous membranes  Neck:  Trachea midline, symmetric.  No JVD.  Chest:  few scattered rhonchi  CVS:  Regular rate and rhythm without murmur, gallop or rub.  S1 and S2 identified and normal.  No S3, S4.   Abdomen:  Soft, nontender, nondistended without masses.  Normal bowel sounds x 4 quadrants.  No bruit.  Extremities:  Warm, pink, motor and sensory  intact and well perfused.  No cyanosis, pallor.  trace BLE edema.  Neuro:  lethargic, difficult to arous.  Grossly intact  Psych:  lethargic      Medications:    Current Facility-Administered Medications:     acetaminophen (TYLENOL) tablet 650 mg, 650 mg, Oral, Q6H PRN, Shreyan George, DO, 650 mg at 10/20/24 2128    amiodarone 150 mg/3 mL injection **ADS Override Pull**, , , ,     amLODIPine (NORVASC) tablet 10 mg, 10 mg, Oral, Daily, Shreyan George, DO    aspirin chewable tablet 81 mg, 81 mg, Oral, Daily, Shreyan George, DO, 81 mg at 10/22/24 0946    atorvastatin (LIPITOR) tablet 20 mg, 20 mg, Oral, Daily, Shreyan George, DO, 20 mg at 10/22/24 0947    chlorhexidine (PERIDEX) 0.12 % oral rinse 15 mL, 15 mL, Mouth/Throat, Q12H JUAN, Shreyan George, DO, 15 mL at 10/22/24 0946    cyanocobalamin (VITAMIN B-12) tablet 1,000 mcg, 1,000 mcg, Oral, Daily, Shreyan George, DO, 1,000 mcg at 10/22/24 0947    enoxaparin (LOVENOX) subcutaneous injection 40 mg, 40 mg, Subcutaneous, Daily, Shreyan George, DO, 40 mg at 10/22/24 0948    folic acid (FOLVITE) tablet 1 mg, 1 mg, Oral, Daily, Shreyan George, DO, 1 mg at 10/22/24 0947    furosemide (LASIX) injection 20 mg, 20 mg, Intravenous, BID (diuretic), Shreyan George, DO, 20 mg at 10/22/24 0951    ipratropium-albuterol (DUO-NEB) 0.5-2.5 mg/3 mL inhalation solution 3 mL, 3 mL, Nebulization, Q6H PRN, Shreyan George, DO, 3 mL at 10/19/24 1432    mirtazapine (REMERON) tablet 15 mg, 15 mg, Oral, HS, Shreyan George, DO, 15 mg at 10/21/24 2056    multivitamin-minerals (CENTRUM) tablet 1 tablet, 1 tablet, Oral, Daily, Duyen George DO, 1 tablet at 10/22/24 0946    nicotine (NICODERM CQ) 21 mg/24 hr TD 24 hr patch 21 mg, 21 mg, Transdermal, Daily, Duyen George DO, 21 mg at 10/22/24 0948    ondansetron (ZOFRAN) injection 4 mg, 4 mg, Intravenous, Q8H PRN, Duyen George DO    pantoprazole (PROTONIX) EC tablet 40 mg, 40 mg, Oral, Early Morning, Duyen George DO, 40 mg at 10/21/24 0609    polyethylene  glycol (MIRALAX) packet 17 g, 17 g, Oral, Daily, Duyen George DO, 17 g at 10/21/24 0836    [COMPLETED] thiamine (VITAMIN B1) 500 mg in sodium chloride 0.9 % 50 mL IVPB, 500 mg, Intravenous, TID, Stopped at 10/19/24 1000 **FOLLOWED BY** thiamine (VITAMIN B1) 250 mg in sodium chloride 0.9 % 50 mL IVPB, 250 mg, Intravenous, TID, Stopped at 10/21/24 2130 **FOLLOWED BY** thiamine (VITAMIN B1) 100 mg in sodium chloride 0.9 % 50 mL IVPB, 100 mg, Intravenous, TID **FOLLOWED BY** [START ON 10/26/2024] thiamine tablet 100 mg, 100 mg, Oral, Daily, Duyen George DO    trimethobenzamide (TIGAN) IM injection 200 mg, 200 mg, Intramuscular, Q6H PRN, Duyen George DO    Laboratory Results:  Results from last 7 days   Lab Units 10/22/24  0828 10/22/24  0423 10/22/24  0012 10/21/24  1929 10/21/24  1629 10/21/24  1056 10/21/24  0620 10/21/24  0525 10/20/24  2340 10/20/24  1054 10/20/24  0345 10/19/24  0403 10/19/24  0402 10/18/24  0634 10/18/24  0407 10/17/24  0756 10/17/24  0444 10/16/24  2022 10/16/24  1528   WBC Thousand/uL  --  8.52  --   --   --   --  9.12  --   --   --  9.76  --  9.02  --  8.70  --  10.17*  --  7.94   HEMOGLOBIN g/dL  --  9.9*  --   --   --   --  10.3*  --   --   --  10.0*  --  10.2*  --  10.3*  --  10.9*  --  11.1*   HEMATOCRIT %  --  30.8*  --   --   --   --  31.9*  --   --   --  30.0*  --  29.8*  --  29.5*  --  30.7*  --  31.1*   PLATELETS Thousands/uL  --  155  --   --   --   --  102*  --   --   --  107*  --  95*  --  99*  --  103*  --  105*   SODIUM mmol/L 133*  --  132* 132* 132* 131*  --  130* 129*   < > 125*   < >  --    < >  --    < > 107*   < > 103*   POTASSIUM mmol/L 4.1  --  3.9 3.9 3.9 3.8  --  4.9 4.0   < > 4.0   < >  --    < >  --    < > 4.6   < > 5.0   CHLORIDE mmol/L 97  --  98 96 97 97  --  97 97   < > 94*   < >  --    < >  --    < > 81*   < > 76*   CO2 mmol/L 30  --  30 28 30 29  --  28 26   < > 26   < >  --    < >  --    < > 19*   < > 16*   BUN mg/dL 9  --  9 9 9 9  --  9 10   < > 6   < >   --    < >  --    < > 10   < > 7   CREATININE mg/dL 0.67  --  0.73 0.80 0.65 0.66  --  0.62 0.73   < > 0.71   < >  --    < >  --    < > 0.76   < > 0.67   CALCIUM mg/dL 8.4  --  8.6 8.5 8.9 8.4  --  8.1* 8.2*   < > 7.9*   < >  --    < >  --    < > 7.9*   < > 8.1*   MAGNESIUM mg/dL  --   --   --   --   --   --   --  2.1  --   --  2.1  --  2.1  --  1.9  --  1.8*  --  1.9   PHOSPHORUS mg/dL  --   --   --   --   --   --   --  3.4  --   --  3.3  --  2.4*  --  2.0*  --  2.8  --  2.4*    < > = values in this interval not displayed.       I have personally reviewed the blood work as stated above and in my note.  I have personally reviewed internal Medicine, co-consultants and previous nephrology notes.

## 2024-10-22 NOTE — ASSESSMENT & PLAN NOTE
Lab Results   Component Value Date    SODIUM 133 (L) 10/22/2024    SODIUM 132 (L) 10/22/2024    SODIUM 132 (L) 10/21/2024     Presented to [facility] with sodium 99. Transferred to ICU [place].  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  Did initially overcorrect and receive DDAVP and D5W.  Getting IV Lasix to help with volume status  Received a maximum dose of phenobarbital and 1 dose of Valium for agitation  Downgraded from ICU on 10/20 with sodium 127    Plan:  Nephrology following, appreciate recommendations  Stable from nephrology perspective  BMP in the morning

## 2024-10-22 NOTE — ASSESSMENT & PLAN NOTE
Etiology-multifactorial in setting of beer [place], alcohol abuse, poor solute intake.  Also there is NSAID use which is likely contributing.     - Admission sodium 101 and repeat was 99 at 11 AM on 10/16 (received hypertonic 3% saline)  - Prior baseline sodium mid 130s  - Serum osmolarity-233  - Urine osmolarity-347  - Urine sodium-21  - TSH-normal  - Imaging-CT of the chest with patchy multifocal consolidation of upper lobes and groundglass opacities     Course of stay-initially received hypertonic 3% saline 150 cc.  Subsequently was started on 1.8% saline and was given DDAVP x 1 on 10/16 to prevent rapid correction.  Subsequently patient was started on Bumex twice daily 10/17 and free water restriction with improvement in sodium level appropriately to 107.  Subsequently sodium level evening of 10/17 was rising slightly rapidly requiring brief D5W and DDAVP X2 overnight into 10/18.  And on 10/18, Bumex was held due to brisk urine output and rapid rise in sodium day prior.  The rise in sodium was slowed appropriately to 115 on 10/18.  By 10/19, sodium level is rising appropriately to 121.  Was still volume overloaded and received Lasix.  And by 10/20, sodium levels improving appropriately to 125 and in the evening was 129.  On 10/21, sodium level rising to 131 and when corrected for blood sugar is approximately 1 31-1 32.  On 10/22, sodium level is improving appropriately to 133.  Potassium and bicarbonate are appropriate.  Creatinine remains at baseline 0.6 mg/dL.     Plan  - I/os; avoid nephrotoxic agents  - Avoid hypotension  -Can change lab work to daily  - Continue Lasix and no objection to titrate higher if needed  - Consider rechecking chest x-ray  - Check urine protein creatinine ratio  - Continue liberalize free water intake for now  - Consider checking ABG given lethargy

## 2024-10-22 NOTE — ASSESSMENT & PLAN NOTE
- Initially there was concern for pneumonia and pulmonary edema  - Requiring oxygen  - Diuresis on board

## 2024-10-22 NOTE — PLAN OF CARE
Problem: PAIN - ADULT  Goal: Verbalizes/displays adequate comfort level or baseline comfort level  Description: Interventions:  - Encourage patient to monitor pain and request assistance  - Assess pain using appropriate pain scale  - Administer analgesics based on type and severity of pain and evaluate response  - Implement non-pharmacological measures as appropriate and evaluate response  - Consider cultural and social influences on pain and pain management  - Notify physician/advanced practitioner if interventions unsuccessful or patient reports new pain  Outcome: Progressing     Problem: INFECTION - ADULT  Goal: Absence or prevention of progression during hospitalization  Description: INTERVENTIONS:  - Assess and monitor for signs and symptoms of infection  - Monitor lab/diagnostic results  - Monitor all insertion sites, i.e. indwelling lines, tubes, and drains  - Monitor endotracheal if appropriate and nasal secretions for changes in amount and color  - [place] appropriate cooling/warming therapies per order  - Administer medications as ordered  - Instruct and encourage patient and family to use good hand hygiene technique  - Identify and instruct in appropriate isolation precautions for identified infection/condition  Outcome: Progressing  Goal: Absence of fever/infection during neutropenic period  Description: INTERVENTIONS:  - Monitor WBC    Outcome: Progressing     Problem: SAFETY ADULT  Goal: Patient will remain free of falls  Description: INTERVENTIONS:  - Educate patient/family on patient safety including physical limitations  - Instruct patient to call for assistance with activity   - Consult OT/PT to assist with strengthening/mobility   - Keep Call bell within reach  - Keep bed low and locked with side rails adjusted as appropriate  - Keep care items and personal belongings within reach  - Initiate and maintain comfort rounds  - Make Fall Risk Sign visible to staff  - Apply yellow socks and bracelet  for high fall risk patients  - Consider moving patient to room near nurses station  Outcome: Progressing  Goal: Maintain or return to baseline ADL function  Description: INTERVENTIONS:  -  Assess patient's ability to carry out ADLs; assess patient's baseline for ADL function and identify physical deficits which impact ability to perform ADLs (bathing, care of mouth/teeth, toileting, grooming, dressing, etc.)  - Assess/evaluate cause of self-care deficits   - Assess range of motion  - Assess patient's mobility; develop plan if impaired  - Assess patient's need for assistive devices and provide as appropriate  - Encourage maximum independence but intervene and supervise when necessary  - Involve family in performance of ADLs  - Assess for home care needs following discharge   - Consider OT consult to assist with ADL evaluation and planning for discharge  - Provide patient education as appropriate  Outcome: Progressing  Goal: Maintains/Returns to pre admission functional level  Description: INTERVENTIONS:  - Perform AM-PAC 6 Click Basic Mobility/ Daily Activity assessment daily.  - Set and communicate daily mobility goal to care team and patient/family/caregiver.   - Collaborate with rehabilitation services on mobility goals if consulted  - Reposition patient every 2 hours.  - Ambulate patient 3 times a day  - Out of bed to chair 3 times a day   - Out of bed for meals 3 times a day  - Out of bed for toileting  - Record patient progress and toleration of activity level   Outcome: Progressing     Problem: DISCHARGE PLANNING  Goal: Discharge to home or other facility with appropriate resources  Description: INTERVENTIONS:  - Identify barriers to discharge w/patient and caregiver  - Arrange for needed discharge resources and transportation as appropriate  - Identify discharge learning needs (meds, wound care, etc.)  - Arrange for interpretive services to assist at discharge as needed  - Refer to Case Management Department  for coordinating discharge planning if the patient needs post-hospital services based on physician/advanced practitioner order or complex needs related to functional status, cognitive ability, or social support system  Outcome: Progressing     Problem: Knowledge Deficit  Goal: Patient/family/caregiver demonstrates understanding of disease process, treatment plan, medications, and discharge instructions  Description: Complete learning assessment and assess knowledge base.  Interventions:  - Provide teaching at level of understanding  - Provide teaching via preferred learning methods  Outcome: Progressing     Problem: NEUROSENSORY - ADULT  Goal: Achieves stable or improved neurological status  Description: INTERVENTIONS  - Monitor and report changes in neurological status  - Monitor vital signs such as temperature, blood pressure, glucose, and any other labs ordered   - Initiate measures to prevent increased intracranial pressure  - Monitor for seizure activity and implement precautions if appropriate      Outcome: Progressing  Goal: Remains free of injury related to seizures activity  Description: INTERVENTIONS  - Maintain airway, patient safety  and administer oxygen as ordered  - Monitor patient for seizure activity, document and report duration and description of seizure to physician/advanced practitioner  - If seizure occurs,  ensure patient safety during seizure  - Reorient patient post seizure  - Seizure pads on all 4 side rails  - Instruct patient/family to notify RN of any seizure activity including if an aura is experienced  - Instruct patient/family to call for assistance with activity based on nursing assessment  - Administer anti-seizure medications if ordered    Outcome: Progressing  Goal: Achieves maximal functionality and self care  Description: INTERVENTIONS  - Monitor swallowing and airway patency with patient fatigue and changes in neurological status  - Encourage and assist patient to increase  activity and self care.   - Encourage visually impaired, hearing impaired and aphasic patients to use assistive/communication devices  Outcome: Progressing

## 2024-10-22 NOTE — ASSESSMENT & PLAN NOTE
CTC as above with bilat consolidation and GGO  Completed 5 day course ceftriaxone  Pulmonary toilet  Management per primary team

## 2024-10-22 NOTE — ASSESSMENT & PLAN NOTE
On CIWA protocol   Report drinking 10-12 beers per night for years  Refusing treatment for alcohol abuse  Management per primary team

## 2024-10-22 NOTE — OCCUPATIONAL THERAPY NOTE
Occupational Therapy Treatment Note     Patient Name: Viktor Farfan  Today's Date: 10/22/2024  Problem List  Principal Problem:    Hyponatremia  Active Problems:    SOB (shortness of breath)    SIRS (systemic inflammatory response syndrome) (HCC)    Alcohol abuse    Tobacco abuse    Thrombocytopenia (HCC)    Transaminitis    Pneumonia     10/22/24 0912   OT Last Visit   OT Visit Date 10/22/24   Note Type   Note Type Treatment   Pain Assessment   Pain Assessment Tool 0-10   Pain Score No Pain   Restrictions/Precautions   Weight Bearing Precautions Per Order No   Other Precautions O2;Cognitive;Chair Alarm;Bed Alarm;Multiple lines;Impulsive  (10L midflow)   Lifestyle   Autonomy Pt lives w/ spouse in a one level apt. Independent with ADLs/IADLs and fnxl mobility w/o use of AD. (+) driving and (+) falls.   Reciprocal Relationships Supportive spouse   Service to Others Unemployed   ADL   Where Assessed Chair   UB Dressing Assistance 3  Moderate Assistance   UB Dressing Deficit Setup;Thread RUE;Thread LUE;Pull around back;Pull down in back   UB Dressing Comments assist to thread RUE through arm hole pt limited d/t decreased ROM   LB Dressing Assistance 4  Minimal Assistance   LB Dressing Deficit Setup;Don/doff R sock;Don/doff L sock   LB Dressing Comments sitting in recliner chair w/ use of figure four technique   Toileting Assistance  1  Total Assistance   Toileting Deficit Perineal hygiene;Setup;Steadying   Toileting Comments posterior pericare w/ steadying assist   Functional Standing Tolerance   Time ~2 mins   Activity posterior pericare   Comments JAKUB x 1 w/ support of RW to maintain static standing position. Limited by generalized weakness, fatigue and endurance.   Bed Mobility   Supine to Sit 5  Supervision   Additional items Assist x 1;HOB elevated;Bedrails  (increased effort)   Sit to Supine   (NT: OOB to recliner chair)   Additional Comments SpO2 desaturating to 86% on 10L mid flow getting to the EOB. Increased  "time spent at the EOB, participating in purse lip breathing. O2 recovering quickly.   Transfers   Sit to Stand 3  Moderate assistance   Additional items Assist x 1;Increased time required;Verbal cues  (VC for optimal placement of BLEs. Retropulsive upon standing, inc time and assist to balance)   Stand to Sit 3  Moderate assistance   Additional items Assist x 1;Increased time required;Verbal cues  (uncontrolled descent into recliner chair)   Stand pivot 3  Moderate assistance   Additional items Assist x 1;Increased time required;Verbal cues  (EOB to BSC w/ use of RW)   Toilet transfer 3  Moderate assistance   Additional items Assist x 1;Commode;Increased time required;Armrests  (VC for surface proximity)   Additional Comments Pt w/ poor recall of safety precautions and optimal hand placement. Minimal improvement with repetition.   Functional Mobility   Functional Mobility 3  Moderate assistance   Additional Comments Ax1, functional household distance w/ use of RW. R foot drop and ataxic gait noted. Minimal s/s of SOB/DONAHUE post ambulation, O2 >90% throughout. Education on pacing techniques and safety awareness.   Additional items Rolling walker   Toilet Transfers   Toilet Transfer From Rolling walker   Toilet Transfer Type To and from   Toilet Transfer to Standard bedside commode   Toilet Transfer Technique Stand pivot   Toilet Transfers Moderate assistance   Toilet Transfers Comments Ax1, VC for surface proximity and optimal hand placement.   Subjective   Subjective \"My legs are so weak...I just to walk 1mile a day\"   Cognition   Overall Cognitive Status Impaired   Arousal/Participation Alert;Responsive;Cooperative   Attention Attends with cues to redirect   Orientation Level Oriented to person;Disoriented to situation;Oriented to place;Oriented to situation  (grossly oriented to time, initially states 04' but able to correct w/ minimal cueing)   Memory Decreased recall of precautions;Decreased recall of recent " events;Decreased short term memory   Following Commands Follows one step commands with increased time or repetition   Comments Pt ID via wristband, name and . Pt continues to display poor safety awareness and insight. Impulsive at times.   Activity Tolerance   Activity Tolerance Patient limited by fatigue;Treatment limited secondary to medical complications (Comment);Other (Comment)  (generalized weakness, SOB/DONAHUE)   Medical Staff Made Aware Spoke with CM, PT, RN pre/post   Assessment   Assessment Patient seen for OT treatment on 10/22/2024 s/p admission for Hyponatremia Patient agreeable to OT session. Patient participated in fall prevention , energy conservation , self-care transfers, bed mobility , functional mobility, and ADLs with intervention focus on optimizing independence in functional mobility, self-care transfers and ADL tasks. Viktor Farfan is showing improvements in activity tolerance, fnxl mobility, LB dressing and endurance but is continuing to perform below baseline due to the following deficits: endurance ,  decreased muscular strength , motor planning, decreased balance , decreased standing tolerance for self care tasks , decreased dynamic balance impacting functional reach, decreased functional reach , decreased trunk control , decreased activity tolerance , attention to task, impaired judgement and problem solving , impaired safety awareness , impulsive behavior, impaired learning ability d/t current cognitive status , impaired global mental status, and direction following. Personal factors continuing to impact D/C include: (+) Hx of falls , Assistance needed for ADL/IADLs, Assistance needed for ADLs and functional mobility, High fall risk , decreased insight toward deficits , decreased recall of precautions , and health management From OT standpoint, patient would benefit from skilled intervention to maximize independence with ADLs and functional mobility. Goals remain appropriate, continue  POC. At this time, recommending D/C to: level I (maximum resource intensity).   Plan   Treatment Interventions ADL retraining;Functional transfer training;Endurance training;Cognitive reorientation;Patient/family training;Equipment evaluation/education;Compensatory technique education;Energy conservation;Activityengagement   Goal Expiration Date 10/27/24   OT Treatment Day 1   OT Frequency 3-5x/wk   Discharge Recommendation   Rehab Resource Intensity Level, OT I (Maximum Resource Intensity)   Additional Comments  The patient's raw score on the AM-PAC Daily Activity Inpatient Short Form is 13. A raw score of less than 19 suggests the patient may benefit from discharge to post-acute rehabilitation services. Please refer to the recommendation of the Occupational Therapist for safe discharge planning.   AM-PAC Daily Activity Inpatient   Lower Body Dressing 2   Bathing 2   Toileting 1   Upper Body Dressing 2   Grooming 3   Eating 3   Daily Activity Raw Score 13   Daily Activity Standardized Score (Calc for Raw Score >=11) 32.03   AM-[facility] Applied Cognition Inpatient   Following a Speech/Presentation 2   Understanding Ordinary Conversation 3   Taking Medications 1   Remembering Where Things Are Placed or Put Away 3   Remembering List of 4-5 Errands 3   Taking Care of Complicated Tasks 2   Applied Cognition Raw Score 14   Applied Cognition Standardized Score 32.02   Modified [place] Scale   Modified [place] Scale 4   End of Consult   Education Provided Yes   Patient Position at End of Consult Bedside chair;Bed/Chair alarm activated;All needs within reach   Nurse Communication Nurse aware of consult     GOALS:      -Patient will perform grooming tasks standing at sink with overall Mod I in order to increase overall independence     -Patient will be Mod I with UB dressing using AE and AD as needed in order to increase (I) with ADLs     -Patient will be Mod I with UB bathing using AE and AD as needed in order to increase (I) with  ADLs     -Patient will be Mod I with LB dressing with use of AE and AD as needed in order to increase (I) with ADLs  PROGRESSING     -Patient will be Mod I with LB bathing with use of AE and AD as needed in order to increase (I) with ADLs     -Patient will complete toileting w/ Mod I w/ G hygiene/thoroughness in order to reduce caregiver burden     -Patient will demonstrate Mod I with bed mobility for ability to manage own comfort and initiate OOB tasks.      -Patient will perform functional transfers with Mod I to/from all surfaces using DME as needed in order to increase (I) with functional tasks     -Patient will be Mod I with functional mobility to/from bathroom for increased independence with toileting tasks     -Patient will tolerate therapeutic activities for greater than 30 min, in order to increase tolerance for functional activities.      -Patient will engage in ongoing cognitive assessment in order to assist with safe discharge planning/recommendations.     -Patient will independently integrate one pacing strategy into morning ADL's.     -Patient will demonstrate standing for 5 min in order to increase active participation in functional activities PROGRESSING    Linda Harrell MS OTR/L   [place] Licensure# [id number]

## 2024-10-22 NOTE — PLAN OF CARE
Problem: OCCUPATIONAL THERAPY ADULT  Goal: Performs self-care activities at highest level of function for planned discharge setting.  See evaluation for individualized goals.  Description: Treatment Interventions: ADL retraining, Functional transfer training, Endurance training, Cognitive reorientation, Patient/family training, Equipment evaluation/education, Compensatory technique education, Activityengagement, Energy conservation (cognitive assessments)          See flowsheet documentation for full assessment, interventions and recommendations.   Note: Limitation: Decreased ADL status, Decreased Safe judgement during ADL, Decreased cognition, Decreased endurance, Decreased self-care trans, Decreased high-level ADLs (insight, problem-solving, balance, activity tolerance, balance)  Prognosis: Fair  Assessment: Patient seen for OT treatment on 10/22/2024 s/p admission for Hyponatremia Patient agreeable to OT session. Patient participated in fall prevention , energy conservation , self-care transfers, bed mobility , functional mobility, and ADLs with intervention focus on optimizing independence in functional mobility, self-care transfers and ADL tasks. Viktor Farfan is showing improvements in activity tolerance, fnxl mobility, LB dressing and endurance but is continuing to perform below baseline due to the following deficits: endurance ,  decreased muscular strength , motor planning, decreased balance , decreased standing tolerance for self care tasks , decreased dynamic balance impacting functional reach, decreased functional reach , decreased trunk control , decreased activity tolerance , attention to task, impaired judgement and problem solving , impaired safety awareness , impulsive behavior, impaired learning ability d/t current cognitive status , impaired global mental status, and direction following. Personal factors continuing to impact D/C include: (+) Hx of falls , Assistance needed for ADL/IADLs,  Assistance needed for ADLs and functional mobility, High fall risk , decreased insight toward deficits , decreased recall of precautions , and health management From OT standpoint, patient would benefit from skilled intervention to maximize independence with ADLs and functional mobility. Goals remain appropriate, continue POC. At this time, recommending D/C to: level I (maximum resource intensity).     Rehab Resource Intensity Level, OT: I (Maximum Resource Intensity)     Linda Harrell MS OTR/L   [place] Licensure# [id number]

## 2024-10-22 NOTE — [facility] ADMISSION
[facility]  spoke with Pt's sister,Maxine VIA phone. Introduced self, explained role, reviewed [facility] program, services offered, acute rehab criteria, review of referral by [facility] Medical Director, insurance authorization process, three [facility] locations and anticipated rehab length of stay. [facility] Rehab folder will be delivered to Pt's room on 10/23. All questions were answered. Patient's sister's first choice is [facility] second choice  [facility]. Patient's sister was made aware ARC Reviewer will communicate with their Care Manager who will keep patient updated on referral status.

## 2024-10-22 NOTE — ASSESSMENT & PLAN NOTE
Secondary to iatrogenic volume overload in the setting from trying to correct hyponatremia  Currently on 10 L MFNC    Plan:  Repeat CXR  Current diuresis with IV Lasix 20 mg BID. Increase to Lasix 40 mg BID  Incentive spirometry  Encourage out of bed  Wean oxygen as tolerated

## 2024-10-22 NOTE — PLAN OF CARE
Problem: PAIN - ADULT  Goal: Verbalizes/displays adequate comfort level or baseline comfort level  Description: Interventions:  - Encourage patient to monitor pain and request assistance  - Assess pain using appropriate pain scale  - Administer analgesics based on type and severity of pain and evaluate response  - Implement non-pharmacological measures as appropriate and evaluate response  - Consider cultural and social influences on pain and pain management  - Notify physician/advanced practitioner if interventions unsuccessful or patient reports new pain  Outcome: Progressing     Problem: INFECTION - ADULT  Goal: Absence or prevention of progression during hospitalization  Description: INTERVENTIONS:  - Assess and monitor for signs and symptoms of infection  - Monitor lab/diagnostic results  - Monitor all insertion sites, i.e. indwelling lines, tubes, and drains  - Monitor endotracheal if appropriate and nasal secretions for changes in amount and color  - [place] appropriate cooling/warming therapies per order  - Administer medications as ordered  - Instruct and encourage patient and family to use good hand hygiene technique  - Identify and instruct in appropriate isolation precautions for identified infection/condition  Outcome: Progressing

## 2024-10-23 ENCOUNTER — TELEPHONE (OUTPATIENT)
Dept: NEPHROLOGY | Facility: CLINIC | Age: 54
End: 2024-10-23

## 2024-10-23 PROBLEM — R19.7 DIARRHEA: Status: ACTIVE | Noted: 2024-10-23

## 2024-10-23 LAB
ANION GAP SERPL CALCULATED.3IONS-SCNC: 7 MMOL/L (ref 4–13)
BUN SERPL-MCNC: 10 MG/DL (ref 5–25)
C COLI+JEJUNI TUF STL QL NAA+PROBE: NEGATIVE
C DIFF TOX GENS STL QL NAA+PROBE: NEGATIVE
CALCIUM SERPL-MCNC: 8.4 MG/DL (ref 8.4–10.2)
CHLORIDE SERPL-SCNC: 98 MMOL/L (ref 96–108)
CO2 SERPL-SCNC: 31 MMOL/L (ref 21–32)
CREAT SERPL-MCNC: 0.69 MG/DL (ref 0.6–1.3)
EC STX1+STX2 GENES STL QL NAA+PROBE: NEGATIVE
ERYTHROCYTE [DISTWIDTH] IN BLOOD BY AUTOMATED COUNT: 16.4 % (ref 11.6–15.1)
GFR SERPL CREATININE-BSD FRML MDRD: 107 ML/MIN/1.73SQ M
GLUCOSE SERPL-MCNC: 102 MG/DL (ref 65–140)
HCT VFR BLD AUTO: 32.6 % (ref 36.5–49.3)
HGB BLD-MCNC: 10.4 G/DL (ref 12–17)
MAGNESIUM SERPL-MCNC: 2.1 MG/DL (ref 1.9–2.7)
MCH RBC QN AUTO: 28.2 PG (ref 26.8–34.3)
MCHC RBC AUTO-ENTMCNC: 31.9 G/DL (ref 31.4–37.4)
MCV RBC AUTO: 88 FL (ref 82–98)
METHYLMALONATE SERPL-SCNC: 93 NMOL/L (ref 0–378)
PHOSPHATE SERPL-MCNC: 3.4 MG/DL (ref 2.7–4.5)
PLATELET # BLD AUTO: 138 THOUSANDS/UL (ref 149–390)
PMV BLD AUTO: 8.9 FL (ref 8.9–12.7)
POTASSIUM SERPL-SCNC: 4 MMOL/L (ref 3.5–5.3)
RBC # BLD AUTO: 3.69 MILLION/UL (ref 3.88–5.62)
SALMONELLA SP SPAO STL QL NAA+PROBE: NEGATIVE
SHIGELLA SP+EIEC IPAH STL QL NAA+PROBE: NEGATIVE
SODIUM SERPL-SCNC: 136 MMOL/L (ref 135–147)
WBC # BLD AUTO: 6.12 THOUSAND/UL (ref 4.31–10.16)

## 2024-10-23 PROCEDURE — 85027 COMPLETE CBC AUTOMATED: CPT | Performed by: INTERNAL MEDICINE

## 2024-10-23 PROCEDURE — 84100 ASSAY OF PHOSPHORUS: CPT | Performed by: INTERNAL MEDICINE

## 2024-10-23 PROCEDURE — 97116 GAIT TRAINING THERAPY: CPT

## 2024-10-23 PROCEDURE — 97530 THERAPEUTIC ACTIVITIES: CPT

## 2024-10-23 PROCEDURE — 80048 BASIC METABOLIC PNL TOTAL CA: CPT | Performed by: INTERNAL MEDICINE

## 2024-10-23 PROCEDURE — 99232 SBSQ HOSP IP/OBS MODERATE 35: CPT | Performed by: INTERNAL MEDICINE

## 2024-10-23 PROCEDURE — 83735 ASSAY OF MAGNESIUM: CPT | Performed by: INTERNAL MEDICINE

## 2024-10-23 PROCEDURE — 94760 N-INVAS EAR/PLS OXIMETRY 1: CPT

## 2024-10-23 RX ADMIN — ATORVASTATIN CALCIUM 20 MG: 20 TABLET, FILM COATED ORAL at 08:29

## 2024-10-23 RX ADMIN — FOLIC ACID 1 MG: 1 TABLET ORAL at 08:30

## 2024-10-23 RX ADMIN — CYANOCOBALAMIN TAB 500 MCG 1000 MCG: 500 TAB at 08:29

## 2024-10-23 RX ADMIN — THIAMINE HYDROCHLORIDE 100 MG: 100 INJECTION, SOLUTION INTRAMUSCULAR; INTRAVENOUS at 09:40

## 2024-10-23 RX ADMIN — MULTIPLE VITAMINS W/ MINERALS TAB 1 TABLET: TAB ORAL at 08:31

## 2024-10-23 RX ADMIN — CHLORHEXIDINE GLUCONATE 15 ML: 1.2 RINSE ORAL at 08:31

## 2024-10-23 RX ADMIN — ESCITALOPRAM OXALATE 20 MG: 20 TABLET ORAL at 08:29

## 2024-10-23 RX ADMIN — MIRTAZAPINE 15 MG: 15 TABLET, FILM COATED ORAL at 20:43

## 2024-10-23 RX ADMIN — ASPIRIN 81 MG CHEWABLE TABLET 81 MG: 81 TABLET CHEWABLE at 08:28

## 2024-10-23 RX ADMIN — FUROSEMIDE 40 MG: 10 INJECTION, SOLUTION INTRAMUSCULAR; INTRAVENOUS at 08:31

## 2024-10-23 RX ADMIN — NICOTINE 21 MG: 21 PATCH, EXTENDED RELEASE TRANSDERMAL at 08:33

## 2024-10-23 RX ADMIN — THIAMINE HYDROCHLORIDE 100 MG: 100 INJECTION, SOLUTION INTRAMUSCULAR; INTRAVENOUS at 15:43

## 2024-10-23 RX ADMIN — PANTOPRAZOLE SODIUM 40 MG: 40 TABLET, DELAYED RELEASE ORAL at 05:21

## 2024-10-23 RX ADMIN — ENOXAPARIN SODIUM 40 MG: 40 INJECTION SUBCUTANEOUS at 08:31

## 2024-10-23 RX ADMIN — AMLODIPINE BESYLATE 10 MG: 10 TABLET ORAL at 08:29

## 2024-10-23 RX ADMIN — THIAMINE HYDROCHLORIDE 100 MG: 100 INJECTION, SOLUTION INTRAMUSCULAR; INTRAVENOUS at 20:42

## 2024-10-23 RX ADMIN — FUROSEMIDE 40 MG: 10 INJECTION, SOLUTION INTRAMUSCULAR; INTRAVENOUS at 15:44

## 2024-10-23 NOTE — ASSESSMENT & PLAN NOTE
Secondary to iatrogenic volume overload in the setting from trying to correct hyponatremia  CXR (10/22) - no significant change diffuse pulmonary edema  Currently on 6 L MFNC    Plan:  Continue diuresis with IV Lasix 40 mg BID  Incentive spirometry  Encourage out of bed  Wean oxygen as tolerated

## 2024-10-23 NOTE — ASSESSMENT & PLAN NOTE
Lab Results   Component Value Date    HGB 10.4 (L) 10/23/2024    HGB 9.9 (L) 10/22/2024    HGB 10.3 (L) 10/21/2024   Baseline is 10-11  Microcytic  No active bleeding  Iron panel - iron 123, ferritin 62, TIBC 384  Vitamin B12 330, folate 5.9  Suspect secondary to bone marrow suppression from alcohol use

## 2024-10-23 NOTE — PHYSICAL THERAPY NOTE
PHYSICAL THERAPY TREATMENT  DATE: 10/23/24  TIME: 0850-0933    NAME:  Viktor Farfan  AGE:   54 y.o.  Mrn:   [id number]  Length Of Stay: 7    ADMIT DX:  Pneumonia of both upper lobes due to infectious organism    Past Medical History:   Diagnosis Date    Anxiety     Back pain     Chronic right-sided low back pain without sciatica 3/31/2018    Depression     Gastroesophageal reflux disease 3/31/2018    GERD (gastroesophageal reflux disease)     Right foot pain      Past Surgical History:   Procedure Laterality Date    NO PAST SURGERIES      ORIF FINGER FRACTURE Right 8/6/2019    Procedure: OPEN REDUCTION FINGER;  Surgeon: Tmoas Morgan MD;  Location: [facility];  Service: Plastics       Performed at least 2 patient identifiers during session: Name, ID bracelet, and Epic photo     10/23/24 0850   PT Last Visit   PT Visit Date 10/23/24   Note Type   Note Type Treatment   Pain Assessment   Pain Assessment Tool 0-10   Pain Score No Pain   Restrictions/Precautions   Weight Bearing Precautions Per Order No   Other Precautions Cognitive;Chair Alarm;Bed Alarm;Impulsive;Fall Risk;O2  (CIWA and seizure protocol; 8L O2 via MFNC; r/o C/Diff)   General   Chart Reviewed Yes   Additional Pertinent History Pt is a 54 yr old male admitted 10/16/24 with c/o SOB x3-4days, increasing number of falls; dx: hyponatremia due to alcohol abuse, acute hypoxic respiratory failure.   Response to Previous Treatment Patient with no complaints from previous session.   Family/Caregiver Present No   Cognition   Overall Cognitive Status Impaired   Arousal/Participation Alert;Cooperative   Attention Attends with cues to redirect   Orientation Level Oriented to person;Oriented to place;Oriented to situation   Memory Decreased short term memory;Decreased recall of recent events;Decreased recall of precautions   Following Commands Follows one step commands with increased time or repetition   Comments Pt continues to be impulsive with all aspects of functional  "mobility; poorly sustained attention to task, easily distracted and requires redirection for completion of tasks.   Subjective   Subjective \"I think I'll only need like three days at the rehab and i'll be good.\"   Bed Mobility   Additional Comments Bed mobility NT on this date as pt presents and was left seated EOB vs OOB.   Transfers   Sit to Stand 4  Minimal assistance   Additional items Assist x 1;Impulsive;Increased time required;Verbal cues  (no AD; impulsive to initiate, increased time to complete)   Stand to Sit 4  Minimal assistance   Additional items Assist x 1;Armrests;Impulsive;Verbal cues  (no AD)   Stand pivot 3  Moderate assistance   Additional items Assist x 1;Impulsive;Increased time required;Verbal cues  (no AD)   Toilet transfer 5  Supervision  (close S)   Additional items Assist x 1;Increased time required;Verbal cues;Standard toilet;Other  (R GB - completes dynamic post toileting hygiene care with JAKUB to maintain unsupported balance, B knee instability in standing, JAKUB needed for thoroughness of hygiene)   Other 5  Supervision  (5xSTS transfer assessment completed from recliner chair, modified as pt utilized B UE support on armrests with close S: completes in 18.26seconds, indicating pt remains at increased risk of falls as compared to age matched peers.)   Additional Comments No AD used for transfers. Pt initially requiring JAKUB for STS from EOB, however is able to progress to close S for transfers from surfaces where pt can have UE support (ie: recliner chair with armrests and toilet w/ R GB).   Ambulation/Elevation   Gait pattern Wide MIKE;Scissoring  (variable wide MIKE w/ intermittent scissoring d/t instability; high guard UE positioning; dec'd foot cleareance B; improved R stepping vs previous session (no steppage gait or R foot drop noted today); B knee and ankle instability however no buckling)   Gait Assistance 3  Moderate assist   Additional items Assist x 1;Verbal cues;Tactile cues "   Assistive Device None   Distance 44ft x2 with functional seated rest break between trials (change in direction skilled nursing each trial), 18ft x2 to/from bathroom with toileting between   Stair Management Assistance Not tested   Ambulation/Elevation Additional Comments Pt with continued instability and multiple LOBs during ambulation trial. Refuses trial of RW at this time.   Balance   Static Sitting Good   Dynamic Sitting Fair   Static Standing Poor +   Dynamic Standing Poor   Ambulatory Poor   Endurance Deficit   Endurance Deficit Yes   Endurance Deficit Description Pt presents on 8L O2 via MFNC (baseline wears no supplemental O2). SPO2 monitored on Pierre t/o session, SPO2 maintained 90%+ on 8L. Pt visibly with moderate degree of SOB however denies any.   Activity Tolerance   Activity Tolerance Patient limited by fatigue;Other (Comment)  (SOB, impaired cognition, insight and safety)   Medical Staff Made Aware Spoke with PIA MICHELE   Assessment   Prognosis Good   Problem List Decreased strength;Decreased endurance;Impaired balance;Decreased mobility;Decreased cognition;Impaired judgement;Decreased safety awareness;Decreased skin integrity   Assessment Pt seen for PT treatment 10/23/2024 with focus on: gait training and therapeutic activity, with intervention focusing on goal of increased independence and safety with ambulation. Pt presents seated at EOB, denies pain, is agreeable to participate in session. During session, pt requires JAKUB progressing to close S for transfers, MODA for ambulation of multiple trials with no AD (44ft x2, 18ft x2) pt declines trial of RW use. Pt completes multiple bouts of dynamic functional standing/reaching tasks with JAKUB for maintained balance. Pt completes 5xSTS assessment in 18.26 seconds, demonstrating a decreased level of LE power and increased risk of falls as compared to age matched peers. T/o session, SPO2 monitored and maintained 90%+ on 8L via MFNC, pt with moderate  "degree of visible SOB however denies SOB from baseline. At end of session pt was left seated OOB in recliner chair with alarm engaged and needs in reach. Overall pt displays improved activity tolerance as compared to previous session, however continues to perform below their baseline level of functional mobility due to weakness, gait deviations, impaired balance, impaired insight and safety. Pt continues to most appropriately benefit from Level 1 (maximal PT intensity) resources upon d/c from the acute care setting. Continue skilled PT POC as able and appropriate in order to progress towards therapy goals and maximize independence with functional mobility.   Barriers to Discharge Decreased caregiver support;Inaccessible home environment   Goals   Patient Goals \"to get to rehab and get home\"   STG Expiration Date 10/31/24   Short Term Goal #1 Patient PT goals established in order to address pt self reported goal of \"to go home\". Pt will: complete all bed mobility independently in flat bed in order to promote increased OOB functional mobility and simulate home environment; complete all transfers consistently with S in order to increase safety with functional mobility; ambulate >150ft with LRAD and S in order to increase safety with household and short community distance functional mobility; negotiate 3-5 stairs with HR assist and S in order to facilitate safe access to his home; demonstrate understanding and independence with LE strengthening HEP; improve ambulatory balance to >/= fair grade in order to promote safety and increased independence with mobility; improve AM-PAC score to >/= 19/24 in order to increase independence with mobility and decrease burden of care; improve Barthel Index score to >/= 60/100 in order to increase independence and decrease risk of falls.   PT Treatment Day 2   Plan   Treatment/Interventions Functional transfer training;LE strengthening/ROM;Elevations;Therapeutic exercise;Endurance " training;Cognitive reorientation;Patient/family training;Equipment eval/education;Bed mobility;Gait training;Spoke to nursing;Spoke to case management   Progress Progressing toward goals   PT Frequency 3-5x/wk   Discharge Recommendation   Rehab Resource Intensity Level, PT I (Maximum Resource Intensity)   AM-PAC Basic Mobility Inpatient   Turning in Flat Bed Without Bedrails 3   Lying on Back to Sitting on Edge of Flat Bed Without Bedrails 3   Moving Bed to Chair 2   Standing Up From Chair Using Arms 3   Walk in Room 2   Climb 3-5 Stairs With Railing 2   Basic Mobility Inpatient Raw Score 15   Basic Mobility Standardized Score 36.97   [facility] Highest Level Of Mobility   -HLM Goal 4: Move to chair/commode   JH-HLM Achieved 7: Walk 25 feet or more   Education   Education Provided Mobility training;Assistive device   Patient Explanation/teachback used;Reinforcement needed   End of Consult   Patient Position at End of Consult Bedside chair;Bed/Chair alarm activated;All needs within reach       Based on patient's [facility] Highest Level of Mobility scores today, patient currently has a goal of -[facility] Levels: 7: WALK 25 FEET OR MORE, to be completed with RN staffing each shift, in order to improve overall activity tolerance and mobility, combat hospital related deconditioning, and maximize outcomes for d/c from the acute care setting.     The patient's AM-PAC Basic Mobility Inpatient Short Form Raw Score is 15. A Raw score of less than or equal to 16 suggests the patient may benefit from discharge to post-acute rehabilitation services. Please also refer to the recommendation of the Physical Therapist for safe discharge planning.        Arlyn Arnett PT, DPT   Available via [employer]  NPI # [id number]  [place] License - [id number]  10/23/2024

## 2024-10-23 NOTE — ASSESSMENT & PLAN NOTE
Lab Results   Component Value Date     (L) 10/23/2024     10/22/2024     (L) 10/21/2024     Stable   No active bleeding  Likely secondary to bone marrow suppression from alcohol use
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
## 2024-10-23 NOTE — PLAN OF CARE
Problem: PAIN - ADULT  Goal: Verbalizes/displays adequate comfort level or baseline comfort level  Description: Interventions:  - Encourage patient to monitor pain and request assistance  - Assess pain using appropriate pain scale  - Administer analgesics based on type and severity of pain and evaluate response  - Implement non-pharmacological measures as appropriate and evaluate response  - Consider cultural and social influences on pain and pain management  - Notify physician/advanced practitioner if interventions unsuccessful or patient reports new pain  Outcome: Progressing     Problem: INFECTION - ADULT  Goal: Absence or prevention of progression during hospitalization  Description: INTERVENTIONS:  - Assess and monitor for signs and symptoms of infection  - Monitor lab/diagnostic results  - Monitor all insertion sites, i.e. indwelling lines, tubes, and drains  - Monitor endotracheal if appropriate and nasal secretions for changes in amount and color  - [place] appropriate cooling/warming therapies per order  - Administer medications as ordered  - Instruct and encourage patient and family to use good hand hygiene technique  - Identify and instruct in appropriate isolation precautions for identified infection/condition  Outcome: Progressing  Goal: Absence of fever/infection during neutropenic period  Description: INTERVENTIONS:  - Monitor WBC    Outcome: Progressing     Problem: SAFETY ADULT  Goal: Patient will remain free of falls  Description: INTERVENTIONS:  - Educate patient/family on patient safety including physical limitations  - Instruct patient to call for assistance with activity   - Consult OT/PT to assist with strengthening/mobility   - Keep Call bell within reach  - Keep bed low and locked with side rails adjusted as appropriate  - Keep care items and personal belongings within reach  - Initiate and maintain comfort rounds  - Make Fall Risk Sign visible to staff  - Offer Toileting every 3 Hours,  in advance of need  - Initiate/Maintain bed alarm  - Obtain necessary fall risk management equipment: walker  - Apply yellow socks and bracelet for high fall risk patients  - Consider moving patient to room near nurses station  Outcome: Progressing  Goal: Maintain or return to baseline ADL function  Description: INTERVENTIONS:  -  Assess patient's ability to carry out ADLs; assess patient's baseline for ADL function and identify physical deficits which impact ability to perform ADLs (bathing, care of mouth/teeth, toileting, grooming, dressing, etc.)  - Assess/evaluate cause of self-care deficits   - Assess range of motion  - Assess patient's mobility; develop plan if impaired  - Assess patient's need for assistive devices and provide as appropriate  - Encourage maximum independence but intervene and supervise when necessary  - Involve family in performance of ADLs  - Assess for home care needs following discharge   - Consider OT consult to assist with ADL evaluation and planning for discharge  - Provide patient education as appropriate  Outcome: Progressing  Goal: Maintains/Returns to pre admission functional level  Description: INTERVENTIONS:  - Perform AM-PAC 6 Click Basic Mobility/ Daily Activity assessment daily.  - Set and communicate daily mobility goal to care team and patient/family/caregiver.   - Collaborate with rehabilitation services on mobility goals if consulted  - Perform Range of Motion 3 times a day.  - Reposition patient every 2 hours.  - Dangle patient 3 times a day  - Stand patient 3 times a day  - Ambulate patient 3 times a day  - Out of bed to chair 3 times a day   - Out of bed for meals 3 times a day  - Out of bed for toileting  - Record patient progress and toleration of activity level   Outcome: Progressing     Problem: DISCHARGE PLANNING  Goal: Discharge to home or other facility with appropriate resources  Description: INTERVENTIONS:  - Identify barriers to discharge w/patient and  caregiver  - Arrange for needed discharge resources and transportation as appropriate  - Identify discharge learning needs (meds, wound care, etc.)  - Arrange for interpretive services to assist at discharge as needed  - Refer to Case Management Department for coordinating discharge planning if the patient needs post-hospital services based on physician/advanced practitioner order or complex needs related to functional status, cognitive ability, or social support system  Outcome: Progressing     Problem: Knowledge Deficit  Goal: Patient/family/caregiver demonstrates understanding of disease process, treatment plan, medications, and discharge instructions  Description: Complete learning assessment and assess knowledge base.  Interventions:  - Provide teaching at level of understanding  - Provide teaching via preferred learning methods  Outcome: Progressing     Problem: NEUROSENSORY - ADULT  Goal: Achieves stable or improved neurological status  Description: INTERVENTIONS  - Monitor and report changes in neurological status  - Monitor vital signs such as temperature, blood pressure, glucose, and any other labs ordered   - Initiate measures to prevent increased intracranial pressure  - Monitor for seizure activity and implement precautions if appropriate      Outcome: Progressing  Goal: Remains free of injury related to seizures activity  Description: INTERVENTIONS  - Maintain airway, patient safety  and administer oxygen as ordered  - Monitor patient for seizure activity, document and report duration and description of seizure to physician/advanced practitioner  - If seizure occurs,  ensure patient safety during seizure  - Reorient patient post seizure  - Seizure pads on all 4 side rails  - Instruct patient/family to notify RN of any seizure activity including if an aura is experienced  - Instruct patient/family to call for assistance with activity based on nursing assessment  - Administer anti-seizure medications if  ordered    Outcome: Progressing  Goal: Achieves maximal functionality and self care  Description: INTERVENTIONS  - Monitor swallowing and airway patency with patient fatigue and changes in neurological status  - Encourage and assist patient to increase activity and self care.   - Encourage visually impaired, hearing impaired and aphasic patients to use assistive/communication devices  Outcome: Progressing     Problem: CARDIOVASCULAR - ADULT  Goal: Maintains optimal cardiac output and hemodynamic stability  Description: INTERVENTIONS:  - Monitor I/O, vital signs and rhythm  - Monitor for S/S and trends of decreased cardiac output  - Administer and titrate ordered vasoactive medications to optimize hemodynamic stability  - Assess quality of pulses, skin color and temperature  - Assess for signs of decreased coronary artery perfusion  - Instruct patient to report change in severity of symptoms  Outcome: Progressing  Goal: Absence of cardiac dysrhythmias or at baseline rhythm  Description: INTERVENTIONS:  - Continuous cardiac monitoring, vital signs, obtain 12 lead EKG if ordered  - Administer antiarrhythmic and heart rate control medications as ordered  - Monitor electrolytes and administer replacement therapy as ordered  Outcome: Progressing     Problem: RESPIRATORY - ADULT  Goal: Achieves optimal ventilation and oxygenation  Description: INTERVENTIONS:  - Assess for changes in respiratory status  - Assess for changes in mentation and behavior  - Position to facilitate oxygenation and minimize respiratory effort  - Oxygen administered by appropriate delivery if ordered  - Initiate smoking cessation education as indicated  - Encourage broncho-pulmonary hygiene including cough, deep breathe, Incentive Spirometry  - Assess the need for suctioning and aspirate as needed  - Assess and instruct to report SOB or any respiratory difficulty  - Respiratory Therapy support as indicated  Outcome: Progressing     Problem:  GASTROINTESTINAL - ADULT  Goal: Minimal or absence of nausea and/or vomiting  Description: INTERVENTIONS:  - Administer IV fluids if ordered to ensure adequate hydration  - Maintain NPO status until nausea and vomiting are resolved  - Nasogastric tube if ordered  - Administer ordered antiemetic medications as needed  - Provide nonpharmacologic comfort measures as appropriate  - Advance diet as tolerated, if ordered  - Consider nutrition services referral to assist patient with adequate nutrition and appropriate food choices  Outcome: Progressing  Goal: Maintains or returns to baseline bowel function  Description: INTERVENTIONS:  - Assess bowel function  - Encourage oral fluids to ensure adequate hydration  - Administer IV fluids if ordered to ensure adequate hydration  - Administer ordered medications as needed  - Encourage mobilization and activity  - Consider nutritional services referral to assist patient with adequate nutrition and appropriate food choices  Outcome: Progressing  Goal: Maintains adequate nutritional intake  Description: INTERVENTIONS:  - Monitor percentage of each meal consumed  - Identify factors contributing to decreased intake, treat as appropriate  - Assist with meals as needed  - Monitor I&O, weight, and lab values if indicated  - Obtain nutrition services referral as needed  Outcome: Progressing  Goal: Establish and maintain optimal ostomy function  Description: INTERVENTIONS:  - Assess bowel function  - Encourage oral fluids to ensure adequate hydration  - Administer IV fluids if ordered to ensure adequate hydration   - Administer ordered medications as needed  - Encourage mobilization and activity  - Nutrition services referral to assist patient with appropriate food choices  - Assess stoma site  - Consider wound care consult   Outcome: Progressing  Goal: Oral mucous membranes remain intact  Description: INTERVENTIONS  - Assess oral mucosa and hygiene practices  - Implement preventative  oral hygiene regimen  - Implement oral medicated treatments as ordered  - Initiate Nutrition services referral as needed  Outcome: Progressing     Problem: GENITOURINARY - ADULT  Goal: Maintains or returns to baseline urinary function  Description: INTERVENTIONS:  - Assess urinary function  - Encourage oral fluids to ensure adequate hydration if ordered  - Administer IV fluids as ordered to ensure adequate hydration  - Administer ordered medications as needed  - Offer frequent toileting  - Follow urinary retention protocol if ordered  Outcome: Progressing  Goal: Absence of urinary retention  Description: INTERVENTIONS:  - Assess patient’s ability to void and empty bladder  - Monitor I/O  - Bladder scan as needed  - Discuss with physician/AP medications to alleviate retention as needed  - Discuss catheterization for long term situations as appropriate  Outcome: Progressing  Goal: Urinary catheter remains patent  Description: INTERVENTIONS:  - Assess patency of urinary catheter  - If patient has a chronic shah, consider changing catheter if non-functioning  - Follow guidelines for intermittent irrigation of non-functioning urinary catheter  Outcome: Progressing     Problem: METABOLIC, FLUID AND ELECTROLYTES - ADULT  Goal: Electrolytes maintained within normal limits  Description: INTERVENTIONS:  - Monitor labs and assess patient for signs and symptoms of electrolyte imbalances  - Administer electrolyte replacement as ordered  - Monitor response to electrolyte replacements, including repeat lab results as appropriate  - Instruct patient on fluid and nutrition as appropriate  Outcome: Progressing     Problem: HEMATOLOGIC - ADULT  Goal: Maintains hematologic stability  Description: INTERVENTIONS  - Assess for signs and symptoms of bleeding or hemorrhage  - Monitor labs  - Administer supportive blood products/factors as ordered and appropriate  Outcome: Progressing     Problem: MUSCULOSKELETAL - ADULT  Goal: Maintain or  return mobility to safest level of function  Description: INTERVENTIONS:  - Assess patient's ability to carry out ADLs; assess patient's baseline for ADL function and identify physical deficits which impact ability to perform ADLs (bathing, care of mouth/teeth, toileting, grooming, dressing, etc.)  - Assess/evaluate cause of self-care deficits   - Assess range of motion  - Assess patient's mobility  - Assess patient's need for assistive devices and provide as appropriate  - Encourage maximum independence but intervene and supervise when necessary  - Involve family in performance of ADLs  - Assess for home care needs following discharge   - Consider OT consult to assist with ADL evaluation and planning for discharge  - Provide patient education as appropriate  Outcome: Progressing  Goal: Maintain proper alignment of affected body part  Description: INTERVENTIONS:  - Support, maintain and protect limb and body alignment  - Provide patient/ family with appropriate education  Outcome: Progressing     Problem: Nutrition/Hydration-ADULT  Goal: Nutrient/Hydration intake appropriate for improving, restoring or maintaining nutritional needs  Description: Monitor and assess patient's nutrition/hydration status for malnutrition. Collaborate with interdisciplinary team and initiate plan and interventions as ordered.  Monitor patient's weight and dietary intake as ordered or per policy. Utilize nutrition screening tool and intervene as necessary. Determine patient's food preferences and provide high-protein, high-caloric foods as appropriate.     INTERVENTIONS:  - Monitor oral intake, urinary output, labs, and treatment plans  - Assess nutrition and hydration status and recommend course of action  - Evaluate amount of meals eaten  - Assist patient with eating if necessary   - Allow adequate time for meals  - Recommend/ encourage appropriate diets, oral nutritional supplements, and vitamin/mineral supplements  - Order, calculate,  and assess calorie counts as needed  - Recommend, monitor, and adjust tube feedings and TPN/PPN based on assessed needs  - Assess need for intravenous fluids  - Provide specific nutrition/hydration education as appropriate  - Include patient/family/caregiver in decisions related to nutrition  Outcome: Progressing     Problem: Prexisting or High Potential for Compromised Skin Integrity  Goal: Skin integrity is maintained or improved  Description: INTERVENTIONS:  - Identify patients at risk for skin breakdown  - Assess and monitor skin integrity  - Assess and monitor nutrition and hydration status  - Monitor labs   - Assess for incontinence   - Turn and reposition patient  - Assist with mobility/ambulation  - Relieve pressure over bony prominences  - Avoid friction and shearing  - Provide appropriate hygiene as needed including keeping skin clean and dry  - Evaluate need for skin moisturizer/barrier cream  - Collaborate with interdisciplinary team   - Patient/family teaching  - Consider wound care consult   Outcome: Progressing     Problem: SAFETY,RESTRAINT: NV/NON-SELF DESTRUCTIVE BEHAVIOR  Goal: Remains free of harm/injury (restraint for non violent/non self-detsructive behavior)  Description: INTERVENTIONS:  - Instruct patient/family regarding restraint use   - Assess and monitor physiologic and psychological status   - Provide interventions and comfort measures to meet assessed patient needs   - Identify and implement measures to help patient regain control  - Assess readiness for release of restraint   Outcome: Progressing  Goal: Returns to optimal restraint-free functioning  Description: INTERVENTIONS:  - Assess the patient's behavior and symptoms that indicate continued need for restraint  - Identify and implement measures to help patient regain control  - Assess readiness for release of restraint   Outcome: Progressing

## 2024-10-23 NOTE — PLAN OF CARE
Problem: PAIN - ADULT  Goal: Verbalizes/displays adequate comfort level or baseline comfort level  Description: Interventions:  - Encourage patient to monitor pain and request assistance  - Assess pain using appropriate pain scale  - Administer analgesics based on type and severity of pain and evaluate response  - Implement non-pharmacological measures as appropriate and evaluate response  - Consider cultural and social influences on pain and pain management  - Notify physician/advanced practitioner if interventions unsuccessful or patient reports new pain  Outcome: Progressing     Problem: INFECTION - ADULT  Goal: Absence or prevention of progression during hospitalization  Description: INTERVENTIONS:  - Assess and monitor for signs and symptoms of infection  - Monitor lab/diagnostic results  - Monitor all insertion sites, i.e. indwelling lines, tubes, and drains  - Monitor endotracheal if appropriate and nasal secretions for changes in amount and color  - [place] appropriate cooling/warming therapies per order  - Administer medications as ordered  - Instruct and encourage patient and family to use good hand hygiene technique  - Identify and instruct in appropriate isolation precautions for identified infection/condition  Outcome: Progressing  Goal: Absence of fever/infection during neutropenic period  Description: INTERVENTIONS:  - Monitor WBC    Outcome: Progressing     Problem: SAFETY ADULT  Goal: Patient will remain free of falls  Description: INTERVENTIONS:  - Educate patient/family on patient safety including physical limitations  - Instruct patient to call for assistance with activity   - Consult OT/PT to assist with strengthening/mobility   - Keep Call bell within reach  - Keep bed low and locked with side rails adjusted as appropriate  - Keep care items and personal belongings within reach  - Initiate and maintain comfort rounds  - Make Fall Risk Sign visible to staff  - Apply yellow socks and bracelet  for high fall risk patients  - Consider moving patient to room near nurses station  Outcome: Progressing  Goal: Maintain or return to baseline ADL function  Description: INTERVENTIONS:  -  Assess patient's ability to carry out ADLs; assess patient's baseline for ADL function and identify physical deficits which impact ability to perform ADLs (bathing, care of mouth/teeth, toileting, grooming, dressing, etc.)  - Assess/evaluate cause of self-care deficits   - Assess range of motion  - Assess patient's mobility; develop plan if impaired  - Assess patient's need for assistive devices and provide as appropriate  - Encourage maximum independence but intervene and supervise when necessary  - Involve family in performance of ADLs  - Assess for home care needs following discharge   - Consider OT consult to assist with ADL evaluation and planning for discharge  - Provide patient education as appropriate  Outcome: Progressing  Goal: Maintains/Returns to pre admission functional level  Description: INTERVENTIONS:  - Perform AM-PAC 6 Click Basic Mobility/ Daily Activity assessment daily.  - Set and communicate daily mobility goal to care team and patient/family/caregiver.   - Collaborate with rehabilitation services on mobility goals if consulted  - Out of bed for toileting  - Record patient progress and toleration of activity level   Outcome: Progressing     Problem: DISCHARGE PLANNING  Goal: Discharge to home or other facility with appropriate resources  Description: INTERVENTIONS:  - Identify barriers to discharge w/patient and caregiver  - Arrange for needed discharge resources and transportation as appropriate  - Identify discharge learning needs (meds, wound care, etc.)  - Arrange for interpretive services to assist at discharge as needed  - Refer to Case Management Department for coordinating discharge planning if the patient needs post-hospital services based on physician/advanced practitioner order or complex needs  related to functional status, cognitive ability, or social support system  Outcome: Progressing

## 2024-10-23 NOTE — TELEPHONE ENCOUNTER
----- Message from Elías George MD sent at 10/23/2024 12:48 PM EDT -----  Hello    Can the patient have posthospital follow-up for hyponatremia in 4 to 6 weeks.  BMP in 2 weeks after discharge.  Patient is still inpatient and will likely remain inpatient for the next few days.    Appointment with anybody    Thank you

## 2024-10-23 NOTE — ASSESSMENT & PLAN NOTE
Lab Results   Component Value Date    SODIUM 136 10/23/2024    SODIUM 133 (L) 10/22/2024    SODIUM 132 (L) 10/22/2024     Presented to [facility] with sodium 99. Transferred to ICU [place].  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  Did initially overcorrect and receive DDAVP and D5W.  Getting IV Lasix to help with volume status  Received a maximum dose of phenobarbital and 1 dose of Valium for agitation  Downgraded from ICU on 10/20 with sodium 127    Plan:  Nephrology following, appreciate recommendations  If urine protein/creatinine ratio is high, then reach out to nephrology for further workup. Otherwise, stable from nephrology perspective  BMP in the morning

## 2024-10-23 NOTE — PROGRESS NOTES
Progress Note - Hospitalist   Name: Viktor Farfan 54 y.o. male I MRN: [id number]  Unit/Bed#: S -01 I Date of Admission: 10/16/2024   Date of Service: 10/23/2024 I Hospital Day: 7    Assessment & Plan  Acute hypoxic respiratory failure (HCC)  Secondary to iatrogenic volume overload in the setting from trying to correct hyponatremia  CXR (10/22) - no significant change diffuse pulmonary edema  Currently on 6 L MFNC    Plan:  Continue diuresis with IV Lasix 40 mg BID  Incentive spirometry  Encourage out of bed  Wean oxygen as tolerated  Hyponatremia  Lab Results   Component Value Date    SODIUM 136 10/23/2024    SODIUM 133 (L) 10/22/2024    SODIUM 132 (L) 10/22/2024     Presented to [facility] with sodium 99. Transferred to ICU [place].  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  Did initially overcorrect and receive DDAVP and D5W.  Getting IV Lasix to help with volume status  Received a maximum dose of phenobarbital and 1 dose of Valium for agitation  Downgraded from ICU on 10/20 with sodium 127    Plan:  Nephrology following, appreciate recommendations  If urine protein/creatinine ratio is high, then reach out to nephrology for further workup. Otherwise, stable from nephrology perspective  BMP in the morning  Alcohol abuse  Drinks 12 pack beer plus several glasses of rum and coke daily  Catch program upon discharge  Tobacco abuse  Smokes 1 pack/day  Encourage smoking cessation  Thrombocytopenia (HCC)  Lab Results   Component Value Date     (L) 10/23/2024     10/22/2024     (L) 10/21/2024     Stable   No active bleeding  Likely secondary to bone marrow suppression from alcohol use  Transaminitis  Lab Results   Component Value Date    AST 76 (H) 10/21/2024    ALT 30 10/21/2024    ALKPHOS 242 (H) 10/21/2024    TBILI 1.27 (H) 10/21/2024     RUQ US (10/17) - hepatic steatosis with morphologic features concerning for cirrhosis.  Small volume RUQ  ascites  Stable  Monitor daily  Follow-up with GI in the outpatient setting  Pneumonia  Met sepsis criteria  With acute hypoxic respiratory failure.  Currently on 10 L midlfow  CTA chest - multifocal bilateral upper lobe consolidation which could be due to pneumonia  Blood cultures - no growth after 4 days  Sputum culture - mixed respiratory alexandra  Completed 5-day course of ceftriaxone from 10/16 to 10/20    Plan:  Incentive spirometry  Encourage out of bed  Wean oxygen as tolerated  HTN (hypertension)  Continue home amlodipine 10 mg  Anemia  Lab Results   Component Value Date    HGB 10.4 (L) 10/23/2024    HGB 9.9 (L) 10/22/2024    HGB 10.3 (L) 10/21/2024   Baseline is 10-11  Microcytic  No active bleeding  Iron panel - iron 123, ferritin 62, TIBC 384  Vitamin B12 330, folate 5.9  Suspect secondary to bone marrow suppression from alcohol use  Vitamin B12 deficiency  Vitamin B12 level 330  Secondary to alcohol use  Continue oral vitamin B12 1000 mcg supplementation  Folate deficiency  Folate level 5.9  Continue oral folic acid supplementation  Urinary retention  Required intermittent straight cath  10/22 - Resolved  Urinary retention protocol  Sepsis due to pneumonia (HCC)  See plan under pneumonia  Diarrhea  New onset diarrhea  Check C. difficile, stool enteric panel    VTE Pharmacologic Prophylaxis: VTE Score: 2 High Risk (Score >/= 5) - Pharmacological DVT Prophylaxis Ordered: enoxaparin (Lovenox). Sequential Compression Devices Ordered.    Mobility:   Basic Mobility Inpatient Raw Score: 15  JH-HLM Goal: 4: Move to chair/commode  JH-HLM Achieved: 7: Walk 25 feet or more  JH-HLM Goal achieved. Continue to encourage appropriate mobility.    Patient Centered Rounds: I performed bedside rounds with nursing staff today.   Discussions with Specialists or Other Care Team Provider: Nephrology    Education and Discussions with Family / Patient:  will update family.     Current Length of Stay: 7 day(s)  Current Patient  Status: Inpatient   Certification Statement: The patient will continue to require additional inpatient hospital stay due to acute hypoxic respiratory failure  Discharge Plan: Anticipate discharge in 24-48 hrs to rehab facility.    Code Status: Level 1 - Full Code    Subjective   Overnight, he remained on 6 L.  Patient was seen and examined at bedside.  He was sleeping very heavily.  He was difficult to arouse, but woke up.  He nodded no to having shortness of breath, chest pain, abdominal pain, nausea, vomiting, urinary symptoms.  He subsequently went back to sleep.    Objective :  Temp:  [98 °F (36.7 °C)-98.7 °F (37.1 °C)] 98.2 °F (36.8 °C)  HR:  [86-96] 86  BP: (108-136)/(57-80) 115/67  Resp:  [16] 16  SpO2:  [91 %-100 %] 93 %  O2 Device: Nasal cannula  Nasal Cannula O2 Flow Rate (L/min):  [5 L/min-6 L/min] 5 L/min    Body mass index is 31.84 kg/m².     Input and Output Summary (last 24 hours):     Intake/Output Summary (Last 24 hours) at 10/23/2024 1530  Last data filed at 10/23/2024 0939  Gross per 24 hour   Intake 360 ml   Output 650 ml   Net -290 ml       Physical Exam  Constitutional:       General: He is not in acute distress.     Appearance: Normal appearance. He is obese. He is not toxic-appearing or diaphoretic.   HENT:      Head: Normocephalic and atraumatic.      Comments: Poor dentition  Pulmonary:      Effort: No respiratory distress.      Breath sounds: No wheezing, rhonchi or rales.      Comments: Coarse breath sounds  Abdominal:      General: Bowel sounds are normal. There is no distension.      Palpations: Abdomen is soft.      Tenderness: There is no abdominal tenderness. There is no guarding or rebound.   Musculoskeletal:         General: Swelling present. Normal range of motion.      Right lower leg: Edema present.      Left lower leg: Edema present.   Skin:     Capillary Refill: Capillary refill takes less than 2 seconds.      Findings: No erythema or rash.   Neurological:      General: No  focal deficit present.      Mental Status: He is alert and oriented to person, place, and time.       Lines/Drains:        Lab Results: I have reviewed the following results:   Results from last 7 days   Lab Units 10/23/24  0519 10/22/24  0423   WBC Thousand/uL 6.12 8.52   HEMOGLOBIN g/dL 10.4* 9.9*   HEMATOCRIT % 32.6* 30.8*   PLATELETS Thousands/uL 138* 155   SEGS PCT %  --  69   LYMPHO PCT %  --  14   MONO PCT %  --  12   EOS PCT %  --  3     Results from last 7 days   Lab Units 10/23/24  0519 10/21/24  1056 10/21/24  0525   SODIUM mmol/L 136   < > 130*   POTASSIUM mmol/L 4.0   < > 4.9   CHLORIDE mmol/L 98   < > 97   CO2 mmol/L 31   < > 28   BUN mg/dL 10   < > 9   CREATININE mg/dL 0.69   < > 0.62   ANION GAP mmol/L 7   < > 5   CALCIUM mg/dL 8.4   < > 8.1*   ALBUMIN g/dL  --   --  2.8*   TOTAL BILIRUBIN mg/dL  --   --  1.27*   ALK PHOS U/L  --   --  242*   ALT U/L  --   --  30   AST U/L  --   --  76*   GLUCOSE RANDOM mg/dL 102   < > 126    < > = values in this interval not displayed.     Results from last 7 days   Lab Units 10/21/24  0620   INR  1.37*             Results from last 7 days   Lab Units 10/18/24  0634   PROCALCITONIN ng/ml 0.10       Recent Cultures (last 7 days):   Results from last 7 days   Lab Units 10/22/24  1757 10/16/24  1728 10/16/24  1609 10/16/24  1533   BLOOD CULTURE   --   --   --  No Growth After 5 Days.   SPUTUM CULTURE   --  4+ Growth of  --   --    GRAM STAIN RESULT   --  1+ Epithelial cells per low power field*  No polys seen*  1+ Gram positive cocci in pairs*  Rare Gram negative rods*  Rare Gram positive rods*  --   --    LEGIONELLA URINARY ANTIGEN   --   --  Negative  --    C DIFF TOXIN B BY PCR  Negative  --   --   --        Imaging Results Review: I reviewed radiology reports from this admission including: CT chest, CT head, Ultrasound(s), and Echocardiogram.  Other Study Results Review: EKG was reviewed.     Last 24 Hours Medication List:     Current Facility-Administered  Medications:     acetaminophen (TYLENOL) tablet 650 mg, Q6H PRN    amLODIPine (NORVASC) tablet 10 mg, Daily    aspirin chewable tablet 81 mg, Daily    atorvastatin (LIPITOR) tablet 20 mg, Daily    chlorhexidine (PERIDEX) 0.12 % oral rinse 15 mL, Q12H JUAN    cyanocobalamin (VITAMIN B-12) tablet 1,000 mcg, Daily    enoxaparin (LOVENOX) subcutaneous injection 40 mg, Daily    escitalopram (LEXAPRO) tablet 20 mg, Daily    folic acid (FOLVITE) tablet 1 mg, Daily    furosemide (LASIX) injection 40 mg, BID (diuretic)    ipratropium-albuterol (DUO-NEB) 0.5-2.5 mg/3 mL inhalation solution 3 mL, Q6H PRN    mirtazapine (REMERON) tablet 15 mg, HS    multivitamin-minerals (CENTRUM) tablet 1 tablet, Daily    nicotine (NICODERM CQ) 21 mg/24 hr TD 24 hr patch 21 mg, Daily    ondansetron (ZOFRAN) injection 4 mg, Q8H PRN    pantoprazole (PROTONIX) EC tablet 40 mg, Early Morning    [COMPLETED] thiamine (VITAMIN B1) 500 mg in sodium chloride 0.9 % 50 mL IVPB, TID, Last Rate: Stopped (10/19/24 1000) **FOLLOWED BY** [COMPLETED] thiamine (VITAMIN B1) 250 mg in sodium chloride 0.9 % 50 mL IVPB, TID, Last Rate: 250 mg (10/22/24 1224) **FOLLOWED BY** thiamine (VITAMIN B1) 100 mg in sodium chloride 0.9 % 50 mL IVPB, TID, Last Rate: 100 mg (10/23/24 0940) **FOLLOWED BY** [START ON 10/26/2024] thiamine tablet 100 mg, Daily    trimethobenzamide (TIGAN) IM injection 200 mg, Q6H PRN    Administrative Statements   Today, Patient Was Seen By: Duyen George DO      **Please Note: This note may have been constructed using a voice recognition system.**

## 2024-10-23 NOTE — ASSESSMENT & PLAN NOTE
Lab Results   Component Value Date    AST 76 (H) 10/21/2024    ALT 30 10/21/2024    ALKPHOS 242 (H) 10/21/2024    TBILI 1.27 (H) 10/21/2024     RUQ US (10/17) - hepatic steatosis with morphologic features concerning for cirrhosis.  Small volume RUQ ascites  Stable  Monitor daily  Follow-up with GI in the outpatient setting

## 2024-10-23 NOTE — ASSESSMENT & PLAN NOTE
-Lactic acid normal  -BC no growth x 5 days  -strep pneumonia, Legionella negative  -sputum culture mixed respiratory alexandra  -CT concerning for pulmonary process, possible pneumonia  -Now off antibiotic  -management per primary team

## 2024-10-23 NOTE — ASSESSMENT & PLAN NOTE
Etiology: Multifactorial likely secondary to EtOH, poor solute intake for 3 days and fluid overload   Sodium on admission: 101-->99 on 10/16/2024  Current sodium 136 mEq/L, appropriate rate of correction   Workup: Serum osm: 233, Urine osm: 347, Urine Sodium:21  Imaging:  CTC with patchy multifocal consolidation bilat upper lobes and GGO  Initially overcorrected received DDAVP and D5W      Serum sodium normalized  Continue diuretics per primary team for volume management.  Currently on Lasix 40 mg IV twice daily   Remain off fluid restriction.  No further intervention treatment for hyponatremia  BMP in am  Normal renal function and creatinine remains at baseline  Abstain from alcohol  Patient stable from a nephrology standpoint

## 2024-10-23 NOTE — ASSESSMENT & PLAN NOTE
Component of possible infection and hypervolemia  CT with no PE but unable fully assess segmental/subsegmental arteries due to motion artifact. +bilat upper lobe consolidations.  Off abx  On Lasix 40 mg IV twice daily per primary team  Continues to require 10L MFNC O2 despite negative fluid balance (-10L total)  Management per primary team

## 2024-10-23 NOTE — PROGRESS NOTES
Progress Note - Nephrology   Name: Viktor Farfan 54 y.o. male I MRN: [id number]  Unit/Bed#: S -01 I Date of Admission: 10/16/2024   Date of Service: 10/23/2024 I Hospital Day: 7       54-year-old male with obesity, EtOH abuse p/w confusion and severe acute hyponatremia. Nephrology consulted for management of severe hyponatremia   Assessment & Plan  Hyponatremia  Etiology: Multifactorial likely secondary to EtOH, poor solute intake for 3 days and fluid overload   Sodium on admission: 101-->99 on 10/16/2024  Current sodium 136 mEq/L, appropriate rate of correction   Workup: Serum osm: 233, Urine osm: 347, Urine Sodium:21  Imaging:  CTC with patchy multifocal consolidation bilat upper lobes and GGO  Initially overcorrected received DDAVP and D5W      Serum sodium normalized  Continue diuretics per primary team for volume management.  Currently on Lasix 40 mg IV twice daily   Remain off fluid restriction.  No further intervention treatment for hyponatremia  BMP in am  Normal renal function and creatinine remains at baseline  Abstain from alcohol  Patient stable from a nephrology standpoint     Acute hypoxic respiratory failure (HCC)  Component of possible infection and hypervolemia  CT with no PE but unable fully assess segmental/subsegmental arteries due to motion artifact. +bilat upper lobe consolidations.  Off abx  On Lasix 40 mg IV twice daily per primary team  Continues to require 10L MFNC O2 despite negative fluid balance (-10L total)  Management per primary team    Alcohol abuse  On CIWA protocol   Report drinking 10-12 beers per night for years  Refusing treatment for alcohol abuse  Management per primary team  Thrombocytopenia (HCC)  Stable and improving  Platelets 138,000  Continue to monitor  Transaminitis  Stable  Consistent with ETOH use  Management per primary team  SIRS (systemic inflammatory response syndrome) (HCC)  -Lactic acid normal  -BC no growth x 5 days  -strep pneumonia, Legionella  negative  -sputum culture mixed respiratory alexandra  -CT concerning for pulmonary process, possible pneumonia  -Now off antibiotic  -management per primary team  Pneumonia  CTC as above with bilat consolidation and GGO  Completed 5 day course ceftriaxone  Pulmonary toilet  Management per primary team  Urinary retention  Has required straight cath  Urinary retention protocol  Management per primary team  Diarrhea  Improving  C. difficile and stool studies pending  Management per primary team    Plan Summary:  -Serum sodium normalized.  Renal function stable  -Diuretics per primary team for volume management  -BMP in a.m.  -Patient stable from nephrology standpoint.  Will sign off and see on a as needed basis during his hospitalization.  Please call if we can be of any further assistance    SUBJECTIVE:  Patient awake, alert without complaints.  Serum sodium normalized at 136.  Renal function stable.  Negative fluid balance.  VSS    A complete review of systems was performed. Pertinent positives and negatives noted in the HPI. Otherwise the review of systems was negative.  OBJECTIVE:  Current Weight: Weight - Scale: 112 kg (248 lb)  Vitals:    10/23/24 0500 10/23/24 0534 10/23/24 0817 10/23/24 0841   BP:   125/74 136/66   BP Location:       Pulse:   93 92   Resp:   16    Temp:   98.1 °F (36.7 °C)    TempSrc:       SpO2:   91% 95%   Weight: 112 kg (248 lb) 112 kg (248 lb)     Height:           Intake/Output Summary (Last 24 hours) at 10/23/2024 1131  Last data filed at 10/23/2024 0939  Gross per 24 hour   Intake 360 ml   Output 1150 ml   Net -790 ml       General:  Awake, alert, appears comfortable and in no acute distress.  Skin:  No rash, warm, good skin turgor   Eyes:  PERRL, EOMI, sclerae nonicteric.  no periorbital edema   ENT:  Moist mucous membranes  Neck:  Trachea midline, symmetric.  No JVD.  Chest: Scattered coarse breath sounds  CVS:  Regular rate and rhythm without murmur, gallop or rub.  S1 and S2 identified  and normal.  No S3, S4.   Abdomen:  Soft, nontender, nondistended without masses.  Normal bowel sounds x 4 quadrants.  No bruit.  Extremities:  Warm, pink, motor and sensory intact and well perfused.  No cyanosis, pallor.  No BLE edema.  Neuro:  Awake, alert, oriented x3.  Grossly intact  Psych:  Appropriate affect.  Mentating appropriately.  Normal mental status exam        Medications:    Current Facility-Administered Medications:     acetaminophen (TYLENOL) tablet 650 mg, 650 mg, Oral, Q6H PRN, Shreyan George, DO, 650 mg at 10/20/24 2128    amLODIPine (NORVASC) tablet 10 mg, 10 mg, Oral, Daily, Shreyan George, DO, 10 mg at 10/23/24 0829    aspirin chewable tablet 81 mg, 81 mg, Oral, Daily, Shreyan George, DO, 81 mg at 10/23/24 0828    atorvastatin (LIPITOR) tablet 20 mg, 20 mg, Oral, Daily, Shreyan George, DO, 20 mg at 10/23/24 0829    chlorhexidine (PERIDEX) 0.12 % oral rinse 15 mL, 15 mL, Mouth/Throat, Q12H JUAN, Shreyan George, DO, 15 mL at 10/23/24 0831    cyanocobalamin (VITAMIN B-12) tablet 1,000 mcg, 1,000 mcg, Oral, Daily, Shreyan George, DO, 1,000 mcg at 10/23/24 0829    enoxaparin (LOVENOX) subcutaneous injection 40 mg, 40 mg, Subcutaneous, Daily, Shreyan George, DO, 40 mg at 10/23/24 0831    escitalopram (LEXAPRO) tablet 20 mg, 20 mg, Oral, Daily, Shreyan George, DO, 20 mg at 10/23/24 0829    folic acid (FOLVITE) tablet 1 mg, 1 mg, Oral, Daily, Shreyan George, DO, 1 mg at 10/23/24 0830    furosemide (LASIX) injection 40 mg, 40 mg, Intravenous, BID (diuretic), Shreyan George, DO, 40 mg at 10/23/24 0831    ipratropium-albuterol (DUO-NEB) 0.5-2.5 mg/3 mL inhalation solution 3 mL, 3 mL, Nebulization, Q6H PRN, Duyen George DO, 3 mL at 10/19/24 1432    mirtazapine (REMERON) tablet 15 mg, 15 mg, Oral, HS, Duyen George DO, 15 mg at 10/22/24 2100    multivitamin-minerals (CENTRUM) tablet 1 tablet, 1 tablet, Oral, Daily, Duyen George DO, 1 tablet at 10/23/24 0831    nicotine (NICODERM CQ) 21 mg/24 hr TD 24 hr patch  21 mg, 21 mg, Transdermal, Daily, Duyen George DO, 21 mg at 10/23/24 0833    ondansetron (ZOFRAN) injection 4 mg, 4 mg, Intravenous, Q8H PRN, Duyen George DO    pantoprazole (PROTONIX) EC tablet 40 mg, 40 mg, Oral, Early Morning, Duyen George DO, 40 mg at 10/23/24 0521    [COMPLETED] thiamine (VITAMIN B1) 500 mg in sodium chloride 0.9 % 50 mL IVPB, 500 mg, Intravenous, TID, Stopped at 10/19/24 1000 **FOLLOWED BY** [COMPLETED] thiamine (VITAMIN B1) 250 mg in sodium chloride 0.9 % 50 mL IVPB, 250 mg, Intravenous, TID, Last Rate: 100 mL/hr at 10/22/24 1224, 250 mg at 10/22/24 1224 **FOLLOWED BY** thiamine (VITAMIN B1) 100 mg in sodium chloride 0.9 % 50 mL IVPB, 100 mg, Intravenous, TID, Last Rate: 100 mL/hr at 10/23/24 0940, 100 mg at 10/23/24 0940 **FOLLOWED BY** [START ON 10/26/2024] thiamine tablet 100 mg, 100 mg, Oral, Daily, Duyen George DO    trimethobenzamide (TIGAN) IM injection 200 mg, 200 mg, Intramuscular, Q6H PRN, Duyen George DO    Laboratory Results:  Results from last 7 days   Lab Units 10/23/24  0519 10/22/24  0828 10/22/24  0423 10/22/24  0012 10/21/24  1929 10/21/24  1629 10/21/24  1056 10/21/24  0620 10/21/24  0525 10/20/24  1054 10/20/24  0345 10/19/24  0403 10/19/24  0402 10/18/24  0634 10/18/24  0407 10/17/24  0756 10/17/24  0444 10/16/24  2022 10/16/24  1528   WBC Thousand/uL 6.12  --  8.52  --   --   --   --  9.12  --   --  9.76  --  9.02  --  8.70  --  10.17*  --  7.94   HEMOGLOBIN g/dL 10.4*  --  9.9*  --   --   --   --  10.3*  --   --  10.0*  --  10.2*  --  10.3*  --  10.9*  --  11.1*   HEMATOCRIT % 32.6*  --  30.8*  --   --   --   --  31.9*  --   --  30.0*  --  29.8*  --  29.5*  --  30.7*  --  31.1*   PLATELETS Thousands/uL 138*  --  155  --   --   --   --  102*  --   --  107*  --  95*  --  99*  --  103*  --  105*   SODIUM mmol/L 136 133*  --  132* 132* 132* 131*  --  130*   < > 125*   < >  --    < >  --    < > 107*   < > 103*   POTASSIUM mmol/L 4.0 4.1  --  3.9 3.9 3.9 3.8  --   4.9   < > 4.0   < >  --    < >  --    < > 4.6   < > 5.0   CHLORIDE mmol/L 98 97  --  98 96 97 97  --  97   < > 94*   < >  --    < >  --    < > 81*   < > 76*   CO2 mmol/L 31 30  --  30 28 30 29  --  28   < > 26   < >  --    < >  --    < > 19*   < > 16*   BUN mg/dL 10 9  --  9 9 9 9  --  9   < > 6   < >  --    < >  --    < > 10   < > 7   CREATININE mg/dL 0.69 0.67  --  0.73 0.80 0.65 0.66  --  0.62   < > 0.71   < >  --    < >  --    < > 0.76   < > 0.67   CALCIUM mg/dL 8.4 8.4  --  8.6 8.5 8.9 8.4  --  8.1*   < > 7.9*   < >  --    < >  --    < > 7.9*   < > 8.1*   MAGNESIUM mg/dL 2.1  --   --   --   --   --   --   --  2.1  --  2.1  --  2.1  --  1.9  --  1.8*  --  1.9   PHOSPHORUS mg/dL 3.4  --   --   --   --   --   --   --  3.4  --  3.3  --  2.4*  --  2.0*  --  2.8  --  2.4*    < > = values in this interval not displayed.       I have personally reviewed the blood work as stated above and in my note.  I have personally reviewed internal Medicine, co-consultants and previous nephrology notes.

## 2024-10-23 NOTE — PLAN OF CARE
Problem: PHYSICAL THERAPY ADULT  Goal: Performs mobility at highest level of function for planned discharge setting.  See evaluation for individualized goals.  Description: Treatment/Interventions: Functional transfer training, LE strengthening/ROM, Elevations, Therapeutic exercise, Endurance training, Cognitive reorientation, Patient/family training, Equipment eval/education, Bed mobility, Gait training, Compensatory technique education, Spoke to nursing, Spoke to case management     See flowsheet documentation for full assessment, interventions and recommendations.  Outcome: Progressing  Note: Prognosis: Good  Problem List: Decreased strength, Decreased endurance, Impaired balance, Decreased mobility, Decreased cognition, Impaired judgement, Decreased safety awareness, Decreased skin integrity  Assessment: Pt seen for PT treatment 10/23/2024 with focus on: gait training and therapeutic activity, with intervention focusing on goal of increased independence and safety with ambulation. Pt presents seated at EOB, denies pain, is agreeable to participate in session. During session, pt requires JAKUB progressing to close S for transfers, MODA for ambulation of multiple trials with no AD (44ft x2, 18ft x2) pt declines trial of RW use. Pt completes multiple bouts of dynamic functional standing/reaching tasks with JAKUB for maintained balance. Pt completes 5xSTS assessment in 18.26 seconds, demonstrating a decreased level of LE power and increased risk of falls as compared to age matched peers. T/o session, SPO2 monitored and maintained 90%+ on 8L via MFNC, pt with moderate degree of visible SOB however denies SOB from baseline. At end of session pt was left seated OOB in recliner chair with alarm engaged and needs in reach. Overall pt displays improved activity tolerance as compared to previous session, however continues to perform below their baseline level of functional mobility due to weakness, gait deviations,  impaired balance, impaired insight and safety. Pt continues to most appropriately benefit from Level 1 (maximal PT intensity) resources upon d/c from the acute care setting. Continue skilled PT POC as able and appropriate in order to progress towards therapy goals and maximize independence with functional mobility.    Barriers to Discharge: Decreased caregiver support, Inaccessible home environment     Rehab Resource Intensity Level, PT: I (Maximum Resource Intensity)    See flowsheet documentation for full assessment.

## 2024-10-23 NOTE — RESPIRATORY THERAPY NOTE
10/23/24 0377   Respiratory Assessment   Resp Comments Found pt on 5L MFNC. Switched pt from MFNC to 5L NC. Pt doing well at this time.   Oxygen Therapy/Pulse Ox   O2 Device (S)  Nasal cannula   O2 Therapy Oxygen humidified   Nasal Cannula O2 Flow Rate (L/min) 5 L/min   Calculated FIO2 (%) - Nasal Cannula 40   SpO2 95 %   SpO2 Activity At Rest   $ Pulse Oximetry Spot Check Charge Completed

## 2024-10-24 PROBLEM — R26.2 AMBULATORY DYSFUNCTION: Status: ACTIVE | Noted: 2024-10-24

## 2024-10-24 LAB
ALBUMIN SERPL BCG-MCNC: 2.8 G/DL (ref 3.5–5)
ALP SERPL-CCNC: 330 U/L (ref 34–104)
ALT SERPL W P-5'-P-CCNC: 48 U/L (ref 7–52)
ANION GAP SERPL CALCULATED.3IONS-SCNC: 5 MMOL/L (ref 4–13)
AST SERPL W P-5'-P-CCNC: 95 U/L (ref 13–39)
BILIRUB SERPL-MCNC: 0.86 MG/DL (ref 0.2–1)
BUN SERPL-MCNC: 10 MG/DL (ref 5–25)
CALCIUM ALBUM COR SERPL-MCNC: 9.3 MG/DL (ref 8.3–10.1)
CALCIUM SERPL-MCNC: 8.3 MG/DL (ref 8.4–10.2)
CHLORIDE SERPL-SCNC: 98 MMOL/L (ref 96–108)
CO2 SERPL-SCNC: 32 MMOL/L (ref 21–32)
CREAT SERPL-MCNC: 0.63 MG/DL (ref 0.6–1.3)
CREAT UR-MCNC: 83.9 MG/DL
ERYTHROCYTE [DISTWIDTH] IN BLOOD BY AUTOMATED COUNT: 16.4 % (ref 11.6–15.1)
GFR SERPL CREATININE-BSD FRML MDRD: 111 ML/MIN/1.73SQ M
GLUCOSE SERPL-MCNC: 103 MG/DL (ref 65–140)
HCT VFR BLD AUTO: 31.1 % (ref 36.5–49.3)
HGB BLD-MCNC: 9.9 G/DL (ref 12–17)
MAGNESIUM SERPL-MCNC: 2 MG/DL (ref 1.9–2.7)
MCH RBC QN AUTO: 28.2 PG (ref 26.8–34.3)
MCHC RBC AUTO-ENTMCNC: 31.8 G/DL (ref 31.4–37.4)
MCV RBC AUTO: 89 FL (ref 82–98)
PHOSPHATE SERPL-MCNC: 2.9 MG/DL (ref 2.7–4.5)
PLATELET # BLD AUTO: 144 THOUSANDS/UL (ref 149–390)
PMV BLD AUTO: 9.8 FL (ref 8.9–12.7)
POTASSIUM SERPL-SCNC: 3.8 MMOL/L (ref 3.5–5.3)
PROT SERPL-MCNC: 6.7 G/DL (ref 6.4–8.4)
PROT UR-MCNC: 8 MG/DL
PROT/CREAT UR: 0.1 MG/G{CREAT} (ref 0–0.1)
RBC # BLD AUTO: 3.51 MILLION/UL (ref 3.88–5.62)
SODIUM SERPL-SCNC: 135 MMOL/L (ref 135–147)
WBC # BLD AUTO: 5.44 THOUSAND/UL (ref 4.31–10.16)

## 2024-10-24 PROCEDURE — 85027 COMPLETE CBC AUTOMATED: CPT

## 2024-10-24 PROCEDURE — 84156 ASSAY OF PROTEIN URINE: CPT | Performed by: INTERNAL MEDICINE

## 2024-10-24 PROCEDURE — 99254 IP/OBS CNSLTJ NEW/EST MOD 60: CPT | Performed by: PHYSICAL MEDICINE & REHABILITATION

## 2024-10-24 PROCEDURE — 97116 GAIT TRAINING THERAPY: CPT

## 2024-10-24 PROCEDURE — 97530 THERAPEUTIC ACTIVITIES: CPT

## 2024-10-24 PROCEDURE — 84100 ASSAY OF PHOSPHORUS: CPT | Performed by: INTERNAL MEDICINE

## 2024-10-24 PROCEDURE — 83735 ASSAY OF MAGNESIUM: CPT | Performed by: INTERNAL MEDICINE

## 2024-10-24 PROCEDURE — 82570 ASSAY OF URINE CREATININE: CPT | Performed by: INTERNAL MEDICINE

## 2024-10-24 PROCEDURE — 80053 COMPREHEN METABOLIC PANEL: CPT

## 2024-10-24 PROCEDURE — 99232 SBSQ HOSP IP/OBS MODERATE 35: CPT | Performed by: INTERNAL MEDICINE

## 2024-10-24 RX ORDER — ALBUMIN (HUMAN) 12.5 G/50ML
25 SOLUTION INTRAVENOUS EVERY 8 HOURS
Status: COMPLETED | OUTPATIENT
Start: 2024-10-24 | End: 2024-10-25

## 2024-10-24 RX ADMIN — CYANOCOBALAMIN TAB 500 MCG 1000 MCG: 500 TAB at 08:18

## 2024-10-24 RX ADMIN — FOLIC ACID 1 MG: 1 TABLET ORAL at 08:17

## 2024-10-24 RX ADMIN — THIAMINE HYDROCHLORIDE 100 MG: 100 INJECTION, SOLUTION INTRAMUSCULAR; INTRAVENOUS at 16:20

## 2024-10-24 RX ADMIN — MULTIPLE VITAMINS W/ MINERALS TAB 1 TABLET: TAB ORAL at 08:17

## 2024-10-24 RX ADMIN — ALBUMIN (HUMAN) 25 G: 0.25 INJECTION, SOLUTION INTRAVENOUS at 11:39

## 2024-10-24 RX ADMIN — ALBUMIN (HUMAN) 25 G: 0.25 INJECTION, SOLUTION INTRAVENOUS at 19:56

## 2024-10-24 RX ADMIN — ATORVASTATIN CALCIUM 20 MG: 20 TABLET, FILM COATED ORAL at 08:17

## 2024-10-24 RX ADMIN — ASPIRIN 81 MG CHEWABLE TABLET 81 MG: 81 TABLET CHEWABLE at 08:18

## 2024-10-24 RX ADMIN — MIRTAZAPINE 15 MG: 15 TABLET, FILM COATED ORAL at 21:23

## 2024-10-24 RX ADMIN — ESCITALOPRAM OXALATE 20 MG: 20 TABLET ORAL at 08:16

## 2024-10-24 RX ADMIN — AMLODIPINE BESYLATE 10 MG: 10 TABLET ORAL at 08:18

## 2024-10-24 RX ADMIN — ENOXAPARIN SODIUM 40 MG: 40 INJECTION SUBCUTANEOUS at 08:16

## 2024-10-24 RX ADMIN — FUROSEMIDE 40 MG: 10 INJECTION, SOLUTION INTRAMUSCULAR; INTRAVENOUS at 08:17

## 2024-10-24 RX ADMIN — NICOTINE 21 MG: 21 PATCH, EXTENDED RELEASE TRANSDERMAL at 08:17

## 2024-10-24 RX ADMIN — THIAMINE HYDROCHLORIDE 100 MG: 100 INJECTION, SOLUTION INTRAMUSCULAR; INTRAVENOUS at 21:30

## 2024-10-24 RX ADMIN — CHLORHEXIDINE GLUCONATE 15 ML: 1.2 RINSE ORAL at 08:18

## 2024-10-24 RX ADMIN — FUROSEMIDE 40 MG: 10 INJECTION, SOLUTION INTRAMUSCULAR; INTRAVENOUS at 16:19

## 2024-10-24 RX ADMIN — PANTOPRAZOLE SODIUM 40 MG: 40 TABLET, DELAYED RELEASE ORAL at 05:35

## 2024-10-24 RX ADMIN — THIAMINE HYDROCHLORIDE 100 MG: 100 INJECTION, SOLUTION INTRAMUSCULAR; INTRAVENOUS at 08:37

## 2024-10-24 NOTE — CASE MANAGEMENT
Case Management Progress Note    Patient name Viktor Farfan  Location S /S -01 MRN [id number]  : 1970 Date 10/24/2024       LOS (days): 8  Geometric Mean LOS (GMLOS) (days): 4.9  Days to GMLOS:-3.2        OBJECTIVE:        Current admission status: Inpatient  Preferred Pharmacy:   [facility] - [place], [place] - [address] [place] [id number]  Phone: [phone] Fax: [phone]    [id number] - [place] - [address]  [address]  [facility] [address]  Phone: [phone] Fax: [phone]    Primary Care Provider: No primary care provider on file.    Primary Insurance: [id number]  Secondary Insurance:     PROGRESS NOTE:  CM spoke with daughterLinda over phone re: dcp. Patient continues to progress with therapy-- PMR consult completed today; rec IRF vs home. CM discussed this with daughter, notified if patient continues to progress he may no longer qualify for STR and auth would not be approved. Daughter expressed understanding, however stating if patient does become cleared by therapy they are interested in D&A rehab. Daughter provided with CRS info. CM will also follow up with [facility] tomorrow to notify of possible need for placement.    CM to follow up as able to continue with dcp.

## 2024-10-24 NOTE — PLAN OF CARE
Problem: PAIN - ADULT  Goal: Verbalizes/displays adequate comfort level or baseline comfort level  Description: Interventions:  - Encourage patient to monitor pain and request assistance  - Assess pain using appropriate pain scale  - Administer analgesics based on type and severity of pain and evaluate response  - Implement non-pharmacological measures as appropriate and evaluate response  - Consider cultural and social influences on pain and pain management  - Notify physician/advanced practitioner if interventions unsuccessful or patient reports new pain  Outcome: Progressing     Problem: SAFETY ADULT  Goal: Patient will remain free of falls  Description: INTERVENTIONS:  - Educate patient/family on patient safety including physical limitations  - Instruct patient to call for assistance with activity   - Consult OT/PT to assist with strengthening/mobility   - Keep Call bell within reach  - Keep bed low and locked with side rails adjusted as appropriate  - Keep care items and personal belongings within reach  - Initiate and maintain comfort rounds  - Make Fall Risk Sign visible to staff  - Apply yellow socks and bracelet for high fall risk patients  - Consider moving patient to room near nurses station  Outcome: Progressing  Goal: Maintain or return to baseline ADL function  Description: INTERVENTIONS:  -  Assess patient's ability to carry out ADLs; assess patient's baseline for ADL function and identify physical deficits which impact ability to perform ADLs (bathing, care of mouth/teeth, toileting, grooming, dressing, etc.)  - Assess/evaluate cause of self-care deficits   - Assess range of motion  - Assess patient's mobility; develop plan if impaired  - Assess patient's need for assistive devices and provide as appropriate  - Encourage maximum independence but intervene and supervise when necessary  - Involve family in performance of ADLs  - Assess for home care needs following discharge   - Consider OT consult  to assist with ADL evaluation and planning for discharge  - Provide patient education as appropriate  Outcome: Progressing  Goal: Maintains/Returns to pre admission functional level  Description: INTERVENTIONS:  - Perform AM-PAC 6 Click Basic Mobility/ Daily Activity assessment daily.  - Set and communicate daily mobility goal to care team and patient/family/caregiver.   - Collaborate with rehabilitation services on mobility goals if consulted  - Out of bed for toileting  - Record patient progress and toleration of activity level   Outcome: Progressing     Problem: Knowledge Deficit  Goal: Patient/family/caregiver demonstrates understanding of disease process, treatment plan, medications, and discharge instructions  Description: Complete learning assessment and assess knowledge base.  Interventions:  - Provide teaching at level of understanding  - Provide teaching via preferred learning methods  Outcome: Progressing     Problem: NEUROSENSORY - ADULT  Goal: Achieves stable or improved neurological status  Description: INTERVENTIONS  - Monitor and report changes in neurological status  - Monitor vital signs such as temperature, blood pressure, glucose, and any other labs ordered   - Initiate measures to prevent increased intracranial pressure  - Monitor for seizure activity and implement precautions if appropriate      Outcome: Progressing  Goal: Remains free of injury related to seizures activity  Description: INTERVENTIONS  - Maintain airway, patient safety  and administer oxygen as ordered  - Monitor patient for seizure activity, document and report duration and description of seizure to physician/advanced practitioner  - If seizure occurs,  ensure patient safety during seizure  - Reorient patient post seizure  - Seizure pads on all 4 side rails  - Instruct patient/family to notify RN of any seizure activity including if an aura is experienced  - Instruct patient/family to call for assistance with activity based on  nursing assessment  - Administer anti-seizure medications if ordered    Outcome: Progressing  Goal: Achieves maximal functionality and self care  Description: INTERVENTIONS  - Monitor swallowing and airway patency with patient fatigue and changes in neurological status  - Encourage and assist patient to increase activity and self care.   - Encourage visually impaired, hearing impaired and aphasic patients to use assistive/communication devices  Outcome: Progressing

## 2024-10-24 NOTE — ASSESSMENT & PLAN NOTE
Met sepsis criteria  With acute hypoxic respiratory failure.  Currently on 10 L midlfow  CTA chest - multifocal bilateral upper lobe consolidation which could be due to pneumonia  Blood cultures - no growth after 4 days  Sputum culture - mixed respiratory alexandra  Completed 5-day course of ceftriaxone from 10/16 to 10/20    Plan:  Incentive spirometry  Encourage out of bed  Wean oxygen as tolerated

## 2024-10-24 NOTE — ASSESSMENT & PLAN NOTE
Secondary to iatrogenic volume overload in the setting from trying to correct hyponatremia  CXR (10/22) - no significant change diffuse pulmonary edema  Echo (10/17) - EF 60%.  Unable to assess diastolic function.  Mild tricuspid regurgitation.  Estimated RVSP mildly elevated, 38.00 mmHg  Currently on 3 L MFNC    Plan:  Continue diuresis with IV Lasix 40 mg BID.  Patient continues to diurese well  Albumin trial with 25% 25g q8h x 3 doses to drive extracellular volume intravascularly and promote further diuresis  Incentive spirometry  Encourage out of bed  Wean oxygen as tolerated

## 2024-10-24 NOTE — PROGRESS NOTES
Progress Note - Hospitalist   Name: Viktor Farfan 54 y.o. male I MRN: [id number]  Unit/Bed#: S -01 I Date of Admission: 10/16/2024   Date of Service: 10/24/2024 I Hospital Day: 8    Assessment & Plan  Acute hypoxic respiratory failure (HCC)  Secondary to iatrogenic volume overload in the setting from trying to correct hyponatremia  CXR (10/22) - no significant change diffuse pulmonary edema  Echo (10/17) - EF 60%.  Unable to assess diastolic function.  Mild tricuspid regurgitation.  Estimated RVSP mildly elevated, 38.00 mmHg  Currently on 3 L MFNC    Plan:  Continue diuresis with IV Lasix 40 mg BID.  Patient continues to diurese well  Albumin trial with 25% 25g q8h x 3 doses to drive extracellular volume intravascularly and promote further diuresis  Incentive spirometry  Encourage out of bed  Wean oxygen as tolerated  Hyponatremia  Lab Results   Component Value Date    SODIUM 135 10/24/2024    SODIUM 136 10/23/2024    SODIUM 133 (L) 10/22/2024     Presented to [facility] with sodium 99. Transferred to ICU [place].  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  Did initially overcorrect and receive DDAVP and D5W.  Getting IV Lasix to help with volume status  Received a maximum dose of phenobarbital and 1 dose of Valium for agitation  Downgraded from ICU on 10/20 with sodium 127    Plan:  Nephrology following, appreciate recommendations  If urine protein/creatinine ratio is high, then reach out to nephrology for further workup. Otherwise, stable from nephrology perspective  Alcohol abuse  Drinks 12 pack beer plus several glasses of rum and coke daily  [facility] program upon discharge  Tobacco abuse  Smokes 1 pack/day  Encourage smoking cessation  Thrombocytopenia (HCC)  Lab Results   Component Value Date     (L) 10/24/2024     (L) 10/23/2024     10/22/2024     Stable   No active bleeding  Likely secondary to bone marrow suppression from alcohol  use  Transaminitis  Lab Results   Component Value Date    AST 95 (H) 10/24/2024    ALT 48 10/24/2024    ALKPHOS 330 (H) 10/24/2024    TBILI 0.86 10/24/2024     RUQ US (10/17) - hepatic steatosis with morphologic features concerning for cirrhosis.  Small volume RUQ ascites  Stable  Monitor daily  Follow-up with GI in the outpatient setting  Pneumonia  Met sepsis criteria  With acute hypoxic respiratory failure.  Currently on 10 L midlfow  CTA chest - multifocal bilateral upper lobe consolidation which could be due to pneumonia  Blood cultures - no growth after 4 days  Sputum culture - mixed respiratory alexandra  Completed 5-day course of ceftriaxone from 10/16 to 10/20    Plan:  Incentive spirometry  Encourage out of bed  Wean oxygen as tolerated  HTN (hypertension)  Continue home amlodipine 10 mg  Anemia  Lab Results   Component Value Date    HGB 9.9 (L) 10/24/2024    HGB 10.4 (L) 10/23/2024    HGB 9.9 (L) 10/22/2024   Baseline is 10-11  Microcytic  No active bleeding  Iron panel - iron 123, ferritin 62, TIBC 384  Vitamin B12 330, folate 5.9  Suspect secondary to bone marrow suppression from alcohol use  Vitamin B12 deficiency  Vitamin B12 level 330  Secondary to alcohol use  Continue oral vitamin B12 1000 mcg supplementation  Folate deficiency  Folate level 5.9  Continue oral folic acid supplementation  Urinary retention  Required intermittent straight cath  10/22 - Resolved  Urinary retention protocol  Sepsis due to pneumonia (HCC)  See plan under pneumonia  Diarrhea  New onset diarrhea  C. Difficile and stool enteric panel negative  Resolved  Ambulatory dysfunction  PT recommend level I  PMR consulted, patient recommendations    VTE Pharmacologic Prophylaxis: VTE Score: 2 Moderate Risk (Score 3-4) - Pharmacological DVT Prophylaxis Ordered: enoxaparin (Lovenox).    Mobility:   Basic Mobility Inpatient Raw Score: 20  JH-HLM Goal: 6: Walk 10 steps or more  JH-HLM Achieved: 7: Walk 25 feet or more  JH-HLM Goal achieved.  Continue to encourage appropriate mobility.    Patient Centered Rounds: I performed bedside rounds with nursing staff today.   Discussions with Specialists or Other Care Team Provider: Nephrology    Education and Discussions with Family / Patient: Updated  (daughter) via phone.    Current Length of Stay: 8 day(s)  Current Patient Status: Inpatient   Certification Statement: The patient will continue to require additional inpatient hospital stay due to acute on chronic hypoxic respiratory failure secondary to volume overload  Discharge Plan: Anticipate discharge in 24-48 hrs to rehab facility.    Code Status: Level 1 - Full Code    Subjective   Patient was seen and examined at bedside.  He was sleeping very heavily at the time my examination.  He was awoken, he nodded no to having any chest pain, difficulty breathing, lightheadedness, abdominal pain, urinary symptoms.  He went back to sleep.    Upon reevaluation, patient was much more awake, sitting upright in chair next to bed.  He was on 3 L of oxygen.  He reports feeling better.  He states that he was walking around with physical therapy.    Objective :  Temp:  [98.2 °F (36.8 °C)-99.4 °F (37.4 °C)] 99.4 °F (37.4 °C)  HR:  [86-93] 87  BP: (106-115)/(56-68) 106/56  Resp:  [16] 16  SpO2:  [84 %-97 %] 97 %    Body mass index is 32.01 kg/m².     Input and Output Summary (last 24 hours):     Intake/Output Summary (Last 24 hours) at 10/24/2024 1501  Last data filed at 10/24/2024 0601  Gross per 24 hour   Intake 640 ml   Output 2200 ml   Net -1560 ml       Physical Exam  Constitutional:       General: He is not in acute distress.     Appearance: Normal appearance. He is obese. He is not toxic-appearing or diaphoretic.   HENT:      Head: Normocephalic and atraumatic.      Comments: Poor dentition  Pulmonary:      Effort: No respiratory distress.      Breath sounds: No wheezing, rhonchi or rales.      Comments: Coarse breath sounds. On 3 L NC  Abdominal:       General: Bowel sounds are normal. There is no distension.      Palpations: Abdomen is soft.      Tenderness: There is no abdominal tenderness. There is no guarding or rebound.   Musculoskeletal:         General: Swelling present. Normal range of motion.      Right lower leg: Edema present.      Left lower leg: Edema present.   Skin:     Capillary Refill: Capillary refill takes less than 2 seconds.      Findings: No erythema or rash.   Neurological:      General: No focal deficit present.      Mental Status: He is alert and oriented to person, place, and time.       Lines/Drains:      Lab Results: I have reviewed the following results:   Results from last 7 days   Lab Units 10/24/24  0533 10/23/24  0519 10/22/24  0423   WBC Thousand/uL 5.44   < > 8.52   HEMOGLOBIN g/dL 9.9*   < > 9.9*   HEMATOCRIT % 31.1*   < > 30.8*   PLATELETS Thousands/uL 144*   < > 155   SEGS PCT %  --   --  69   LYMPHO PCT %  --   --  14   MONO PCT %  --   --  12   EOS PCT %  --   --  3    < > = values in this interval not displayed.     Results from last 7 days   Lab Units 10/24/24  0533   SODIUM mmol/L 135   POTASSIUM mmol/L 3.8   CHLORIDE mmol/L 98   CO2 mmol/L 32   BUN mg/dL 10   CREATININE mg/dL 0.63   ANION GAP mmol/L 5   CALCIUM mg/dL 8.3*   ALBUMIN g/dL 2.8*   TOTAL BILIRUBIN mg/dL 0.86   ALK PHOS U/L 330*   ALT U/L 48   AST U/L 95*   GLUCOSE RANDOM mg/dL 103     Results from last 7 days   Lab Units 10/21/24  0620   INR  1.37*             Results from last 7 days   Lab Units 10/18/24  0634   PROCALCITONIN ng/ml 0.10       Recent Cultures (last 7 days):   Results from last 7 days   Lab Units 10/22/24  1757   C DIFF TOXIN B BY PCR  Negative       Imaging Results Review: I reviewed radiology reports from this admission including: chest xray, CT chest, Ultrasound(s), and Echocardiogram.  Other Study Results Review: EKG was reviewed.     Last 24 Hours Medication List:     Current Facility-Administered Medications:     acetaminophen (TYLENOL)  tablet 650 mg, Q6H PRN    albumin human (FLEXBUMIN) 25 % injection 25 g, Q8H    amLODIPine (NORVASC) tablet 10 mg, Daily    aspirin chewable tablet 81 mg, Daily    atorvastatin (LIPITOR) tablet 20 mg, Daily    chlorhexidine (PERIDEX) 0.12 % oral rinse 15 mL, Q12H JUAN    cyanocobalamin (VITAMIN B-12) tablet 1,000 mcg, Daily    enoxaparin (LOVENOX) subcutaneous injection 40 mg, Daily    escitalopram (LEXAPRO) tablet 20 mg, Daily    folic acid (FOLVITE) tablet 1 mg, Daily    furosemide (LASIX) injection 40 mg, BID (diuretic)    ipratropium-albuterol (DUO-NEB) 0.5-2.5 mg/3 mL inhalation solution 3 mL, Q6H PRN    mirtazapine (REMERON) tablet 15 mg, HS    multivitamin-minerals (CENTRUM) tablet 1 tablet, Daily    nicotine (NICODERM CQ) 21 mg/24 hr TD 24 hr patch 21 mg, Daily    ondansetron (ZOFRAN) injection 4 mg, Q8H PRN    pantoprazole (PROTONIX) EC tablet 40 mg, Early Morning    [COMPLETED] thiamine (VITAMIN B1) 500 mg in sodium chloride 0.9 % 50 mL IVPB, TID, Last Rate: Stopped (10/19/24 1000) **FOLLOWED BY** [COMPLETED] thiamine (VITAMIN B1) 250 mg in sodium chloride 0.9 % 50 mL IVPB, TID, Last Rate: 250 mg (10/22/24 1224) **FOLLOWED BY** thiamine (VITAMIN B1) 100 mg in sodium chloride 0.9 % 50 mL IVPB, TID, Last Rate: 100 mg (10/24/24 0837) **FOLLOWED BY** [START ON 10/26/2024] thiamine tablet 100 mg, Daily    trimethobenzamide (TIGAN) IM injection 200 mg, Q6H PRN    Administrative Statements   Today, Patient Was Seen By: Duyen George DO      **Please Note: This note may have been constructed using a voice recognition system.**

## 2024-10-24 NOTE — ASSESSMENT & PLAN NOTE
Lab Results   Component Value Date     (L) 10/24/2024     (L) 10/23/2024     10/22/2024     Stable   No active bleeding  Likely secondary to bone marrow suppression from alcohol use

## 2024-10-24 NOTE — UTILIZATION REVIEW
Continued Stay Review    Date: 10/24 for  10/23                           Current Patient Class: Inpatient  Current Level of Care: MS     HPI:54 y.o. male initially admitted on 10/16/24  . Acute hypoxic resp failure. JADON . PNA- completed abx .     Assessment/Plan: 10/2/3/24   On 10/21, sodium level rising to 131 and when corrected for blood sugar is approximately 131-132. On 10/22, sodium level is improving appropriately to 133. Potassium and bicarbonate are appropriate. Creatinine remains at baseline 0.6 mg/dL. Uptitrated  Lasix 20 mg IV BID to 40 mg IV BID .  CXR 10/22 shows no change in diffuse pulm edema   On 10/23, sodium level is rising appropriately to 136.   Potassium, bicarbonate, phosphorus and magnesium are appropriate.Per nephrology,  it is unclear why the patient is significantly volume overloaded. He is improving with diuresis. I/O = -11.3 L since admission .Wt decreasing . Pt on 10 L MFNC this am , weaned down to 6 L .  Coarse breath sounds .  Echocardiogram  unrevealing for the cause of volume overload per nephrology . Per internal med, iatrogenic fluid overload secondary to prior need for aggressive fluids for correction of profound hyponatremia . Urine is bland and renal function is at baseline. Liver is fatty on ultrasound. Unclear if this is liver related versus other with regards to volume. Currently concerned that this is likely liver related. Nephrology to defer to  primary team at this juncture .  Will need close hepatology follow-up . BMP in am . Will require ongoing counseling for alcohol cessation. Awaiting acute rehab placement. CIWA 0 .             Medications:   Scheduled Medications:  amLODIPine, 10 mg, Oral, Daily  aspirin, 81 mg, Oral, Daily  atorvastatin, 20 mg, Oral, Daily  chlorhexidine, 15 mL, Mouth/Throat, Q12H JUAN  cyanocobalamin, 1,000 mcg, Oral, Daily  enoxaparin, 40 mg, Subcutaneous, Daily  escitalopram, 20 mg, Oral, Daily  folic acid, 1 mg, Oral, Daily  furosemide, 40  mg, Intravenous, BID (diuretic)  mirtazapine, 15 mg, Oral, HS  multivitamin-minerals, 1 tablet, Oral, Daily  nicotine, 21 mg, Transdermal, Daily  pantoprazole, 40 mg, Oral, Early Morning  thiamine, 100 mg, Intravenous, TID   Followed by  [START ON 10/26/2024] thiamine, 100 mg, Oral, Daily    furosemide (LASIX) injection 20 mg  Dose: 20 mg  Freq: 2 times daily (diuretic) Route: IV  Start: 10/20/24 0800 End: 10/22/24 1238  potassium chloride (Klor-Con M20) CR tablet 40 mEq  Dose: 40 mEq  Freq: Once Route: PO  Start: 10/22/24 0630 End: 10/22/24 0633      Continuous IV Infusions:     PRN Meds:  acetaminophen, 650 mg, Oral, Q6H PRN  ipratropium-albuterol, 3 mL, Nebulization, Q6H PRN  ondansetron, 4 mg, Intravenous, Q8H PRN  trimethobenzamide, 200 mg, Intramuscular, Q6H PRN      Discharge Plan: TBD     Vital Signs (last 3 days)       Date/Time Temp Pulse Resp BP MAP (mmHg) SpO2 Calculated FIO2 (%) - Nasal Cannula O2 Flow Rate (L/min) Nasal Cannula O2 Flow Rate (L/min) O2 Device O2 Interface Device Patient Position - Orthostatic VS [place] Coma Scale Score CIWA-Ar Total Pain    10/23/24 2305 -- -- -- -- -- -- -- -- -- -- -- -- 15 -- --    10/23/24 22:08:41 98.5 °F (36.9 °C) 86 16 109/68 82 96 % -- -- -- -- -- -- -- 0 No Pain    10/23/24 1915 -- -- -- -- -- -- -- -- -- -- -- -- 15 -- --    10/23/24 1900 -- 86 -- -- -- -- -- -- -- -- -- -- -- 0 --    10/23/24 1600 -- -- -- -- -- -- -- -- -- -- -- -- 15 -- --    10/23/24 15:07:38 98.2 °F (36.8 °C) 86 16 115/67 83 93 % -- -- -- -- -- -- -- -- --    10/23/24 1449 -- -- -- -- -- 95 % 40 -- 5 L/min Nasal cannula  -- -- -- -- --    10/23/24 1200 -- -- -- -- -- -- -- -- -- -- -- -- 15 -- --    10/23/24 0850 -- -- -- -- -- -- -- -- -- -- -- -- -- -- No Pain    10/23/24 08:41:54 -- 92 -- 136/66 89 95 % -- -- -- -- -- -- -- -- --    10/23/24 08:17:54 98.1 °F (36.7 °C) 93 16 125/74 91 91 % -- -- -- -- -- -- -- -- --    10/23/24 0800 -- -- -- -- -- -- -- -- -- -- -- -- 15 -- No Pain     10/23/24 0349 -- -- -- -- -- -- -- 10 L/min -- Mid flow nasal cannula -- -- -- -- --    10/23/24 03:02:42 98.7 °F (37.1 °C) 96 -- 108/57 74 91 % -- -- -- -- -- -- -- -- --    10/22/24 23:06:02 98.7 °F (37.1 °C) 94 -- 117/73 88 100 % -- -- -- -- -- -- -- -- --    10/22/24 2100 -- -- -- -- -- -- -- -- -- -- -- -- 15 -- No Pain    10/22/24 2055 -- -- -- -- -- -- 44 -- 6 L/min Mid flow nasal cannula MFNC prongs -- -- -- --    10/22/24 20:22:33 98 °F (36.7 °C) 88 -- 120/80 93 96 % -- -- -- -- -- -- -- -- --    10/22/24 1600 -- -- -- -- -- -- -- -- -- -- -- -- 15 -- --    10/22/24 15:26:40 97.7 °F (36.5 °C) 90 15 145/107 120 96 % -- -- -- -- -- -- -- -- --    10/22/24 11:47:48 98.3 °F (36.8 °C) 89 13 124/72 89 95 % -- -- -- -- -- -- -- -- --    10/22/24 0951 -- -- -- -- -- -- -- -- -- -- -- -- -- -- No Pain    10/22/24 0912 -- -- -- -- -- -- -- -- -- -- -- -- -- -- No Pain    10/22/24 0900 -- -- -- -- -- -- -- -- -- -- -- -- 14 -- No Pain    10/22/24 08:12:24 98 °F (36.7 °C) 95 17 128/72 91 93 % -- -- -- -- -- -- -- -- --    10/22/24 0745 -- -- -- -- -- 90 % 60 -- 10 L/min Mid flow nasal cannula -- -- -- -- --    10/22/24 0532 98.5 °F (36.9 °C) 95 22 116/75 97 92 % -- 10 L/min -- Mid flow nasal cannula -- Lying -- -- --    10/22/24 0118 -- -- -- -- -- 90 % 60 -- 10 L/min Mid flow nasal cannula MFNC prongs -- -- -- --    10/21/24 2209 99 °F (37.2 °C) -- -- -- -- -- -- -- -- -- -- -- -- -- --    10/21/24 2208 -- 109 20 126/64 86 90 % -- -- -- -- -- -- -- -- --    10/21/24 2105 -- -- -- -- -- 90 % -- 10 L/min -- Mid flow nasal cannula MFNC prongs -- -- -- --    10/21/24 2000 -- -- -- -- -- -- -- -- -- -- -- -- 14 -- No Pain    10/21/24 1901 99.6 °F (37.6 °C) 96 20 132/71 95 95 % -- -- -- -- -- -- -- -- --    10/21/24 1644 -- -- -- -- -- -- 60 -- 10 L/min Mid flow nasal cannula -- -- -- -- --    10/21/24 1500 99.8 °F (37.7 °C) 97 21 131/71 94 94 % -- -- -- -- -- -- -- -- --    10/21/24 1409 -- -- -- -- -- -- 60 -- 10 L/min --  -- -- -- -- --    10/21/24 1100 -- 91 16 137/75 99 94 % -- -- -- -- -- -- -- -- --    10/21/24 1059 99.3 °F (37.4 °C) -- -- 137/75 99 -- 68 -- 12 L/min Mid flow nasal cannula -- Sitting -- -- --    10/21/24 0837 -- -- -- 126/73 -- -- -- -- -- -- -- -- -- -- --    10/21/24 0800 -- -- -- -- -- -- -- -- -- -- -- -- 14 -- No Pain    10/21/24 0759 -- -- -- -- -- 93 % 80 -- 15 L/min Mid flow nasal cannula -- -- -- -- --    10/21/24 0728 -- -- -- 116/75 89 -- -- -- -- Mid flow nasal cannula -- Lying -- -- --    10/21/24 0300 -- -- -- -- -- 89 % -- 15 L/min -- Mid flow nasal cannula MFNC prongs -- -- -- --    10/21/24 0115 -- 94 30 131/63 86 91 % -- -- -- -- -- -- -- -- --    10/21/24 0000 -- -- -- -- -- -- -- -- -- -- -- -- 14 -- --          Weight (last 2 days)       Date/Time Weight    10/24/24 0600 113 (249.34)    10/23/24 0534 112 (248)    10/23/24 0500 112 (248)           CIWA-Ar Score         Row Name 10/24/24 0000 10/23/24 22:08:41 10/23/24 1900       CIWA-Ar    Pulse -- -- 86    Nausea and Vomiting 0 0 0    Tactile Disturbances 0 0 0    Tremor 0 0 0    Auditory Disturbances 0 0 0    Paroxysmal Sweats 0 0 0    Visual Disturbances 0 0 0    Anxiety 0 0 0    Headache, Fullness in Head 0 0 0    Agitation 0 0 0    Orientation and Clouding of Sensorium 0 0 0    CIWA-Ar Total 0 -- 0                  Pertinent Labs/Diagnostic Results:   Radiology:  XR chest portable   Final Interpretation by Edwin Michaud MD (10/22 1627)      No significant change in diffuse pulmonary edema.            Workstation performed: [id number]         XR chest portable ICU   Final Interpretation by Db Berrios DO (10/21 9265)      Bilateral coalescent alveolar consolidations redemonstrated appears slightly more conspicuous which may be technical versus slight worsening compared to the most recent study. This could be secondary to pulmonary edema or multifocal pneumonia.            Resident: Gilmar Dorantes I, the attending  radiologist, have reviewed the images and agree with the final report above.      Workstation performed: [id number]         XR chest portable ICU   Final Interpretation by Christina Rodriguez MD (10/19 2010)      Moderate bilateral consolidation, slightly increased on the right since 10/16/2024, which could be due to edema or pneumonia in the appropriate clinical setting.            Workstation performed: [id number]         US right upper quadrant   Final Interpretation by Seun Davies MD (10/18 0811)      Hepatic steatosis with morphologic features concerning for cirrhosis.      Small volume right upper quadrant ascites.      Workstation performed: [id number]         XR chest portable ICU   Final Interpretation by Joi Beck MD (10/17 1647)   Increasing consolidation and groundglass density in the left lung in the perihilar region, suggest worsening pneumonia            Workstation performed: [id number]         CT head wo contrast   Final Interpretation by Víctor Tong MD (10/17 0139)      No acute intracranial abnormality.                  Workstation performed: [id number]         XR shoulder 2+ vw right   Final Interpretation by Joi Beck MD (10/16 1649)   No acute displaced fracture   Patchy opacity right lung may be due to congestion/pneumonia      Computerized Assisted Algorithm (CAA) may have been used to analyze all applicable images.         Workstation performed: [id number]           Cardiology:  Echo complete w/ contrast if indicated   Final Result by Wilfrido Lopez MD (10/17 8044)        Left Ventricle: Left ventricular cavity size is normal. Wall thickness    is normal. The left ventricular ejection fraction is 60%. Systolic    function is normal. Wall motion is normal. Unable to assess diastolic    function due to E/A fusion due to tachycardia.     Tricuspid Valve: There is mild regurgitation. The right ventricular    systolic pressure is mildly elevated. The estimated  right ventricular    systolic pressure is 38.00 mmHg.         ECG 12 lead   Final Result by Malaika Harding MD (10/17 7636)   Normal sinus rhythm   Right axis deviation   Nonspecific T wave abnormality   Abnormal ECG   When compared with ECG of 16-OCT-2024 09:59,   QRS axis Shifted right   T wave inversion now evident in Inferior leads   Confirmed by Malaika Harding ([id number]) on 10/17/2024 11:16:03 PM        GI:  No orders to display           Results from last 7 days   Lab Units 10/24/24  0533 10/23/24  0519 10/22/24  0423 10/21/24  0620 10/20/24  0345   WBC Thousand/uL 5.44 6.12 8.52 9.12 9.76   HEMOGLOBIN g/dL 9.9* 10.4* 9.9* 10.3* 10.0*   HEMATOCRIT % 31.1* 32.6* 30.8* 31.9* 30.0*   PLATELETS Thousands/uL 144* 138* 155 102* 107*   TOTAL NEUT ABS Thousands/µL  --   --  5.88 6.59 7.12         Results from last 7 days   Lab Units 10/24/24  0533 10/23/24  0519 10/22/24  0828 10/22/24  0012 10/21/24  1929 10/21/24  1056 10/21/24  0620 10/21/24  0525 10/20/24  1054 10/20/24  0345 10/19/24  0403 10/19/24  0402 10/18/24  0634 10/18/24  0407   SODIUM mmol/L 135 136 133* 132* 132*   < >  --  130*   < > 125*   < >  --    < >  --    POTASSIUM mmol/L 3.8 4.0 4.1 3.9 3.9   < >  --  4.9   < > 4.0   < >  --    < >  --    CHLORIDE mmol/L 98 98 97 98 96   < >  --  97   < > 94*   < >  --    < >  --    CO2 mmol/L 32 31 30 30 28   < >  --  28   < > 26   < >  --    < >  --    ANION GAP mmol/L 5 7 6 4 8   < >  --  5   < > 5   < >  --    < >  --    BUN mg/dL 10 10 9 9 9   < >  --  9   < > 6   < >  --    < >  --    CREATININE mg/dL 0.63 0.69 0.67 0.73 0.80   < >  --  0.62   < > 0.71   < >  --    < >  --    EGFR ml/min/1.73sq m 111 107 108 105 101   < >  --  112   < > 106   < >  --    < >  --    CALCIUM mg/dL 8.3* 8.4 8.4 8.6 8.5   < >  --  8.1*   < > 7.9*   < >  --    < >  --    CALCIUM, IONIZED mmol/L  --   --   --   --   --   --  0.97*  --   --  1.08*  --  1.05*  --  1.02*   MAGNESIUM mg/dL 2.0 2.1  --   --   --   --   --  2.1  --  2.1   "--  2.1  --  1.9   PHOSPHORUS mg/dL 2.9 3.4  --   --   --   --   --  3.4  --  3.3  --  2.4*  --  2.0*    < > = values in this interval not displayed.     Results from last 7 days   Lab Units 10/24/24  0533 10/21/24  0525 10/20/24  0345 10/19/24  0402 10/18/24  0407   AST U/L 95* 76* 54* 57* 64*   ALT U/L 48 30 22 21 20   ALK PHOS U/L 330* 242* 208* 180* 165*   TOTAL PROTEIN g/dL 6.7 6.6 6.7 6.7 7.0   ALBUMIN g/dL 2.8* 2.8* 2.9* 3.1* 3.1*   TOTAL BILIRUBIN mg/dL 0.86 1.27* 1.15* 1.43* 1.38*   BILIRUBIN DIRECT mg/dL  --   --  0.49* 0.73* 0.52*         Results from last 7 days   Lab Units 10/24/24  0533 10/23/24  0519 10/22/24  0828 10/22/24  0012 10/21/24  1929 10/21/24  1629 10/21/24  1056 10/21/24  0525 10/20/24  2340 10/20/24  1623 10/20/24  1054 10/20/24  0345   GLUCOSE RANDOM mg/dL 103 102 116 112 229* 117 182* 126 130 131 123 136             No results found for: \"BETA-HYDROXYBUTYRATE\"       Results from last 7 days   Lab Units 10/19/24  0258   PH REBECCA  7.427*   PCO2 REBECCA mm Hg 41.0*   PO2 REBECCA mm Hg 42.2   HCO3 REBECCA mmol/L 26.4   BASE EXC REBECCA mmol/L 1.9   O2 CONTENT REBECCA ml/dL 12.7   O2 HGB, VENOUS % 77.9                     Results from last 7 days   Lab Units 10/21/24  0620 10/20/24  0345 10/19/24  0402   PROTIME seconds 17.6* 16.4* 16.5*   INR  1.37* 1.24* 1.25*   PTT seconds 40* 36* 35*         Results from last 7 days   Lab Units 10/18/24  0634   PROCALCITONIN ng/ml 0.10                   Results from last 7 days   Lab Units 10/20/24  0345   CRP mg/L 115.6*               Results from last 7 days   Lab Units 10/22/24  1757   C DIFF TOXIN B BY PCR  Negative     Results from last 7 days   Lab Units 10/22/24  1757   SALMONELLA SP PCR  Negative   SHIGELLA SP/ENTEROINVASIVE E. COLI (EIEC)  Negative   CAMPYLOBACTER SP (JEJUNI AND COLI)  Negative   SHIGA TOXIN 1/SHIGA TOXIN 2  Negative                           Network Utilization Review Department  ATTENTION: Please call with any questions or concerns to [phone] " and carefully listen to the prompts so that you are directed to the right person. All voicemails are confidential.   For Discharge needs, contact Care Management DC Support Team at [phone] opt. 2  Send all requests for admission clinical reviews, approved or denied determinations and any other requests to dedicated fax number below belonging to the campus where the patient is receiving treatment. List of dedicated fax numbers for the Facilities:  FACILITY NAME UR FAX NUMBER   ADMISSION DENIALS (Administrative/Medical Necessity) [phone]   DISCHARGE SUPPORT TEAM (NETWORK) [phone]   PARENT CHILD HEALTH (Maternity/NICU/Pediatrics) [phone]   [facility] [phone]   [facility] [phone]   [facility] [phone]   [facility] [phone]   [facility] [phone]   [facility] [phone]   [facility] [phone]   [facility] [phone]   [facility] [phone]   [facility] [phone]   [facility] [phone]   [facility] [phone]

## 2024-10-24 NOTE — ASSESSMENT & PLAN NOTE
Lab Results   Component Value Date    HGB 9.9 (L) 10/24/2024    HGB 10.4 (L) 10/23/2024    HGB 9.9 (L) 10/22/2024   Baseline is 10-11  Microcytic  No active bleeding  Iron panel - iron 123, ferritin 62, TIBC 384  Vitamin B12 330, folate 5.9  Suspect secondary to bone marrow suppression from alcohol use

## 2024-10-24 NOTE — ASSESSMENT & PLAN NOTE
Lab Results   Component Value Date    AST 95 (H) 10/24/2024    ALT 48 10/24/2024    ALKPHOS 330 (H) 10/24/2024    TBILI 0.86 10/24/2024     RUQ US (10/17) - hepatic steatosis with morphologic features concerning for cirrhosis.  Small volume RUQ ascites  Stable  Monitor daily  Follow-up with GI in the outpatient setting

## 2024-10-24 NOTE — PLAN OF CARE
Problem: PHYSICAL THERAPY ADULT  Goal: Performs mobility at highest level of function for planned discharge setting.  See evaluation for individualized goals.  Description: Treatment/Interventions: Functional transfer training, LE strengthening/ROM, Elevations, Therapeutic exercise, Endurance training, Cognitive reorientation, Patient/family training, Equipment eval/education, Bed mobility, Gait training, Compensatory technique education, Spoke to nursing, Spoke to case management     See flowsheet documentation for full assessment, interventions and recommendations.  Outcome: Progressing  Note: Prognosis: Good  Problem List: Decreased strength, Decreased endurance, Impaired balance, Decreased mobility, Decreased cognition, Impaired judgement, Decreased safety awareness, Decreased skin integrity  Assessment: pt began tx session seated OOB in the recliner as pt was agreeable to participate in PT intervention. PT to focus on transfer training, TE activities, posture/balance w/ gait and stair trials. Progress was noted in todays tx session with functional transfers and ambulation distance. pt did require VC's for hand placement while performing transfers w/o an AD as pt completed multiple transfers with /s. pt did demonstrate uncontrolled descent while returning to recliner post ambulation as pt did not use UE's to desend into recliner safely. pt increased ambulation distance to 90'x1 no AD but remains at an increased risk for falls and injuries due to improper weight shifts, several LOB and pt continuing to requirer phyiscal assistance for balance and safety. pt was able to participate in TE activities while seated in the recliner with AROM, no increases in pain in order to strengthen pt LE's in efforts in reducing pt risk for falls and injuries w/ all OOB activities. pt did require min ax1 for stair trials in todays tx session as pt completed 4 steps with bilateral hand rails and min ax1 for safety. Additional was  not possible due to fatigue. Post tx pt in the recliner with call bell, chair alarm activated and all pt needs met. pt continues to demonstrate the following: limited activity tolerance, ambulation distance, steps completed as pt remains at an increased risk for falls and injuries with all OOB activities at this time. Continue to recomemdn Dc w/ level 11 maximal rehab resource intensity when medically cleared  Barriers to Discharge: Decreased caregiver support, Inaccessible home environment     Rehab Resource Intensity Level, PT: I (Maximum Resource Intensity)    See flowsheet documentation for full assessment.

## 2024-10-24 NOTE — CONSULTS
PHYSICAL MEDICINE AND REHABILITATION CONSULT NOTE  Viktor Farfan 54 y.o. male MRN: [id number]  Unit/Bed#: S -01 Encounter: [id number]    Requested by (Physician/Service): Lesly Ray MD  Reason for Consultation:  Assessment of rehabilitation needs    Assessment:  Rehabilitation Diagnosis:   Debility related to severe hyponatremia, pneumonia, respiratory failure, volume overload, chronic ETOH usage  Impaired mobility and self care    Recommendations:  Rehabilitation Plan:  Continue PT/OT while on acute care.  The patient continues on albumin and IV lasix currently. He may progress to being able to d/c to home vs inpatient rehabilitation pending medical and functional progress.     Other:  Hyponatremia  Pneumonia, respiratory failure - 10L midflow   Volume overload,   Chronic ETOH usage  Transaminitis  Tobacco use  Thrombocytopenia   Bowel plan: Having loose stools 10/23/24  Bladder plan: voiding   DVT ppx: Lovenox and SCD    Thank you for this consultation.  Do not hesitate to contact service with further questions.      I have spent a total time of 60 minutes on 10/24/24 in caring for this patient including Impressions, Documenting in the medical record, Reviewing / ordering tests, medicine, procedures  , and Obtaining or reviewing history  .      History of Present Illness:  Viktor Farfan is a 54 y.o. male with  has a past medical history of Anxiety, Back pain, Chronic right-sided low back pain without sciatica (3/31/2018), Depression, Gastroesophageal reflux disease (3/31/2018), GERD (gastroesophageal reflux disease), and Right foot pain..  Patient [facility] on 10/16/24 with SOB and cough.  Found to have multifocal bilateral upper lobe consolidation.  Started on antibiotics.  Na level 101.  Repeated and Na = 99.  Likely secondary to beer potomania.  Patient consumes large quantities of ETOH.  Nephrology managed and NA eventually rises to Na = 136.   Medical course  complicated by volume overload, transaminitis, and respiratory failure.  Hepatic steatosis with morphologic features concerning for cirrhosis. He was needing 10L midflow oxygen and is now down to 4 liters nasal cannula. Patient is very fatigued today though was tolerating siting OOB to the recliner. He has been started on Albumin x 3 doses 10/24/2024.    Review of Systems: 10 point ROS negative except for what is noted in HPI      Function:  Prior level of function and living situation:  Patient lives with significant other in a home with 3STE.      Current level of function:  Physical Therapy:  Bed Mobility   Additional Comments Bed mobility NT on this date as pt presents and was left seated EOB vs OOB.   Transfers   Sit to Stand 4  Minimal assistance   Additional items Assist x 1;Impulsive;Increased time required;Verbal cues  (no AD; impulsive to initiate, increased time to complete)   Stand to Sit 4  Minimal assistance   Additional items Assist x 1;Armrests;Impulsive;Verbal cues  (no AD)   Stand pivot 3  Moderate assistance   Additional items Assist x 1;Impulsive;Increased time required;Verbal cues  (no AD)   Toilet transfer 5  Supervision  (close S)   Additional items Assist x 1;Increased time required;Verbal cues;Standard toilet;Other  (R GB - completes dynamic post toileting hygiene care with JAKUB to maintain unsupported balance, B knee instability in standing, JAKUB needed for thoroughness of hygiene)   Other 5  Supervision  (5xSTS transfer assessment completed from recliner chair, modified as pt utilized B UE support on armrests with close S: completes in 18.26seconds, indicating pt remains at increased risk of falls as compared to age matched peers.)   Additional Comments No AD used for transfers. Pt initially requiring JAKUB for STS from EOB, however is able to progress to close S for transfers from surfaces where pt can have UE support (ie: recliner chair with armrests and toilet w/ R GB).  "  Ambulation/Elevation   Gait pattern Wide MIKE;Scissoring  (variable wide MIKE w/ intermittent scissoring d/t instability; high guard UE positioning; dec'd foot cleareance B; improved R stepping vs previous session (no steppage gait or R foot drop noted today); B knee and ankle instability however no buckling)   Gait Assistance 3  Moderate assist   Additional items Assist x 1;Verbal cues;Tactile cues   Assistive Device None   Distance 44ft x2 with functional seated rest break between trials (change in direction care home each trial), 18ft x2 to/from bathroom with toileting between   Stair Management Assistance Not tested   Ambulation/Elevation Additional Comments Pt with continued instability and multiple LOBs during ambulation trial. Refuses trial of RW at this time.       Occupational Therapy:  ADL   Where Assessed Chair   UB Dressing Assistance 3  Moderate Assistance   UB Dressing Deficit Setup;Thread RUE;Thread LUE;Pull around back;Pull down in back   UB Dressing Comments assist to thread RUE through arm hole pt limited d/t decreased ROM   LB Dressing Assistance 4  Minimal Assistance   LB Dressing Deficit Setup;Don/doff R sock;Don/doff L sock   LB Dressing Comments sitting in recliner chair w/ use of figure four technique   Toileting Assistance  1  Total Assistance   Toileting Deficit Perineal hygiene;Setup;Steadying   Toileting Comments posterior pericare w/ steadying assist       Speech Therapy:      Physical Exam:  /66   Pulse 93   Temp 98.2 °F (36.8 °C)   Resp 16   Ht 6' 2\" (1.88 m)   Wt 113 kg (249 lb 5.4 oz)   SpO2 (!) 84%   BMI 32.01 kg/m²        Intake/Output Summary (Last 24 hours) at 10/24/2024 1229  Last data filed at 10/24/2024 0601  Gross per 24 hour   Intake 640 ml   Output 2200 ml   Net -1560 ml       Body mass index is 32.01 kg/m².      Physical Exam  Vitals and nursing note reviewed.   Constitutional:       General: He is not in acute distress.     Comments: Sleeping most of exam  "   HENT:      Head: Normocephalic and atraumatic.      Nose: Nose normal.      Mouth/Throat:      Mouth: Mucous membranes are moist.   Cardiovascular:      Pulses: Normal pulses.   Pulmonary:      Effort: Pulmonary effort is normal.      Comments: Currently on 4 liters nasal canula   Abdominal:      General: There is no distension.      Palpations: Abdomen is soft.      Tenderness: There is no abdominal tenderness.   Musculoskeletal:         General: No swelling.   Skin:     General: Skin is warm.   Neurological:      Mental Status: He is oriented to person, place, and time.   Psychiatric:         Mood and Affect: Mood normal.          Social History:    Social History     Socioeconomic History    Marital status: Single     Spouse name: Not on file    Number of children: Not on file    Years of education: Not on file    Highest education level: Not on file   Occupational History    Not on file   Tobacco Use    Smoking status: Every Day     Current packs/day: 1.00     Average packs/day: 1 pack/day for 38.0 years (38.0 ttl pk-yrs)     Types: Cigarettes    Smokeless tobacco: Never   Vaping Use    Vaping status: Never Used   Substance and Sexual Activity    Alcohol use: Yes     Alcohol/week: 6.0 standard drinks of alcohol     Types: 6 Cans of beer per week     Comment: 12 pack of beer a night    Drug use: No    Sexual activity: Not on file   Other Topics Concern    Not on file   Social History Narrative    Not on file     Social Determinants of Health     Financial Resource Strain: Not on file   Food Insecurity: Patient Unable To Answer (10/18/2024)    Nursing - Inadequate Food Risk Classification     Worried About Running Out of Food in the Last Year: Patient unable to answer     Ran Out of Food in the Last Year: Patient unable to answer     Ran Out of Food in the Last Year: Not on file   Transportation Needs: Patient Unable To Answer (10/18/2024)    PRAPARE - Transportation     Lack of Transportation (Medical): Patient  unable to answer     Lack of Transportation (Non-Medical): Patient unable to answer   Physical Activity: Not on file   Stress: Not on file   Social Connections: Not on file   Intimate Partner Violence: Not on file   Housing Stability: Patient Unable To Answer (10/18/2024)    Housing Stability Vital Sign     Unable to Pay for Housing in the Last Year: Patient unable to answer     Number of Times Moved in the Last Year: Not on file     Homeless in the Last Year: Patient unable to answer        Family History:    Family History   Problem Relation Age of Onset    COPD Father     Diabetes Father     Cancer Maternal Grandfather          Medications:     Current Facility-Administered Medications:     acetaminophen (TYLENOL) tablet 650 mg, 650 mg, Oral, Q6H PRN, Shreyan George, DO, 650 mg at 10/20/24 2128    albumin human (FLEXBUMIN) 25 % injection 25 g, 25 g, Intravenous, Q8H, Shreyan George, DO, 25 g at 10/24/24 1139    amLODIPine (NORVASC) tablet 10 mg, 10 mg, Oral, Daily, Shreyan George, DO, 10 mg at 10/24/24 0818    aspirin chewable tablet 81 mg, 81 mg, Oral, Daily, Shreyan George, DO, 81 mg at 10/24/24 0818    atorvastatin (LIPITOR) tablet 20 mg, 20 mg, Oral, Daily, Shreyan George, DO, 20 mg at 10/24/24 0817    chlorhexidine (PERIDEX) 0.12 % oral rinse 15 mL, 15 mL, Mouth/Throat, Q12H JUAN, Shreyan George, DO, 15 mL at 10/24/24 0818    cyanocobalamin (VITAMIN B-12) tablet 1,000 mcg, 1,000 mcg, Oral, Daily, Shreyan George, DO, 1,000 mcg at 10/24/24 0818    enoxaparin (LOVENOX) subcutaneous injection 40 mg, 40 mg, Subcutaneous, Daily, Shreyan George, DO, 40 mg at 10/24/24 0816    escitalopram (LEXAPRO) tablet 20 mg, 20 mg, Oral, Daily, Shreyan George, DO, 20 mg at 10/24/24 0816    folic acid (FOLVITE) tablet 1 mg, 1 mg, Oral, Daily, Shreyan George, DO, 1 mg at 10/24/24 0817    furosemide (LASIX) injection 40 mg, 40 mg, Intravenous, BID (diuretic), Duyen George DO, 40 mg at 10/24/24 0817    ipratropium-albuterol (DUO-NEB) 0.5-2.5  mg/3 mL inhalation solution 3 mL, 3 mL, Nebulization, Q6H PRN, Shreyan George, DO, 3 mL at 10/19/24 1432    mirtazapine (REMERON) tablet 15 mg, 15 mg, Oral, HS, Shreyan George, DO, 15 mg at 10/23/24 2043    multivitamin-minerals (CENTRUM) tablet 1 tablet, 1 tablet, Oral, Daily, Shreyan George, DO, 1 tablet at 10/24/24 0817    nicotine (NICODERM CQ) 21 mg/24 hr TD 24 hr patch 21 mg, 21 mg, Transdermal, Daily, Shreyan George, DO, 21 mg at 10/24/24 0817    ondansetron (ZOFRAN) injection 4 mg, 4 mg, Intravenous, Q8H PRN, Shreyan George, DO    pantoprazole (PROTONIX) EC tablet 40 mg, 40 mg, Oral, Early Morning, Shreyan George, DO, 40 mg at 10/24/24 0535    [COMPLETED] thiamine (VITAMIN B1) 500 mg in sodium chloride 0.9 % 50 mL IVPB, 500 mg, Intravenous, TID, Stopped at 10/19/24 1000 **FOLLOWED BY** [COMPLETED] thiamine (VITAMIN B1) 250 mg in sodium chloride 0.9 % 50 mL IVPB, 250 mg, Intravenous, TID, Last Rate: 100 mL/hr at 10/22/24 1224, 250 mg at 10/22/24 1224 **FOLLOWED BY** thiamine (VITAMIN B1) 100 mg in sodium chloride 0.9 % 50 mL IVPB, 100 mg, Intravenous, TID, Last Rate: 100 mL/hr at 10/24/24 0837, 100 mg at 10/24/24 0837 **FOLLOWED BY** [START ON 10/26/2024] thiamine tablet 100 mg, 100 mg, Oral, Daily, Shreyan George, DO    trimethobenzamide (TIGAN) IM injection 200 mg, 200 mg, Intramuscular, Q6H PRN, Shreyan George, DO    Past Medical History:     Past Medical History:   Diagnosis Date    Anxiety     Back pain     Chronic right-sided low back pain without sciatica 3/31/2018    Depression     Gastroesophageal reflux disease 3/31/2018    GERD (gastroesophageal reflux disease)     Right foot pain         Past Surgical History:     Past Surgical History:   Procedure Laterality Date    NO PAST SURGERIES      ORIF FINGER FRACTURE Right 8/6/2019    Procedure: OPEN REDUCTION FINGER;  Surgeon: Tomas Morgan MD;  Location: [facility];  Service: Plastics         Allergies:     No Known Allergies        LABORATORY RESULTS:      Lab  Results   Component Value Date    HGB 9.9 (L) 10/24/2024    HCT 31.1 (L) 10/24/2024    WBC 5.44 10/24/2024     Lab Results   Component Value Date    BUN 10 10/24/2024    BUN 3 (L) 08/14/2024    K 3.8 10/24/2024    K 4.3 08/14/2024    CL 98 10/24/2024     08/14/2024    CREATININE 0.63 10/24/2024    CREATININE 0.73 08/14/2024     Lab Results   Component Value Date    PROTIME 17.6 (H) 10/21/2024    INR 1.37 (H) 10/21/2024        DIAGNOSTIC STUDIES: Reviewed  XR chest portable ICU    Result Date: 10/21/2024  Impression: Bilateral coalescent alveolar consolidations redemonstrated appears slightly more conspicuous which may be technical versus slight worsening compared to the most recent study. This could be secondary to pulmonary edema or multifocal pneumonia. Resident: Gilmar Dorantes I, the attending radiologist, have reviewed the images and agree with the final report above. Workstation performed: [id number]     XR chest portable ICU    Result Date: 10/19/2024  Impression: Moderate bilateral consolidation, slightly increased on the right since 10/16/2024, which could be due to edema or pneumonia in the appropriate clinical setting. Workstation performed: [id number]     US right upper quadrant    Result Date: 10/18/2024  Impression: Hepatic steatosis with morphologic features concerning for cirrhosis. Small volume right upper quadrant ascites. Workstation performed: [id number]     XR chest portable ICU    Result Date: 10/17/2024  Impression: Increasing consolidation and groundglass density in the left lung in the perihilar region, suggest worsening pneumonia Workstation performed: [id number]     CT head wo contrast    Result Date: 10/17/2024  Impression: No acute intracranial abnormality. Workstation performed: [id number]     XR shoulder 2+ vw right    Result Date: 10/16/2024  Impression: No acute displaced fracture Patchy opacity right lung may be due to congestion/pneumonia Computerized Assisted Algorithm  (CAA) may have been used to analyze all applicable images. Workstation performed: [id number]     CTA ed chest pe study    Result Date: 10/16/2024  Impression: Evaluation compromised by motion. No emboli in the main, right, and left pulmonary arteries. Segmental and subsegmental emboli cannot be excluded due to severe motion artifact and suboptimal opacification of the pulmonary arteries. Diffuse septal thickening and groundglass opacity due to pulmonary edema with trace pleural effusions. Multifocal bilateral upper lobe consolidation which could be due to pneumonia in the appropriate clinical setting versus localized alveolar edema. Mildly nodular liver suggestive of cirrhosis with trace ascites. The study was marked in EPIC for immediate notification. Workstation performed: [id number]     XR chest portable    Result Date: 10/16/2024  Impression: Cardiomegaly with probable pulmonary edema and asymmetric left perihilar alveolar edema. Superimposed pneumonia cannot be completely excluded. Resident: Jona Pacheco I, the attending radiologist, have reviewed the images and agree with the final report above. Workstation performed: [id number]

## 2024-10-24 NOTE — PLAN OF CARE
Problem: PAIN - ADULT  Goal: Verbalizes/displays adequate comfort level or baseline comfort level  Description: Interventions:  - Encourage patient to monitor pain and request assistance  - Assess pain using appropriate pain scale  - Administer analgesics based on type and severity of pain and evaluate response  - Implement non-pharmacological measures as appropriate and evaluate response  - Consider cultural and social influences on pain and pain management  - Notify physician/advanced practitioner if interventions unsuccessful or patient reports new pain  Outcome: Progressing     Problem: INFECTION - ADULT  Goal: Absence or prevention of progression during hospitalization  Description: INTERVENTIONS:  - Assess and monitor for signs and symptoms of infection  - Monitor lab/diagnostic results  - Monitor all insertion sites, i.e. indwelling lines, tubes, and drains  - Monitor endotracheal if appropriate and nasal secretions for changes in amount and color  - [place] appropriate cooling/warming therapies per order  - Administer medications as ordered  - Instruct and encourage patient and family to use good hand hygiene technique  - Identify and instruct in appropriate isolation precautions for identified infection/condition  Outcome: Progressing     Problem: Knowledge Deficit  Goal: Patient/family/caregiver demonstrates understanding of disease process, treatment plan, medications, and discharge instructions  Description: Complete learning assessment and assess knowledge base.  Interventions:  - Provide teaching at level of understanding  - Provide teaching via preferred learning methods  Outcome: Progressing     Problem: METABOLIC, FLUID AND ELECTROLYTES - ADULT  Goal: Electrolytes maintained within normal limits  Description: INTERVENTIONS:  - Monitor labs and assess patient for signs and symptoms of electrolyte imbalances  - Administer electrolyte replacement as ordered  - Monitor response to electrolyte  replacements, including repeat lab results as appropriate  - Instruct patient on fluid and nutrition as appropriate  Outcome: Progressing

## 2024-10-24 NOTE — PHYSICAL THERAPY NOTE
PHYSICAL THERAPY NOTE          Patient Name: Viktor Farfan  Today's Date: 10/24/2024           10/24/24 0913   PT Last Visit   PT Visit Date 10/24/24   Note Type   Note Type Treatment   Pain Assessment   Pain Assessment Tool 0-10   Pain Score No Pain   Patient's Stated Pain Goal No pain   Hospital Pain Intervention(s) Repositioned;Ambulation/increased activity;Rest   Multiple Pain Sites No   Restrictions/Precautions   Weight Bearing Precautions Per Order No   Other Precautions Cognitive;Chair Alarm;Bed Alarm;O2;Fall Risk  (anamaria, 4L NC 02, IV)   General   Chart Reviewed Yes   Response to Previous Treatment Patient with no complaints from previous session.   Family/Caregiver Present No   Cognition   Overall Cognitive Status Impaired   Arousal/Participation Alert;Responsive;Cooperative   Attention Attends with cues to redirect   Orientation Level Oriented X4   Memory Decreased recall of recent events;Decreased short term memory;Decreased recall of precautions   Following Commands Follows one step commands with increased time or repetition   Comments pt was pleasant and cooperative in todays tx session   Subjective   Subjective pt was agreeable to participate in PT intervention and stated no pain pre/post tx session   Bed Mobility   Additional Comments pt seated OOB in the recliner pre/post tx session   Transfers   Sit to Stand 5  Supervision   Additional items Assist x 1;Armrests;Increased time required;Verbal cues  (no AD)   Stand to Sit 5  Supervision   Additional items Assist x 1;Armrests;Verbal cues  (uncontrolled descent as pt did not utilize hands on armrest for increased safety and balance)   Stand pivot Unable to assess   Additional Comments pt completed 3 STS in todays tx session all with /ds and no AD. pt did demonstrate uncontrolled descent while returning to recliner   Ambulation/Elevation   Gait pattern Narrow MIKE;Wide  MIKE;Decreased foot clearance;Inconsistent kelsi  (pt varying from NBOS to WBOS with UE guarding. pt did require min ax1 for safety and balance with ambulation in todays tx session)   Gait Assistance 4  Minimal assist   Additional items Assist x 1;Verbal cues   Assistive Device None   Distance 90'x1 no AD   Stair Management Assistance 4  Minimal assist   Additional items Assist x 1;Verbal cues;Increased time required   Stair Management Technique Two rails;Step to pattern;Foreward;Backward   Number of Stairs 4   Ambulation/Elevation Additional Comments pt continues to be at risk for falls and injuries to to several LOB w/o an AD as pt continues to refuse using a RW for ambulation   Balance   Static Sitting Good   Dynamic Sitting Fair   Static Standing Fair -   Dynamic Standing Poor   Ambulatory Poor +   Endurance Deficit   Endurance Deficit Yes   Endurance Deficit Description pt limited with activity toleranec and ambulation distance due to fatigue as pt required a seated therapeuitc rest break post 90'x1 of ambulation   Activity Tolerance   Activity Tolerance Patient limited by fatigue   Nurse Made Aware Spoke to RN   Exercises   Hip Abduction Sitting;15 reps;AROM;Bilateral   Hip Adduction Sitting;15 reps;AROM;Bilateral  (pillow squeezes)   Knee AROM Long Arc Quad Sitting;15 reps;AROM;Bilateral   Ankle Pumps Sitting;20 reps;AROM;Bilateral   Marching Sitting;10 reps;AROM;Bilateral   Assessment   Prognosis Good   Problem List Decreased strength;Decreased endurance;Impaired balance;Decreased mobility;Decreased cognition;Impaired judgement;Decreased safety awareness;Decreased skin integrity   Assessment pt began tx session seated OOB in the recliner as pt was agreeable to participate in PT intervention. PT to focus on transfer training, TE activities, posture/balance w/ gait and stair trials. Progress was noted in todays tx session with functional transfers and ambulation distance. pt did require VC's for hand  placement while performing transfers w/o an AD as pt completed multiple transfers with /s. pt did demonstrate uncontrolled descent while returning to recliner post ambulation as pt did not use UE's to desend into recliner safely. pt increased ambulation distance to 90'x1 no AD but remains at an increased risk for falls and injuries due to improper weight shifts, several LOB and pt continuing to requirer phyiscal assistance for balance and safety. pt was able to participate in TE activities while seated in the recliner with AROM, no increases in pain in order to strengthen pt LE's in efforts in reducing pt risk for falls and injuries w/ all OOB activities. pt did require min ax1 for stair trials in todays tx session as pt completed 4 steps with bilateral hand rails and min ax1 for safety. Additional was not possible due to fatigue. Post tx pt in the recliner with call bell, chair alarm activated and all pt needs met. pt continues to demonstrate the following: limited activity tolerance, ambulation distance, steps completed as pt remains at an increased risk for falls and injuries with all OOB activities at this time. Continue to recomemdn Dc w/ level 11 maximal rehab resource intensity when medically cleared   Goals   Patient Goals to go to rehab   STG Expiration Date 10/31/24   PT Treatment Day 3   Plan   Treatment/Interventions Functional transfer training;LE strengthening/ROM;Elevations;Therapeutic exercise;Endurance training;Cognitive reorientation;Patient/family training;Equipment eval/education;Bed mobility;Gait training;Spoke to nursing   Progress Slow progress, decreased activity tolerance   PT Frequency 3-5x/wk   Discharge Recommendation   Rehab Resource Intensity Level, PT I (Maximum Resource Intensity)   AM-PAC Basic Mobility Inpatient   Turning in Flat Bed Without Bedrails 4   Lying on Back to Sitting on Edge of Flat Bed Without Bedrails 4   Moving Bed to Chair 3   Standing Up From Chair Using Arms 3    Walk in Room 3   Climb 3-5 Stairs With Railing 3   Basic Mobility Inpatient Raw Score 20   Basic Mobility Standardized Score 43.99   [facility] Highest Level Of Mobility   -HLM Goal 6: Walk 10 steps or more   -HLM Achieved 7: Walk 25 feet or more   Education   Education Provided Mobility training;Assistive device;Other  (functional transfers, TE activities and stair trials)   Patient Reinforcement needed   End of Consult   Patient Position at End of Consult Bedside chair;Bed/Chair alarm activated;All needs within reach   The patient's AM-PAC Basic Mobility Inpatient Short Form Raw Score is 20. A Raw score of greater than 16 suggests the patient may benefit from discharge to home. Please also refer to the recommendation of the Physical Therapist for safe discharge planning.    Pt continues to be functioning below his baseline level and remains at an increased risk for falls and injuries with all OOB activities due to LOB and limited activity tolerance/ambulation distance  Rodrigue Tucker

## 2024-10-24 NOTE — DISCHARGE INSTR - AVS FIRST PAGE
Dear Viktor Farfan,     It was our pleasure to care for you here at [facility].  It is our hope that we were always able to exceed the expected standards for your care during your stay.  You were hospitalized due to critically low sodium level due to alcohol use.  You were cared for on the 2nd floor by Duyen George DO under the service of Lesly Ray MD with the [facility] who covers for your primary care physician (PCP), No primary care provider on file., while you were hospitalized.  If you have any questions or concerns related to this hospitalization, you may contact us at .  For follow up as well as any medication refills, we recommend that you follow up with your primary care physician.  A registered nurse will reach out to you by phone within a few days after your discharge to answer any additional questions that you may have after going home.  However, at this time we provide for you here, the most important instructions / recommendations at discharge:     Notable Medication Adjustments -   Start taking Lasix 20 mg every day.  Start applying nicotine (NICODERM CQ) 21 mg/24 hr patch. Place one patch on the skin for over 24 hours.  Start taking Spironolactone 50 mg every day.  Start taking folic acid 1 mg every day.  Start taking thiamine 100 mg every day.  Start taking Vitamin B12 1000 mcg every day.  Continue taking the rest of your medications as previously prescribed.  Testing Required after Discharge -   We recommend repeat CBC (to monitor blood counts) and CMP (monitor electrolytes, kidney function, liver function) within 1 week  ** Please contact your PCP to request testing orders for any of the testing recommended here **  Important follow up information -   Follow-up with your primary care physician within 1 week.  We have given you a referral for gastroenterology.  Somebody from the office will call to schedule an  appointment.  If you do not hear back, call [phone] and follow up.  We have given you a referral for nephrology.  Somebody from the office will call to schedule an appointment.  If you do not hear back, call [phone] and follow up.  The phone number for [facility] alcohol detox program is [phone]. Please call and follow up.  We recommend abstaining from all alcohol use.  We recommend smoking cessation.  We have given you a referral for physical therapy. Somebody from the office will call to schedule an appointment.  We have given you a referral for occupational therapy.  Somebody from the office will call to schedule an appointment.  Other Instructions -   Return to the emergency department if you have worsening symptoms.  Please review this entire after visit summary as additional general instructions including medication list, appointments, activity, diet, any pertinent wound care, and other additional recommendations from your care team that may be provided for you.    Sincerely,     Duyen George, DO

## 2024-10-24 NOTE — ASSESSMENT & PLAN NOTE
Lab Results   Component Value Date    SODIUM 135 10/24/2024    SODIUM 136 10/23/2024    SODIUM 133 (L) 10/22/2024     Presented to [facility] with sodium 99. Transferred to ICU [place].  Etiology is hypovolemic hyponatremia - beer potsusania  Nephrology following.  Did initially overcorrect and receive DDAVP and D5W.  Getting IV Lasix to help with volume status  Received a maximum dose of phenobarbital and 1 dose of Valium for agitation  Downgraded from ICU on 10/20 with sodium 127    Plan:  Nephrology following, appreciate recommendations  If urine protein/creatinine ratio is high, then reach out to nephrology for further workup. Otherwise, stable from nephrology perspective

## 2024-10-25 ENCOUNTER — APPOINTMENT (OUTPATIENT)
Dept: ULTRASOUND IMAGING | Facility: HOSPITAL | Age: 54
DRG: 720 | End: 2024-10-25
Payer: COMMERCIAL

## 2024-10-25 PROBLEM — Z12.11 SCREEN FOR COLON CANCER: Status: ACTIVE | Noted: 2024-10-25

## 2024-10-25 LAB
AFP-TM SERPL-MCNC: 2.45 NG/ML (ref 0–9)
ALBUMIN SERPL BCG-MCNC: 3.3 G/DL (ref 3.5–5)
ALP SERPL-CCNC: 364 U/L (ref 34–104)
ALT SERPL W P-5'-P-CCNC: 59 U/L (ref 7–52)
ANA SER QL IA: NEGATIVE
ANION GAP SERPL CALCULATED.3IONS-SCNC: 4 MMOL/L (ref 4–13)
AST SERPL W P-5'-P-CCNC: 111 U/L (ref 13–39)
BILIRUB SERPL-MCNC: 0.87 MG/DL (ref 0.2–1)
BUN SERPL-MCNC: 10 MG/DL (ref 5–25)
CALCIUM ALBUM COR SERPL-MCNC: 9.4 MG/DL (ref 8.3–10.1)
CALCIUM SERPL-MCNC: 8.8 MG/DL (ref 8.4–10.2)
CHLORIDE SERPL-SCNC: 100 MMOL/L (ref 96–108)
CO2 SERPL-SCNC: 33 MMOL/L (ref 21–32)
CREAT SERPL-MCNC: 0.71 MG/DL (ref 0.6–1.3)
ERYTHROCYTE [DISTWIDTH] IN BLOOD BY AUTOMATED COUNT: 16.3 % (ref 11.6–15.1)
GFR SERPL CREATININE-BSD FRML MDRD: 106 ML/MIN/1.73SQ M
GLUCOSE SERPL-MCNC: 105 MG/DL (ref 65–140)
HCT VFR BLD AUTO: 33.2 % (ref 36.5–49.3)
HGB BLD-MCNC: 10.5 G/DL (ref 12–17)
MCH RBC QN AUTO: 27.8 PG (ref 26.8–34.3)
MCHC RBC AUTO-ENTMCNC: 31.6 G/DL (ref 31.4–37.4)
MCV RBC AUTO: 88 FL (ref 82–98)
PLATELET # BLD AUTO: 144 THOUSANDS/UL (ref 149–390)
PMV BLD AUTO: 9.7 FL (ref 8.9–12.7)
POTASSIUM SERPL-SCNC: 4 MMOL/L (ref 3.5–5.3)
PROT SERPL-MCNC: 7.1 G/DL (ref 6.4–8.4)
RBC # BLD AUTO: 3.78 MILLION/UL (ref 3.88–5.62)
SODIUM SERPL-SCNC: 137 MMOL/L (ref 135–147)
WBC # BLD AUTO: 5.59 THOUSAND/UL (ref 4.31–10.16)

## 2024-10-25 PROCEDURE — 99232 SBSQ HOSP IP/OBS MODERATE 35: CPT | Performed by: INTERNAL MEDICINE

## 2024-10-25 PROCEDURE — 86381 MITOCHONDRIAL ANTIBODY EACH: CPT | Performed by: PHYSICIAN ASSISTANT

## 2024-10-25 PROCEDURE — 93976 VASCULAR STUDY: CPT

## 2024-10-25 PROCEDURE — 99254 IP/OBS CNSLTJ NEW/EST MOD 60: CPT | Performed by: INTERNAL MEDICINE

## 2024-10-25 PROCEDURE — 86038 ANTINUCLEAR ANTIBODIES: CPT | Performed by: PHYSICIAN ASSISTANT

## 2024-10-25 PROCEDURE — 80053 COMPREHEN METABOLIC PANEL: CPT

## 2024-10-25 PROCEDURE — 97116 GAIT TRAINING THERAPY: CPT

## 2024-10-25 PROCEDURE — 82105 ALPHA-FETOPROTEIN SERUM: CPT | Performed by: PHYSICIAN ASSISTANT

## 2024-10-25 PROCEDURE — 85027 COMPLETE CBC AUTOMATED: CPT

## 2024-10-25 RX ADMIN — ESCITALOPRAM OXALATE 20 MG: 20 TABLET ORAL at 09:02

## 2024-10-25 RX ADMIN — PANTOPRAZOLE SODIUM 40 MG: 40 TABLET, DELAYED RELEASE ORAL at 04:52

## 2024-10-25 RX ADMIN — FUROSEMIDE 40 MG: 10 INJECTION, SOLUTION INTRAMUSCULAR; INTRAVENOUS at 16:04

## 2024-10-25 RX ADMIN — MIRTAZAPINE 15 MG: 15 TABLET, FILM COATED ORAL at 21:25

## 2024-10-25 RX ADMIN — ASPIRIN 81 MG CHEWABLE TABLET 81 MG: 81 TABLET CHEWABLE at 08:59

## 2024-10-25 RX ADMIN — ALBUMIN (HUMAN) 25 G: 0.25 INJECTION, SOLUTION INTRAVENOUS at 04:49

## 2024-10-25 RX ADMIN — THIAMINE HYDROCHLORIDE 100 MG: 100 INJECTION, SOLUTION INTRAMUSCULAR; INTRAVENOUS at 09:12

## 2024-10-25 RX ADMIN — MULTIPLE VITAMINS W/ MINERALS TAB 1 TABLET: TAB ORAL at 08:59

## 2024-10-25 RX ADMIN — ATORVASTATIN CALCIUM 20 MG: 20 TABLET, FILM COATED ORAL at 08:59

## 2024-10-25 RX ADMIN — FOLIC ACID 1 MG: 1 TABLET ORAL at 08:59

## 2024-10-25 RX ADMIN — FUROSEMIDE 40 MG: 10 INJECTION, SOLUTION INTRAMUSCULAR; INTRAVENOUS at 08:59

## 2024-10-25 RX ADMIN — NICOTINE 21 MG: 21 PATCH, EXTENDED RELEASE TRANSDERMAL at 09:03

## 2024-10-25 RX ADMIN — CYANOCOBALAMIN TAB 500 MCG 1000 MCG: 500 TAB at 08:59

## 2024-10-25 RX ADMIN — ENOXAPARIN SODIUM 40 MG: 40 INJECTION SUBCUTANEOUS at 08:59

## 2024-10-25 RX ADMIN — CHLORHEXIDINE GLUCONATE 15 ML: 1.2 RINSE ORAL at 09:02

## 2024-10-25 NOTE — ASSESSMENT & PLAN NOTE
Lab Results   Component Value Date    SODIUM 137 10/25/2024    SODIUM 135 10/24/2024    SODIUM 136 10/23/2024     Presented to [facility] with sodium 99. Transferred to ICU [place].  Etiology is hypovolemic hyponatremia - evelyne crawford  Nephrology following.  Did initially overcorrect and receive DDAVP and D5W.  Getting IV Lasix to help with volume status  Received a maximum dose of phenobarbital and 1 dose of Valium for agitation  Downgraded from ICU on 10/20 with sodium 127  Resolved

## 2024-10-25 NOTE — ASSESSMENT & PLAN NOTE
-Reported to drink at least a 12 pack of beer plus several glasses of rum daily  -Needs abstinence/program  -Agree folic acid and thiamine supplementation

## 2024-10-25 NOTE — ASSESSMENT & PLAN NOTE
-Likely bone marrow suppression from alcohol use  -Hemoglobin stable 9.9 up to 10.5 with normal indices  -Follow hemoglobin transfuse as needed

## 2024-10-25 NOTE — CONSULTS
Consultation - Gastroenterology   Name: Viktor Farfan 54 y.o. male I MRN: [id number]  Unit/Bed#: S -01 I Date of Admission: 10/16/2024   Date of Service: 10/25/2024 I Hospital Day: 9   Inpatient consult to gastroenterology  Consult performed by: Arya Felder PA-C  Consult ordered by: Duyen George DO        Physician Requesting Evaluation: Lesly Ray MD   Reason for Evaluation / Principal Problem: Elevated liver function test    Assessment & Plan  Transaminitis  - Multifactorial with alcohol +/- drug induced with antibiotics +/-  cholestasis of sepsis, shock,   - October 16, 2024 AST 66 ALT 19 alk phos 219 albumin 3.6 bilirubin 2.30  - October 25, 2024 , ALT 59, alk phos 364, albumin 3.3, bilirubin 0.87  -Maddrey's discriminant function 21.6 does not suggest benefit from steroids being less than 33  - Ultrasound 10/17/2024 showed fatty liver with morphologic features concerning for cirrhosis and small volume of right upper quadrant ascites   - if cirrhotic, MELD 3.0 12, MELD-Na 11  -Continue to monitor liver function tests and INR daily  -Plan further serologies to rule out any other underlying source of liver disease    Alcohol abuse  -Reported to drink at least a 12 pack of beer plus several glasses of rum daily  -Needs abstinence/program  -Agree folic acid and thiamine supplementation  Thrombocytopenia (HCC)  -Likely underlying cirrhosis with splenic sequestration and decreased thrombopoietin  -Platelets currently mildly low at 144,000  Anemia  -Likely bone marrow suppression from alcohol use  -Hemoglobin stable 9.9 up to 10.5 with normal indices  -Follow hemoglobin transfuse as needed  Vitamin B12 deficiency  -Second to alcohol abuse  -Receiving B12 supplementation  Folate deficiency  -Second to alcohol abuse  -Receiving folate supplementation  Diarrhea  -Currently resolved   - October 22, 2024 stool enteric pathogen and C. difficile negative  -TSH October 16, 2024 normal  Screen  for colon cancer  -No previous colonoscopy  -Will plan to arrange this as an outpatient            History of Present Illness   HPI:  Viktor Farfan is a 54 y.o. male with past medical history of alcohol abuse, tobacco abuse, hypertension, hyperlipidemia, depression, GERD who was admitted back on October 16 with shortness of breath generalized weakness and white sputum production.  Known to be a heavy drinker of a 12 pack of beer plus hard liquor daily.  He is being treated for acute hypoxic respiratory failure second to volume overload (Echo 10/17/24 withy LVEF 60%).  GI being consulted for elevated liver function tests.    Patient has had classic liver function elevation in the past with 2:1 AST and ALT.  On admission his AST was 66 ALT 99 alk phos 213 albumin 3.6 total bili 2.30.  Today his liver functions are , ALT 59, alk phos 364, albumin 3.3, bilirubin 0.87.  Blood cultures x 2 were no growth in 5 days.  COVID and influenza A/B were negative.  Urine strep pneumonia and Legionella negative no lactic acidosis on admission.  Iron studies and ferritin normal folate low at 5.9 and B12 330.  INR 1.27 on admission 1.37 currently.  Chest x-ray shows diffuse pulmonary edema.    Endoscopic History  EGD -none previously  Colonoscopy -none previously      Review of Systems   Constitutional:  Negative for activity change, appetite change, chills, diaphoresis, fatigue and unexpected weight change.   HENT:  Negative for mouth sores, rhinorrhea, sore throat and trouble swallowing.    Eyes:  Negative for discharge, redness and visual disturbance.   Respiratory:  Negative for apnea, cough, choking, chest tightness and shortness of breath.    Cardiovascular:  Negative for chest pain, palpitations and leg swelling.   Gastrointestinal:  Negative for abdominal distention, abdominal pain, anal bleeding, blood in stool, constipation, diarrhea, nausea, rectal pain and vomiting.   Genitourinary:  Negative for difficulty  urinating, dysuria, frequency and hematuria.   Musculoskeletal:  Negative for arthralgias, back pain, gait problem, joint swelling and myalgias.   Skin:  Negative for color change, pallor and rash.   Allergic/Immunologic: Negative for environmental allergies and food allergies.   Neurological:  Negative for dizziness, tremors, weakness, light-headedness, numbness and headaches.   Psychiatric/Behavioral:  Negative for agitation, behavioral problems, confusion and sleep disturbance. The patient is not nervous/anxious.      Historical Information   Past Medical History:   Diagnosis Date    Anxiety     Back pain     Chronic right-sided low back pain without sciatica 3/31/2018    Depression     Gastroesophageal reflux disease 3/31/2018    GERD (gastroesophageal reflux disease)     Right foot pain      Past Surgical History:   Procedure Laterality Date    NO PAST SURGERIES      ORIF FINGER FRACTURE Right 8/6/2019    Procedure: OPEN REDUCTION FINGER;  Surgeon: Tomas Morgan MD;  Location: [facility];  Service: Plastics     Social History     Tobacco Use    Smoking status: Every Day     Current packs/day: 1.00     Average packs/day: 1 pack/day for 38.0 years (38.0 ttl pk-yrs)     Types: Cigarettes    Smokeless tobacco: Never   Vaping Use    Vaping status: Never Used   Substance and Sexual Activity    Alcohol use: Yes     Alcohol/week: 6.0 standard drinks of alcohol     Types: 6 Cans of beer per week     Comment: 12 pack of beer a night    Drug use: No    Sexual activity: Not on file     E-Cigarette/Vaping    E-Cigarette Use Never User      E-Cigarette/Vaping Substances    Nicotine No     THC No     CBD No     Flavoring No     Other No     Unknown No          Objective :  Temp:  [98.3 °F (36.8 °C)-99.4 °F (37.4 °C)] 98.3 °F (36.8 °C)  HR:  [86-92] 86  BP: (106-121)/(56-74) 121/74  Resp:  [16] 16  SpO2:  [92 %-97 %] 97 %  O2 Device: Nasal cannula  Nasal Cannula O2 Flow Rate (L/min):  [4 L/min] 4 L/min    Physical  "Exam  Constitutional:       General: He is not in acute distress.     Appearance: Normal appearance. He is not ill-appearing.   HENT:      Head: Normocephalic and atraumatic.   Eyes:      General: No scleral icterus.     Conjunctiva/sclera: Conjunctivae normal.   Cardiovascular:      Rate and Rhythm: Normal rate and regular rhythm.   Pulmonary:      Effort: Pulmonary effort is normal. No respiratory distress.      Breath sounds: Normal breath sounds.   Abdominal:      General: Bowel sounds are normal. There is no distension.      Palpations: Abdomen is soft.      Tenderness: There is no abdominal tenderness. There is no guarding.   Skin:     General: Skin is warm and dry.   Neurological:      Mental Status: He is alert and oriented to person, place, and time.   Psychiatric:         Mood and Affect: Mood normal.         Behavior: Behavior normal.           Lab Results: I have reviewed the following results:CBC/BMP:   .     10/25/24  0452   WBC 5.59   HGB 10.5*   HCT 33.2*   *   SODIUM 137   K 4.0      CO2 33*   BUN 10   CREATININE 0.71   GLUC 105    , LFTs:   .     10/25/24  0452   *   ALT 59*   ALB 3.3*   TBILI 0.87   ALKPHOS 364*    , PTT/INR:No new results in last 24 hours. , Vitamins B1, B6, B12, A, and D: No results found for: \"B1\", \"THYROGLB\", \"LVVWIKIK83\", \"[id number]\", Folate: No results found for: \"FOLATE\", Blood Culture: No results found for: \"BLOODCX\"    Imaging Results Review: I reviewed radiology reports from this admission including: Ultrasound(s).  Other Study Results Review: No additional pertinent studies reviewed.        Arya Felder PA-C  [facility] - Gastroentrology    "

## 2024-10-25 NOTE — PLAN OF CARE
"  Problem: PHYSICAL THERAPY ADULT  Goal: Performs mobility at highest level of function for planned discharge setting.  See evaluation for individualized goals.  Description: Treatment/Interventions: Functional transfer training, LE strengthening/ROM, Elevations, Therapeutic exercise, Endurance training, Cognitive reorientation, Patient/family training, Equipment eval/education, Bed mobility, Gait training, Compensatory technique education, Spoke to nursing, Spoke to case management     See flowsheet documentation for full assessment, interventions and recommendations.  Outcome: Progressing  Note: Prognosis: Good  Problem List: Decreased strength, Decreased endurance, Impaired balance, Decreased mobility, Decreased coordination, Decreased cognition, Impaired judgement, Decreased safety awareness  Assessment: Pt agreeable to PT this pm. Pt is noted with SUP trans and min A progressing to SUP ambulation without and AD. Pt with 3 LOB during PT session requiring steadying assist to correct - although pt stating \"I'm fine, I'm fine.\" At end of 1st amb trial pt declines further activity/ambulation with PT. Pt is noted with slowly improving gait endurance and balance. Pt will cont to benefit from skilled PT services per PT POC. When medically stable for dc, pt remains appropriate for Level I Maximum Resource Intensity.  Barriers to Discharge: Decreased caregiver support, Inaccessible home environment     Rehab Resource Intensity Level, PT: I (Maximum Resource Intensity)    See flowsheet documentation for full assessment.        "

## 2024-10-25 NOTE — PROGRESS NOTES
Progress Note - Hospitalist   Name: Viktor Farfan 54 y.o. male I MRN: [id number]  Unit/Bed#: S -01 I Date of Admission: 10/16/2024   Date of Service: 10/25/2024 I Hospital Day: 9    Assessment & Plan  Acute hypoxic respiratory failure (HCC)  Secondary to iatrogenic volume overload in the setting from trying to correct hyponatremia  CXR (10/22) - no significant change diffuse pulmonary edema  Echo (10/17) - EF 60%.  Unable to assess diastolic function.  Mild tricuspid regurgitation.  Estimated RVSP mildly elevated, 38.00 mmHg  Currently on 4 L MFNC    Plan:  Continue diuresis with IV Lasix 40 mg BID.  Patient continues to diurese well  Incentive spirometry  Encourage out of bed  Wean oxygen as tolerated  Hyponatremia  Lab Results   Component Value Date    SODIUM 137 10/25/2024    SODIUM 135 10/24/2024    SODIUM 136 10/23/2024     Presented to [facility] with sodium 99. Transferred to ICU [place].  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  Did initially overcorrect and receive DDAVP and D5W.  Getting IV Lasix to help with volume status  Received a maximum dose of phenobarbital and 1 dose of Valium for agitation  Downgraded from ICU on 10/20 with sodium 127  Resolved    Alcohol abuse  Drinks 12 pack beer plus several glasses of rum and coke daily  Catch program upon discharge  Tobacco abuse  Smokes 1 pack/day  Encourage smoking cessation  Thrombocytopenia (HCC)  Lab Results   Component Value Date     (L) 10/25/2024     (L) 10/24/2024     (L) 10/23/2024     Stable   No active bleeding  Likely secondary to bone marrow suppression from alcohol use  Transaminitis  Lab Results   Component Value Date     (H) 10/25/2024    ALT 59 (H) 10/25/2024    ALKPHOS 364 (H) 10/25/2024    TBILI 0.87 10/25/2024     RUQ US (10/17) - hepatic steatosis with morphologic features concerning for cirrhosis.  Small volume RUQ ascites  Stable  Monitor daily  GI  consulted -recommend getting US liver with Dopplers, trending CMP and INR  Pneumonia  Met sepsis criteria  With acute hypoxic respiratory failure.  Currently on 10 L midlfow  CTA chest - multifocal bilateral upper lobe consolidation which could be due to pneumonia  Blood cultures - no growth after 4 days  Sputum culture - mixed respiratory alexandra  Completed 5-day course of ceftriaxone from 10/16 to 10/20    Plan:  Incentive spirometry  Encourage out of bed  Wean oxygen as tolerated  HTN (hypertension)  Continue home amlodipine 10 mg  Anemia  Lab Results   Component Value Date    HGB 10.5 (L) 10/25/2024    HGB 9.9 (L) 10/24/2024    HGB 10.4 (L) 10/23/2024   Baseline is 10-11  Microcytic  No active bleeding  Iron panel - iron 123, ferritin 62, TIBC 384  Vitamin B12 330, folate 5.9  Suspect secondary to bone marrow suppression from alcohol use  Vitamin B12 deficiency  Vitamin B12 level 330  Secondary to alcohol use  Continue oral vitamin B12 1000 mcg supplementation  Folate deficiency  Folate level 5.9  Continue oral folic acid supplementation  Urinary retention  Required intermittent straight cath  10/22 - Resolved  Urinary retention protocol  Sepsis due to pneumonia (HCC)  See plan under pneumonia  Diarrhea  New onset diarrhea  C. Difficile and stool enteric panel negative  Resolved  Ambulatory dysfunction  PT recommend level I  PMR consulted - may progress to discharge to home versus inpatient rehab pending medical and functional progress    VTE Pharmacologic Prophylaxis: VTE Score: 2 Moderate Risk (Score 3-4) - Pharmacological DVT Prophylaxis Ordered: enoxaparin (Lovenox).    Mobility:   Basic Mobility Inpatient Raw Score: 20  JH-HLM Goal: 6: Walk 10 steps or more  JH-HLM Achieved: 7: Walk 25 feet or more  JH-HLM Goal achieved. Continue to encourage appropriate mobility.    Patient Centered Rounds: I performed bedside rounds with nursing staff today.   Discussions with Specialists or Other Care Team Provider:  Nephrology, gastroenterology, PMR    Education and Discussions with Family / Patient: Updated  (daughter) via phone.    Current Length of Stay: 9 day(s)  Current Patient Status: Inpatient   Certification Statement: The patient will continue to require additional inpatient hospital stay due to acute on chronic hypoxic respiratory failure secondary to fluid overload  Discharge Plan: Anticipate discharge in 24-48 hrs to rehab facility.    Code Status: Level 1 - Full Code    Subjective   Patient was seen and examined at bedside.  He was sleeping very comfortably at the time my examination.  He was awoken and nodded no to having any pain or shortness of breath.  He proceeded to fall back asleep.    Objective :  Temp:  [98.3 °F (36.8 °C)-99.4 °F (37.4 °C)] 98.3 °F (36.8 °C)  HR:  [79-92] 79  BP: (106-121)/(56-74) 116/70  Resp:  [16] 16  SpO2:  [92 %-98 %] 98 %  O2 Device: Nasal cannula  Nasal Cannula O2 Flow Rate (L/min):  [4 L/min] 4 L/min    Body mass index is 31.17 kg/m².     Input and Output Summary (last 24 hours):     Intake/Output Summary (Last 24 hours) at 10/25/2024 1406  Last data filed at 10/25/2024 0947  Gross per 24 hour   Intake 480 ml   Output 450 ml   Net 30 ml       Physical Exam  Constitutional:       General: He is not in acute distress.     Appearance: Normal appearance. He is obese. He is not toxic-appearing or diaphoretic.   HENT:      Head: Normocephalic and atraumatic.      Comments: Poor dentition  Pulmonary:      Effort: No respiratory distress.      Breath sounds: No wheezing, rhonchi or rales.      Comments: Coarse breath sounds. On 4 L NC  Abdominal:      General: Bowel sounds are normal. There is no distension.      Palpations: Abdomen is soft.      Tenderness: There is no abdominal tenderness. There is no guarding or rebound.   Musculoskeletal:         General: Swelling present. Normal range of motion.      Right lower leg: Edema present.      Left lower leg: Edema present.    Skin:     Capillary Refill: Capillary refill takes less than 2 seconds.      Findings: No erythema or rash.   Neurological:      General: No focal deficit present.      Mental Status: He is alert and oriented to person, place, and time.       Lines/Drains:      Lab Results: I have reviewed the following results:   Results from last 7 days   Lab Units 10/25/24  0452 10/23/24  0519 10/22/24  0423   WBC Thousand/uL 5.59   < > 8.52   HEMOGLOBIN g/dL 10.5*   < > 9.9*   HEMATOCRIT % 33.2*   < > 30.8*   PLATELETS Thousands/uL 144*   < > 155   SEGS PCT %  --   --  69   LYMPHO PCT %  --   --  14   MONO PCT %  --   --  12   EOS PCT %  --   --  3    < > = values in this interval not displayed.     Results from last 7 days   Lab Units 10/25/24  0452   SODIUM mmol/L 137   POTASSIUM mmol/L 4.0   CHLORIDE mmol/L 100   CO2 mmol/L 33*   BUN mg/dL 10   CREATININE mg/dL 0.71   ANION GAP mmol/L 4   CALCIUM mg/dL 8.8   ALBUMIN g/dL 3.3*   TOTAL BILIRUBIN mg/dL 0.87   ALK PHOS U/L 364*   ALT U/L 59*   AST U/L 111*   GLUCOSE RANDOM mg/dL 105     Results from last 7 days   Lab Units 10/21/24  0620   INR  1.37*                   Recent Cultures (last 7 days):   Results from last 7 days   Lab Units 10/22/24  1757   C DIFF TOXIN B BY PCR  Negative       Imaging Results Review: I reviewed radiology reports from this admission including: CTA chest, CT head, Ultrasound(s), and Echocardiogram.  Other Study Results Review: EKG was reviewed.     Last 24 Hours Medication List:     Current Facility-Administered Medications:     acetaminophen (TYLENOL) tablet 650 mg, Q6H PRN    amLODIPine (NORVASC) tablet 10 mg, Daily    aspirin chewable tablet 81 mg, Daily    atorvastatin (LIPITOR) tablet 20 mg, Daily    chlorhexidine (PERIDEX) 0.12 % oral rinse 15 mL, Q12H JUAN    cyanocobalamin (VITAMIN B-12) tablet 1,000 mcg, Daily    enoxaparin (LOVENOX) subcutaneous injection 40 mg, Daily    escitalopram (LEXAPRO) tablet 20 mg, Daily    folic acid (FOLVITE)  tablet 1 mg, Daily    furosemide (LASIX) injection 40 mg, BID (diuretic)    ipratropium-albuterol (DUO-NEB) 0.5-2.5 mg/3 mL inhalation solution 3 mL, Q6H PRN    mirtazapine (REMERON) tablet 15 mg, HS    multivitamin-minerals (CENTRUM) tablet 1 tablet, Daily    nicotine (NICODERM CQ) 21 mg/24 hr TD 24 hr patch 21 mg, Daily    ondansetron (ZOFRAN) injection 4 mg, Q8H PRN    pantoprazole (PROTONIX) EC tablet 40 mg, Early Morning    [COMPLETED] thiamine (VITAMIN B1) 500 mg in sodium chloride 0.9 % 50 mL IVPB, TID, Last Rate: Stopped (10/19/24 1000) **FOLLOWED BY** [COMPLETED] thiamine (VITAMIN B1) 250 mg in sodium chloride 0.9 % 50 mL IVPB, TID, Last Rate: 250 mg (10/22/24 1224) **FOLLOWED BY** [COMPLETED] thiamine (VITAMIN B1) 100 mg in sodium chloride 0.9 % 50 mL IVPB, TID, Last Rate: 100 mg (10/25/24 0912) **FOLLOWED BY** [START ON 10/26/2024] thiamine tablet 100 mg, Daily    trimethobenzamide (TIGAN) IM injection 200 mg, Q6H PRN    Administrative Statements   Today, Patient Was Seen By: Duyen George DO      **Please Note: This note may have been constructed using a voice recognition system.**

## 2024-10-25 NOTE — ASSESSMENT & PLAN NOTE
Lab Results   Component Value Date     (H) 10/25/2024    ALT 59 (H) 10/25/2024    ALKPHOS 364 (H) 10/25/2024    TBILI 0.87 10/25/2024     RUQ US (10/17) - hepatic steatosis with morphologic features concerning for cirrhosis.  Small volume RUQ ascites  Stable  Monitor daily  GI consulted -recommend getting US liver with Dopplers, trending CMP and INR

## 2024-10-25 NOTE — ASSESSMENT & PLAN NOTE
- Multifactorial with alcohol +/- drug induced with antibiotics +/-  cholestasis of sepsis, shock,   - October 16, 2024 AST 66 ALT 19 alk phos 219 albumin 3.6 bilirubin 2.30  - October 25, 2024 , ALT 59, alk phos 364, albumin 3.3, bilirubin 0.87  -Maddrey's discriminant function 21.6 does not suggest benefit from steroids being less than 33  - Ultrasound 10/17/2024 showed fatty liver with morphologic features concerning for cirrhosis and small volume of right upper quadrant ascites   - if cirrhotic, MELD 3.0 12, MELD-Na 11  -Continue to monitor liver function tests and INR daily  -Plan further serologies to rule out any other underlying source of liver disease

## 2024-10-25 NOTE — ASSESSMENT & PLAN NOTE
-Currently resolved   - October 22, 2024 stool enteric pathogen and C. difficile negative  -TSH October 16, 2024 normal

## 2024-10-25 NOTE — ASSESSMENT & PLAN NOTE
PT recommend level I  PMR consulted - may progress to discharge to home versus inpatient rehab pending medical and functional progress

## 2024-10-25 NOTE — PLAN OF CARE
Problem: PAIN - ADULT  Goal: Verbalizes/displays adequate comfort level or baseline comfort level  Description: Interventions:  - Encourage patient to monitor pain and request assistance  - Assess pain using appropriate pain scale  - Administer analgesics based on type and severity of pain and evaluate response  - Implement non-pharmacological measures as appropriate and evaluate response  - Consider cultural and social influences on pain and pain management  - Notify physician/advanced practitioner if interventions unsuccessful or patient reports new pain  Outcome: Progressing     Problem: INFECTION - ADULT  Goal: Absence or prevention of progression during hospitalization  Description: INTERVENTIONS:  - Assess and monitor for signs and symptoms of infection  - Monitor lab/diagnostic results  - Monitor all insertion sites, i.e. indwelling lines, tubes, and drains  - Monitor endotracheal if appropriate and nasal secretions for changes in amount and color  - [place] appropriate cooling/warming therapies per order  - Administer medications as ordered  - Instruct and encourage patient and family to use good hand hygiene technique  - Identify and instruct in appropriate isolation precautions for identified infection/condition  Outcome: Progressing  Goal: Absence of fever/infection during neutropenic period  Description: INTERVENTIONS:  - Monitor WBC    Outcome: Progressing     Problem: SAFETY ADULT  Goal: Patient will remain free of falls  Description: INTERVENTIONS:  - Educate patient/family on patient safety including physical limitations  - Instruct patient to call for assistance with activity   - Consult OT/PT to assist with strengthening/mobility   - Keep Call bell within reach  - Keep bed low and locked with side rails adjusted as appropriate  - Keep care items and personal belongings within reach  - Initiate and maintain comfort rounds  - Make Fall Risk Sign visible to staff  - Apply yellow socks and bracelet  for high fall risk patients  - Consider moving patient to room near nurses station  Outcome: Progressing  Goal: Maintain or return to baseline ADL function  Description: INTERVENTIONS:  -  Assess patient's ability to carry out ADLs; assess patient's baseline for ADL function and identify physical deficits which impact ability to perform ADLs (bathing, care of mouth/teeth, toileting, grooming, dressing, etc.)  - Assess/evaluate cause of self-care deficits   - Assess range of motion  - Assess patient's mobility; develop plan if impaired  - Assess patient's need for assistive devices and provide as appropriate  - Encourage maximum independence but intervene and supervise when necessary  - Involve family in performance of ADLs  - Assess for home care needs following discharge   - Consider OT consult to assist with ADL evaluation and planning for discharge  - Provide patient education as appropriate  Outcome: Progressing  Goal: Maintains/Returns to pre admission functional level  Description: INTERVENTIONS:  - Perform AM-PAC 6 Click Basic Mobility/ Daily Activity assessment daily.  - Set and communicate daily mobility goal to care team and patient/family/caregiver.   - Collaborate with rehabilitation services on mobility goals if consulted  - Out of bed for toileting  - Record patient progress and toleration of activity level   Outcome: Progressing     Problem: DISCHARGE PLANNING  Goal: Discharge to home or other facility with appropriate resources  Description: INTERVENTIONS:  - Identify barriers to discharge w/patient and caregiver  - Arrange for needed discharge resources and transportation as appropriate  - Identify discharge learning needs (meds, wound care, etc.)  - Arrange for interpretive services to assist at discharge as needed  - Refer to Case Management Department for coordinating discharge planning if the patient needs post-hospital services based on physician/advanced practitioner order or complex needs  related to functional status, cognitive ability, or social support system  Outcome: Progressing     Problem: Knowledge Deficit  Goal: Patient/family/caregiver demonstrates understanding of disease process, treatment plan, medications, and discharge instructions  Description: Complete learning assessment and assess knowledge base.  Interventions:  - Provide teaching at level of understanding  - Provide teaching via preferred learning methods  Outcome: Progressing     Problem: NEUROSENSORY - ADULT  Goal: Achieves stable or improved neurological status  Description: INTERVENTIONS  - Monitor and report changes in neurological status  - Monitor vital signs such as temperature, blood pressure, glucose, and any other labs ordered   - Initiate measures to prevent increased intracranial pressure  - Monitor for seizure activity and implement precautions if appropriate      Outcome: Progressing  Goal: Remains free of injury related to seizures activity  Description: INTERVENTIONS  - Maintain airway, patient safety  and administer oxygen as ordered  - Monitor patient for seizure activity, document and report duration and description of seizure to physician/advanced practitioner  - If seizure occurs,  ensure patient safety during seizure  - Reorient patient post seizure  - Seizure pads on all 4 side rails  - Instruct patient/family to notify RN of any seizure activity including if an aura is experienced  - Instruct patient/family to call for assistance with activity based on nursing assessment  - Administer anti-seizure medications if ordered    Outcome: Progressing  Goal: Achieves maximal functionality and self care  Description: INTERVENTIONS  - Monitor swallowing and airway patency with patient fatigue and changes in neurological status  - Encourage and assist patient to increase activity and self care.   - Encourage visually impaired, hearing impaired and aphasic patients to use assistive/communication devices  Outcome:  Progressing     Problem: CARDIOVASCULAR - ADULT  Goal: Maintains optimal cardiac output and hemodynamic stability  Description: INTERVENTIONS:  - Monitor I/O, vital signs and rhythm  - Monitor for S/S and trends of decreased cardiac output  - Administer and titrate ordered vasoactive medications to optimize hemodynamic stability  - Assess quality of pulses, skin color and temperature  - Assess for signs of decreased coronary artery perfusion  - Instruct patient to report change in severity of symptoms  Outcome: Progressing  Goal: Absence of cardiac dysrhythmias or at baseline rhythm  Description: INTERVENTIONS:  - Continuous cardiac monitoring, vital signs, obtain 12 lead EKG if ordered  - Administer antiarrhythmic and heart rate control medications as ordered  - Monitor electrolytes and administer replacement therapy as ordered  Outcome: Progressing     Problem: RESPIRATORY - ADULT  Goal: Achieves optimal ventilation and oxygenation  Description: INTERVENTIONS:  - Assess for changes in respiratory status  - Assess for changes in mentation and behavior  - Position to facilitate oxygenation and minimize respiratory effort  - Oxygen administered by appropriate delivery if ordered  - Initiate smoking cessation education as indicated  - Encourage broncho-pulmonary hygiene including cough, deep breathe, Incentive Spirometry  - Assess the need for suctioning and aspirate as needed  - Assess and instruct to report SOB or any respiratory difficulty  - Respiratory Therapy support as indicated  Outcome: Progressing     Problem: GASTROINTESTINAL - ADULT  Goal: Minimal or absence of nausea and/or vomiting  Description: INTERVENTIONS:  - Administer IV fluids if ordered to ensure adequate hydration  - Maintain NPO status until nausea and vomiting are resolved  - Nasogastric tube if ordered  - Administer ordered antiemetic medications as needed  - Provide nonpharmacologic comfort measures as appropriate  - Advance diet as  tolerated, if ordered  - Consider nutrition services referral to assist patient with adequate nutrition and appropriate food choices  Outcome: Progressing  Goal: Maintains or returns to baseline bowel function  Description: INTERVENTIONS:  - Assess bowel function  - Encourage oral fluids to ensure adequate hydration  - Administer IV fluids if ordered to ensure adequate hydration  - Administer ordered medications as needed  - Encourage mobilization and activity  - Consider nutritional services referral to assist patient with adequate nutrition and appropriate food choices  Outcome: Progressing  Goal: Maintains adequate nutritional intake  Description: INTERVENTIONS:  - Monitor percentage of each meal consumed  - Identify factors contributing to decreased intake, treat as appropriate  - Assist with meals as needed  - Monitor I&O, weight, and lab values if indicated  - Obtain nutrition services referral as needed  Outcome: Progressing  Goal: Establish and maintain optimal ostomy function  Description: INTERVENTIONS:  - Assess bowel function  - Encourage oral fluids to ensure adequate hydration  - Administer IV fluids if ordered to ensure adequate hydration   - Administer ordered medications as needed  - Encourage mobilization and activity  - Nutrition services referral to assist patient with appropriate food choices  - Assess stoma site  - Consider wound care consult   Outcome: Progressing  Goal: Oral mucous membranes remain intact  Description: INTERVENTIONS  - Assess oral mucosa and hygiene practices  - Implement preventative oral hygiene regimen  - Implement oral medicated treatments as ordered  - Initiate Nutrition services referral as needed  Outcome: Progressing     Problem: GENITOURINARY - ADULT  Goal: Maintains or returns to baseline urinary function  Description: INTERVENTIONS:  - Assess urinary function  - Encourage oral fluids to ensure adequate hydration if ordered  - Administer IV fluids as ordered to  ensure adequate hydration  - Administer ordered medications as needed  - Offer frequent toileting  - Follow urinary retention protocol if ordered  Outcome: Progressing  Goal: Absence of urinary retention  Description: INTERVENTIONS:  - Assess patient’s ability to void and empty bladder  - Monitor I/O  - Bladder scan as needed  - Discuss with physician/AP medications to alleviate retention as needed  - Discuss catheterization for long term situations as appropriate  Outcome: Progressing  Goal: Urinary catheter remains patent  Description: INTERVENTIONS:  - Assess patency of urinary catheter  - If patient has a chronic shah, consider changing catheter if non-functioning  - Follow guidelines for intermittent irrigation of non-functioning urinary catheter  Outcome: Progressing     Problem: METABOLIC, FLUID AND ELECTROLYTES - ADULT  Goal: Electrolytes maintained within normal limits  Description: INTERVENTIONS:  - Monitor labs and assess patient for signs and symptoms of electrolyte imbalances  - Administer electrolyte replacement as ordered  - Monitor response to electrolyte replacements, including repeat lab results as appropriate  - Instruct patient on fluid and nutrition as appropriate  Outcome: Progressing  Goal: Fluid balance maintained  Description: INTERVENTIONS:  - Monitor labs   - Monitor I/O and WT  - Instruct patient on fluid and nutrition as appropriate  - Assess for signs & symptoms of volume excess or deficit  Outcome: Progressing  Goal: Glucose maintained within target range  Description: INTERVENTIONS:  - Monitor Blood Glucose as ordered  - Assess for signs and symptoms of hyperglycemia and hypoglycemia  - Administer ordered medications to maintain glucose within target range  - Assess nutritional intake and initiate nutrition service referral as needed  Outcome: Progressing     Problem: SKIN/TISSUE INTEGRITY - ADULT  Goal: Skin Integrity remains intact(Skin Breakdown Prevention)  Description:  Assess:  -Inspect skin when repositioning, toileting, and assisting with ADLS  -Assess extremities for adequate circulation and sensation     Bed Management:  -Have minimal linens on bed & keep smooth, unwrinkled  -Change linens as needed when moist or perspiring    Toileting:  -Offer bedside commode    Activity:    -Encourage activity and walks on unit  -Encourage or provide ROM exercises   -Use appropriate equipment to lift or move patient in bed    Skin Care:  -Avoid use of baby powder, tape, friction and shearing, hot water or constrictive clothing  -Do not massage red bony areas    Outcome: Progressing  Goal: Incision(s), wounds(s) or drain site(s) healing without S/S of infection  Description: INTERVENTIONS  - Assess and document dressing, incision, wound bed, drain sites and surrounding tissue  - Provide patient and family education  Outcome: Progressing  Goal: Pressure injury heals and does not worsen  Description: Interventions:  - Implement low air loss mattress or specialty surface (Criteria met)  - Apply silicone foam dressingn in chair   - Apply fecal or urinary incontinence containment device   - Utilize friction reducing device or surface for transfers   - Consider nutrition services referral as needed  Outcome: Progressing     Problem: HEMATOLOGIC - ADULT  Goal: Maintains hematologic stability  Description: INTERVENTIONS  - Assess for signs and symptoms of bleeding or hemorrhage  - Monitor labs  - Administer supportive blood products/factors as ordered and appropriate  Outcome: Progressing     Problem: MUSCULOSKELETAL - ADULT  Goal: Maintain or return mobility to safest level of function  Description: INTERVENTIONS:  - Assess patient's ability to carry out ADLs; assess patient's baseline for ADL function and identify physical deficits which impact ability to perform ADLs (bathing, care of mouth/teeth, toileting, grooming, dressing, etc.)  - Assess/evaluate cause of self-care deficits   - Assess range  of motion  - Assess patient's mobility  - Assess patient's need for assistive devices and provide as appropriate  - Encourage maximum independence but intervene and supervise when necessary  - Involve family in performance of ADLs  - Assess for home care needs following discharge   - Consider OT consult to assist with ADL evaluation and planning for discharge  - Provide patient education as appropriate  Outcome: Progressing  Goal: Maintain proper alignment of affected body part  Description: INTERVENTIONS:  - Support, maintain and protect limb and body alignment  - Provide patient/ family with appropriate education  Outcome: Progressing     Problem: Nutrition/Hydration-ADULT  Goal: Nutrient/Hydration intake appropriate for improving, restoring or maintaining nutritional needs  Description: Monitor and assess patient's nutrition/hydration status for malnutrition. Collaborate with interdisciplinary team and initiate plan and interventions as ordered.  Monitor patient's weight and dietary intake as ordered or per policy. Utilize nutrition screening tool and intervene as necessary. Determine patient's food preferences and provide high-protein, high-caloric foods as appropriate.     INTERVENTIONS:  - Monitor oral intake, urinary output, labs, and treatment plans  - Assess nutrition and hydration status and recommend course of action  - Evaluate amount of meals eaten  - Assist patient with eating if necessary   - Allow adequate time for meals  - Recommend/ encourage appropriate diets, oral nutritional supplements, and vitamin/mineral supplements  - Order, calculate, and assess calorie counts as needed  - Recommend, monitor, and adjust tube feedings and TPN/PPN based on assessed needs  - Assess need for intravenous fluids  - Provide specific nutrition/hydration education as appropriate  - Include patient/family/caregiver in decisions related to nutrition  Outcome: Progressing     Problem: Prexisting or High Potential for  Compromised Skin Integrity  Goal: Skin integrity is maintained or improved  Description: INTERVENTIONS:  - Identify patients at risk for skin breakdown  - Assess and monitor skin integrity  - Assess and monitor nutrition and hydration status  - Monitor labs   - Assess for incontinence   - Turn and reposition patient  - Assist with mobility/ambulation  - Relieve pressure over bony prominences  - Avoid friction and shearing  - Provide appropriate hygiene as needed including keeping skin clean and dry  - Evaluate need for skin moisturizer/barrier cream  - Collaborate with interdisciplinary team   - Patient/family teaching  - Consider wound care consult   Outcome: Progressing     Problem: SAFETY,RESTRAINT: NV/NON-SELF DESTRUCTIVE BEHAVIOR  Goal: Remains free of harm/injury (restraint for non violent/non self-detsructive behavior)  Description: INTERVENTIONS:  - Instruct patient/family regarding restraint use   - Assess and monitor physiologic and psychological status   - Provide interventions and comfort measures to meet assessed patient needs   - Identify and implement measures to help patient regain control  - Assess readiness for release of restraint   Outcome: Progressing  Goal: Returns to optimal restraint-free functioning  Description: INTERVENTIONS:  - Assess the patient's behavior and symptoms that indicate continued need for restraint  - Identify and implement measures to help patient regain control  - Assess readiness for release of restraint   Outcome: Progressing

## 2024-10-25 NOTE — ASSESSMENT & PLAN NOTE
Secondary to iatrogenic volume overload in the setting from trying to correct hyponatremia  CXR (10/22) - no significant change diffuse pulmonary edema  Echo (10/17) - EF 60%.  Unable to assess diastolic function.  Mild tricuspid regurgitation.  Estimated RVSP mildly elevated, 38.00 mmHg  Currently on 4 L MFNC    Plan:  Continue diuresis with IV Lasix 40 mg BID.  Patient continues to diurese well  Incentive spirometry  Encourage out of bed  Wean oxygen as tolerated

## 2024-10-25 NOTE — PLAN OF CARE
Problem: PAIN - ADULT  Goal: Verbalizes/displays adequate comfort level or baseline comfort level  Description: Interventions:  - Encourage patient to monitor pain and request assistance  - Assess pain using appropriate pain scale  - Administer analgesics based on type and severity of pain and evaluate response  - Implement non-pharmacological measures as appropriate and evaluate response  - Consider cultural and social influences on pain and pain management  - Notify physician/advanced practitioner if interventions unsuccessful or patient reports new pain  Outcome: Progressing     Problem: INFECTION - ADULT  Goal: Absence or prevention of progression during hospitalization  Description: INTERVENTIONS:  - Assess and monitor for signs and symptoms of infection  - Monitor lab/diagnostic results  - Monitor all insertion sites, i.e. indwelling lines, tubes, and drains  - Monitor endotracheal if appropriate and nasal secretions for changes in amount and color  - Sterling appropriate cooling/warming therapies per order  - Administer medications as ordered  - Instruct and encourage patient and family to use good hand hygiene technique  - Identify and instruct in appropriate isolation precautions for identified infection/condition  Outcome: Progressing  Goal: Absence of fever/infection during neutropenic period  Description: INTERVENTIONS:  - Monitor WBC    Outcome: Progressing     Problem: SAFETY ADULT  Goal: Patient will remain free of falls  Description: INTERVENTIONS:  - Educate patient/family on patient safety including physical limitations  - Instruct patient to call for assistance with activity   - Consult OT/PT to assist with strengthening/mobility   - Keep Call bell within reach  - Keep bed low and locked with side rails adjusted as appropriate  - Keep care items and personal belongings within reach  - Initiate and maintain comfort rounds  - Make Fall Risk Sign visible to staff  - Consider moving patient to room  near nurses station  Outcome: Progressing  Goal: Maintain or return to baseline ADL function  Description: INTERVENTIONS:  -  Assess patient's ability to carry out ADLs; assess patient's baseline for ADL function and identify physical deficits which impact ability to perform ADLs (bathing, care of mouth/teeth, toileting, grooming, dressing, etc.)  - Assess/evaluate cause of self-care deficits   - Assess range of motion  - Assess patient's mobility; develop plan if impaired  - Assess patient's need for assistive devices and provide as appropriate  - Encourage maximum independence but intervene and supervise when necessary  - Involve family in performance of ADLs  - Assess for home care needs following discharge   - Consider OT consult to assist with ADL evaluation and planning for discharge  - Provide patient education as appropriate  Outcome: Progressing  Goal: Maintains/Returns to pre admission functional level  Description: INTERVENTIONS:  - Perform AM-PAC 6 Click Basic Mobility/ Daily Activity assessment daily.  - Set and communicate daily mobility goal to care team and patient/family/caregiver.   - Collaborate with rehabilitation services on mobility goals if consulted  - Record patient progress and toleration of activity level   Outcome: Progressing     Problem: DISCHARGE PLANNING  Goal: Discharge to home or other facility with appropriate resources  Description: INTERVENTIONS:  - Identify barriers to discharge w/patient and caregiver  - Arrange for needed discharge resources and transportation as appropriate  - Identify discharge learning needs (meds, wound care, etc.)  - Arrange for interpretive services to assist at discharge as needed  - Refer to Case Management Department for coordinating discharge planning if the patient needs post-hospital services based on physician/advanced practitioner order or complex needs related to functional status, cognitive ability, or social support system  Outcome:  Progressing     Problem: Knowledge Deficit  Goal: Patient/family/caregiver demonstrates understanding of disease process, treatment plan, medications, and discharge instructions  Description: Complete learning assessment and assess knowledge base.  Interventions:  - Provide teaching at level of understanding  - Provide teaching via preferred learning methods  Outcome: Progressing     Problem: Knowledge Deficit  Goal: Patient/family/caregiver demonstrates understanding of disease process, treatment plan, medications, and discharge instructions  Description: Complete learning assessment and assess knowledge base.  Interventions:  - Provide teaching at level of understanding  - Provide teaching via preferred learning methods  Outcome: Progressing     Problem: NEUROSENSORY - ADULT  Goal: Achieves stable or improved neurological status  Description: INTERVENTIONS  - Monitor and report changes in neurological status  - Monitor vital signs such as temperature, blood pressure, glucose, and any other labs ordered   - Initiate measures to prevent increased intracranial pressure  - Monitor for seizure activity and implement precautions if appropriate      Outcome: Progressing  Goal: Remains free of injury related to seizures activity  Description: INTERVENTIONS  - Maintain airway, patient safety  and administer oxygen as ordered  - Monitor patient for seizure activity, document and report duration and description of seizure to physician/advanced practitioner  - If seizure occurs,  ensure patient safety during seizure  - Reorient patient post seizure  - Seizure pads on all 4 side rails  - Instruct patient/family to notify RN of any seizure activity including if an aura is experienced  - Instruct patient/family to call for assistance with activity based on nursing assessment  - Administer anti-seizure medications if ordered    Outcome: Progressing  Goal: Achieves maximal functionality and self care  Description: INTERVENTIONS  -  Monitor swallowing and airway patency with patient fatigue and changes in neurological status  - Encourage and assist patient to increase activity and self care.   - Encourage visually impaired, hearing impaired and aphasic patients to use assistive/communication devices  Outcome: Progressing     Problem: RESPIRATORY - ADULT  Goal: Achieves optimal ventilation and oxygenation  Description: INTERVENTIONS:  - Assess for changes in respiratory status  - Assess for changes in mentation and behavior  - Position to facilitate oxygenation and minimize respiratory effort  - Oxygen administered by appropriate delivery if ordered  - Initiate smoking cessation education as indicated  - Encourage broncho-pulmonary hygiene including cough, deep breathe, Incentive Spirometry  - Assess the need for suctioning and aspirate as needed  - Assess and instruct to report SOB or any respiratory difficulty  - Respiratory Therapy support as indicated  Outcome: Progressing     Problem: GENITOURINARY - ADULT  Goal: Maintains or returns to baseline urinary function  Description: INTERVENTIONS:  - Assess urinary function  - Encourage oral fluids to ensure adequate hydration if ordered  - Administer IV fluids as ordered to ensure adequate hydration  - Administer ordered medications as needed  - Offer frequent toileting  - Follow urinary retention protocol if ordered  Outcome: Progressing  Goal: Absence of urinary retention  Description: INTERVENTIONS:  - Assess patient’s ability to void and empty bladder  - Monitor I/O  - Bladder scan as needed  - Discuss with physician/AP medications to alleviate retention as needed  - Discuss catheterization for long term situations as appropriate  Outcome: Progressing  Goal: Urinary catheter remains patent  Description: INTERVENTIONS:  - Assess patency of urinary catheter  - If patient has a chronic shah, consider changing catheter if non-functioning  - Follow guidelines for intermittent irrigation of  non-functioning urinary catheter  Outcome: Progressing     Problem: METABOLIC, FLUID AND ELECTROLYTES - ADULT  Goal: Electrolytes maintained within normal limits  Description: INTERVENTIONS:  - Monitor labs and assess patient for signs and symptoms of electrolyte imbalances  - Administer electrolyte replacement as ordered  - Monitor response to electrolyte replacements, including repeat lab results as appropriate  - Instruct patient on fluid and nutrition as appropriate  Outcome: Progressing  Goal: Fluid balance maintained  Description: INTERVENTIONS:  - Monitor labs   - Monitor I/O and WT  - Instruct patient on fluid and nutrition as appropriate  - Assess for signs & symptoms of volume excess or deficit  Outcome: Progressing  Goal: Glucose maintained within target range  Description: INTERVENTIONS:  - Monitor Blood Glucose as ordered  - Assess for signs and symptoms of hyperglycemia and hypoglycemia  - Administer ordered medications to maintain glucose within target range  - Assess nutritional intake and initiate nutrition service referral as needed  Outcome: Progressing

## 2024-10-25 NOTE — PHYSICAL THERAPY NOTE
"   PT TREATMENT     10/25/24 6649   PT Last Visit   PT Visit Date 10/25/24   Note Type   Note Type Treatment   Pain Assessment   Pain Assessment Tool 0-10   Pain Score No Pain   Restrictions/Precautions   Other Precautions O2;Fall Risk;Bed Alarm;Chair Alarm  (4L O2 via NC)   General   Chart Reviewed Yes   Family/Caregiver Present Yes  (pt's brother)   Cognition   Arousal/Participation Cooperative   Attention Attends with cues to redirect   Orientation Level Oriented X4   Following Commands Follows multistep commands with increased time or repetition   Comments At least 2 pt identifiers including name and    Subjective   Subjective \"I don't need the oxygen, that's what I told the doctor\"   Bed Mobility   Additional Comments Pt received OOB in chair   Transfers   Sit to Stand 5  Supervision   Additional items Armrests;Assist x 1;Verbal cues;Increased time required   Stand to Sit 5  Supervision   Additional items Armrests;Assist x 1;Verbal cues;Increased time required   Ambulation/Elevation   Gait pattern Short stride;Decreased foot clearance;Decreased heel strike;Decreased toe off  (minimal, intermittent generalized unsteadiness t/o gait distance;no reciprocal arm swing and limited righting responses)   Gait Assistance 4  Minimal assist  (progressing to SUP)   Additional items Assist x 1;Verbal cues;Tactile cues   Assistive Device None  (pt declines use of RW)   Distance 150 feet with change in direction;pt denies fatigue at end of amb but defers further activity/gait with PT   Balance   Static Sitting Good   Static Standing Fair   Ambulatory   (F-/F)   Endurance Deficit   Endurance Deficit Yes   Endurance Deficit Description Limited activity and gait endurance   Activity Tolerance   Activity Tolerance Patient limited by fatigue;Treatment limited secondary to medical complications (Comment)  (impaired insight into safety and functional deficits)   Assessment   Problem List Decreased strength;Decreased " "endurance;Impaired balance;Decreased mobility;Decreased coordination;Decreased cognition;Impaired judgement;Decreased safety awareness   Assessment Pt agreeable to PT this pm. Pt is noted with SUP trans and min A progressing to SUP ambulation without and AD. Pt with 3 LOB during PT session requiring steadying assist to correct - although pt stating \"I'm fine, I'm fine.\" At end of 1st amb trial pt declines further activity/ambulation with PT. Pt is noted with slowly improving gait endurance and balance. Pt will cont to benefit from skilled PT services per PT POC. When medically stable for dc, pt remains appropriate for Level I Maximum Resource Intensity.    The patient's AM-PAC Basic Mobility Inpatient Short Form Raw Score is 20. A Raw score of greater than 16 suggests the patient may benefit from discharge to home. Please also refer to the recommendation of the Physical Therapist for safe discharge planning.   Goals   STG Expiration Date 10/31/24   Short Term Goal #1 Patient PT goals established in order to address pt self reported goal of \"to go home\". Pt will: complete all bed mobility independently in flat bed in order to promote increased OOB functional mobility and simulate home environment; complete all transfers consistently with S in order to increase safety with functional mobility; ambulate >150ft with LRAD and S in order to increase safety with household and short community distance functional mobility; negotiate 3-5 stairs with HR assist and S in order to facilitate safe access to his home; demonstrate understanding and independence with LE strengthening HEP; improve ambulatory balance to >/= fair grade in order to promote safety and increased independence with mobility; improve AM-PAC score to >/= 19/24 in order to increase independence with mobility and decrease burden of care; improve Barthel Index score to >/= 60/100 in order to increase independence and decrease risk of falls.   Plan "   Treatment/Interventions ADL retraining;Functional transfer training;LE strengthening/ROM;Elevations;Therapeutic exercise;Endurance training;Patient/family training;Equipment eval/education;Bed mobility;Gait training;Compensatory technique education;Cognitive reorientation;Family   PT Frequency 3-5x/wk   Discharge Recommendation   Rehab Resource Intensity Level, PT I (Maximum Resource Intensity)   AM-PAC Basic Mobility Inpatient   Turning in Flat Bed Without Bedrails 4   Lying on Back to Sitting on Edge of Flat Bed Without Bedrails 4   Moving Bed to Chair 3   Standing Up From Chair Using Arms 3   Walk in Room 3   Climb 3-5 Stairs With Railing 3   Basic Mobility Inpatient Raw Score 20   Basic Mobility Standardized Score 43.99   [facility] Highest Level Of Mobility   JH-HLM Goal 6: Walk 10 steps or more   JH-HLM Achieved 7: Walk 25 feet or more   Education   Education Provided Mobility training   Patient Explanation/teachback used;Reinforcement needed   End of Consult   Patient Position at End of Consult Bed/Chair alarm activated;Bedside chair;All needs within reach   Licensure   [place] License Number  Aleja Ambriz, PT

## 2024-10-25 NOTE — CASE MANAGEMENT
Case Management Progress Note    Patient name Viktor Farfan  Location S /S -01 MRN [id number]  : 1970 Date 10/25/2024       LOS (days): 9  Geometric Mean LOS (GMLOS) (days): 4.9  Days to GMLOS:-4.1        OBJECTIVE:        Current admission status: Inpatient  Preferred Pharmacy:   [facility] - [place], [place] - [address] [place] [id number]  Phone: [phone] Fax: [phone]    [id number] - [place] - [address]  [address]  [facility] [address]  Phone: [phone] Fax: [phone]    Primary Care Provider: No primary care provider on file.    Primary Insurance: [employer]  Secondary Insurance:     PROGRESS NOTE:  Patient continues to progress from mobility standpoint.    Lena GARCIA met with patient at bedside to discuss treatment-- patient currently focused on his hospital stay, but not un-agreeable to treatment.     Suggestion made for care-team meeting-- SLIM and CRS both agreeable.     CM spoke with daughterLinda over phone who is agreeable to careteam meeting, stating 3:00PM would work (over phone). Linda requesting CM contact sister, Maxine and brother, James to notify of same. Call made to both siblings- both also confirm they will be available Monday.     CM to follow up on Monday, to continue with dcp.

## 2024-10-25 NOTE — ASSESSMENT & PLAN NOTE
-Likely underlying cirrhosis with splenic sequestration and decreased thrombopoietin  -Platelets currently mildly low at 144,000   GENERAL APPEARANCE: Alert and cooperative. NAD.   HEENT:  NC/AT, clear conjunctiva  NECK: Neck supple, non-tender without lymphadenopathy, masses  CARDIAC: Normal S1 and S2. No S3, S4 or murmurs. Rhythm is regular.  LUNGS: Clear to auscultation and percussion without rales, rhonchi, wheezing or diminished breath sounds.  ABDOMEN: Soft, nondistended, nontender. No guarding or rebound.   MUSKULOSKELETAL: No joint erythema or tenderness.   EXTREMITIES: 1+LLE edema   NEUROLOGICAL: 4/5 Strength on LLE, 3/5 on RLE. sensation intact  SKIN: Skin clean, dry, intact  PSYCHIATRIC: AOx3. Normal affect and behavior.

## 2024-10-25 NOTE — ASSESSMENT & PLAN NOTE
Lab Results   Component Value Date     (L) 10/25/2024     (L) 10/24/2024     (L) 10/23/2024     Stable   No active bleeding  Likely secondary to bone marrow suppression from alcohol use

## 2024-10-25 NOTE — ASSESSMENT & PLAN NOTE
Lab Results   Component Value Date    HGB 10.5 (L) 10/25/2024    HGB 9.9 (L) 10/24/2024    HGB 10.4 (L) 10/23/2024   Baseline is 10-11  Microcytic  No active bleeding  Iron panel - iron 123, ferritin 62, TIBC 384  Vitamin B12 330, folate 5.9  Suspect secondary to bone marrow suppression from alcohol use

## 2024-10-26 PROBLEM — R19.7 DIARRHEA: Status: RESOLVED | Noted: 2024-10-23 | Resolved: 2024-10-26

## 2024-10-26 PROBLEM — E87.1 HYPONATREMIA: Status: RESOLVED | Noted: 2024-10-16 | Resolved: 2024-10-26

## 2024-10-26 LAB
ALBUMIN SERPL BCG-MCNC: 3.3 G/DL (ref 3.5–5)
ALP SERPL-CCNC: 315 U/L (ref 34–104)
ALT SERPL W P-5'-P-CCNC: 48 U/L (ref 7–52)
ANION GAP SERPL CALCULATED.3IONS-SCNC: 6 MMOL/L (ref 4–13)
AST SERPL W P-5'-P-CCNC: 74 U/L (ref 13–39)
BILIRUB SERPL-MCNC: 0.89 MG/DL (ref 0.2–1)
BUN SERPL-MCNC: 10 MG/DL (ref 5–25)
CALCIUM ALBUM COR SERPL-MCNC: 9.3 MG/DL (ref 8.3–10.1)
CALCIUM SERPL-MCNC: 8.7 MG/DL (ref 8.4–10.2)
CHLORIDE SERPL-SCNC: 104 MMOL/L (ref 96–108)
CO2 SERPL-SCNC: 30 MMOL/L (ref 21–32)
CREAT SERPL-MCNC: 0.66 MG/DL (ref 0.6–1.3)
ERYTHROCYTE [DISTWIDTH] IN BLOOD BY AUTOMATED COUNT: 16.3 % (ref 11.6–15.1)
GFR SERPL CREATININE-BSD FRML MDRD: 109 ML/MIN/1.73SQ M
GLUCOSE SERPL-MCNC: 115 MG/DL (ref 65–140)
HAV IGM SER QL: NORMAL
HBV CORE IGM SER QL: NORMAL
HBV SURFACE AG SER QL: NORMAL
HCT VFR BLD AUTO: 31.2 % (ref 36.5–49.3)
HCV AB SER QL: NORMAL
HGB BLD-MCNC: 9.9 G/DL (ref 12–17)
INR PPP: 1.23 (ref 0.85–1.19)
MCH RBC QN AUTO: 28 PG (ref 26.8–34.3)
MCHC RBC AUTO-ENTMCNC: 31.7 G/DL (ref 31.4–37.4)
MCV RBC AUTO: 88 FL (ref 82–98)
MITOCHONDRIA M2 IGG SER-ACNC: <20 UNITS (ref 0–20)
PLATELET # BLD AUTO: 124 THOUSANDS/UL (ref 149–390)
PMV BLD AUTO: 9.8 FL (ref 8.9–12.7)
POTASSIUM SERPL-SCNC: 4 MMOL/L (ref 3.5–5.3)
PROT SERPL-MCNC: 6.9 G/DL (ref 6.4–8.4)
PROTHROMBIN TIME: 16.2 SECONDS (ref 12.3–15)
RBC # BLD AUTO: 3.53 MILLION/UL (ref 3.88–5.62)
SODIUM SERPL-SCNC: 140 MMOL/L (ref 135–147)
WBC # BLD AUTO: 6.25 THOUSAND/UL (ref 4.31–10.16)

## 2024-10-26 PROCEDURE — 99232 SBSQ HOSP IP/OBS MODERATE 35: CPT | Performed by: PHYSICIAN ASSISTANT

## 2024-10-26 PROCEDURE — 99232 SBSQ HOSP IP/OBS MODERATE 35: CPT | Performed by: INTERNAL MEDICINE

## 2024-10-26 PROCEDURE — 86015 ACTIN ANTIBODY EACH: CPT | Performed by: PHYSICIAN ASSISTANT

## 2024-10-26 PROCEDURE — 82390 ASSAY OF CERULOPLASMIN: CPT | Performed by: PHYSICIAN ASSISTANT

## 2024-10-26 PROCEDURE — 85027 COMPLETE CBC AUTOMATED: CPT

## 2024-10-26 PROCEDURE — 80074 ACUTE HEPATITIS PANEL: CPT | Performed by: PHYSICIAN ASSISTANT

## 2024-10-26 PROCEDURE — 82103 ALPHA-1-ANTITRYPSIN TOTAL: CPT | Performed by: PHYSICIAN ASSISTANT

## 2024-10-26 PROCEDURE — 85610 PROTHROMBIN TIME: CPT

## 2024-10-26 PROCEDURE — 80053 COMPREHEN METABOLIC PANEL: CPT

## 2024-10-26 RX ADMIN — MULTIPLE VITAMINS W/ MINERALS TAB 1 TABLET: TAB ORAL at 08:10

## 2024-10-26 RX ADMIN — PANTOPRAZOLE SODIUM 40 MG: 40 TABLET, DELAYED RELEASE ORAL at 05:32

## 2024-10-26 RX ADMIN — ESCITALOPRAM OXALATE 20 MG: 20 TABLET ORAL at 08:10

## 2024-10-26 RX ADMIN — CYANOCOBALAMIN TAB 500 MCG 1000 MCG: 500 TAB at 08:11

## 2024-10-26 RX ADMIN — NICOTINE 21 MG: 21 PATCH, EXTENDED RELEASE TRANSDERMAL at 08:12

## 2024-10-26 RX ADMIN — Medication 100 MG: at 08:10

## 2024-10-26 RX ADMIN — MIRTAZAPINE 15 MG: 15 TABLET, FILM COATED ORAL at 22:38

## 2024-10-26 RX ADMIN — ASPIRIN 81 MG CHEWABLE TABLET 81 MG: 81 TABLET CHEWABLE at 08:11

## 2024-10-26 RX ADMIN — FOLIC ACID 1 MG: 1 TABLET ORAL at 08:10

## 2024-10-26 RX ADMIN — FUROSEMIDE 40 MG: 10 INJECTION, SOLUTION INTRAMUSCULAR; INTRAVENOUS at 08:11

## 2024-10-26 RX ADMIN — ENOXAPARIN SODIUM 40 MG: 40 INJECTION SUBCUTANEOUS at 08:10

## 2024-10-26 RX ADMIN — ATORVASTATIN CALCIUM 20 MG: 20 TABLET, FILM COATED ORAL at 08:11

## 2024-10-26 NOTE — ASSESSMENT & PLAN NOTE
Met sepsis criteria  With acute hypoxic respiratory failure.  Currently on 10 L midlfow  CTA chest - multifocal bilateral upper lobe consolidation which could be due to pneumonia  Blood cultures - no growth after 4 days  Sputum culture - mixed respiratory alexandra  Completed 5-day course of ceftriaxone from 10/16 to 10/20  Weaned off of O2    Plan:  Incentive spirometry  Encourage out of bed

## 2024-10-26 NOTE — NURSING NOTE
"While in with a patient, notified by PCA that family requested to speak with RN, stated that I would be in when finished with current patient. While still in patient's room notified by another RN that patient's daughter was in the matta asking to speak with RN, she offered assistance, stated that she was waiting for his RN. Daughter then stated that she felt her father had a facial droop. RN completed neuro check and no deficits noted. This RN then also responded to patient's room and completed neuro check, no deficits noted. Patient stated \"my daughter needs to stop getting everyone worked up. I'm fine.\" SLIM aware.   "

## 2024-10-26 NOTE — ASSESSMENT & PLAN NOTE
Lab Results   Component Value Date    AST 74 (H) 10/26/2024    ALT 48 10/26/2024    ALKPHOS 315 (H) 10/26/2024    TBILI 0.89 10/26/2024     RUQ US (10/17/24) - hepatic steatosis with morphologic features concerning for cirrhosis.  Small volume RUQ ascites.  Liver Dopplers (10/25/2024): Prominent main portal vein suggests portal venous hypertension.   As per GI likely trending down mixed pattern in the setting of hepatic congestion, and alcohol use.  MELD score 8   Hepatitis panel, EZEQUIEL, AFP are within normal range     Plan  Continue to monitor  GI consulted -outpatient close follow-up and EGD colonoscopy recommended.  Follow-up ceruloplasmin, alpha-1 antitrypsin, and anti-smooth muscle antibody.  Monitor PT/INR

## 2024-10-26 NOTE — ASSESSMENT & PLAN NOTE
Lab Results   Component Value Date    SODIUM 140 10/26/2024    SODIUM 137 10/25/2024    SODIUM 135 10/24/2024     Presented to [facility] with sodium 99. Transferred to ICU [facility].  Etiology is hypovolemic hyponatremia - evelyne crawford  Nephrology following.  Did initially overcorrect and receive DDAVP and D5W.  Getting IV Lasix to help with volume status  Received a maximum dose of phenobarbital and 1 dose of Valium for agitation  Downgraded from ICU on 10/20 with sodium 127  Resolved

## 2024-10-26 NOTE — ASSESSMENT & PLAN NOTE
Multifactorial with alcohol +/- drug induced with antibiotics +/-  cholestasis of sepsis, shock,   Underlying liver disease and likely cirrhosis noted, liver nodularity noted on imaging, thrombocytopenia on labs, and hepatojugular reflux on exam.  MELD score 8.  LFTs do appear to be improving at present with stable INR/synthetic function    -Follow-up on pending serologic workup    -Monitor LFTs and PT/INR closely    -Advised patient regarding importance of alcohol cessation    -Low-sodium diet, may benefit from addition of spironolactone for diuretic regimen    -Outpatient follow-up with hepatology

## 2024-10-26 NOTE — PLAN OF CARE
Problem: PAIN - ADULT  Goal: Verbalizes/displays adequate comfort level or baseline comfort level  Description: Interventions:  - Encourage patient to monitor pain and request assistance  - Assess pain using appropriate pain scale  - Administer analgesics based on type and severity of pain and evaluate response  - Implement non-pharmacological measures as appropriate and evaluate response  - Consider cultural and social influences on pain and pain management  - Notify physician/advanced practitioner if interventions unsuccessful or patient reports new pain  Outcome: Progressing     Problem: INFECTION - ADULT  Goal: Absence or prevention of progression during hospitalization  Description: INTERVENTIONS:  - Assess and monitor for signs and symptoms of infection  - Monitor lab/diagnostic results  - Monitor all insertion sites, i.e. indwelling lines, tubes, and drains  - Monitor endotracheal if appropriate and nasal secretions for changes in amount and color  - [place] appropriate cooling/warming therapies per order  - Administer medications as ordered  - Instruct and encourage patient and family to use good hand hygiene technique  - Identify and instruct in appropriate isolation precautions for identified infection/condition  Outcome: Progressing     Problem: SAFETY ADULT  Goal: Patient will remain free of falls  Description: INTERVENTIONS:  - Educate patient/family on patient safety including physical limitations  - Instruct patient to call for assistance with activity   - Consult OT/PT to assist with strengthening/mobility   - Keep Call bell within reach  - Keep bed low and locked with side rails adjusted as appropriate  - Keep care items and personal belongings within reach  - Initiate and maintain comfort rounds  - Make Fall Risk Sign visible to staff  - Offer Toileting every 2 Hours, in advance of need  - Initiate/Maintain bed/chair alarm  - Obtain necessary fall risk management equipment  - Apply yellow socks  and bracelet for high fall risk patients  - Consider moving patient to room near nurses station  Outcome: Progressing     Problem: DISCHARGE PLANNING  Goal: Discharge to home or other facility with appropriate resources  Description: INTERVENTIONS:  - Identify barriers to discharge w/patient and caregiver  - Arrange for needed discharge resources and transportation as appropriate  - Identify discharge learning needs (meds, wound care, etc.)  - Arrange for interpretive services to assist at discharge as needed  - Refer to Case Management Department for coordinating discharge planning if the patient needs post-hospital services based on physician/advanced practitioner order or complex needs related to functional status, cognitive ability, or social support system  Outcome: Progressing     Problem: Knowledge Deficit  Goal: Patient/family/caregiver demonstrates understanding of disease process, treatment plan, medications, and discharge instructions  Description: Complete learning assessment and assess knowledge base.  Interventions:  - Provide teaching at level of understanding  - Provide teaching via preferred learning methods  Outcome: Progressing     Problem: CARDIOVASCULAR - ADULT  Goal: Maintains optimal cardiac output and hemodynamic stability  Description: INTERVENTIONS:  - Monitor I/O, vital signs and rhythm  - Monitor for S/S and trends of decreased cardiac output  - Administer and titrate ordered vasoactive medications to optimize hemodynamic stability  - Assess quality of pulses, skin color and temperature  - Assess for signs of decreased coronary artery perfusion  - Instruct patient to report change in severity of symptoms  Outcome: Progressing     Problem: RESPIRATORY - ADULT  Goal: Achieves optimal ventilation and oxygenation  Description: INTERVENTIONS:  - Assess for changes in respiratory status  - Assess for changes in mentation and behavior  - Position to facilitate oxygenation and minimize  respiratory effort  - Oxygen administered by appropriate delivery if ordered  - Initiate smoking cessation education as indicated  - Encourage broncho-pulmonary hygiene including cough, deep breathe, Incentive Spirometry  - Assess the need for suctioning and aspirate as needed  - Assess and instruct to report SOB or any respiratory difficulty  - Respiratory Therapy support as indicated  Outcome: Progressing

## 2024-10-26 NOTE — PROGRESS NOTES
Progress Note - Gastroenterology   Name: Viktor Farfan 54 y.o. male I MRN: [id number]  Unit/Bed#: S -01 I Date of Admission: 10/16/2024   Date of Service: 10/26/2024 I Hospital Day: 10    Assessment & Plan  Transaminitis  Multifactorial with alcohol +/- drug induced with antibiotics +/-  cholestasis of sepsis, shock,   Underlying liver disease and likely cirrhosis noted, liver nodularity noted on imaging, thrombocytopenia on labs, and hepatojugular reflux on exam.  MELD score 8.  LFTs do appear to be improving at present with stable INR/synthetic function    -Follow-up on pending serologic workup    -Monitor LFTs and PT/INR closely    -Advised patient regarding importance of alcohol cessation    -Low-sodium diet, may benefit from addition of spironolactone for diuretic regimen    -Outpatient follow-up with hepatology    Screen for colon cancer  -No previous colonoscopy  -Will plan to arrange this as an outpatient        Subjective   Patient reports feeling relatively well, wondering when he can go home, he reports to be tolerating diet well, denies any nausea vomiting rectal bleeding or melena.    Objective :  Temp:  [97.3 °F (36.3 °C)-98.8 °F (37.1 °C)] 97.3 °F (36.3 °C)  HR:  [82-97] 97  BP: (116-129)/(67-77) 126/77  Resp:  [12-20] 16  SpO2:  [95 %-97 %] 95 %  O2 Device: None (Room air)  Nasal Cannula O2 Flow Rate (L/min):  [1 L/min-4 L/min] 1 L/min    Physical Exam  Constitutional:       General: He is not in acute distress.     Appearance: He is well-developed. He is not diaphoretic.   HENT:      Head: Normocephalic and atraumatic.   Eyes:      Conjunctiva/sclera: Conjunctivae normal.      Pupils: Pupils are equal, round, and reactive to light.   Cardiovascular:      Rate and Rhythm: Normal rate and regular rhythm.      Heart sounds: Normal heart sounds. No murmur heard.     No friction rub. No gallop.   Pulmonary:      Effort: Pulmonary effort is normal. No respiratory distress.      Breath sounds: Normal  breath sounds. No stridor. No wheezing or rales.   Abdominal:      General: Bowel sounds are normal. There is no distension.      Palpations: Abdomen is soft. There is no mass.      Tenderness: There is no abdominal tenderness. There is no guarding or rebound.   Musculoskeletal:         General: No tenderness. Normal range of motion.      Cervical back: Normal range of motion and neck supple.   Skin:     General: Skin is warm and dry.      Coloration: Skin is not pale.      Findings: No erythema or rash.   Neurological:      Mental Status: He is alert and oriented to person, place, and time.   Psychiatric:         Behavior: Behavior normal.           Lab Results: I have reviewed the following results:CBC/BMP:   .     10/26/24  0630   WBC 6.25   HGB 9.9*   HCT 31.2*   *   SODIUM 140   K 4.0      CO2 30   BUN 10   CREATININE 0.66   GLUC 115    , Creatinine Clearance: Estimated Creatinine Clearance: 167.4 mL/min (by C-G formula based on SCr of 0.66 mg/dL)., LFTs:   .     10/26/24  0630   AST 74*   ALT 48   ALB 3.3*   TBILI 0.89   ALKPHOS 315*    , PTT/INR:  .     10/26/24  0630   INR 1.23*    MELD 3.0: 8 at 10/26/2024  6:30 AM  MELD-Na: 8 at 10/26/2024  6:30 AM  Calculated from:  Serum Creatinine: 0.66 mg/dL (Using min of 1 mg/dL) at 10/26/2024  6:30 AM  Serum Sodium: 140 mmol/L (Using max of 137 mmol/L) at 10/26/2024  6:30 AM  Total Bilirubin: 0.89 mg/dL (Using min of 1 mg/dL) at 10/26/2024  6:30 AM  Serum Albumin: 3.3 g/dL at 10/26/2024  6:30 AM  INR(ratio): 1.23 at 10/26/2024  6:30 AM  Age at listing (hypothetical): 54 years  Sex: Male at 10/26/2024  6:30 AM

## 2024-10-26 NOTE — PROGRESS NOTES
Progress Note - Hospitalist   Name: Viktor Farfan 54 y.o. male I MRN: [id number]  Unit/Bed#: S -01 I Date of Admission: 10/16/2024   Date of Service: 10/26/2024 I Hospital Day: 10    Assessment & Plan  Acute hypoxic respiratory failure (HCC)  Secondary to iatrogenic volume overload in the setting from trying to correct hyponatremia  CXR (10/22) - no significant change diffuse pulmonary edema  Echo (10/17) - EF 60%.  Unable to assess diastolic function.  Mild tricuspid regurgitation.  Estimated RVSP mildly elevated, 38.00 mmHg  Weaned off of oxygen saturating well on room air    Plan:  Continue diuresis with IV Lasix 40 mg BID today with plans to transition to Lasix 20 mg p.o. daily and Aldactone 50 mg daily.  Patient continues to diurese well  Incentive spirometry  Encourage out of bed  Hyponatremia (Resolved: 10/26/2024)  Lab Results   Component Value Date    SODIUM 140 10/26/2024    SODIUM 137 10/25/2024    SODIUM 135 10/24/2024     Presented to [facility] with sodium 99. Transferred to ICU [place].  Etiology is hypovolemic hyponatremia - [facility]  Nephrology following.  Did initially overcorrect and receive DDAVP and D5W.  Getting IV Lasix to help with volume status  Received a maximum dose of phenobarbital and 1 dose of Valium for agitation  Downgraded from ICU on 10/20 with sodium 127  Resolved    Alcohol abuse  Drinks 12 pack beer plus several glasses of rum and coke daily  Catch program upon discharge  Tobacco abuse  Smokes 1 pack/day      Nicotine replacement therapy ordered  encourage smoking cessation  Thrombocytopenia (HCC)  Lab Results   Component Value Date     (L) 10/26/2024     (L) 10/25/2024     (L) 10/24/2024     Stable   No active bleeding  Likely secondary to bone marrow suppression from alcohol use  Transaminitis  Lab Results   Component Value Date    AST 74 (H) 10/26/2024    ALT 48 10/26/2024    ALKPHOS 315 (H) 10/26/2024    TBILI 0.89 10/26/2024     RUQ  US (10/17/24) - hepatic steatosis with morphologic features concerning for cirrhosis.  Small volume RUQ ascites.  Liver Dopplers (10/25/2024): Prominent main portal vein suggests portal venous hypertension.   As per GI likely trending down mixed pattern in the setting of hepatic congestion, and alcohol use.  MELD score 8   Hepatitis panel, EZEQUIEL, AFP are within normal range     Plan  Continue to monitor  GI consulted -outpatient close follow-up and EGD colonoscopy recommended.  Follow-up ceruloplasmin, alpha-1 antitrypsin, and anti-smooth muscle antibody.  Monitor PT/INR  Pneumonia  Met sepsis criteria  With acute hypoxic respiratory failure.  Currently on 10 L midlfow  CTA chest - multifocal bilateral upper lobe consolidation which could be due to pneumonia  Blood cultures - no growth after 4 days  Sputum culture - mixed respiratory alexandra  Completed 5-day course of ceftriaxone from 10/16 to 10/20  Weaned off of O2    Plan:  Incentive spirometry  Encourage out of bed    HTN (hypertension)  Continue home amlodipine 10 mg  Anemia  Lab Results   Component Value Date    HGB 9.9 (L) 10/26/2024    HGB 10.5 (L) 10/25/2024    HGB 9.9 (L) 10/24/2024   Baseline is 10-11  Microcytic  No active bleeding  Iron panel - iron 123, ferritin 62, TIBC 384  Vitamin B12 330, folate 5.9  Suspect secondary to bone marrow suppression from alcohol use, continue to monitor  Vitamin B12 deficiency  Vitamin B12 level 330  Secondary to alcohol use  Continue oral vitamin B12 1000 mcg supplementation  Folate deficiency  Folate level 5.9  Continue oral folic acid supplementation  Urinary retention  Required intermittent straight cath  10/22 - Resolved  Urinary retention protocol  Sepsis due to pneumonia (HCC)  See plan under pneumonia  Diarrhea (Resolved: 10/26/2024)  New onset diarrhea  C. Difficile and stool enteric panel negative  Resolved  Ambulatory dysfunction  PT recommend level I  PMR consulted - may progress to discharge to home versus  inpatient rehab pending medical and functional progress    VTE Pharmacologic Prophylaxis: VTE Score: 2 Moderate Risk (Score 3-4) - Pharmacological DVT Prophylaxis Ordered: enoxaparin (Lovenox).    Mobility:   Basic Mobility Inpatient Raw Score: 24  JH-HLM Goal: 8: Walk 250 feet or more  JH-HLM Achieved: 4: Move to chair/commode  JH-HLM Goal achieved. Continue to encourage appropriate mobility.    Patient Centered Rounds: I performed bedside rounds with nursing staff today.   Discussions with Specialists or Other Care Team Provider: Gastroenterology    Education and Discussions with Family / Patient: Updated  (sister and brother-in-law ) at bedside.  And daughter by phone    Current Length of Stay: 10 day(s)  Current Patient Status: Inpatient   Certification Statement: The patient will continue to require additional inpatient hospital stay due to IV diuresis for volume overload  Discharge Plan: Anticipate discharge in 48 hrs to rehab facility.    Code Status: Level 1 - Full Code    Subjective   Patient reports feeling better, keeps asking if he is good enough to go home.  Had to reinforce he is still needing treatment that requires hospital stay and would benefit from acute rehab.  Patient confirms understanding, it is still repeatedly wanting to know about discharge timeline.    Objective :  Temp:  [97.3 °F (36.3 °C)-98.8 °F (37.1 °C)] 98.6 °F (37 °C)  HR:  [76-97] 76  BP: (102-129)/(64-77) 102/64  Resp:  [16-20] 16  SpO2:  [94 %-97 %] 94 %  O2 Device: None (Room air)  Nasal Cannula O2 Flow Rate (L/min):  [1 L/min-4 L/min] 1 L/min    Body mass index is 30.69 kg/m².     Input and Output Summary (last 24 hours):     Intake/Output Summary (Last 24 hours) at 10/26/2024 1635  Last data filed at 10/26/2024 1036  Gross per 24 hour   Intake 100 ml   Output 1620 ml   Net -1520 ml       Physical Exam      Lines/Drains:              Lab Results: I have reviewed the following results:   Results from last 7 days    Lab Units 10/26/24  0630 10/23/24  0519 10/22/24  0423   WBC Thousand/uL 6.25   < > 8.52   HEMOGLOBIN g/dL 9.9*   < > 9.9*   HEMATOCRIT % 31.2*   < > 30.8*   PLATELETS Thousands/uL 124*   < > 155   SEGS PCT %  --   --  69   LYMPHO PCT %  --   --  14   MONO PCT %  --   --  12   EOS PCT %  --   --  3    < > = values in this interval not displayed.     Results from last 7 days   Lab Units 10/26/24  0630   SODIUM mmol/L 140   POTASSIUM mmol/L 4.0   CHLORIDE mmol/L 104   CO2 mmol/L 30   BUN mg/dL 10   CREATININE mg/dL 0.66   ANION GAP mmol/L 6   CALCIUM mg/dL 8.7   ALBUMIN g/dL 3.3*   TOTAL BILIRUBIN mg/dL 0.89   ALK PHOS U/L 315*   ALT U/L 48   AST U/L 74*   GLUCOSE RANDOM mg/dL 115     Results from last 7 days   Lab Units 10/26/24  0630   INR  1.23*                   Recent Cultures (last 7 days):   Results from last 7 days   Lab Units 10/22/24  1757   C DIFF TOXIN B BY PCR  Negative       Imaging Results Review: I reviewed radiology reports from this admission including: Ultrasound(s).  Other Study Results Review: EKG was reviewed.     Last 24 Hours Medication List:     Current Facility-Administered Medications:     acetaminophen (TYLENOL) tablet 650 mg, Q6H PRN    amLODIPine (NORVASC) tablet 10 mg, Daily    aspirin chewable tablet 81 mg, Daily    atorvastatin (LIPITOR) tablet 20 mg, Daily    chlorhexidine (PERIDEX) 0.12 % oral rinse 15 mL, Q12H JUAN    cyanocobalamin (VITAMIN B-12) tablet 1,000 mcg, Daily    enoxaparin (LOVENOX) subcutaneous injection 40 mg, Daily    escitalopram (LEXAPRO) tablet 20 mg, Daily    folic acid (FOLVITE) tablet 1 mg, Daily    furosemide (LASIX) injection 40 mg, BID (diuretic)    ipratropium-albuterol (DUO-NEB) 0.5-2.5 mg/3 mL inhalation solution 3 mL, Q6H PRN    mirtazapine (REMERON) tablet 15 mg, HS    multivitamin-minerals (CENTRUM) tablet 1 tablet, Daily    nicotine (NICODERM CQ) 21 mg/24 hr TD 24 hr patch 21 mg, Daily    ondansetron (ZOFRAN) injection 4 mg, Q8H PRN    pantoprazole  (PROTONIX) EC tablet 40 mg, Early Morning    [COMPLETED] thiamine (VITAMIN B1) 500 mg in sodium chloride 0.9 % 50 mL IVPB, TID, Last Rate: Stopped (10/19/24 1000) **FOLLOWED BY** [COMPLETED] thiamine (VITAMIN B1) 250 mg in sodium chloride 0.9 % 50 mL IVPB, TID, Last Rate: 250 mg (10/22/24 1224) **FOLLOWED BY** [COMPLETED] thiamine (VITAMIN B1) 100 mg in sodium chloride 0.9 % 50 mL IVPB, TID, Last Rate: 100 mg (10/25/24 0912) **FOLLOWED BY** thiamine tablet 100 mg, Daily    trimethobenzamide (TIGAN) IM injection 200 mg, Q6H PRN    Administrative Statements   Today, Patient Was Seen By: Demetrius Vu MD      **Please Note: This note may have been constructed using a voice recognition system.**

## 2024-10-26 NOTE — ASSESSMENT & PLAN NOTE
Lab Results   Component Value Date     (L) 10/26/2024     (L) 10/25/2024     (L) 10/24/2024     Stable   No active bleeding  Likely secondary to bone marrow suppression from alcohol use

## 2024-10-26 NOTE — PLAN OF CARE
Problem: INFECTION - ADULT  Goal: Absence or prevention of progression during hospitalization  Description: INTERVENTIONS:  - Assess and monitor for signs and symptoms of infection  - Monitor lab/diagnostic results  - Monitor all insertion sites, i.e. indwelling lines, tubes, and drains  - Monitor endotracheal if appropriate and nasal secretions for changes in amount and color  - [place] appropriate cooling/warming therapies per order  - Administer medications as ordered  - Instruct and encourage patient and family to use good hand hygiene technique  - Identify and instruct in appropriate isolation precautions for identified infection/condition  Outcome: Progressing  Goal: Absence of fever/infection during neutropenic period  Description: INTERVENTIONS:  - Monitor WBC    Outcome: Progressing     Problem: SAFETY ADULT  Goal: Patient will remain free of falls  Description: INTERVENTIONS:  - Educate patient/family on patient safety including physical limitations  - Instruct patient to call for assistance with activity   - Consult OT/PT to assist with strengthening/mobility   - Keep Call bell within reach  - Keep bed low and locked with side rails adjusted as appropriate  - Keep care items and personal belongings within reach  - Initiate and maintain comfort rounds  - Make Fall Risk Sign visible to staff  - Apply yellow socks and bracelet for high fall risk patients  - Consider moving patient to room near nurses station  Outcome: Progressing  Goal: Maintain or return to baseline ADL function  Description: INTERVENTIONS:  -  Assess patient's ability to carry out ADLs; assess patient's baseline for ADL function and identify physical deficits which impact ability to perform ADLs (bathing, care of mouth/teeth, toileting, grooming, dressing, etc.)  - Assess/evaluate cause of self-care deficits   - Assess range of motion  - Assess patient's mobility; develop plan if impaired  - Assess patient's need for assistive devices  and provide as appropriate  - Encourage maximum independence but intervene and supervise when necessary  - Involve family in performance of ADLs  - Assess for home care needs following discharge   - Consider OT consult to assist with ADL evaluation and planning for discharge  - Provide patient education as appropriate  Outcome: Progressing  Goal: Maintains/Returns to pre admission functional level  Description: INTERVENTIONS:  - Perform AM-PAC 6 Click Basic Mobility/ Daily Activity assessment daily.  - Set and communicate daily mobility goal to care team and patient/family/caregiver.   - Collaborate with rehabilitation services on mobility goals if consulted  - Record patient progress and toleration of activity level   Outcome: Progressing     Problem: DISCHARGE PLANNING  Goal: Discharge to home or other facility with appropriate resources  Description: INTERVENTIONS:  - Identify barriers to discharge w/patient and caregiver  - Arrange for needed discharge resources and transportation as appropriate  - Identify discharge learning needs (meds, wound care, etc.)  - Arrange for interpretive services to assist at discharge as needed  - Refer to Case Management Department for coordinating discharge planning if the patient needs post-hospital services based on physician/advanced practitioner order or complex needs related to functional status, cognitive ability, or social support system  Outcome: Progressing     Problem: NEUROSENSORY - ADULT  Goal: Achieves stable or improved neurological status  Description: INTERVENTIONS  - Monitor and report changes in neurological status  - Monitor vital signs such as temperature, blood pressure, glucose, and any other labs ordered   - Initiate measures to prevent increased intracranial pressure  - Monitor for seizure activity and implement precautions if appropriate      Outcome: Progressing  Goal: Remains free of injury related to seizures activity  Description: INTERVENTIONS  -  Maintain airway, patient safety  and administer oxygen as ordered  - Monitor patient for seizure activity, document and report duration and description of seizure to physician/advanced practitioner  - If seizure occurs,  ensure patient safety during seizure  - Reorient patient post seizure  - Seizure pads on all 4 side rails  - Instruct patient/family to notify RN of any seizure activity including if an aura is experienced  - Instruct patient/family to call for assistance with activity based on nursing assessment  - Administer anti-seizure medications if ordered    Outcome: Progressing  Goal: Achieves maximal functionality and self care  Description: INTERVENTIONS  - Monitor swallowing and airway patency with patient fatigue and changes in neurological status  - Encourage and assist patient to increase activity and self care.   - Encourage visually impaired, hearing impaired and aphasic patients to use assistive/communication devices  Outcome: Progressing     Problem: CARDIOVASCULAR - ADULT  Goal: Maintains optimal cardiac output and hemodynamic stability  Description: INTERVENTIONS:  - Monitor I/O, vital signs and rhythm  - Monitor for S/S and trends of decreased cardiac output  - Administer and titrate ordered vasoactive medications to optimize hemodynamic stability  - Assess quality of pulses, skin color and temperature  - Assess for signs of decreased coronary artery perfusion  - Instruct patient to report change in severity of symptoms  Outcome: Progressing  Goal: Absence of cardiac dysrhythmias or at baseline rhythm  Description: INTERVENTIONS:  - Continuous cardiac monitoring, vital signs, obtain 12 lead EKG if ordered  - Administer antiarrhythmic and heart rate control medications as ordered  - Monitor electrolytes and administer replacement therapy as ordered  Outcome: Progressing     Problem: GASTROINTESTINAL - ADULT  Goal: Minimal or absence of nausea and/or vomiting  Description: INTERVENTIONS:  -  Administer IV fluids if ordered to ensure adequate hydration  - Maintain NPO status until nausea and vomiting are resolved  - Nasogastric tube if ordered  - Administer ordered antiemetic medications as needed  - Provide nonpharmacologic comfort measures as appropriate  - Advance diet as tolerated, if ordered  - Consider nutrition services referral to assist patient with adequate nutrition and appropriate food choices  Outcome: Progressing  Goal: Maintains or returns to baseline bowel function  Description: INTERVENTIONS:  - Assess bowel function  - Encourage oral fluids to ensure adequate hydration  - Administer IV fluids if ordered to ensure adequate hydration  - Administer ordered medications as needed  - Encourage mobilization and activity  - Consider nutritional services referral to assist patient with adequate nutrition and appropriate food choices  Outcome: Progressing  Goal: Maintains adequate nutritional intake  Description: INTERVENTIONS:  - Monitor percentage of each meal consumed  - Identify factors contributing to decreased intake, treat as appropriate  - Assist with meals as needed  - Monitor I&O, weight, and lab values if indicated  - Obtain nutrition services referral as needed  Outcome: Progressing  Goal: Establish and maintain optimal ostomy function  Description: INTERVENTIONS:  - Assess bowel function  - Encourage oral fluids to ensure adequate hydration  - Administer IV fluids if ordered to ensure adequate hydration   - Administer ordered medications as needed  - Encourage mobilization and activity  - Nutrition services referral to assist patient with appropriate food choices  - Assess stoma site  - Consider wound care consult   Outcome: Progressing  Goal: Oral mucous membranes remain intact  Description: INTERVENTIONS  - Assess oral mucosa and hygiene practices  - Implement preventative oral hygiene regimen  - Implement oral medicated treatments as ordered  - Initiate Nutrition services referral  as needed  Outcome: Progressing     Problem: GENITOURINARY - ADULT  Goal: Maintains or returns to baseline urinary function  Description: INTERVENTIONS:  - Assess urinary function  - Encourage oral fluids to ensure adequate hydration if ordered  - Administer IV fluids as ordered to ensure adequate hydration  - Administer ordered medications as needed  - Offer frequent toileting  - Follow urinary retention protocol if ordered  Outcome: Progressing  Goal: Absence of urinary retention  Description: INTERVENTIONS:  - Assess patient’s ability to void and empty bladder  - Monitor I/O  - Bladder scan as needed  - Discuss with physician/AP medications to alleviate retention as needed  - Discuss catheterization for long term situations as appropriate  Outcome: Progressing  Goal: Urinary catheter remains patent  Description: INTERVENTIONS:  - Assess patency of urinary catheter  - If patient has a chronic shah, consider changing catheter if non-functioning  - Follow guidelines for intermittent irrigation of non-functioning urinary catheter  Outcome: Progressing     Problem: METABOLIC, FLUID AND ELECTROLYTES - ADULT  Goal: Electrolytes maintained within normal limits  Description: INTERVENTIONS:  - Monitor labs and assess patient for signs and symptoms of electrolyte imbalances  - Administer electrolyte replacement as ordered  - Monitor response to electrolyte replacements, including repeat lab results as appropriate  - Instruct patient on fluid and nutrition as appropriate  Outcome: Progressing  Goal: Fluid balance maintained  Description: INTERVENTIONS:  - Monitor labs   - Monitor I/O and WT  - Instruct patient on fluid and nutrition as appropriate  - Assess for signs & symptoms of volume excess or deficit  Outcome: Progressing  Goal: Glucose maintained within target range  Description: INTERVENTIONS:  - Monitor Blood Glucose as ordered  - Assess for signs and symptoms of hyperglycemia and hypoglycemia  - Administer ordered  medications to maintain glucose within target range  - Assess nutritional intake and initiate nutrition service referral as needed  Outcome: Progressing     Problem: SKIN/TISSUE INTEGRITY - ADULT  Goal: Skin Integrity remains intact(Skin Breakdown Prevention)  Outcome: Progressing  Goal: Incision(s), wounds(s) or drain site(s) healing without S/S of infection  Description: INTERVENTIONS  - Assess and document dressing, incision, wound bed, drain sites and surrounding tissue  - Provide patient and family education  Goal: Pressure injury heals and does not worsen  Description: Interventions:  - Implement low air loss mattress or specialty surface (Criteria met)  - Apply silicone foam dressing  Outcome: Progressing     Problem: MUSCULOSKELETAL - ADULT  Goal: Maintain or return mobility to safest level of function  Description: INTERVENTIONS:  - Assess patient's ability to carry out ADLs; assess patient's baseline for ADL function and identify physical deficits which impact ability to perform ADLs (bathing, care of mouth/teeth, toileting, grooming, dressing, etc.)  - Assess/evaluate cause of self-care deficits   - Assess range of motion  - Assess patient's mobility  - Assess patient's need for assistive devices and provide as appropriate  - Encourage maximum independence but intervene and supervise when necessary  - Involve family in performance of ADLs  - Assess for home care needs following discharge   - Consider OT consult to assist with ADL evaluation and planning for discharge  - Provide patient education as appropriate  Outcome: Progressing  Goal: Maintain proper alignment of affected body part  Description: INTERVENTIONS:  - Support, maintain and protect limb and body alignment  - Provide patient/ family with appropriate education  Outcome: Progressing

## 2024-10-26 NOTE — ASSESSMENT & PLAN NOTE
Lab Results   Component Value Date    HGB 9.9 (L) 10/26/2024    HGB 10.5 (L) 10/25/2024    HGB 9.9 (L) 10/24/2024   Baseline is 10-11  Microcytic  No active bleeding  Iron panel - iron 123, ferritin 62, TIBC 384  Vitamin B12 330, folate 5.9  Suspect secondary to bone marrow suppression from alcohol use, continue to monitor

## 2024-10-26 NOTE — ASSESSMENT & PLAN NOTE
Secondary to iatrogenic volume overload in the setting from trying to correct hyponatremia  CXR (10/22) - no significant change diffuse pulmonary edema  Echo (10/17) - EF 60%.  Unable to assess diastolic function.  Mild tricuspid regurgitation.  Estimated RVSP mildly elevated, 38.00 mmHg  Weaned off of oxygen saturating well on room air    Plan:  Continue diuresis with IV Lasix 40 mg BID today with plans to transition to Lasix 20 mg p.o. daily and Aldactone 50 mg daily.  Patient continues to diurese well  Incentive spirometry  Encourage out of bed

## 2024-10-27 PROBLEM — E87.70 VOLUME OVERLOAD: Status: ACTIVE | Noted: 2024-10-27

## 2024-10-27 LAB
A1AT SERPL-MCNC: 271 MG/DL (ref 101–187)
ALBUMIN SERPL BCG-MCNC: 3.2 G/DL (ref 3.5–5)
ALP SERPL-CCNC: 323 U/L (ref 34–104)
ALT SERPL W P-5'-P-CCNC: 50 U/L (ref 7–52)
ANION GAP SERPL CALCULATED.3IONS-SCNC: 6 MMOL/L (ref 4–13)
AST SERPL W P-5'-P-CCNC: 74 U/L (ref 13–39)
BILIRUB SERPL-MCNC: 1.1 MG/DL (ref 0.2–1)
BUN SERPL-MCNC: 9 MG/DL (ref 5–25)
CALCIUM ALBUM COR SERPL-MCNC: 9.2 MG/DL (ref 8.3–10.1)
CALCIUM SERPL-MCNC: 8.6 MG/DL (ref 8.4–10.2)
CERULOPLASMIN SERPL-MCNC: 23.4 MG/DL (ref 16–31)
CHLORIDE SERPL-SCNC: 106 MMOL/L (ref 96–108)
CO2 SERPL-SCNC: 26 MMOL/L (ref 21–32)
CREAT SERPL-MCNC: 0.67 MG/DL (ref 0.6–1.3)
ERYTHROCYTE [DISTWIDTH] IN BLOOD BY AUTOMATED COUNT: 16 % (ref 11.6–15.1)
GFR SERPL CREATININE-BSD FRML MDRD: 108 ML/MIN/1.73SQ M
GLUCOSE SERPL-MCNC: 122 MG/DL (ref 65–140)
HCT VFR BLD AUTO: 31.2 % (ref 36.5–49.3)
HGB BLD-MCNC: 9.9 G/DL (ref 12–17)
INR PPP: 1.27 (ref 0.85–1.19)
MAGNESIUM SERPL-MCNC: 2 MG/DL (ref 1.9–2.7)
MCH RBC QN AUTO: 27.8 PG (ref 26.8–34.3)
MCHC RBC AUTO-ENTMCNC: 31.7 G/DL (ref 31.4–37.4)
MCV RBC AUTO: 88 FL (ref 82–98)
PLATELET # BLD AUTO: 122 THOUSANDS/UL (ref 149–390)
PMV BLD AUTO: 10.6 FL (ref 8.9–12.7)
POTASSIUM SERPL-SCNC: 3.9 MMOL/L (ref 3.5–5.3)
PROT SERPL-MCNC: 7 G/DL (ref 6.4–8.4)
PROTHROMBIN TIME: 16.6 SECONDS (ref 12.3–15)
RBC # BLD AUTO: 3.56 MILLION/UL (ref 3.88–5.62)
SODIUM SERPL-SCNC: 138 MMOL/L (ref 135–147)
WBC # BLD AUTO: 7.04 THOUSAND/UL (ref 4.31–10.16)

## 2024-10-27 PROCEDURE — 85610 PROTHROMBIN TIME: CPT

## 2024-10-27 PROCEDURE — 99232 SBSQ HOSP IP/OBS MODERATE 35: CPT | Performed by: INTERNAL MEDICINE

## 2024-10-27 PROCEDURE — 83735 ASSAY OF MAGNESIUM: CPT

## 2024-10-27 PROCEDURE — 80053 COMPREHEN METABOLIC PANEL: CPT

## 2024-10-27 PROCEDURE — 85027 COMPLETE CBC AUTOMATED: CPT

## 2024-10-27 RX ORDER — FUROSEMIDE 20 MG/1
20 TABLET ORAL DAILY
Status: DISCONTINUED | OUTPATIENT
Start: 2024-10-27 | End: 2024-10-28 | Stop reason: HOSPADM

## 2024-10-27 RX ORDER — SPIRONOLACTONE 25 MG/1
50 TABLET ORAL DAILY
Status: DISCONTINUED | OUTPATIENT
Start: 2024-10-27 | End: 2024-10-28 | Stop reason: HOSPADM

## 2024-10-27 RX ADMIN — FOLIC ACID 1 MG: 1 TABLET ORAL at 08:25

## 2024-10-27 RX ADMIN — ESCITALOPRAM OXALATE 20 MG: 20 TABLET ORAL at 08:25

## 2024-10-27 RX ADMIN — MULTIPLE VITAMINS W/ MINERALS TAB 1 TABLET: TAB ORAL at 08:25

## 2024-10-27 RX ADMIN — NICOTINE 21 MG: 21 PATCH, EXTENDED RELEASE TRANSDERMAL at 08:27

## 2024-10-27 RX ADMIN — FUROSEMIDE 20 MG: 20 TABLET ORAL at 08:25

## 2024-10-27 RX ADMIN — SPIRONOLACTONE 50 MG: 25 TABLET ORAL at 08:25

## 2024-10-27 RX ADMIN — ENOXAPARIN SODIUM 40 MG: 40 INJECTION SUBCUTANEOUS at 08:26

## 2024-10-27 RX ADMIN — MIRTAZAPINE 15 MG: 15 TABLET, FILM COATED ORAL at 21:10

## 2024-10-27 RX ADMIN — PANTOPRAZOLE SODIUM 40 MG: 40 TABLET, DELAYED RELEASE ORAL at 05:37

## 2024-10-27 RX ADMIN — ASPIRIN 81 MG CHEWABLE TABLET 81 MG: 81 TABLET CHEWABLE at 08:25

## 2024-10-27 RX ADMIN — CYANOCOBALAMIN TAB 500 MCG 1000 MCG: 500 TAB at 08:25

## 2024-10-27 RX ADMIN — ATORVASTATIN CALCIUM 20 MG: 20 TABLET, FILM COATED ORAL at 08:25

## 2024-10-27 RX ADMIN — Medication 100 MG: at 08:25

## 2024-10-27 NOTE — PROGRESS NOTES
Progress Note - Hospitalist   Name: Viktor Farfan 54 y.o. male I MRN: [id number]  Unit/Bed#: S -01 I Date of Admission: 10/16/2024   Date of Service: 10/27/2024 I Hospital Day: 11    Assessment & Plan  Acute hypoxic respiratory failure (HCC)  CXR (10/22) - no significant change diffuse pulmonary edema  Echo (10/17) - EF 60%.  Unable to assess diastolic function.  Mild tricuspid regurgitation.  Estimated RVSP mildly elevated, 38.00 mmHg  Weaned off of oxygen. Saturating well on room air    Plan:  See plan under volume overload  Medically cleared for discharge. Pending rehab placement  Volume overload  Unclear etiology. Suspect liver etiology  With acute hypoxic respiratory failure. Requiring up to 15 L. Now back to baseline RA    Plan:  Transition to PO Lasix 20 mg and Spironolactone 50 mg qd  Incentive spirometry  Encourage out of bed  Medically cleared for discharge. Pending rehab placement  Tobacco abuse  Smokes 1 pack/day  Nicotine replacement therapy ordered  encourage smoking cessation  Alcohol abuse  Drinks 12 pack beer plus several glasses of rum and coke daily  Catch program upon discharge  Thrombocytopenia (HCC)  Lab Results   Component Value Date     (L) 10/27/2024     (L) 10/26/2024     (L) 10/25/2024     Stable   No active bleeding  Likely secondary to bone marrow suppression from alcohol use  Transaminitis  Lab Results   Component Value Date    AST 74 (H) 10/27/2024    ALT 50 10/27/2024    ALKPHOS 323 (H) 10/27/2024    TBILI 1.10 (H) 10/27/2024     RUQ US (10/17/24) - hepatic steatosis with morphologic features concerning for cirrhosis.  Small volume RUQ ascites.  Liver Dopplers (10/25/2024): Prominent main portal vein suggests portal venous hypertension.   As per GI likely trending down mixed pattern in the setting of hepatic congestion, and alcohol use.  MELD score 8   Hepatitis panel, EZEQUIEL, AFP, ceruloplasmin levels normal  Alpha-1 antitrypsin level 271,  high    Plan  Continue to monitor  GI consulted - outpatient close follow-up and EGD colonoscopy recommended.  Follow-up anti-smooth muscle antibody.  Monitor PT/INR  Pneumonia  Met sepsis criteria  With acute hypoxic respiratory failure.  Currently on 10 L midlfow  CTA chest - multifocal bilateral upper lobe consolidation which could be due to pneumonia  Blood cultures - no growth after 4 days  Sputum culture - mixed respiratory alexandra  Completed 5-day course of ceftriaxone from 10/16 to 10/20  Weaned off of O2    Plan:  Incentive spirometry  Encourage out of bed    HTN (hypertension)  Continue home amlodipine 10 mg  Anemia  Lab Results   Component Value Date    HGB 9.9 (L) 10/27/2024    HGB 9.9 (L) 10/26/2024    HGB 10.5 (L) 10/25/2024   Baseline is 10-11  Microcytic  No active bleeding  Iron panel - iron 123, ferritin 62, TIBC 384  Vitamin B12 330, folate 5.9  Suspect secondary to bone marrow suppression from alcohol use, continue to monitor  Vitamin B12 deficiency  Vitamin B12 level 330  Secondary to alcohol use  Continue oral vitamin B12 1000 mcg supplementation  Folate deficiency  Folate level 5.9  Continue oral folic acid supplementation  Urinary retention  Required intermittent straight cath  10/22 - Resolved  Urinary retention protocol  Sepsis due to pneumonia (HCC)  See plan under pneumonia  Ambulatory dysfunction  PT recommend level I  PMR consulted - may progress to discharge to home versus inpatient rehab pending medical and functional progress    VTE Pharmacologic Prophylaxis: VTE Score: 2 Moderate Risk (Score 3-4) - Pharmacological DVT Prophylaxis Ordered: enoxaparin (Lovenox).    Mobility:   Basic Mobility Inpatient Raw Score: 24  JH-HLM Goal: 8: Walk 250 feet or more  JH-HLM Achieved: 2: Bed activities/Dependent transfer  JH-HLM Goal NOT achieved. Continue with multidisciplinary rounding and encourage appropriate mobility to improve upon JH-HLM goals.    Patient Centered Rounds: I performed bedside  "rounds with nursing staff today.   Discussions with Specialists or Other Care Team Provider: Gastroenterology, nephrology, PMR    Education and Discussions with Family / Patient: Updated  ([place]) at bedside.    Current Length of Stay: 11 day(s)  Current Patient Status: Inpatient   Certification Statement: The patient will continue to require additional inpatient hospital stay due to rehab placement  Discharge Plan: Anticipate discharge in 24-48 hrs to rehab facility.    Code Status: Level 1 - Full Code    Subjective   Overnight, there was possible concern for right-sided facial droop per family.  At that time, he was evaluated and had normal neurological examination.  Patient was seen and examined this morning.  He feels great this morning.  He says that the facial droop is because \"he just woke up.\"  He understands that rehab is good for him.  He is agreeable to going to rehab at this time.  He denies any chest pain, difficulty breathing, lightheadedness, abdominal pain, nausea, vomiting, diarrhea.  He is currently on room air.  Patient is medically cleared for discharge.    Objective :  Temp:  [98.6 °F (37 °C)-99 °F (37.2 °C)] 99 °F (37.2 °C)  HR:  [76-85] 85  BP: (102-119)/(62-67) 119/67  Resp:  [16] 16  SpO2:  [88 %-94 %] 88 %  O2 Device: None (Room air)    Body mass index is 30.79 kg/m².     Input and Output Summary (last 24 hours):   No intake or output data in the 24 hours ending 10/27/24 1231      Physical Exam  Constitutional:       General: He is not in acute distress.     Appearance: Normal appearance. He is obese. He is not toxic-appearing or diaphoretic.   HENT:      Head: Normocephalic and atraumatic.   Pulmonary:      Effort: No respiratory distress.      Breath sounds: No wheezing, rhonchi or rales.      Comments: On room air  Abdominal:      General: Bowel sounds are normal. There is no distension.      Palpations: Abdomen is soft.      Tenderness: There is no abdominal tenderness. " There is no guarding or rebound.   Musculoskeletal:         General: Swelling present. Normal range of motion.      Right lower leg: Edema (Trace) present.      Left lower leg: Edema (Trace) present.   Skin:     Capillary Refill: Capillary refill takes less than 2 seconds.      Findings: No erythema or rash.   Neurological:      General: No focal deficit present.      Mental Status: He is alert and oriented to person, place, and time.       Lines/Drains:      Lab Results: I have reviewed the following results:   Results from last 7 days   Lab Units 10/27/24  0550 10/23/24  0519 10/22/24  0423   WBC Thousand/uL 7.04   < > 8.52   HEMOGLOBIN g/dL 9.9*   < > 9.9*   HEMATOCRIT % 31.2*   < > 30.8*   PLATELETS Thousands/uL 122*   < > 155   SEGS PCT %  --   --  69   LYMPHO PCT %  --   --  14   MONO PCT %  --   --  12   EOS PCT %  --   --  3    < > = values in this interval not displayed.     Results from last 7 days   Lab Units 10/27/24  0550   SODIUM mmol/L 138   POTASSIUM mmol/L 3.9   CHLORIDE mmol/L 106   CO2 mmol/L 26   BUN mg/dL 9   CREATININE mg/dL 0.67   ANION GAP mmol/L 6   CALCIUM mg/dL 8.6   ALBUMIN g/dL 3.2*   TOTAL BILIRUBIN mg/dL 1.10*   ALK PHOS U/L 323*   ALT U/L 50   AST U/L 74*   GLUCOSE RANDOM mg/dL 122     Results from last 7 days   Lab Units 10/27/24  0550   INR  1.27*                   Recent Cultures (last 7 days):   Results from last 7 days   Lab Units 10/22/24  1757   C DIFF TOXIN B BY PCR  Negative       Imaging Results Review: I reviewed radiology reports from this admission including: CT chest, CT head, Ultrasound(s), and Echocardiogram.  Other Study Results Review: EKG was reviewed.     Last 24 Hours Medication List:     Current Facility-Administered Medications:     acetaminophen (TYLENOL) tablet 650 mg, Q6H PRN    amLODIPine (NORVASC) tablet 10 mg, Daily    aspirin chewable tablet 81 mg, Daily    atorvastatin (LIPITOR) tablet 20 mg, Daily    chlorhexidine (PERIDEX) 0.12 % oral rinse 15 mL, Q12H  JUAN    cyanocobalamin (VITAMIN B-12) tablet 1,000 mcg, Daily    enoxaparin (LOVENOX) subcutaneous injection 40 mg, Daily    escitalopram (LEXAPRO) tablet 20 mg, Daily    folic acid (FOLVITE) tablet 1 mg, Daily    furosemide (LASIX) tablet 20 mg, Daily    ipratropium-albuterol (DUO-NEB) 0.5-2.5 mg/3 mL inhalation solution 3 mL, Q6H PRN    mirtazapine (REMERON) tablet 15 mg, HS    multivitamin-minerals (CENTRUM) tablet 1 tablet, Daily    nicotine (NICODERM CQ) 21 mg/24 hr TD 24 hr patch 21 mg, Daily    ondansetron (ZOFRAN) injection 4 mg, Q8H PRN    pantoprazole (PROTONIX) EC tablet 40 mg, Early Morning    spironolactone (ALDACTONE) tablet 50 mg, Daily    [COMPLETED] thiamine (VITAMIN B1) 500 mg in sodium chloride 0.9 % 50 mL IVPB, TID, Last Rate: Stopped (10/19/24 1000) **FOLLOWED BY** [COMPLETED] thiamine (VITAMIN B1) 250 mg in sodium chloride 0.9 % 50 mL IVPB, TID, Last Rate: 250 mg (10/22/24 1224) **FOLLOWED BY** [COMPLETED] thiamine (VITAMIN B1) 100 mg in sodium chloride 0.9 % 50 mL IVPB, TID, Last Rate: 100 mg (10/25/24 0912) **FOLLOWED BY** thiamine tablet 100 mg, Daily    trimethobenzamide (TIGAN) IM injection 200 mg, Q6H PRN    Administrative Statements   Today, Patient Was Seen By: Duyen George DO      **Please Note: This note may have been constructed using a voice recognition system.**

## 2024-10-27 NOTE — PLAN OF CARE
Problem: PAIN - ADULT  Goal: Verbalizes/displays adequate comfort level or baseline comfort level  Description: Interventions:  - Encourage patient to monitor pain and request assistance  - Assess pain using appropriate pain scale  - Administer analgesics based on type and severity of pain and evaluate response  - Implement non-pharmacological measures as appropriate and evaluate response  - Consider cultural and social influences on pain and pain management  - Notify physician/advanced practitioner if interventions unsuccessful or patient reports new pain  Outcome: Progressing     Problem: INFECTION - ADULT  Goal: Absence or prevention of progression during hospitalization  Description: INTERVENTIONS:  - Assess and monitor for signs and symptoms of infection  - Monitor lab/diagnostic results  - Monitor all insertion sites, i.e. indwelling lines, tubes, and drains  - Monitor endotracheal if appropriate and nasal secretions for changes in amount and color  - [place] appropriate cooling/warming therapies per order  - Administer medications as ordered  - Instruct and encourage patient and family to use good hand hygiene technique  - Identify and instruct in appropriate isolation precautions for identified infection/condition  Outcome: Progressing  Goal: Absence of fever/infection during neutropenic period  Description: INTERVENTIONS:  - Monitor WBC    Outcome: Progressing     Problem: SAFETY ADULT  Goal: Patient will remain free of falls  Description: INTERVENTIONS:  - Educate patient/family on patient safety including physical limitations  - Instruct patient to call for assistance with activity   - Consult OT/PT to assist with strengthening/mobility   - Keep Call bell within reach  - Keep bed low and locked with side rails adjusted as appropriate  - Keep care items and personal belongings within reach  - Initiate and maintain comfort rounds  - Make Fall Risk Sign visible to staff  - Apply yellow socks and bracelet  for high fall risk patients  - Consider moving patient to room near nurses station  Outcome: Progressing  Goal: Maintain or return to baseline ADL function  Description: INTERVENTIONS:  -  Assess patient's ability to carry out ADLs; assess patient's baseline for ADL function and identify physical deficits which impact ability to perform ADLs (bathing, care of mouth/teeth, toileting, grooming, dressing, etc.)  - Assess/evaluate cause of self-care deficits   - Assess range of motion  - Assess patient's mobility; develop plan if impaired  - Assess patient's need for assistive devices and provide as appropriate  - Encourage maximum independence but intervene and supervise when necessary  - Involve family in performance of ADLs  - Assess for home care needs following discharge   - Consider OT consult to assist with ADL evaluation and planning for discharge  - Provide patient education as appropriate  Outcome: Progressing  Goal: Maintains/Returns to pre admission functional level  Description: INTERVENTIONS:  - Perform AM-PAC 6 Click Basic Mobility/ Daily Activity assessment daily.  - Set and communicate daily mobility goal to care team and patient/family/caregiver.   - Collaborate with rehabilitation services on mobility goals if consulted  - Out of bed for toileting  - Record patient progress and toleration of activity level   Outcome: Progressing     Problem: DISCHARGE PLANNING  Goal: Discharge to home or other facility with appropriate resources  Description: INTERVENTIONS:  - Identify barriers to discharge w/patient and caregiver  - Arrange for needed discharge resources and transportation as appropriate  - Identify discharge learning needs (meds, wound care, etc.)  - Arrange for interpretive services to assist at discharge as needed  - Refer to Case Management Department for coordinating discharge planning if the patient needs post-hospital services based on physician/advanced practitioner order or complex needs  related to functional status, cognitive ability, or social support system  Outcome: Progressing     Problem: Knowledge Deficit  Goal: Patient/family/caregiver demonstrates understanding of disease process, treatment plan, medications, and discharge instructions  Description: Complete learning assessment and assess knowledge base.  Interventions:  - Provide teaching at level of understanding  - Provide teaching via preferred learning methods  Outcome: Progressing     Problem: NEUROSENSORY - ADULT  Goal: Achieves stable or improved neurological status  Description: INTERVENTIONS  - Monitor and report changes in neurological status  - Monitor vital signs such as temperature, blood pressure, glucose, and any other labs ordered   - Initiate measures to prevent increased intracranial pressure  - Monitor for seizure activity and implement precautions if appropriate      Outcome: Progressing  Goal: Remains free of injury related to seizures activity  Description: INTERVENTIONS  - Maintain airway, patient safety  and administer oxygen as ordered  - Monitor patient for seizure activity, document and report duration and description of seizure to physician/advanced practitioner  - If seizure occurs,  ensure patient safety during seizure  - Reorient patient post seizure  - Seizure pads on all 4 side rails  - Instruct patient/family to notify RN of any seizure activity including if an aura is experienced  - Instruct patient/family to call for assistance with activity based on nursing assessment  - Administer anti-seizure medications if ordered    Outcome: Progressing  Goal: Achieves maximal functionality and self care  Description: INTERVENTIONS  - Monitor swallowing and airway patency with patient fatigue and changes in neurological status  - Encourage and assist patient to increase activity and self care.   - Encourage visually impaired, hearing impaired and aphasic patients to use assistive/communication devices  Outcome:  Progressing     Problem: CARDIOVASCULAR - ADULT  Goal: Maintains optimal cardiac output and hemodynamic stability  Description: INTERVENTIONS:  - Monitor I/O, vital signs and rhythm  - Monitor for S/S and trends of decreased cardiac output  - Administer and titrate ordered vasoactive medications to optimize hemodynamic stability  - Assess quality of pulses, skin color and temperature  - Assess for signs of decreased coronary artery perfusion  - Instruct patient to report change in severity of symptoms  Outcome: Progressing  Goal: Absence of cardiac dysrhythmias or at baseline rhythm  Description: INTERVENTIONS:  - Continuous cardiac monitoring, vital signs, obtain 12 lead EKG if ordered  - Administer antiarrhythmic and heart rate control medications as ordered  - Monitor electrolytes and administer replacement therapy as ordered  Outcome: Progressing     Problem: RESPIRATORY - ADULT  Goal: Achieves optimal ventilation and oxygenation  Description: INTERVENTIONS:  - Assess for changes in respiratory status  - Assess for changes in mentation and behavior  - Position to facilitate oxygenation and minimize respiratory effort  - Oxygen administered by appropriate delivery if ordered  - Initiate smoking cessation education as indicated  - Encourage broncho-pulmonary hygiene including cough, deep breathe, Incentive Spirometry  - Assess the need for suctioning and aspirate as needed  - Assess and instruct to report SOB or any respiratory difficulty  - Respiratory Therapy support as indicated  Outcome: Progressing     Problem: GASTROINTESTINAL - ADULT  Goal: Minimal or absence of nausea and/or vomiting  Description: INTERVENTIONS:  - Administer IV fluids if ordered to ensure adequate hydration  - Maintain NPO status until nausea and vomiting are resolved  - Nasogastric tube if ordered  - Administer ordered antiemetic medications as needed  - Provide nonpharmacologic comfort measures as appropriate  - Advance diet as  tolerated, if ordered  - Consider nutrition services referral to assist patient with adequate nutrition and appropriate food choices  Outcome: Progressing  Goal: Maintains or returns to baseline bowel function  Description: INTERVENTIONS:  - Assess bowel function  - Encourage oral fluids to ensure adequate hydration  - Administer IV fluids if ordered to ensure adequate hydration  - Administer ordered medications as needed  - Encourage mobilization and activity  - Consider nutritional services referral to assist patient with adequate nutrition and appropriate food choices  Outcome: Progressing  Goal: Maintains adequate nutritional intake  Description: INTERVENTIONS:  - Monitor percentage of each meal consumed  - Identify factors contributing to decreased intake, treat as appropriate  - Assist with meals as needed  - Monitor I&O, weight, and lab values if indicated  - Obtain nutrition services referral as needed  Outcome: Progressing  Goal: Establish and maintain optimal ostomy function  Description: INTERVENTIONS:  - Assess bowel function  - Encourage oral fluids to ensure adequate hydration  - Administer IV fluids if ordered to ensure adequate hydration   - Administer ordered medications as needed  - Encourage mobilization and activity  - Nutrition services referral to assist patient with appropriate food choices  - Assess stoma site  - Consider wound care consult   Outcome: Progressing  Goal: Oral mucous membranes remain intact  Description: INTERVENTIONS  - Assess oral mucosa and hygiene practices  - Implement preventative oral hygiene regimen  - Implement oral medicated treatments as ordered  - Initiate Nutrition services referral as needed  Outcome: Progressing     Problem: GENITOURINARY - ADULT  Goal: Maintains or returns to baseline urinary function  Description: INTERVENTIONS:  - Assess urinary function  - Encourage oral fluids to ensure adequate hydration if ordered  - Administer IV fluids as ordered to  ensure adequate hydration  - Administer ordered medications as needed  - Offer frequent toileting  - Follow urinary retention protocol if ordered  Outcome: Progressing  Goal: Absence of urinary retention  Description: INTERVENTIONS:  - Assess patient’s ability to void and empty bladder  - Monitor I/O  - Bladder scan as needed  - Discuss with physician/AP medications to alleviate retention as needed  - Discuss catheterization for long term situations as appropriate  Outcome: Progressing  Goal: Urinary catheter remains patent  Description: INTERVENTIONS:  - Assess patency of urinary catheter  - If patient has a chronic shah, consider changing catheter if non-functioning  - Follow guidelines for intermittent irrigation of non-functioning urinary catheter  Outcome: Progressing     Problem: METABOLIC, FLUID AND ELECTROLYTES - ADULT  Goal: Electrolytes maintained within normal limits  Description: INTERVENTIONS:  - Monitor labs and assess patient for signs and symptoms of electrolyte imbalances  - Administer electrolyte replacement as ordered  - Monitor response to electrolyte replacements, including repeat lab results as appropriate  - Instruct patient on fluid and nutrition as appropriate  Outcome: Progressing  Goal: Fluid balance maintained  Description: INTERVENTIONS:  - Monitor labs   - Monitor I/O and WT  - Instruct patient on fluid and nutrition as appropriate  - Assess for signs & symptoms of volume excess or deficit  Outcome: Progressing  Goal: Glucose maintained within target range  Description: INTERVENTIONS:  - Monitor Blood Glucose as ordered  - Assess for signs and symptoms of hyperglycemia and hypoglycemia  - Administer ordered medications to maintain glucose within target range  - Assess nutritional intake and initiate nutrition service referral as needed  Outcome: Progressing      Problem: HEMATOLOGIC - ADULT  Goal: Maintains hematologic stability  Description: INTERVENTIONS  - Assess for signs and  symptoms of bleeding or hemorrhage  - Monitor labs  - Administer supportive blood products/factors as ordered and appropriate  Outcome: Progressing     Problem: MUSCULOSKELETAL - ADULT  Goal: Maintain or return mobility to safest level of function  Description: INTERVENTIONS:  - Assess patient's ability to carry out ADLs; assess patient's baseline for ADL function and identify physical deficits which impact ability to perform ADLs (bathing, care of mouth/teeth, toileting, grooming, dressing, etc.)  - Assess/evaluate cause of self-care deficits   - Assess range of motion  - Assess patient's mobility  - Assess patient's need for assistive devices and provide as appropriate  - Encourage maximum independence but intervene and supervise when necessary  - Involve family in performance of ADLs  - Assess for home care needs following discharge   - Consider OT consult to assist with ADL evaluation and planning for discharge  - Provide patient education as appropriate  Outcome: Progressing  Goal: Maintain proper alignment of affected body part  Description: INTERVENTIONS:  - Support, maintain and protect limb and body alignment  - Provide patient/ family with appropriate education  Outcome: Progressing     Problem: Nutrition/Hydration-ADULT  Goal: Nutrient/Hydration intake appropriate for improving, restoring or maintaining nutritional needs  Description: Monitor and assess patient's nutrition/hydration status for malnutrition. Collaborate with interdisciplinary team and initiate plan and interventions as ordered.  Monitor patient's weight and dietary intake as ordered or per policy. Utilize nutrition screening tool and intervene as necessary. Determine patient's food preferences and provide high-protein, high-caloric foods as appropriate.     INTERVENTIONS:  - Monitor oral intake, urinary output, labs, and treatment plans  - Assess nutrition and hydration status and recommend course of action  - Evaluate amount of meals  eaten  - Assist patient with eating if necessary   - Allow adequate time for meals  - Recommend/ encourage appropriate diets, oral nutritional supplements, and vitamin/mineral supplements  - Order, calculate, and assess calorie counts as needed  - Recommend, monitor, and adjust tube feedings and TPN/PPN based on assessed needs  - Assess need for intravenous fluids  - Provide specific nutrition/hydration education as appropriate  - Include patient/family/caregiver in decisions related to nutrition  Outcome: Progressing     Problem: Prexisting or High Potential for Compromised Skin Integrity  Goal: Skin integrity is maintained or improved  Description: INTERVENTIONS:  - Identify patients at risk for skin breakdown  - Assess and monitor skin integrity  - Assess and monitor nutrition and hydration status  - Monitor labs   - Assess for incontinence   - Turn and reposition patient  - Assist with mobility/ambulation  - Relieve pressure over bony prominences  - Avoid friction and shearing  - Provide appropriate hygiene as needed including keeping skin clean and dry  - Evaluate need for skin moisturizer/barrier cream  - Collaborate with interdisciplinary team   - Patient/family teaching  - Consider wound care consult   Outcome: Progressing     Problem: SAFETY,RESTRAINT: NV/NON-SELF DESTRUCTIVE BEHAVIOR  Goal: Remains free of harm/injury (restraint for non violent/non self-detsructive behavior)  Description: INTERVENTIONS:  - Instruct patient/family regarding restraint use   - Assess and monitor physiologic and psychological status   - Provide interventions and comfort measures to meet assessed patient needs   - Identify and implement measures to help patient regain control  - Assess readiness for release of restraint   Outcome: Progressing  Goal: Returns to optimal restraint-free functioning  Description: INTERVENTIONS:  - Assess the patient's behavior and symptoms that indicate continued need for restraint  - Identify and  implement measures to help patient regain control  - Assess readiness for release of restraint   Outcome: Progressing

## 2024-10-27 NOTE — ASSESSMENT & PLAN NOTE
Lab Results   Component Value Date    AST 74 (H) 10/27/2024    ALT 50 10/27/2024    ALKPHOS 323 (H) 10/27/2024    TBILI 1.10 (H) 10/27/2024     RUQ US (10/17/24) - hepatic steatosis with morphologic features concerning for cirrhosis.  Small volume RUQ ascites.  Liver Dopplers (10/25/2024): Prominent main portal vein suggests portal venous hypertension.   As per GI likely trending down mixed pattern in the setting of hepatic congestion, and alcohol use.  MELD score 8   Hepatitis panel, EZEQUIEL, AFP, ceruloplasmin levels normal  Alpha-1 antitrypsin level 271, high    Plan  Continue to monitor  GI consulted - outpatient close follow-up and EGD colonoscopy recommended.  Follow-up anti-smooth muscle antibody.  Monitor PT/INR

## 2024-10-27 NOTE — ASSESSMENT & PLAN NOTE
Unclear etiology. Suspect liver etiology  With acute hypoxic respiratory failure. Requiring up to 15 L. Now back to baseline RA    Plan:  Transition to PO Lasix 20 mg and Spironolactone 50 mg qd  Incentive spirometry  Encourage out of bed  Medically cleared for discharge. Pending rehab placement

## 2024-10-27 NOTE — PLAN OF CARE
Problem: PAIN - ADULT  Goal: Verbalizes/displays adequate comfort level or baseline comfort level  Description: Interventions:  - Encourage patient to monitor pain and request assistance  - Assess pain using appropriate pain scale  - Administer analgesics based on type and severity of pain and evaluate response  - Implement non-pharmacological measures as appropriate and evaluate response  - Consider cultural and social influences on pain and pain management  - Notify physician/advanced practitioner if interventions unsuccessful or patient reports new pain  Outcome: Progressing     Problem: INFECTION - ADULT  Goal: Absence or prevention of progression during hospitalization  Description: INTERVENTIONS:  - Assess and monitor for signs and symptoms of infection  - Monitor lab/diagnostic results  - Monitor all insertion sites, i.e. indwelling lines, tubes, and drains  - Monitor endotracheal if appropriate and nasal secretions for changes in amount and color  - [place] appropriate cooling/warming therapies per order  - Administer medications as ordered  - Instruct and encourage patient and family to use good hand hygiene technique  - Identify and instruct in appropriate isolation precautions for identified infection/condition  Outcome: Progressing  Goal: Absence of fever/infection during neutropenic period  Description: INTERVENTIONS:  - Monitor WBC    Outcome: Progressing     Problem: SAFETY ADULT  Goal: Patient will remain free of falls  Description: INTERVENTIONS:  - Educate patient/family on patient safety including physical limitations  - Instruct patient to call for assistance with activity   - Consult OT/PT to assist with strengthening/mobility   - Keep Call bell within reach  - Keep bed low and locked with side rails adjusted as appropriate  - Keep care items and personal belongings within reach  - Initiate and maintain comfort rounds  - Make Fall Risk Sign visible to staff  - Apply yellow socks and bracelet  for high fall risk patients  - Consider moving patient to room near nurses station  Outcome: Progressing  Goal: Maintain or return to baseline ADL function  Description: INTERVENTIONS:  -  Assess patient's ability to carry out ADLs; assess patient's baseline for ADL function and identify physical deficits which impact ability to perform ADLs (bathing, care of mouth/teeth, toileting, grooming, dressing, etc.)  - Assess/evaluate cause of self-care deficits   - Assess range of motion  - Assess patient's mobility; develop plan if impaired  - Assess patient's need for assistive devices and provide as appropriate  - Encourage maximum independence but intervene and supervise when necessary  - Involve family in performance of ADLs  - Assess for home care needs following discharge   - Consider OT consult to assist with ADL evaluation and planning for discharge  - Provide patient education as appropriate  Outcome: Progressing  Goal: Maintains/Returns to pre admission functional level  Description: INTERVENTIONS:  - Perform AM-PAC 6 Click Basic Mobility/ Daily Activity assessment daily.  - Set and communicate daily mobility goal to care team and patient/family/caregiver.   - Collaborate with rehabilitation services on mobility goals if consulted  - Record patient progress and toleration of activity level   Outcome: Progressing     Problem: NEUROSENSORY - ADULT  Goal: Achieves stable or improved neurological status  Description: INTERVENTIONS  - Monitor and report changes in neurological status  - Monitor vital signs such as temperature, blood pressure, glucose, and any other labs ordered   - Initiate measures to prevent increased intracranial pressure  - Monitor for seizure activity and implement precautions if appropriate      Outcome: Progressing  Goal: Remains free of injury related to seizures activity  Description: INTERVENTIONS  - Maintain airway, patient safety  and administer oxygen as ordered  - Monitor patient for  seizure activity, document and report duration and description of seizure to physician/advanced practitioner  - If seizure occurs,  ensure patient safety during seizure  - Reorient patient post seizure  - Seizure pads on all 4 side rails  - Instruct patient/family to notify RN of any seizure activity including if an aura is experienced  - Instruct patient/family to call for assistance with activity based on nursing assessment  - Administer anti-seizure medications if ordered    Outcome: Progressing  Goal: Achieves maximal functionality and self care  Description: INTERVENTIONS  - Monitor swallowing and airway patency with patient fatigue and changes in neurological status  - Encourage and assist patient to increase activity and self care.   - Encourage visually impaired, hearing impaired and aphasic patients to use assistive/communication devices  Outcome: Progressing     Problem: CARDIOVASCULAR - ADULT  Goal: Maintains optimal cardiac output and hemodynamic stability  Description: INTERVENTIONS:  - Monitor I/O, vital signs and rhythm  - Monitor for S/S and trends of decreased cardiac output  - Administer and titrate ordered vasoactive medications to optimize hemodynamic stability  - Assess quality of pulses, skin color and temperature  - Assess for signs of decreased coronary artery perfusion  - Instruct patient to report change in severity of symptoms  Outcome: Progressing  Goal: Absence of cardiac dysrhythmias or at baseline rhythm  Description: INTERVENTIONS:  - Continuous cardiac monitoring, vital signs, obtain 12 lead EKG if ordered  - Administer antiarrhythmic and heart rate control medications as ordered  - Monitor electrolytes and administer replacement therapy as ordered  Outcome: Progressing     Problem: RESPIRATORY - ADULT  Goal: Achieves optimal ventilation and oxygenation  Description: INTERVENTIONS:  - Assess for changes in respiratory status  - Assess for changes in mentation and behavior  - Position  to facilitate oxygenation and minimize respiratory effort  - Oxygen administered by appropriate delivery if ordered  - Initiate smoking cessation education as indicated  - Encourage broncho-pulmonary hygiene including cough, deep breathe, Incentive Spirometry  - Assess the need for suctioning and aspirate as needed  - Assess and instruct to report SOB or any respiratory difficulty  - Respiratory Therapy support as indicated  Outcome: Progressing

## 2024-10-27 NOTE — ASSESSMENT & PLAN NOTE
Lab Results   Component Value Date    HGB 9.9 (L) 10/27/2024    HGB 9.9 (L) 10/26/2024    HGB 10.5 (L) 10/25/2024   Baseline is 10-11  Microcytic  No active bleeding  Iron panel - iron 123, ferritin 62, TIBC 384  Vitamin B12 330, folate 5.9  Suspect secondary to bone marrow suppression from alcohol use, continue to monitor

## 2024-10-27 NOTE — ASSESSMENT & PLAN NOTE
Lab Results   Component Value Date     (L) 10/27/2024     (L) 10/26/2024     (L) 10/25/2024     Stable   No active bleeding  Likely secondary to bone marrow suppression from alcohol use

## 2024-10-27 NOTE — ASSESSMENT & PLAN NOTE
CXR (10/22) - no significant change diffuse pulmonary edema  Echo (10/17) - EF 60%.  Unable to assess diastolic function.  Mild tricuspid regurgitation.  Estimated RVSP mildly elevated, 38.00 mmHg  Weaned off of oxygen. Saturating well on room air    Plan:  See plan under volume overload  Medically cleared for discharge. Pending rehab placement

## 2024-10-28 ENCOUNTER — TELEPHONE (OUTPATIENT)
Age: 54
End: 2024-10-28

## 2024-10-28 VITALS
HEIGHT: 74 IN | DIASTOLIC BLOOD PRESSURE: 72 MMHG | HEART RATE: 78 BPM | TEMPERATURE: 98.3 F | SYSTOLIC BLOOD PRESSURE: 117 MMHG | BODY MASS INDEX: 30.62 KG/M2 | WEIGHT: 238.6 LBS | RESPIRATION RATE: 16 BRPM | OXYGEN SATURATION: 93 %

## 2024-10-28 DIAGNOSIS — E87.70 VOLUME OVERLOAD: ICD-10-CM

## 2024-10-28 LAB
ACTIN IGG SERPL-ACNC: 5 UNITS (ref 0–19)
ALBUMIN SERPL BCG-MCNC: 3.3 G/DL (ref 3.5–5)
ALP SERPL-CCNC: 313 U/L (ref 34–104)
ALT SERPL W P-5'-P-CCNC: 47 U/L (ref 7–52)
ANION GAP SERPL CALCULATED.3IONS-SCNC: 5 MMOL/L (ref 4–13)
AST SERPL W P-5'-P-CCNC: 64 U/L (ref 13–39)
BILIRUB SERPL-MCNC: 0.93 MG/DL (ref 0.2–1)
BUN SERPL-MCNC: 8 MG/DL (ref 5–25)
CALCIUM ALBUM COR SERPL-MCNC: 9.1 MG/DL (ref 8.3–10.1)
CALCIUM SERPL-MCNC: 8.5 MG/DL (ref 8.4–10.2)
CHLORIDE SERPL-SCNC: 109 MMOL/L (ref 96–108)
CO2 SERPL-SCNC: 24 MMOL/L (ref 21–32)
CREAT SERPL-MCNC: 0.7 MG/DL (ref 0.6–1.3)
GFR SERPL CREATININE-BSD FRML MDRD: 106 ML/MIN/1.73SQ M
GLUCOSE SERPL-MCNC: 138 MG/DL (ref 65–140)
POTASSIUM SERPL-SCNC: 3.8 MMOL/L (ref 3.5–5.3)
PROT SERPL-MCNC: 6.9 G/DL (ref 6.4–8.4)
SODIUM SERPL-SCNC: 138 MMOL/L (ref 135–147)

## 2024-10-28 PROCEDURE — 80053 COMPREHEN METABOLIC PANEL: CPT

## 2024-10-28 PROCEDURE — 97168 OT RE-EVAL EST PLAN CARE: CPT

## 2024-10-28 PROCEDURE — 99239 HOSP IP/OBS DSCHRG MGMT >30: CPT | Performed by: INTERNAL MEDICINE

## 2024-10-28 RX ORDER — SPIRONOLACTONE 50 MG/1
50 TABLET, FILM COATED ORAL DAILY
Qty: 30 TABLET | Refills: 0 | Status: SHIPPED | OUTPATIENT
Start: 2024-10-29 | End: 2024-11-28

## 2024-10-28 RX ORDER — LANOLIN ALCOHOL/MO/W.PET/CERES
100 CREAM (GRAM) TOPICAL DAILY
Qty: 30 TABLET | Refills: 0 | Status: SHIPPED | OUTPATIENT
Start: 2024-10-29 | End: 2024-11-28

## 2024-10-28 RX ORDER — FOLIC ACID 1 MG/1
1 TABLET ORAL DAILY
Qty: 30 TABLET | Refills: 0 | Status: SHIPPED | OUTPATIENT
Start: 2024-10-29 | End: 2024-11-28

## 2024-10-28 RX ORDER — NICOTINE 21 MG/24HR
1 PATCH, TRANSDERMAL 24 HOURS TRANSDERMAL DAILY
Qty: 28 PATCH | Refills: 0 | Status: SHIPPED | OUTPATIENT
Start: 2024-10-29

## 2024-10-28 RX ORDER — PANTOPRAZOLE SODIUM 40 MG/1
40 TABLET, DELAYED RELEASE ORAL
Qty: 30 TABLET | Refills: 0 | Status: CANCELLED | OUTPATIENT
Start: 2024-10-29 | End: 2024-11-28

## 2024-10-28 RX ORDER — FUROSEMIDE 20 MG/1
20 TABLET ORAL DAILY
Qty: 30 TABLET | Refills: 0 | Status: SHIPPED | OUTPATIENT
Start: 2024-10-29 | End: 2024-11-28

## 2024-10-28 RX ADMIN — ENOXAPARIN SODIUM 40 MG: 40 INJECTION SUBCUTANEOUS at 08:07

## 2024-10-28 RX ADMIN — ASPIRIN 81 MG CHEWABLE TABLET 81 MG: 81 TABLET CHEWABLE at 08:08

## 2024-10-28 RX ADMIN — Medication 100 MG: at 08:07

## 2024-10-28 RX ADMIN — MULTIPLE VITAMINS W/ MINERALS TAB 1 TABLET: TAB ORAL at 08:08

## 2024-10-28 RX ADMIN — NICOTINE 21 MG: 21 PATCH, EXTENDED RELEASE TRANSDERMAL at 08:08

## 2024-10-28 RX ADMIN — FUROSEMIDE 20 MG: 20 TABLET ORAL at 08:07

## 2024-10-28 RX ADMIN — FOLIC ACID 1 MG: 1 TABLET ORAL at 08:07

## 2024-10-28 RX ADMIN — CYANOCOBALAMIN TAB 500 MCG 1000 MCG: 500 TAB at 08:08

## 2024-10-28 RX ADMIN — PANTOPRAZOLE SODIUM 40 MG: 40 TABLET, DELAYED RELEASE ORAL at 05:30

## 2024-10-28 RX ADMIN — SPIRONOLACTONE 50 MG: 25 TABLET ORAL at 08:08

## 2024-10-28 RX ADMIN — ATORVASTATIN CALCIUM 20 MG: 20 TABLET, FILM COATED ORAL at 08:08

## 2024-10-28 RX ADMIN — ESCITALOPRAM OXALATE 20 MG: 20 TABLET ORAL at 08:07

## 2024-10-28 NOTE — PLAN OF CARE
Problem: PAIN - ADULT  Goal: Verbalizes/displays adequate comfort level or baseline comfort level  Description: Interventions:  - Encourage patient to monitor pain and request assistance  - Assess pain using appropriate pain scale  - Administer analgesics based on type and severity of pain and evaluate response  - Implement non-pharmacological measures as appropriate and evaluate response  - Consider cultural and social influences on pain and pain management  - Notify physician/advanced practitioner if interventions unsuccessful or patient reports new pain  Outcome: Progressing     Problem: INFECTION - ADULT  Goal: Absence or prevention of progression during hospitalization  Description: INTERVENTIONS:  - Assess and monitor for signs and symptoms of infection  - Monitor lab/diagnostic results  - Monitor all insertion sites, i.e. indwelling lines, tubes, and drains  - Monitor endotracheal if appropriate and nasal secretions for changes in amount and color  - [place] appropriate cooling/warming therapies per order  - Administer medications as ordered  - Instruct and encourage patient and family to use good hand hygiene technique  - Identify and instruct in appropriate isolation precautions for identified infection/condition  Outcome: Progressing  Goal: Absence of fever/infection during neutropenic period  Description: INTERVENTIONS:  - Monitor WBC    Outcome: Progressing     Problem: SAFETY ADULT  Goal: Patient will remain free of falls  Description: INTERVENTIONS:  - Educate patient/family on patient safety including physical limitations  - Instruct patient to call for assistance with activity   - Consult OT/PT to assist with strengthening/mobility   - Keep Call bell within reach  - Keep bed low and locked with side rails adjusted as appropriate  - Keep care items and personal belongings within reach  - Initiate and maintain comfort rounds  - Apply yellow socks and bracelet for high fall risk patients  - Consider  moving patient to room near nurses station  Outcome: Progressing  Goal: Maintain or return to baseline ADL function  Description: INTERVENTIONS:  -  Assess patient's ability to carry out ADLs; assess patient's baseline for ADL function and identify physical deficits which impact ability to perform ADLs (bathing, care of mouth/teeth, toileting, grooming, dressing, etc.)  - Assess/evaluate cause of self-care deficits   - Assess range of motion  - Assess patient's mobility; develop plan if impaired  - Assess patient's need for assistive devices and provide as appropriate  - Encourage maximum independence but intervene and supervise when necessary  - Involve family in performance of ADLs  - Assess for home care needs following discharge   - Consider OT consult to assist with ADL evaluation and planning for discharge  - Provide patient education as appropriate  Outcome: Progressing  Goal: Maintains/Returns to pre admission functional level  Description: INTERVENTIONS:  - Perform AM-PAC 6 Click Basic Mobility/ Daily Activity assessment daily.  - Set and communicate daily mobility goal to care team and patient/family/caregiver.   - Collaborate with rehabilitation services on mobility goals if consulted  - Out of bed for toileting  - Record patient progress and toleration of activity level   Outcome: Progressing     Problem: DISCHARGE PLANNING  Goal: Discharge to home or other facility with appropriate resources  Description: INTERVENTIONS:  - Identify barriers to discharge w/patient and caregiver  - Arrange for needed discharge resources and transportation as appropriate  - Identify discharge learning needs (meds, wound care, etc.)  - Arrange for interpretive services to assist at discharge as needed  - Refer to Case Management Department for coordinating discharge planning if the patient needs post-hospital services based on physician/advanced practitioner order or complex needs related to functional status, cognitive  ability, or social support system  Outcome: Progressing     Problem: Knowledge Deficit  Goal: Patient/family/caregiver demonstrates understanding of disease process, treatment plan, medications, and discharge instructions  Description: Complete learning assessment and assess knowledge base.  Interventions:  - Provide teaching at level of understanding  - Provide teaching via preferred learning methods  Outcome: Progressing     Problem: NEUROSENSORY - ADULT  Goal: Achieves stable or improved neurological status  Description: INTERVENTIONS  - Monitor and report changes in neurological status  - Monitor vital signs such as temperature, blood pressure, glucose, and any other labs ordered   - Initiate measures to prevent increased intracranial pressure  - Monitor for seizure activity and implement precautions if appropriate      Outcome: Progressing  Goal: Remains free of injury related to seizures activity  Description: INTERVENTIONS  - Maintain airway, patient safety  and administer oxygen as ordered  - Monitor patient for seizure activity, document and report duration and description of seizure to physician/advanced practitioner  - If seizure occurs,  ensure patient safety during seizure  - Reorient patient post seizure  - Seizure pads on all 4 side rails  - Instruct patient/family to notify RN of any seizure activity including if an aura is experienced  - Instruct patient/family to call for assistance with activity based on nursing assessment  - Administer anti-seizure medications if ordered    Outcome: Progressing  Goal: Achieves maximal functionality and self care  Description: INTERVENTIONS  - Monitor swallowing and airway patency with patient fatigue and changes in neurological status  - Encourage and assist patient to increase activity and self care.   - Encourage visually impaired, hearing impaired and aphasic patients to use assistive/communication devices  Outcome: Progressing     Problem: CARDIOVASCULAR -  ADULT  Goal: Maintains optimal cardiac output and hemodynamic stability  Description: INTERVENTIONS:  - Monitor I/O, vital signs and rhythm  - Monitor for S/S and trends of decreased cardiac output  - Administer and titrate ordered vasoactive medications to optimize hemodynamic stability  - Assess quality of pulses, skin color and temperature  - Assess for signs of decreased coronary artery perfusion  - Instruct patient to report change in severity of symptoms  Outcome: Progressing  Goal: Absence of cardiac dysrhythmias or at baseline rhythm  Description: INTERVENTIONS:  - Continuous cardiac monitoring, vital signs, obtain 12 lead EKG if ordered  - Administer antiarrhythmic and heart rate control medications as ordered  - Monitor electrolytes and administer replacement therapy as ordered  Outcome: Progressing     Problem: RESPIRATORY - ADULT  Goal: Achieves optimal ventilation and oxygenation  Description: INTERVENTIONS:  - Assess for changes in respiratory status  - Assess for changes in mentation and behavior  - Position to facilitate oxygenation and minimize respiratory effort  - Oxygen administered by appropriate delivery if ordered  - Initiate smoking cessation education as indicated  - Encourage broncho-pulmonary hygiene including cough, deep breathe, Incentive Spirometry  - Assess the need for suctioning and aspirate as needed  - Assess and instruct to report SOB or any respiratory difficulty  - Respiratory Therapy support as indicated  Outcome: Progressing     Problem: GASTROINTESTINAL - ADULT  Goal: Minimal or absence of nausea and/or vomiting  Description: INTERVENTIONS:  - Administer IV fluids if ordered to ensure adequate hydration  - Maintain NPO status until nausea and vomiting are resolved  - Nasogastric tube if ordered  - Administer ordered antiemetic medications as needed  - Provide nonpharmacologic comfort measures as appropriate  - Advance diet as tolerated, if ordered  - Consider nutrition  services referral to assist patient with adequate nutrition and appropriate food choices  Outcome: Progressing  Goal: Maintains or returns to baseline bowel function  Description: INTERVENTIONS:  - Assess bowel function  - Encourage oral fluids to ensure adequate hydration  - Administer IV fluids if ordered to ensure adequate hydration  - Administer ordered medications as needed  - Encourage mobilization and activity  - Consider nutritional services referral to assist patient with adequate nutrition and appropriate food choices  Outcome: Progressing  Goal: Maintains adequate nutritional intake  Description: INTERVENTIONS:  - Monitor percentage of each meal consumed  - Identify factors contributing to decreased intake, treat as appropriate  - Assist with meals as needed  - Monitor I&O, weight, and lab values if indicated  - Obtain nutrition services referral as needed  Outcome: Progressing  Goal: Establish and maintain optimal ostomy function  Description: INTERVENTIONS:  - Assess bowel function  - Encourage oral fluids to ensure adequate hydration  - Administer IV fluids if ordered to ensure adequate hydration   - Administer ordered medications as needed  - Encourage mobilization and activity  - Nutrition services referral to assist patient with appropriate food choices  - Assess stoma site  - Consider wound care consult   Outcome: Progressing  Goal: Oral mucous membranes remain intact  Description: INTERVENTIONS  - Assess oral mucosa and hygiene practices  - Implement preventative oral hygiene regimen  - Implement oral medicated treatments as ordered  - Initiate Nutrition services referral as needed  Outcome: Progressing     Problem: GENITOURINARY - ADULT  Goal: Maintains or returns to baseline urinary function  Description: INTERVENTIONS:  - Assess urinary function  - Encourage oral fluids to ensure adequate hydration if ordered  - Administer IV fluids as ordered to ensure adequate hydration  - Administer ordered  medications as needed  - Offer frequent toileting  - Follow urinary retention protocol if ordered  Outcome: Progressing  Goal: Absence of urinary retention  Description: INTERVENTIONS:  - Assess patient’s ability to void and empty bladder  - Monitor I/O  - Bladder scan as needed  - Discuss with physician/AP medications to alleviate retention as needed  - Discuss catheterization for long term situations as appropriate  Outcome: Progressing  Goal: Urinary catheter remains patent  Description: INTERVENTIONS:  - Assess patency of urinary catheter  - If patient has a chronic shah, consider changing catheter if non-functioning  - Follow guidelines for intermittent irrigation of non-functioning urinary catheter  Outcome: Progressing     Problem: METABOLIC, FLUID AND ELECTROLYTES - ADULT  Goal: Electrolytes maintained within normal limits  Description: INTERVENTIONS:  - Monitor labs and assess patient for signs and symptoms of electrolyte imbalances  - Administer electrolyte replacement as ordered  - Monitor response to electrolyte replacements, including repeat lab results as appropriate  - Instruct patient on fluid and nutrition as appropriate  Outcome: Progressing  Goal: Fluid balance maintained  Description: INTERVENTIONS:  - Monitor labs   - Monitor I/O and WT  - Instruct patient on fluid and nutrition as appropriate  - Assess for signs & symptoms of volume excess or deficit  Outcome: Progressing  Goal: Glucose maintained within target range  Description: INTERVENTIONS:  - Monitor Blood Glucose as ordered  - Assess for signs and symptoms of hyperglycemia and hypoglycemia  - Administer ordered medications to maintain glucose within target range  - Assess nutritional intake and initiate nutrition service referral as needed  Outcome: Progressing     Problem: SKIN/TISSUE INTEGRITY - ADULT  Goal: Skin Integrity remains intact(Skin Breakdown Prevention)  Description: Assess:  -Assess extremities for adequate circulation and  sensation     Bed Management:  -Have minimal linens on bed & keep smooth, unwrinkled  -Change linens as needed when moist or perspiring    Toileting:  -Offer bedside commode    Activity:  -Encourage activity and walks on unit  -Encourage or provide ROM exercises       Skin Care:  -Avoid use of baby powder, tape, friction and shearing, hot water or constrictive clothing  -Do not massage red bony areas    Next Steps:  Outcome: Progressing  Goal: Incision(s), wounds(s) or drain site(s) healing without S/S of infection  Description: INTERVENTIONS  - Assess and document dressing, incision, wound bed, drain sites and surrounding tissue  - Provide patient and family education  Outcome: Progressing  Goal: Pressure injury heals and does not worsen  Description: Interventions:  - Implement low air loss mattress or specialty surface (Criteria met)  - Apply silicone foam dressing  - Apply fecal or urinary incontinence containment device   - Consider nutrition services referral as needed  Outcome: Progressing     Problem: HEMATOLOGIC - ADULT  Goal: Maintains hematologic stability  Description: INTERVENTIONS  - Assess for signs and symptoms of bleeding or hemorrhage  - Monitor labs  - Administer supportive blood products/factors as ordered and appropriate  Outcome: Progressing     Problem: MUSCULOSKELETAL - ADULT  Goal: Maintain or return mobility to safest level of function  Description: INTERVENTIONS:  - Assess patient's ability to carry out ADLs; assess patient's baseline for ADL function and identify physical deficits which impact ability to perform ADLs (bathing, care of mouth/teeth, toileting, grooming, dressing, etc.)  - Assess/evaluate cause of self-care deficits   - Assess range of motion  - Assess patient's mobility  - Assess patient's need for assistive devices and provide as appropriate  - Encourage maximum independence but intervene and supervise when necessary  - Involve family in performance of ADLs  - Assess for  home care needs following discharge   - Consider OT consult to assist with ADL evaluation and planning for discharge  - Provide patient education as appropriate  Outcome: Progressing  Goal: Maintain proper alignment of affected body part  Description: INTERVENTIONS:  - Support, maintain and protect limb and body alignment  - Provide patient/ family with appropriate education  Outcome: Progressing     Problem: Nutrition/Hydration-ADULT  Goal: Nutrient/Hydration intake appropriate for improving, restoring or maintaining nutritional needs  Description: Monitor and assess patient's nutrition/hydration status for malnutrition. Collaborate with interdisciplinary team and initiate plan and interventions as ordered.  Monitor patient's weight and dietary intake as ordered or per policy. Utilize nutrition screening tool and intervene as necessary. Determine patient's food preferences and provide high-protein, high-caloric foods as appropriate.     INTERVENTIONS:  - Monitor oral intake, urinary output, labs, and treatment plans  - Assess nutrition and hydration status and recommend course of action  - Evaluate amount of meals eaten  - Assist patient with eating if necessary   - Allow adequate time for meals  - Recommend/ encourage appropriate diets, oral nutritional supplements, and vitamin/mineral supplements  - Order, calculate, and assess calorie counts as needed  - Recommend, monitor, and adjust tube feedings and TPN/PPN based on assessed needs  - Assess need for intravenous fluids  - Provide specific nutrition/hydration education as appropriate  - Include patient/family/caregiver in decisions related to nutrition  Outcome: Progressing     Problem: SAFETY,RESTRAINT: NV/NON-SELF DESTRUCTIVE BEHAVIOR  Goal: Remains free of harm/injury (restraint for non violent/non self-detsructive behavior)  Description: INTERVENTIONS:  - Instruct patient/family regarding restraint use   - Assess and monitor physiologic and psychological  status   - Provide interventions and comfort measures to meet assessed patient needs   - Identify and implement measures to help patient regain control  - Assess readiness for release of restraint   Outcome: Progressing  Goal: Returns to optimal restraint-free functioning  Description: INTERVENTIONS:  - Assess the patient's behavior and symptoms that indicate continued need for restraint  - Identify and implement measures to help patient regain control  - Assess readiness for release of restraint   Outcome: Progressing     Problem: Prexisting or High Potential for Compromised Skin Integrity  Goal: Skin integrity is maintained or improved  Description: INTERVENTIONS:  - Identify patients at risk for skin breakdown  - Assess and monitor skin integrity  - Assess and monitor nutrition and hydration status  - Monitor labs   - Assess for incontinence   - Turn and reposition patient  - Assist with mobility/ambulation  - Relieve pressure over bony prominences  - Avoid friction and shearing  - Provide appropriate hygiene as needed including keeping skin clean and dry  - Evaluate need for skin moisturizer/barrier cream  - Collaborate with interdisciplinary team   - Patient/family teaching  - Consider wound care consult   Outcome: Progressing

## 2024-10-28 NOTE — ASSESSMENT & PLAN NOTE
CXR (10/22) - no significant change diffuse pulmonary edema  Echo (10/17) - EF 60%.  Unable to assess diastolic function.  Mild tricuspid regurgitation.  Estimated RVSP mildly elevated, 38.00 mmHg  Weaned off of oxygen. Saturating well on room air    Plan:  See plan under volume overload

## 2024-10-28 NOTE — ASSESSMENT & PLAN NOTE
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

## 2024-10-28 NOTE — DISCHARGE SUMMARY
Discharge Summary - Hospitalist   Name: Viktor Farfan 54 y.o. male I MRN: [id number]  Unit/Bed#: S -01 I Date of Admission: 10/16/2024   Date of Service: 10/28/2024 I Hospital Day: 12     Assessment & Plan  Acute hypoxic respiratory failure (HCC)  CXR (10/22) - no significant change diffuse pulmonary edema  Echo (10/17) - EF 60%.  Unable to assess diastolic function.  Mild tricuspid regurgitation.  Estimated RVSP mildly elevated, 38.00 mmHg  Weaned off of oxygen. Saturating well on room air    Plan:  See plan under volume overload  Volume overload  Unclear etiology. Suspect liver etiology  With acute hypoxic respiratory failure. Requiring up to 15 L. Now back to baseline RA  Diuresed -14.7 L during hospital stay    Plan:  Discharge home on PO Lasix 20 mg and Spironolactone 50 mg qd  Follow up with nephrology in the outpatient setting  Follow-up with gastroenterology in the outpatient setting  Tobacco abuse  Smokes 1 pack/day  Nicotine patch upon discharge  Alcohol abuse  Drinks 12 pack beer plus several glasses of rum and coke daily  Catch program upon discharge  Follow-up with gastroenterology in the outpatient setting  Thrombocytopenia (HCC)  Lab Results   Component Value Date     (L) 10/27/2024     (L) 10/26/2024     (L) 10/25/2024     Likely secondary to bone marrow suppression from alcohol use  Recommend repeat testing within 1 week with primary care physician  Transaminitis  Lab Results   Component Value Date    AST 64 (H) 10/28/2024    ALT 47 10/28/2024    ALKPHOS 313 (H) 10/28/2024    TBILI 0.93 10/28/2024     RUQ US (10/17/24) - hepatic steatosis with morphologic features concerning for cirrhosis.  Small volume RUQ ascites.  Liver Dopplers (10/25/2024): Prominent main portal vein suggests portal venous hypertension.   As per GI likely trending down mixed pattern in the setting of hepatic congestion, and alcohol use.  MELD score stable   Hepatitis panel, EZEQUIEL, AFP, ceruloplasmin  levels normal  Alpha-1 antitrypsin level 271, high    Plan  Follow up with GI in the outpatient setting  We recommend repeat testing within 1 week with PCP  Pneumonia  Met sepsis criteria  With acute hypoxic respiratory failure.  Currently on 10 L midlfow  CTA chest - multifocal bilateral upper lobe consolidation which could be due to pneumonia  Blood cultures - no growth after 4 days  Sputum culture - mixed respiratory alexandra  Completed 5-day course of ceftriaxone from 10/16 to 10/20  Weaned off of O2    HTN (hypertension)  Continue home amlodipine 10 mg  Anemia  Lab Results   Component Value Date    HGB 9.9 (L) 10/27/2024    HGB 9.9 (L) 10/26/2024    HGB 10.5 (L) 10/25/2024   Baseline is 10-11  Microcytic  No active bleeding  Iron panel - iron 123, ferritin 62, TIBC 384  Vitamin B12 330, folate 5.9  Suspect secondary to bone marrow suppression from alcohol use, continue to monitor  We recommend repeat testing within 1 week with primary care physician  Vitamin B12 deficiency  Vitamin B12 level 330  Secondary to alcohol use  Continue oral vitamin B12 1000 mcg supplementation  Folate deficiency  Folate level 5.9  Continue oral folic acid supplementation  Urinary retention  Required intermittent straight cath  10/22 - Resolved  Sepsis due to pneumonia (HCC)  See plan under pneumonia  Ambulatory dysfunction  PT recommend level I  However, patient declined inpatient rehab.  Opted for outpatient PT and OT, referrals given     Medical Problems       Resolved Problems  Date Reviewed: 10/20/2024            Resolved    Hyponatremia 10/26/2024     Resolved by  Demetrius Vu MD    Falls 10/19/2024     Resolved by  Sahra Duarte, DO    Right shoulder pain 10/19/2024     Resolved by  Sahra Duarte DO    Hyperkalemia 10/17/2024     Resolved by  Sahra Duarte, DO    Normal anion gap metabolic acidosis 10/16/2024     Resolved by  Steffi Prieto PA-C    Abnormal acid-base balance 10/20/2024     Resolved by  Sahra Duarte,  DO    Hypomagnesemia 10/19/2024     Resolved by  Sahra Duarte, DO    Hypokalemia 10/20/2024     Resolved by  Sahra Duarte, DO    Hypophosphatasia 10/20/2024     Resolved by  Sahra Duarte, DO    Diarrhea 10/26/2024     Resolved by  Demetrius Vu MD        Discharging Physician / Practitioner: Duyen George DO  PCP: No primary care provider on file.  Admission Date:   Admission Orders (From admission, onward)       Ordered        10/16/24 1520  INPATIENT ADMISSION  Once                          Discharge Date: 10/28/24    Consultations During Hospital Stay:  Gastroenterology, nephrology, PMR    Procedures Performed:   None    Significant Findings / Test Results:   US liver doppler only   Final Result by Gilmar Sams MD (10/25 1447)      1. Prominent main portal vein suggests portal venous hypertension.         Workstation performed: [id number]         XR chest portable   Final Result by Edwin Michaud MD (10/22 1627)      No significant change in diffuse pulmonary edema.            Workstation performed: [id number]         XR chest portable ICU   Final Result by Db Berrios DO (10/21 1515)      Bilateral coalescent alveolar consolidations redemonstrated appears slightly more conspicuous which may be technical versus slight worsening compared to the most recent study. This could be secondary to pulmonary edema or multifocal pneumonia.            Resident: Gilmar Dorantes I, the attending radiologist, have reviewed the images and agree with the final report above.      Workstation performed: [id number]         XR chest portable ICU   Final Result by Christina Rodriguez MD (10/19 2010)      Moderate bilateral consolidation, slightly increased on the right since 10/16/2024, which could be due to edema or pneumonia in the appropriate clinical setting.            Workstation performed: [id number]         US right upper quadrant   Final Result by Seun Davies MD (10/18 4211)       Hepatic steatosis with morphologic features concerning for cirrhosis.      Small volume right upper quadrant ascites.      Workstation performed: [id number]         XR chest portable ICU   Final Result by Joi Beck MD (10/17 1647)   Increasing consolidation and groundglass density in the left lung in the perihilar region, suggest worsening pneumonia            Workstation performed: [id number]         CT head wo contrast   Final Result by Víctor Tong MD (10/17 0139)      No acute intracranial abnormality.                  Workstation performed: [id number]         XR shoulder 2+ vw right   Final Result by Joi Beck MD (10/16 1649)   No acute displaced fracture   Patchy opacity right lung may be due to congestion/pneumonia      Computerized Assisted Algorithm (CAA) may have been used to analyze all applicable images.         Workstation performed: [id number]           Incidental Findings:    XR chest portable ICU: Increasing consolidation and groundglass density in the left lung in the perihilar region, suggest worsening pneumonia, Workstation performed: [id number]  I reviewed the above mentioned incidental findings with the patient and/or family and they expressed understanding.    Test Results Pending at Discharge (will require follow up):   None     Outpatient Tests Requested:  We recommend repeat CBC and CMP within 1 week    Complications:  None    Reason for Admission: SOB, generalized weakness, decreased p.o. intake    Hospital Course:   Viktor Farfan is a 54 y.o. male patient who originally presented to the hospital on 10/16/2024 due to SOB, generalized weakness and decreased p.o. intake.  He initially presented to [facility] on 10/16 and was found to have a sodium level of 99.  He was transferred to ICU [facility] for further management.  Nephrology was consulted; they helped to resolve hyponatremia gradually.  Etiology was hypervolemic hyponatremia secondary to beer potomania.   He was also found to have acute hypoxic respiratory failure secondary to volume overload.  He required up to 15 L MFNC during hospital stay.  He was diuresed intravenously and was switched to oral regimen of PO Lasix 20 mg and spironolactone 50 mg.  Patient was not any diuretics at home prior to this hospital admission.  He returned back to baseline of room air after successful diuresis of -14.7 L.  Unknown etiology causing volume overload.  He had echocardiogram with normal ejection fraction.  Suspect liver etiology especially given elevated LFTs.  Right upper quadrant ultrasound showed hepatic steatosis with morphologic features concerning for cirrhosis. GI was consulted for further workup.  They recommended following up in the outpatient setting for cirrhosis workup, EGD, and colonoscopy.  During hospital stay, patient was found to have pneumonia seen on CT chest imaging.  He finished a 5-day course of ceftriaxone.  He was also found to be anemic and had deficiencies in vitamin B12 and folate.  Iron panel was normal.  He was supplemented with vitamin B12, folate, and thiamine, all of which were continued upon discharge.  PT recommended Level I rehab; however, patient declined.  He stated that he just wanted to go home and will follow-up with physical therapy and Occupational Therapy in the outpatient setting.  Ambulatory referrals to physical therapy, Occupational Therapy, gastroenterology, and nephrology were given.  Phone number for CATCH program was provided.  Patient stated that he still wanted to continue smoking but asked for prescription for nicotine patches.  Plan for outpatient follow-up with primary care physician within 1 week.  We recommend repeat CBC and CMP within 1 week to monitor numbers.  Stable for discharge.    Please see above list of diagnoses and related plan for additional information.     Condition at Discharge: stable    Discharge Day Visit / Exam:   Subjective: Patient was seen and  "examined at bedside.  He reports that he wants to go home and not rehab.  He does not have any complaints at this time.  He denies chest pain, difficulty breathing, palpitations, abdominal pain, nausea, vomiting, diarrhea.  He is eating and moving his bowels.  He is on room air.    Vitals: Blood Pressure: 117/72 (10/28/24 0719)  Pulse: 78 (10/28/24 0719)  Temperature: 98.3 °F (36.8 °C) (10/28/24 0719)  Temp Source: Oral (10/27/24 0753)  Respirations: 16 (10/28/24 0719)  Height: 6' 2\" (188 cm) (10/17/24 1021)  Weight - Scale: 108 kg (238 lb 9.6 oz) (10/28/24 0554)  SpO2: 93 % (10/28/24 0719)  Physical Exam  Constitutional:       General: He is not in acute distress.     Appearance: Normal appearance. He is obese. He is not toxic-appearing or diaphoretic.   HENT:      Head: Normocephalic and atraumatic.   Pulmonary:      Effort: No respiratory distress.      Breath sounds: No wheezing, rhonchi or rales.      Comments: On room air  Abdominal:      General: Bowel sounds are normal. There is no distension.      Palpations: Abdomen is soft.      Tenderness: There is no abdominal tenderness. There is no guarding or rebound.   Musculoskeletal:         General: No swelling. Normal range of motion.      Right lower leg: No edema.      Left lower leg: No edema.   Skin:     Capillary Refill: Capillary refill takes less than 2 seconds.      Findings: No erythema or rash.   Neurological:      General: No focal deficit present.      Mental Status: He is alert and oriented to person, place, and time.        Discussion with Family: Updated  (daughter) via phone.    Discharge instructions/Information to patient and family:   See after visit summary for information provided to patient and family.      Provisions for Follow-Up Care:  See after visit summary for information related to follow-up care and any pertinent home health orders.      Mobility at time of Discharge:   Basic Mobility Inpatient Raw Score: 24  JH-HLM " Goal: 8: Walk 250 feet or more  JH-HLM Achieved: 7: Walk 25 feet or more  HLM Goal NOT achieved. Continue to encourage mobility in post discharge setting.     Disposition:   Home    Planned Readmission: No    Discharge Medications:  See after visit summary for reconciled discharge medications provided to patient and/or family.      Administrative Statements   Discharge Statement:  I have spent a total time of 30 minutes in caring for this patient on the day of the visit/encounter. .    **Please Note: This note may have been constructed using a voice recognition system**

## 2024-10-28 NOTE — ASSESSMENT & PLAN NOTE
Met sepsis criteria  With acute hypoxic respiratory failure.  Currently on 10 L midlfow  CTA chest - multifocal bilateral upper lobe consolidation which could be due to pneumonia  Blood cultures - no growth after 4 days  Sputum culture - mixed respiratory alexandra  Completed 5-day course of ceftriaxone from 10/16 to 10/20  Weaned off of O2

## 2024-10-28 NOTE — TELEPHONE ENCOUNTER
[facility] pharmacy calling regarding RX: Lasix and Spiranolactone. Inquiring if patient is to be taking two water pills.      Call back [phone]       Please advise.  Thank you

## 2024-10-28 NOTE — CASE MANAGEMENT
Case Management Discharge Planning Note    Patient name Viktor Farfan  Location S /S [facility]  MRN [id number]  : 1970 Date 10/28/2024       Current Admission Date: 10/16/2024  Current Admission Diagnosis:Acute hypoxic respiratory failure (HCC)   Patient Active Problem List    Diagnosis Date Noted Date Diagnosed    Volume overload 10/27/2024     Screen for colon cancer 10/25/2024     Ambulatory dysfunction 10/24/2024     At risk for acid-base imbalance 10/22/2024     Electrolyte imbalance risk 10/22/2024     HTN (hypertension) 10/22/2024     Anemia 10/22/2024     Vitamin B12 deficiency 10/22/2024     Folate deficiency 10/22/2024     Urinary retention 10/22/2024     Sepsis due to pneumonia (HCC) 10/22/2024     Pneumonia 10/21/2024     Thrombocytopenia (HCC) 10/19/2024     Transaminitis 10/19/2024     Acute hypoxic respiratory failure (HCC) 10/16/2024     SIRS (systemic inflammatory response syndrome) (HCC) 10/16/2024     Alcohol abuse 10/16/2024     Tobacco abuse 10/16/2024     Primary osteoarthritis of left knee 01/10/2024     Chronic pain of left knee 2024       LOS (days): 12  Geometric Mean LOS (GMLOS) (days): 4.9  Days to GMLOS:-7.1     OBJECTIVE:  Risk of Unplanned Readmission Score: 15.32         Current admission status: Inpatient   Preferred Pharmacy:   [facility] [place], [place] - [address] [place] [id number]  Phone: [phone] Fax: [phone]    [facility]/pharmacy #[id number] - [place] - [address]  [address] [address]  Phone: [phone] Fax: [phone]    Primary Care Provider: No primary care provider on file.    Primary Insurance: [employer]  Secondary Insurance:     DISCHARGE DETAILS:    Discharge planning discussed with:: patient at bedside- [facility] resident present, patient phoned sister, Maxine and daughter, Linda  Freedom of Choice: Yes  Comments - Freedom of Choice: home with OP  therapy and OP substance use follow up  CM contacted family/caregiver?: Yes  Were Treatment Team discharge recommendations reviewed with patient/caregiver?: Yes  Did patient/caregiver verbalize understanding of patient care needs?: Yes  Were patient/caregiver advised of the risks associated with not following Treatment Team discharge recommendations?: Yes    Contacts  Patient Contacts: Maxine Mckeon  Relationship to Patient:: Family (daughter, sister)  Contact Method: Phone  Reason/Outcome: Continuity of Care, Emergency Contact, Discharge Planning, Referral    Requested Home Health Care         Is the patient interested in HHC at discharge?: No    DME Referral Provided  Referral made for DME?: No    Other Referral/Resources/Interventions Provided:  Interventions: Other (Specify), Outpatient OT, Outpatient PT (CATCH- OP follow up)  Referral Comments: CM notified this AM by [facility] that patient is no longer interested in STR or IP substance use treatment- wanting to go home ASAP. CM and [facility] resident, met with patient at bedside. Patient placed phone call to sisterMaxine and daughterLinda-- notified them both that he will be going home with OP therapy (location guide provided) and OP D&A treatment (patient has Lena from [email or web address] contact info-- she also met with patient at bedside this AM). Family acknowledging patient has the right to make this decision. Patient encouraged to follow up with [facility] ASAP. Patient stating he will be needing a ride home. [facility] waiver signed and labeled to be scanned to patients chart. Confirmed address in chart- 2:00PM [facility] requested- RN made aware and attached to notifications. All referrals canceled in [facility]. No further CM needs anticipated at this time.    Would you like to participate in our [facility] service program?  : No - Declined    Treatment Team Recommendation: Acute Rehab, Substance Abuse Treatment  Discharge Destination Plan:: Home  Transport at Discharge : Ride  Share    Number/Name of Dispatcher: Roundtrip  Transported by (Company and Unit #): [employer] Villalobos ID: [id number]  ETA of Transport (Date): 10/28/24  ETA of Transport (Time): 1400

## 2024-10-28 NOTE — ASSESSMENT & PLAN NOTE
Unclear etiology. Suspect liver etiology  With acute hypoxic respiratory failure. Requiring up to 15 L. Now back to baseline RA  Diuresed -14.7 L during hospital stay    Plan:  Discharge home on PO Lasix 20 mg and Spironolactone 50 mg qd  Follow up with nephrology in the outpatient setting  Follow-up with gastroenterology in the outpatient setting

## 2024-10-28 NOTE — PLAN OF CARE
Problem: OCCUPATIONAL THERAPY ADULT  Goal: Performs self-care activities at highest level of function for planned discharge setting.  See evaluation for individualized goals.  Description: Treatment Interventions: ADL retraining, Functional transfer training, Patient/family training, Equipment evaluation/education, Compensatory technique education, Activityengagement          See flowsheet documentation for full assessment, interventions and recommendations.   Note: Limitation: Decreased self-care trans, Decreased high-level ADLs  Prognosis: Good  Assessment: Pt is a 54 y.o. male admitted to [facility] on 10/16/2024 due to SOB, cough, vomiting, confusion and generalized malaise. Found to have sodium of 99 w/ severe hyponatremia. Pt seen on this date for OT Re-Evaluation due to initial evaluation goals expiring. Please refer to H&P/ initial OT eval (10/17/2024) for detailed PMH and social history. At baseline pt lives w/ spouse in a one level apt. Independent with ADLs/IADLs and fnxl mobility w/o use of AD. (+) driving and (+) falls. Upon re-eval pt performed as is listed above, demonstrating improved functional mobility, cognition, insight, balance, independence in ADLs, activity tolerance and endurance. Pt would benefit from continued skilled OT treatment in order to maximize safety, independence and overall performance with ADLs, functional transfers, functional mobility and cognition in order to achieve highest level of function. Updated goals are listed below     Rehab Resource Intensity Level, OT: III (Minimum Resource Intensity)           77 yo F with PMHx of Chronic AFib on Eliquis, Pancreatic Ca s/p whipple 21 (in remission) presents with worsening fatigue and diarrhea. Pt was found to be positive for C. diff. During her previous admission, she was found to have fungemia and was treated with caspofungin with a PICC line completed on 21 and since then, she reports worsening diarrhea.     #Bilateral arm swelling:   - arm swelling present from mid arm to mid forearm  - Bilateral arm Duplex ordered   - will follow up     #C. Diff colitis:  - pt is having about 4-5 watery diarrhea episodes per day  - continue with vancomycin 125mg q6h   - currently not experiencing abdominal pain  - pt is currently on contact precautions     #Decreased PO Intake:  - as per dietician: 1) Continue current diet order 2) Add ensure enlive TID to diet order 3) Will provide banatrol plus TID ; a nutrition supplement for loose stools and diarrhea  - calorie count   - monitor PO intake for increase to about 75%  - pt is drinking well     #Positive UA:  - positive UA   - Urine culture shows multiple resistance   - pt started on cipro on 10/4  - ID consult: recommended d/c cipro  - pt is asymptomatic     #Hypomagnesmia   - Ma.6   - magnesium repletion needed but due to bilateral arm swelling, pt is refusing IV mag repletion  - oral mag oxalate not given due to possibility of increased diarrhea episodes   - repeat magnesium levels in the AM tomorrow     #Chronic afib  - pt is on cardizem and eliquis - continue with this management     #Pancreatic Cancer s/p Whipple procedure - in remission   - follow up outpatient     #Lactic acidosis - resolved    - secondary to dehydration   - positive for c diff        77 yo F with PMHx of Chronic AFib on Eliquis, Pancreatic Ca s/p whipple 21 (in remission) presents with worsening fatigue and diarrhea. Pt was found to be positive for C. diff. During her previous admission, she was found to have fungemia and was treated with caspofungin with a PICC line completed on 21 and since then, she reports worsening diarrhea.     #Bilateral arm swelling:   - arm swelling present from mid arm to mid forearm  - Bilateral arm Duplex ordered   - will follow up     #C. Diff colitis:  - pt is having about 4-5 watery diarrhea episodes per day  - continue with vancomycin 125mg q6h   - currently not experiencing abdominal pain  - pt is currently on contact precautions     #Decreased PO Intake:  #Malnutrition:  - as per dietician: 1) Continue current diet order 2) Add ensure enlive TID to diet order 3) Will provide banatrol plus TID ; a nutrition supplement for loose stools and diarrhea  - calorie count   - monitor PO intake for increase to about 75%  - pt is drinking well   - pt may be malabsorbing food given hx of Whipple procedure - discussed with Dr. Nino who recommends adding Creon [id number] TID with meals     #Positive UA:  - positive UA   - Urine culture shows multiple resistance   - pt started on cipro on 10/4  - ID consult: recommended d/c cipro  - pt is asymptomatic     #Hypomagnesmia   - Ma.6   - magnesium repletion needed but due to bilateral arm swelling, pt is refusing IV mag repletion  - oral mag oxalate not given due to possibility of increased diarrhea episodes   - repeat magnesium levels in the AM tomorrow     #Chronic afib  - pt is on cardizem and eliquis - continue with this management     #Pancreatic Cancer s/p Whipple procedure - in remission   - follow up outpatient   - added Creon to medication regimen     #Lactic acidosis - resolved    - secondary to dehydration   - positive for c diff        04-May-2022 15:22

## 2024-10-28 NOTE — ASSESSMENT & PLAN NOTE
Drinks 12 pack beer plus several glasses of rum and coke daily  Catch program upon discharge  Follow-up with gastroenterology in the outpatient setting

## 2024-10-28 NOTE — ASSESSMENT & PLAN NOTE
Lab Results   Component Value Date    AST 64 (H) 10/28/2024    ALT 47 10/28/2024    ALKPHOS 313 (H) 10/28/2024    TBILI 0.93 10/28/2024     RUQ US (10/17/24) - hepatic steatosis with morphologic features concerning for cirrhosis.  Small volume RUQ ascites.  Liver Dopplers (10/25/2024): Prominent main portal vein suggests portal venous hypertension.   As per GI likely trending down mixed pattern in the setting of hepatic congestion, and alcohol use.  MELD score stable   Hepatitis panel, EZEQUIEL, AFP, ceruloplasmin levels normal  Alpha-1 antitrypsin level 271, high    Plan  Follow up with GI in the outpatient setting  We recommend repeat testing within 1 week with PCP

## 2024-10-28 NOTE — OCCUPATIONAL THERAPY NOTE
Occupational Therapy Re-evaluation     Patient Name: Viktor Farfan  Today's Date: 10/28/2024  Problem List  Principal Problem:    Acute hypoxic respiratory failure (HCC)  Active Problems:    SIRS (systemic inflammatory response syndrome) (HCC)    Alcohol abuse    Tobacco abuse    Thrombocytopenia (HCC)    Transaminitis    Pneumonia    At risk for acid-base imbalance    Electrolyte imbalance risk    HTN (hypertension)    Anemia    Vitamin B12 deficiency    Folate deficiency    Urinary retention    Sepsis due to pneumonia (HCC)    Ambulatory dysfunction    Screen for colon cancer    Volume overload    Past Medical History  Past Medical History:   Diagnosis Date    Anxiety     Back pain     Chronic right-sided low back pain without sciatica 3/31/2018    Depression     Gastroesophageal reflux disease 3/31/2018    GERD (gastroesophageal reflux disease)     Right foot pain      Past Surgical History  Past Surgical History:   Procedure Laterality Date    NO PAST SURGERIES      ORIF FINGER FRACTURE Right 8/6/2019    Procedure: OPEN REDUCTION FINGER;  Surgeon: Tomas Morgan MD;  Location: [facility];  Service: Plastics         10/28/24 1036   OT Last Visit   OT Visit Date 10/28/24   Note Type   Note type Re-Evaluation   Pain Assessment   Pain Assessment Tool 0-10   Pain Score No Pain   Restrictions/Precautions   Weight Bearing Precautions Per Order No   Other Precautions Fall Risk  (off bed/chair alarm upon arrival to the room - pt has been mobilizing in the room and hallway Mod I)   Prior Function   Comments See initial evaluation for detailed information regarding  home setup and PLOF.   Lifestyle   Autonomy Pt lives w/ spouse in a one level apt. Independent with ADLs/IADLs and fnxl mobility w/o use of AD. (+) driving and (+) falls.   Reciprocal Relationships Supportive spouse   Service to Others Unemployed   General   Additional Pertinent History Pt admit [facility] [place] [facility] [place] with SOB, cough,  "vomiting, confusion and generalized malaise. Found to have sodium of 99 w/ severe hyponatremia. PMHx: EtOH abuse, GERD, depression, anxiety   Family/Caregiver Present No   Subjective   Subjective \"are you my ticket out of here?\"   ADL   Eating Assistance 7  Independent   Grooming Assistance 7  Independent   UB Bathing Assistance 6  Modified Independent   LB Bathing Assistance 6  Modified Independent   UB Dressing Assistance 6  Modified independent   LB Dressing Assistance 6  Modified independent   LB Dressing Deficit Setup  (doff/woody pants while sitting on the toilet. Edu on safe sitting surfaces while completing LB dressing. Pt verbalized understanding)   Toileting Assistance  6  Modified independent   Bed Mobility   Additional Comments Pt received sitting at the EOB   Transfers   Sit to Stand 6  Modified independent   Stand to Sit 6  Modified independent   Stand pivot 6  Modified independent   Additional items   (no use of AD)   Toilet transfer 6  Modified independent   Additional Comments No use of AD. No overt LOB or signs stability. Pt denies lightheadedness/dizziness   Functional Mobility   Functional Mobility 6  Modified independent   Additional Comments fnxl community distance w/o use of AD w/ change in direction. Pt w/ much improved gait and balance on this date   Additional items   (none)   Balance   Static Sitting Good   Dynamic Sitting Fair +   Static Standing Fair +   Dynamic Standing Fair +   Activity Tolerance   Activity Tolerance Patient tolerated treatment well   Medical Staff Made Aware Spoke with CM, PT   Nurse Made Aware Spoke with RN pre/post   RUE Assessment   RUE Assessment WFL   LUE Assessment   LUE Assessment WFL   Sensation   Light Touch   (diabetic neuropathy from feet up to calves bilaterally at baseline)   Vision-Basic Assessment   Current Vision Wears glasses only for reading   Cognition   Overall Cognitive Status WFL  (questionable higher level cognitive deficits - overall improved " mentation)   Arousal/Participation Alert;Responsive;Cooperative   Attention Within functional limits   Orientation Level Oriented X4   Memory Decreased recall of recent events   Following Commands Follows one step commands without difficulty   Comments Pt ID via wristband, name and . Pt is very pleasent and cooperative, eager to return home and to functional baseline. Demonstrates good safety awareness.   Assessment   Limitation Decreased self-care trans;Decreased high-level ADLs   Prognosis Good   Assessment Pt is a 54 y.o. male admitted to [facility] on 10/16/2024 due to SOB, cough, vomiting, confusion and generalized malaise. Found to have sodium of 99 w/ severe hyponatremia. Pt seen on this date for OT Re-Evaluation due to initial evaluation goals expiring. Please refer to H&P/ initial OT eval (10/17/2024) for detailed PMH and social history. At baseline pt lives w/ spouse in a one level apt. Independent with ADLs/IADLs and fnxl mobility w/o use of AD. (+) driving and (+) falls. Upon re-eval pt performed as is listed above, demonstrating improved functional mobility, cognition, insight, balance, independence in ADLs, activity tolerance and endurance. Pt would benefit from continued skilled OT treatment in order to maximize safety, independence and overall performance with ADLs, functional transfers, functional mobility and cognition in order to achieve highest level of function. Updated goals are listed below   Goals   Patient Goals to go home   LTG Time Frame 10-14   Long Term Goal See below   Plan   Treatment Interventions ADL retraining;Functional transfer training;Patient/family training;Equipment evaluation/education;Compensatory technique education;Activityengagement   Goal Expiration Date 24   OT Treatment Day 2   OT Frequency 1-2x/wk   Discharge Recommendation   Rehab Resource Intensity Level, OT III (Minimum Resource Intensity)   Additional Comments  The patient's raw score on the AM-PAC Daily  Activity Inpatient Short Form is 22. A raw score of greater than or equal to 19 suggests the patient may benefit from discharge to home. Please refer to the recommendation of the Occupational Therapist for safe discharge planning.   AM-PAC Daily Activity Inpatient   Lower Body Dressing 3   Bathing 3   Toileting 4   Upper Body Dressing 4   Grooming 4   Eating 4   Daily Activity Raw Score 22   Daily Activity Standardized Score (Calc for Raw Score >=11) 47.1   AM-PAC Applied Cognition Inpatient   Following a Speech/Presentation 3   Understanding Ordinary Conversation 4   Taking Medications 3   Remembering Where Things Are Placed or Put Away 4   Remembering List of 4-5 Errands 3   Taking Care of Complicated Tasks 3   Applied Cognition Raw Score 20   Applied Cognition Standardized Score 41.76   Barthel Index   Feeding 10   Bathing 0   Grooming Score 5   Dressing Score 10   Bladder Score 10   Bowels Score 10   Toilet Use Score 10   Transfers (Bed/Chair) Score 10   Mobility (Level Surface) Score 10   Stairs Score 5   Barthel Index Score 80   End of Consult   Education Provided Yes   Patient Position at End of Consult Seated edge of bed;All needs within reach   Nurse Communication Nurse aware of consult       GOALS:     -Patient will be Independent with LB dressing with use of AE and AD as needed in order to increase (I) with ADLs     -Patient will be Independent with LB bathing with use of AE and AD as needed in order to increase (I) with ADLs      -Patient will tolerate therapeutic activities for greater than 40 min, in order to increase tolerance for functional activities.      -Patient will demonstrate PLB techniques during ADL tasks with min VC     -Patient will demonstrate standing for 15-18 min in order to increase active participation in functional activities    Linda Harrell MS OTR/L   [place] Licensure# [id number]

## 2024-10-28 NOTE — ASSESSMENT & PLAN NOTE
Lab Results   Component Value Date    HGB 9.9 (L) 10/27/2024    HGB 9.9 (L) 10/26/2024    HGB 10.5 (L) 10/25/2024   Baseline is 10-11  Microcytic  No active bleeding  Iron panel - iron 123, ferritin 62, TIBC 384  Vitamin B12 330, folate 5.9  Suspect secondary to bone marrow suppression from alcohol use, continue to monitor  We recommend repeat testing within 1 week with primary care physician

## 2024-10-28 NOTE — INCIDENTAL FINDINGS
The following findings require follow up:  Radiographic finding   Finding:      XR chest portable ICU: Increasing consolidation and groundglass density in the left lung in the perihilar region, suggest worsening pneumonia, Workstation performed: [id number]     Follow up required: No, this was addressed during hospital stay     Incidental finding results were discussed with the Patient by Duyen George DO on 10/28/24.   They expressed understanding and all questions answered.

## 2024-10-28 NOTE — ASSESSMENT & PLAN NOTE
PT recommend level I  However, patient declined inpatient rehab.  Opted for outpatient PT and OT, referrals given

## 2024-10-29 DIAGNOSIS — E87.1 HYPONATREMIA: Primary | ICD-10-CM

## 2024-10-29 NOTE — TELEPHONE ENCOUNTER
Patient calling he is going to [facility] labs to get his labs done. Please add lab orders and fax to [facility].

## 2024-10-29 NOTE — UTILIZATION REVIEW
NOTIFICATION OF ADMISSION DISCHARGE   This is a Notification of Discharge from [facility]. Please be advised that this patient has been discharge from our facility. Below you will find the admission and discharge date and time including the patient’s disposition.   UTILIZATION REVIEW CONTACT:  Janey Mueller  Utilization   Network Utilization Review Department  Phone: [phone] x carefully listen to the prompts. All voicemails are confidential.  Email: [email or web address]     ADMISSION INFORMATION  PRESENTATION DATE: 10/16/2024  2:51 PM  OBERVATION ADMISSION DATE: N/A  INPATIENT ADMISSION DATE: 10/16/24  2:51 PM   DISCHARGE DATE: 10/28/2024  2:16 PM   DISPOSITION:Home/Self Care    Network Utilization Review Department  ATTENTION: Please call with any questions or concerns to [phone] and carefully listen to the prompts so that you are directed to the right person. All voicemails are confidential.   For Discharge needs, contact Care Management DC Support Team at [phone] opt. 2  Send all requests for admission clinical reviews, approved or denied determinations and any other requests to dedicated fax number below belonging to the campus where the patient is receiving treatment. List of dedicated fax numbers for the Facilities:  FACILITY NAME UR FAX NUMBER   ADMISSION DENIALS (Administrative/Medical Necessity) [phone]   DISCHARGE SUPPORT TEAM ([facility]) [phone]   PARENT CHILD HEALTH (Maternity/NICU/Pediatrics) [phone]   [facility] [phone]   [facility] [phone]   [facility] [phone]   [facility] [phone]   [facility] [phone]   [facility] [phone]   [facility] [phone]   [facility] [phone]    [facility] [phone]   [facility] [phone]   [facility] [phone]   [facility] [phone]

## 2024-11-01 RX ORDER — ESCITALOPRAM OXALATE 20 MG/1
20 TABLET ORAL DAILY
Qty: 30 TABLET | Refills: 0 | Status: SHIPPED | OUTPATIENT
Start: 2024-11-01 | End: 2024-12-01

## 2024-11-01 NOTE — TELEPHONE ENCOUNTER
Patient called to give HNL Location which is int [facility] center:    Fax: [phone]  Phone: [phone] [address] [place] [address]    Patient will be there Monday to get labs done.    Please Advise.

## 2024-11-08 ENCOUNTER — TELEPHONE (OUTPATIENT)
Dept: GASTROENTEROLOGY | Facility: CLINIC | Age: 54
End: 2024-11-08

## 2024-11-08 NOTE — TELEPHONE ENCOUNTER
----- Message from Mahogany PHELPS sent at 10/28/2024  8:51 AM EDT -----  Regarding: F/U ov  Can you please schedule this? Thank you!  ----- Message -----  From: Solange Palm MA  Sent: 10/28/2024   8:00 AM EDT  To: Mahogany Pearson MA      ----- Message -----  From: Sergio Duffy MD  Sent: 10/26/2024   3:07 PM EDT  To: [facility] [place]    Patient needs office visit for hospital follow-up within the next 1 month with myself or one of the PAs.    Okay to overbook with me.

## 2024-11-08 NOTE — TELEPHONE ENCOUNTER
Spoke with pt and scheduled him for 11/25 at 4pm at [place]. Advised pt he will be reminded multiple times regarding his appt.

## 2024-11-20 ENCOUNTER — OFFICE VISIT (OUTPATIENT)
Dept: NEPHROLOGY | Facility: CLINIC | Age: 54
End: 2024-11-20
Payer: COMMERCIAL

## 2024-11-20 VITALS
DIASTOLIC BLOOD PRESSURE: 70 MMHG | SYSTOLIC BLOOD PRESSURE: 118 MMHG | HEIGHT: 74 IN | BODY MASS INDEX: 28.75 KG/M2 | WEIGHT: 224 LBS

## 2024-11-20 DIAGNOSIS — Z09 HOSPITAL DISCHARGE FOLLOW-UP: ICD-10-CM

## 2024-11-20 DIAGNOSIS — E87.79 OTHER HYPERVOLEMIA: Primary | ICD-10-CM

## 2024-11-20 DIAGNOSIS — I10 PRIMARY HYPERTENSION: ICD-10-CM

## 2024-11-20 PROBLEM — J18.9 PNEUMONIA: Status: RESOLVED | Noted: 2024-10-21 | Resolved: 2024-11-20

## 2024-11-20 PROCEDURE — 99214 OFFICE O/P EST MOD 30 MIN: CPT | Performed by: INTERNAL MEDICINE

## 2024-11-20 NOTE — ASSESSMENT & PLAN NOTE
-- Resolved volume status has improved  --Weight down to 225 pounds will establish this as his dry weight.  --Blood pressure is excellent  --If his weight goes below 220 pounds okay to reduce the frequency of Lasix to every other day and if it continues to drop can discontinue Lasix and use as needed if needed    Orders:    Basic metabolic panel; Future    Comprehensive metabolic panel; Future

## 2024-11-20 NOTE — PROGRESS NOTES
Name: Viktor Farfan      : 1970      MRN: [id number]  Encounter Provider: Zeynep Saucedo MD  Encounter Date: 2024   Encounter department: [facility] [place]  :  Assessment & Plan  Other hypervolemia  -- Resolved volume status has improved  --Weight down to 225 pounds will establish this as his dry weight.  --Blood pressure is excellent  --If his weight goes below 220 pounds okay to reduce the frequency of Lasix to every other day and if it continues to drop can discontinue Lasix and use as needed if needed    Orders:    Basic metabolic panel; Future    Comprehensive metabolic panel; Future    Primary hypertension  -- Blood pressure is under excellent control  --Currently euvolemic  --Continue amlodipine 10 mg daily, losartan 25 mg daily, furosemide 20 mg daily  --If blood pressure becomes too low or hypovolemic can discontinue furosemide    Orders:    Basic metabolic panel; Future    Comprehensive metabolic panel; Future    Hospital discharge follow-up  -- Patient was recently discharged at the end of October from [facility].  He was admitted on  for shortness of breath weakness and decreased oral intake.  He was initially presented to [facility] transferred to [place] for escalation of care.  --Nephrology was called for severe hyponatremia  --His admission sodium was 101 and dropped to as low as 99  --He initially received 3% saline which then overcorrected resulting in him being administered hypotonic fluids  --His sodium normalized in the hospital.  His post discharge sodium is normal at 140  --His volume status has improved.  --Suspected to be secondary to volume overload, beer potomania and poor solute intake  --Repeat BMP in 6 months and a CMP in 1 year.  If sodium level stable no further nephrology follow-up required.  Orders:    Basic metabolic panel; Future    Comprehensive metabolic panel; Future        History of Present Illness     HPI  Viktor Farfan  is a 54 y.o. male who presents hospital follow-up for hyponatremia.  Patient had severe hyponatremia about a month ago sodium was 101 on admission and dropped to as low as 99.  He did receive 3% saline and did have overcorrection for which required hypotonic fluids and DDAVP.    His urine sodium was 21 urine osmolality 347 serum osmolality 233.  TSH was normal.  CT scan showed patchy multifocal consolidation of the upper lobes.    He was volume overloaded and diuresed with IV diuretics transition to oral diuretics currently on furosemide 20 mg daily.  Volume status has much improved.    No swelling no chest pain or shortness of breath blood pressure under excellent control.    Discharge sodium is now normal.    History obtained from: patient    Review of Systems  Current Outpatient Medications on File Prior to Visit   Medication Sig Dispense Refill    amLODIPine (NORVASC) 10 mg tablet Take 10 mg by mouth daily      Aspirin Low Dose 81 MG chewable tablet Chew 81 mg daily Chew      atorvastatin (LIPITOR) 20 mg tablet Take 20 mg by mouth daily      bacitracin topical ointment 500 units/g topical ointment Apply 1 large application topically 2 (two) times a day 28 g 0    cyanocobalamin (VITAMIN B-12) 1000 MCG tablet Take 1 tablet (1,000 mcg total) by mouth daily 30 tablet 0    Diclofenac Sodium (VOLTAREN) 1 % APPLY 2 GRAMS TO AFFECTED AREA TWICE A DAY      escitalopram (LEXAPRO) 20 mg tablet Take 1 tablet (20 mg total) by mouth daily 30 tablet 0    fluticasone (FLONASE) 50 mcg/act nasal spray 1 spray into each nostril daily 16 g 0    folic acid (FOLVITE) 1 mg tablet Take 1 tablet (1 mg total) by mouth daily 30 tablet 0    furosemide (LASIX) 20 mg tablet Take 1 tablet (20 mg total) by mouth daily for 30 doses 30 tablet 0    gabapentin (NEURONTIN) 100 mg capsule TAKE 2 CAPSULE BY ORAL ROUTE 3 TIMES EVERY BEDTIME      hydrOXYzine HCL (ATARAX) 25 mg tablet Take 1 tablet (25 mg total) by mouth daily at bedtime as needed for  "itching 12 tablet 0    meloxicam (MOBIC) 15 mg tablet TAKE 1 TABLET (15 MG TOTAL) BY MOUTH DAILY. 30 tablet 5    metFORMIN (GLUCOPHAGE) 500 mg tablet Take 500 mg by mouth 2 (two) times a day with meals With morning and evening      mirtazapine (REMERON) 15 mg tablet TAKE 1 TABLET BY MOUTH EVERY DAY BEFORE BEDTIME      nicotine (NICODERM CQ) 21 mg/24 hr TD 24 hr patch Place 1 patch on the skin over 24 hours daily 28 patch 0    omeprazole (PriLOSEC) 20 mg delayed release capsule Take 20 mg by mouth daily Take before a meal      spironolactone (ALDACTONE) 50 mg tablet Take 1 tablet (50 mg total) by mouth daily 30 tablet 0    thiamine 100 MG tablet Take 1 tablet (100 mg total) by mouth daily 30 tablet 0     No current facility-administered medications on file prior to visit.         Objective   /70   Ht 6' 2\" (1.88 m)   Wt 102 kg (224 lb)   BMI 28.76 kg/m²      Physical Exam  Vitals and nursing note reviewed.   Constitutional:       General: He is not in acute distress.     Appearance: He is well-developed. He is not diaphoretic.   HENT:      Head: Normocephalic and atraumatic.   Eyes:      General: No scleral icterus.     Pupils: Pupils are equal, round, and reactive to light.   Cardiovascular:      Rate and Rhythm: Normal rate and regular rhythm.      Heart sounds: Normal heart sounds. No murmur heard.     No friction rub. No gallop.   Pulmonary:      Effort: Pulmonary effort is normal. No respiratory distress.      Breath sounds: Normal breath sounds. No wheezing or rales.   Chest:      Chest wall: No tenderness.   Abdominal:      General: Bowel sounds are normal. There is distension.      Palpations: Abdomen is soft.      Tenderness: There is no abdominal tenderness. There is no rebound.   Musculoskeletal:         General: Normal range of motion.      Cervical back: Normal range of motion and neck supple.   Skin:     Findings: No rash.   Neurological:      Mental Status: He is alert and oriented to person, " place, and time.         Administrative Statements   I have spent a total time of 30 minutes in caring for this patient on the day of the visit/encounter including Prognosis, Importance of tx compliance, Risk factor reductions, Counseling / Coordination of care, Documenting in the medical record, and Obtaining or reviewing history  .

## 2024-11-20 NOTE — PATIENT INSTRUCTIONS
Continue everything the same.    If weight below 220 can reduce lasix to every other day    Blood work in 6 months and 12 months

## 2024-11-20 NOTE — ASSESSMENT & PLAN NOTE
-- Patient was recently discharged at the end of October from [facility].  He was admitted on October 16 for shortness of breath weakness and decreased oral intake.  He was initially presented to [facility] transferred to [place] for escalation of care.  --Nephrology was called for severe hyponatremia  --His admission sodium was 101 and dropped to as low as 99  --He initially received 3% saline which then overcorrected resulting in him being administered hypotonic fluids  --His sodium normalized in the hospital.  His post discharge sodium is normal at 140  --His volume status has improved.  --Suspected to be secondary to volume overload, beer potomania and poor solute intake  --Repeat BMP in 6 months and a CMP in 1 year.  If sodium level stable no further nephrology follow-up required.  Orders:    Basic metabolic panel; Future    Comprehensive metabolic panel; Future

## 2024-11-20 NOTE — ASSESSMENT & PLAN NOTE
-- Blood pressure is under excellent control  --Currently euvolemic  --Continue amlodipine 10 mg daily, losartan 25 mg daily, furosemide 20 mg daily  --If blood pressure becomes too low or hypovolemic can discontinue furosemide    Orders:    Basic metabolic panel; Future    Comprehensive metabolic panel; Future

## 2024-11-21 PROBLEM — A41.9 SEPSIS DUE TO PNEUMONIA (HCC): Status: RESOLVED | Noted: 2024-10-22 | Resolved: 2024-11-21

## 2024-11-21 PROBLEM — J18.9 SEPSIS DUE TO PNEUMONIA (HCC): Status: RESOLVED | Noted: 2024-10-22 | Resolved: 2024-11-21

## 2024-11-24 PROBLEM — Z12.11 SCREEN FOR COLON CANCER: Status: RESOLVED | Noted: 2024-10-25 | Resolved: 2024-11-24

## 2024-11-25 ENCOUNTER — TELEPHONE (OUTPATIENT)
Dept: NEPHROLOGY | Facility: CLINIC | Age: 54
End: 2024-11-25

## 2024-11-25 DIAGNOSIS — R74.01 TRANSAMINITIS: Primary | ICD-10-CM

## 2024-11-26 LAB
ALBUMIN SERPL-MCNC: 3.5 G/DL (ref 3.5–5.7)
ALBUMIN SERPL-MCNC: 3.6 G/DL (ref 3.5–5.7)
ALP SERPL-CCNC: 142 U/L (ref 35–120)
ALP SERPL-CCNC: 143 U/L (ref 35–120)
ALT SERPL-CCNC: 16 U/L
ALT SERPL-CCNC: 16 U/L
ANION GAP SERPL CALCULATED.3IONS-SCNC: 10 MMOL/L (ref 3–11)
AST SERPL-CCNC: 26 U/L
AST SERPL-CCNC: 26 U/L
BASOPHILS # BLD AUTO: 0 THOU/CMM (ref 0–0.1)
BASOPHILS NFR BLD AUTO: 1 %
BILIRUB DIRECT SERPL-MCNC: 0.2 MG/DL (ref 0–0.2)
BILIRUB SERPL-MCNC: 0.7 MG/DL (ref 0.2–1)
BILIRUB SERPL-MCNC: 0.7 MG/DL (ref 0.2–1)
BUN SERPL-MCNC: 10 MG/DL (ref 7–28)
CALCIUM SERPL-MCNC: 9.1 MG/DL (ref 8.5–10.5)
CHLORIDE SERPL-SCNC: 104 MMOL/L (ref 100–109)
CO2 SERPL-SCNC: 26 MMOL/L (ref 21–31)
CREAT SERPL-MCNC: 0.72 MG/DL (ref 0.53–1.3)
CYTOLOGY CMNT CVX/VAG CYTO-IMP: ABNORMAL
DIFFERENTIAL METHOD BLD: ABNORMAL
EOSINOPHIL # BLD AUTO: 0.3 THOU/CMM (ref 0–0.5)
EOSINOPHIL NFR BLD AUTO: 5 %
ERYTHROCYTE [DISTWIDTH] IN BLOOD BY AUTOMATED COUNT: 16.4 % (ref 12–16)
GFR/BSA.PRED SERPLBLD CYS-BASED-ARV: 108 ML/MIN/{1.73_M2}
GLUCOSE SERPL-MCNC: 202 MG/DL (ref 65–99)
HCT VFR BLD AUTO: 37.7 % (ref 37–48)
HGB BLD-MCNC: 12.6 G/DL (ref 12.5–17)
INR PPP: 1.1
LYMPHOCYTES # BLD AUTO: 1.6 THOU/CMM (ref 1–3)
LYMPHOCYTES NFR BLD AUTO: 28 %
MCH RBC QN AUTO: 28.1 PG (ref 27–36)
MCHC RBC AUTO-ENTMCNC: 33.4 G/DL (ref 32–37)
MCV RBC AUTO: 84 FL (ref 80–100)
MONOCYTES # BLD AUTO: 0.3 THOU/CMM (ref 0.3–1)
MONOCYTES NFR BLD AUTO: 6 %
NEUTROPHILS # BLD AUTO: 3.6 THOU/CMM (ref 1.8–7.8)
NEUTROPHILS NFR BLD AUTO: 60 %
PLATELET # BLD AUTO: 101 THOU/CMM (ref 140–350)
PMV BLD REES-ECKER: 10.5 FL (ref 7.5–11.3)
POTASSIUM SERPL-SCNC: 4.2 MMOL/L (ref 3.5–5.2)
PROT SERPL-MCNC: 7.3 G/DL (ref 6.3–8.3)
PROT SERPL-MCNC: 7.3 G/DL (ref 6.3–8.3)
PROTHROMBIN TIME: 14.6 SEC (ref 12–14.6)
RBC # BLD AUTO: 4.48 MILL/CMM (ref 4–5.4)
SODIUM SERPL-SCNC: 140 MMOL/L (ref 135–145)
WBC # BLD AUTO: 5.9 THOU/CMM (ref 4–10.5)

## 2024-11-27 ENCOUNTER — RESULTS FOLLOW-UP (OUTPATIENT)
Dept: OTHER | Facility: HOSPITAL | Age: 54
End: 2024-11-27

## 2024-11-27 NOTE — RESULT ENCOUNTER NOTE
Labs reviewed.  Please call patient let him know his sodium level is stable and normal.  Kidney function is stable at his baseline normal.  Repeat labs in 6 months as previously recommended by Dr. Saucedo.  Follow-up in office in 1 year.

## 2024-11-29 ENCOUNTER — RESULTS FOLLOW-UP (OUTPATIENT)
Dept: GASTROENTEROLOGY | Facility: CLINIC | Age: 54
End: 2024-11-29

## 2024-11-29 NOTE — TELEPHONE ENCOUNTER
Message left on patient's VM that labs are stable.  Per Steffi, repeat labs in 6 months as previously recommended, followup in one year.    ----- Message from Steffi Prieto PA-C sent at 11/27/2024  3:27 PM EST -----  Labs reviewed.  Please call patient let him know his sodium level is stable and normal.  Kidney function is stable at his baseline normal.  Repeat labs in 6 months as previously recommended by Dr. Saucedo.  Follow-up in office in 1 year.

## 2024-12-27 RX ORDER — FUROSEMIDE 20 MG/1
20 TABLET ORAL DAILY
Qty: 30 TABLET | Refills: 0 | OUTPATIENT
Start: 2024-12-27

## 2025-04-11 ENCOUNTER — APPOINTMENT (EMERGENCY)
Dept: CT IMAGING | Facility: HOSPITAL | Age: 55
End: 2025-04-11
Payer: COMMERCIAL

## 2025-04-11 ENCOUNTER — APPOINTMENT (EMERGENCY)
Dept: RADIOLOGY | Facility: HOSPITAL | Age: 55
End: 2025-04-11
Payer: COMMERCIAL

## 2025-04-11 ENCOUNTER — HOSPITAL ENCOUNTER (INPATIENT)
Facility: HOSPITAL | Age: 55
LOS: 2 days | Discharge: HOME HEALTH | End: 2025-04-13
Attending: STUDENT IN AN ORGANIZED HEALTH CARE EDUCATION/TRAINING PROGRAM | Admitting: SURGERY
Payer: COMMERCIAL

## 2025-04-11 DIAGNOSIS — Z91.89 AT RISK FOR ACID-BASE IMBALANCE: ICD-10-CM

## 2025-04-11 DIAGNOSIS — E08.10 DIABETIC KETOACIDOSIS WITHOUT COMA ASSOCIATED WITH DIABETES MELLITUS DUE TO UNDERLYING CONDITION (HCC): ICD-10-CM

## 2025-04-11 DIAGNOSIS — S82.142A CLOSED FRACTURE OF LEFT TIBIAL PLATEAU, INITIAL ENCOUNTER: Primary | ICD-10-CM

## 2025-04-11 DIAGNOSIS — S82.035A CLOSED NONDISPLACED TRANSVERSE FRACTURE OF LEFT PATELLA, INITIAL ENCOUNTER: ICD-10-CM

## 2025-04-11 DIAGNOSIS — R73.9 HYPERGLYCEMIA: ICD-10-CM

## 2025-04-11 PROBLEM — S81.012A KNEE LACERATION, LEFT, INITIAL ENCOUNTER: Status: ACTIVE | Noted: 2025-04-11

## 2025-04-11 PROBLEM — E11.65 HYPERGLYCEMIA DUE TO DIABETES MELLITUS (HCC): Status: ACTIVE | Noted: 2025-04-11

## 2025-04-11 PROBLEM — S82.202A FRACTURE OF LEFT TIBIA: Status: ACTIVE | Noted: 2025-04-11
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
## 2025-04-11 LAB
ABO GROUP BLD: NORMAL
ABO GROUP BLD: NORMAL
ALBUMIN SERPL BCG-MCNC: 3.6 G/DL (ref 3.5–5)
ALP SERPL-CCNC: 171 U/L (ref 34–104)
ALT SERPL W P-5'-P-CCNC: 19 U/L (ref 7–52)
ANION GAP SERPL CALCULATED.3IONS-SCNC: 13 MMOL/L (ref 4–13)
ANION GAP SERPL CALCULATED.3IONS-SCNC: 15 MMOL/L (ref 4–13)
APTT PPP: 28 SECONDS (ref 23–34)
AST SERPL W P-5'-P-CCNC: 35 U/L (ref 13–39)
B-OH-BUTYR SERPL-MCNC: 0.07 MMOL/L (ref 0.02–0.27)
BASE EX.OXY STD BLDV CALC-SCNC: 55.9 % (ref 60–80)
BASE EXCESS BLDV CALC-SCNC: -1.6 MMOL/L
BASOPHILS # BLD AUTO: 0.12 THOUSANDS/ÂΜL (ref 0–0.1)
BASOPHILS NFR BLD AUTO: 2 % (ref 0–1)
BILIRUB SERPL-MCNC: 0.77 MG/DL (ref 0.2–1)
BLD GP AB SCN SERPL QL: NEGATIVE
BUN SERPL-MCNC: 6 MG/DL (ref 5–25)
BUN SERPL-MCNC: 6 MG/DL (ref 5–25)
CALCIUM SERPL-MCNC: 8.5 MG/DL (ref 8.4–10.2)
CALCIUM SERPL-MCNC: 8.6 MG/DL (ref 8.4–10.2)
CHLORIDE SERPL-SCNC: 98 MMOL/L (ref 96–108)
CHLORIDE SERPL-SCNC: 99 MMOL/L (ref 96–108)
CO2 SERPL-SCNC: 19 MMOL/L (ref 21–32)
CO2 SERPL-SCNC: 20 MMOL/L (ref 21–32)
CREAT SERPL-MCNC: 0.88 MG/DL (ref 0.6–1.3)
CREAT SERPL-MCNC: 0.94 MG/DL (ref 0.6–1.3)
EOSINOPHIL # BLD AUTO: 0.24 THOUSAND/ÂΜL (ref 0–0.61)
EOSINOPHIL NFR BLD AUTO: 4 % (ref 0–6)
ERYTHROCYTE [DISTWIDTH] IN BLOOD BY AUTOMATED COUNT: 17.2 % (ref 11.6–15.1)
EST. AVERAGE GLUCOSE BLD GHB EST-MCNC: 303 MG/DL
GFR SERPL CREATININE-BSD FRML MDRD: 91 ML/MIN/1.73SQ M
GFR SERPL CREATININE-BSD FRML MDRD: 97 ML/MIN/1.73SQ M
GLUCOSE SERPL-MCNC: 161 MG/DL (ref 65–140)
GLUCOSE SERPL-MCNC: 173 MG/DL (ref 65–140)
GLUCOSE SERPL-MCNC: 233 MG/DL (ref 65–140)
GLUCOSE SERPL-MCNC: 282 MG/DL (ref 65–140)
GLUCOSE SERPL-MCNC: 340 MG/DL (ref 65–140)
GLUCOSE SERPL-MCNC: 355 MG/DL (ref 65–140)
HBA1C MFR BLD: 12.2 %
HCO3 BLDV-SCNC: 24.1 MMOL/L (ref 24–30)
HCT VFR BLD AUTO: 40.1 % (ref 36.5–49.3)
HGB BLD-MCNC: 12.5 G/DL (ref 12–17)
IMM GRANULOCYTES # BLD AUTO: 0.03 THOUSAND/UL (ref 0–0.2)
IMM GRANULOCYTES NFR BLD AUTO: 0 % (ref 0–2)
INR PPP: 1.15 (ref 0.85–1.19)
LIPASE SERPL-CCNC: 30 U/L (ref 11–82)
LYMPHOCYTES # BLD AUTO: 2.11 THOUSANDS/ÂΜL (ref 0.6–4.47)
LYMPHOCYTES NFR BLD AUTO: 31 % (ref 14–44)
MCH RBC QN AUTO: 25.2 PG (ref 26.8–34.3)
MCHC RBC AUTO-ENTMCNC: 31.2 G/DL (ref 31.4–37.4)
MCV RBC AUTO: 81 FL (ref 82–98)
MONOCYTES # BLD AUTO: 0.71 THOUSAND/ÂΜL (ref 0.17–1.22)
MONOCYTES NFR BLD AUTO: 10 % (ref 4–12)
NEUTROPHILS # BLD AUTO: 3.69 THOUSANDS/ÂΜL (ref 1.85–7.62)
NEUTS SEG NFR BLD AUTO: 53 % (ref 43–75)
NRBC BLD AUTO-RTO: 0 /100 WBCS
O2 CT BLDV-SCNC: 10.7 ML/DL
PCO2 BLDV: 44 MM HG (ref 42–50)
PH BLDV: 7.36 [PH] (ref 7.3–7.4)
PLATELET # BLD AUTO: 116 THOUSANDS/UL (ref 149–390)
PLATELET # BLD AUTO: 94 THOUSANDS/UL (ref 149–390)
PMV BLD AUTO: 11 FL (ref 8.9–12.7)
PO2 BLDV: 30.8 MM HG (ref 35–45)
POTASSIUM SERPL-SCNC: 4.1 MMOL/L (ref 3.5–5.3)
POTASSIUM SERPL-SCNC: 4.7 MMOL/L (ref 3.5–5.3)
PROT SERPL-MCNC: 7.6 G/DL (ref 6.4–8.4)
PROTHROMBIN TIME: 15.4 SECONDS (ref 12.3–15)
RBC # BLD AUTO: 4.97 MILLION/UL (ref 3.88–5.62)
RH BLD: POSITIVE
RH BLD: POSITIVE
SODIUM SERPL-SCNC: 131 MMOL/L (ref 135–147)
SODIUM SERPL-SCNC: 133 MMOL/L (ref 135–147)
SPECIMEN EXPIRATION DATE: NORMAL
WBC # BLD AUTO: 6.9 THOUSAND/UL (ref 4.31–10.16)

## 2025-04-11 PROCEDURE — 99222 1ST HOSP IP/OBS MODERATE 55: CPT | Performed by: SURGERY

## 2025-04-11 PROCEDURE — 73700 CT LOWER EXTREMITY W/O DYE: CPT

## 2025-04-11 PROCEDURE — 73590 X-RAY EXAM OF LOWER LEG: CPT

## 2025-04-11 PROCEDURE — 70450 CT HEAD/BRAIN W/O DYE: CPT

## 2025-04-11 PROCEDURE — 99284 EMERGENCY DEPT VISIT MOD MDM: CPT

## 2025-04-11 PROCEDURE — 96365 THER/PROPH/DIAG IV INF INIT: CPT

## 2025-04-11 PROCEDURE — 85025 COMPLETE CBC W/AUTO DIFF WBC: CPT

## 2025-04-11 PROCEDURE — 99254 IP/OBS CNSLTJ NEW/EST MOD 60: CPT | Performed by: INTERNAL MEDICINE

## 2025-04-11 PROCEDURE — 73564 X-RAY EXAM KNEE 4 OR MORE: CPT

## 2025-04-11 PROCEDURE — 86900 BLOOD TYPING SEROLOGIC ABO: CPT

## 2025-04-11 PROCEDURE — 96366 THER/PROPH/DIAG IV INF ADDON: CPT

## 2025-04-11 PROCEDURE — 96376 TX/PRO/DX INJ SAME DRUG ADON: CPT

## 2025-04-11 PROCEDURE — 80053 COMPREHEN METABOLIC PANEL: CPT

## 2025-04-11 PROCEDURE — 85730 THROMBOPLASTIN TIME PARTIAL: CPT

## 2025-04-11 PROCEDURE — 0HQLXZZ REPAIR LEFT LOWER LEG SKIN, EXTERNAL APPROACH: ICD-10-PCS | Performed by: STUDENT IN AN ORGANIZED HEALTH CARE EDUCATION/TRAINING PROGRAM

## 2025-04-11 PROCEDURE — 90715 TDAP VACCINE 7 YRS/> IM: CPT

## 2025-04-11 PROCEDURE — 36415 COLL VENOUS BLD VENIPUNCTURE: CPT

## 2025-04-11 PROCEDURE — 99254 IP/OBS CNSLTJ NEW/EST MOD 60: CPT | Performed by: ORTHOPAEDIC SURGERY

## 2025-04-11 PROCEDURE — 71045 X-RAY EXAM CHEST 1 VIEW: CPT

## 2025-04-11 PROCEDURE — 85049 AUTOMATED PLATELET COUNT: CPT | Performed by: PHYSICIAN ASSISTANT

## 2025-04-11 PROCEDURE — 85610 PROTHROMBIN TIME: CPT

## 2025-04-11 PROCEDURE — 80048 BASIC METABOLIC PNL TOTAL CA: CPT | Performed by: PHYSICIAN ASSISTANT

## 2025-04-11 PROCEDURE — 82010 KETONE BODYS QUAN: CPT

## 2025-04-11 PROCEDURE — 82805 BLOOD GASES W/O2 SATURATION: CPT

## 2025-04-11 PROCEDURE — 72125 CT NECK SPINE W/O DYE: CPT

## 2025-04-11 PROCEDURE — 83036 HEMOGLOBIN GLYCOSYLATED A1C: CPT | Performed by: STUDENT IN AN ORGANIZED HEALTH CARE EDUCATION/TRAINING PROGRAM

## 2025-04-11 PROCEDURE — 86901 BLOOD TYPING SEROLOGIC RH(D): CPT

## 2025-04-11 PROCEDURE — 96375 TX/PRO/DX INJ NEW DRUG ADDON: CPT

## 2025-04-11 PROCEDURE — 73552 X-RAY EXAM OF FEMUR 2/>: CPT

## 2025-04-11 PROCEDURE — 83690 ASSAY OF LIPASE: CPT

## 2025-04-11 PROCEDURE — 72170 X-RAY EXAM OF PELVIS: CPT

## 2025-04-11 PROCEDURE — 2W3RX3Z IMMOBILIZATION OF LEFT LOWER LEG USING BRACE: ICD-10-PCS | Performed by: ORTHOPAEDIC SURGERY

## 2025-04-11 PROCEDURE — 86850 RBC ANTIBODY SCREEN: CPT

## 2025-04-11 PROCEDURE — 12002 RPR S/N/AX/GEN/TRNK2.6-7.5CM: CPT | Performed by: STUDENT IN AN ORGANIZED HEALTH CARE EDUCATION/TRAINING PROGRAM

## 2025-04-11 PROCEDURE — 99285 EMERGENCY DEPT VISIT HI MDM: CPT | Performed by: STUDENT IN AN ORGANIZED HEALTH CARE EDUCATION/TRAINING PROGRAM

## 2025-04-11 PROCEDURE — 82948 REAGENT STRIP/BLOOD GLUCOSE: CPT

## 2025-04-11 PROCEDURE — 90471 IMMUNIZATION ADMIN: CPT

## 2025-04-11 RX ORDER — CEFAZOLIN SODIUM 2 G/50ML
2000 SOLUTION INTRAVENOUS
Status: CANCELLED | OUTPATIENT
Start: 2025-04-12 | End: 2025-04-13

## 2025-04-11 RX ORDER — HYDROXYZINE HYDROCHLORIDE 25 MG/1
25 TABLET, FILM COATED ORAL
Status: DISCONTINUED | OUTPATIENT
Start: 2025-04-11 | End: 2025-04-13 | Stop reason: HOSPADM

## 2025-04-11 RX ORDER — AMLODIPINE BESYLATE 10 MG/1
10 TABLET ORAL DAILY
Status: DISCONTINUED | OUTPATIENT
Start: 2025-04-12 | End: 2025-04-13 | Stop reason: HOSPADM

## 2025-04-11 RX ORDER — HYDROMORPHONE HCL IN WATER/PF 6 MG/30 ML
0.2 PATIENT CONTROLLED ANALGESIA SYRINGE INTRAVENOUS EVERY 2 HOUR PRN
Refills: 0 | Status: DISCONTINUED | OUTPATIENT
Start: 2025-04-11 | End: 2025-04-13 | Stop reason: HOSPADM

## 2025-04-11 RX ORDER — LANOLIN ALCOHOL/MO/W.PET/CERES
100 CREAM (GRAM) TOPICAL DAILY
Status: DISCONTINUED | OUTPATIENT
Start: 2025-04-12 | End: 2025-04-13 | Stop reason: HOSPADM

## 2025-04-11 RX ORDER — AMOXICILLIN 250 MG
1 CAPSULE ORAL
Status: DISCONTINUED | OUTPATIENT
Start: 2025-04-11 | End: 2025-04-13 | Stop reason: HOSPADM

## 2025-04-11 RX ORDER — SODIUM CHLORIDE, SODIUM LACTATE, POTASSIUM CHLORIDE, CALCIUM CHLORIDE 600; 310; 30; 20 MG/100ML; MG/100ML; MG/100ML; MG/100ML
75 INJECTION, SOLUTION INTRAVENOUS CONTINUOUS
Status: DISCONTINUED | OUTPATIENT
Start: 2025-04-12 | End: 2025-04-13

## 2025-04-11 RX ORDER — FOLIC ACID 1 MG/1
1 TABLET ORAL DAILY
Status: DISCONTINUED | OUTPATIENT
Start: 2025-04-12 | End: 2025-04-13 | Stop reason: HOSPADM

## 2025-04-11 RX ORDER — PANTOPRAZOLE SODIUM 20 MG/1
20 TABLET, DELAYED RELEASE ORAL
Status: DISCONTINUED | OUTPATIENT
Start: 2025-04-12 | End: 2025-04-13 | Stop reason: HOSPADM

## 2025-04-11 RX ORDER — LIDOCAINE HYDROCHLORIDE AND EPINEPHRINE 10; 10 MG/ML; UG/ML
1 INJECTION, SOLUTION INFILTRATION; PERINEURAL ONCE
Status: COMPLETED | OUTPATIENT
Start: 2025-04-11 | End: 2025-04-11

## 2025-04-11 RX ORDER — TRANEXAMIC ACID 10 MG/ML
1000 INJECTION, SOLUTION INTRAVENOUS
Status: CANCELLED | OUTPATIENT
Start: 2025-04-12 | End: 2025-04-13

## 2025-04-11 RX ORDER — ESCITALOPRAM OXALATE 10 MG/1
10 TABLET ORAL DAILY
Status: DISCONTINUED | OUTPATIENT
Start: 2025-04-12 | End: 2025-04-13 | Stop reason: HOSPADM

## 2025-04-11 RX ORDER — ONDANSETRON 2 MG/ML
4 INJECTION INTRAMUSCULAR; INTRAVENOUS EVERY 4 HOURS PRN
Status: DISCONTINUED | OUTPATIENT
Start: 2025-04-11 | End: 2025-04-13 | Stop reason: HOSPADM

## 2025-04-11 RX ORDER — ATORVASTATIN CALCIUM 20 MG/1
20 TABLET, FILM COATED ORAL DAILY
Status: DISCONTINUED | OUTPATIENT
Start: 2025-04-12 | End: 2025-04-13 | Stop reason: HOSPADM

## 2025-04-11 RX ORDER — POLYETHYLENE GLYCOL 3350 17 G/17G
17 POWDER, FOR SOLUTION ORAL DAILY
Status: DISCONTINUED | OUTPATIENT
Start: 2025-04-11 | End: 2025-04-13 | Stop reason: HOSPADM

## 2025-04-11 RX ORDER — MORPHINE SULFATE 4 MG/ML
4 INJECTION, SOLUTION INTRAMUSCULAR; INTRAVENOUS ONCE
Status: COMPLETED | OUTPATIENT
Start: 2025-04-11 | End: 2025-04-11

## 2025-04-11 RX ORDER — ACETAMINOPHEN 325 MG/1
975 TABLET ORAL EVERY 8 HOURS SCHEDULED
Status: DISCONTINUED | OUTPATIENT
Start: 2025-04-11 | End: 2025-04-13 | Stop reason: HOSPADM

## 2025-04-11 RX ORDER — ENOXAPARIN SODIUM 100 MG/ML
30 INJECTION SUBCUTANEOUS EVERY 12 HOURS SCHEDULED
Status: DISCONTINUED | OUTPATIENT
Start: 2025-04-11 | End: 2025-04-13 | Stop reason: HOSPADM

## 2025-04-11 RX ORDER — SODIUM CHLORIDE, SODIUM GLUCONATE, SODIUM ACETATE, POTASSIUM CHLORIDE, MAGNESIUM CHLORIDE, SODIUM PHOSPHATE, DIBASIC, AND POTASSIUM PHOSPHATE .53; .5; .37; .037; .03; .012; .00082 G/100ML; G/100ML; G/100ML; G/100ML; G/100ML; G/100ML; G/100ML
75 INJECTION, SOLUTION INTRAVENOUS CONTINUOUS
Status: DISCONTINUED | OUTPATIENT
Start: 2025-04-12 | End: 2025-04-11

## 2025-04-11 RX ORDER — OXYCODONE HYDROCHLORIDE 5 MG/1
5 TABLET ORAL EVERY 4 HOURS PRN
Refills: 0 | Status: DISCONTINUED | OUTPATIENT
Start: 2025-04-11 | End: 2025-04-12

## 2025-04-11 RX ORDER — ASPIRIN 81 MG/1
81 TABLET, CHEWABLE ORAL DAILY
Status: DISCONTINUED | OUTPATIENT
Start: 2025-04-12 | End: 2025-04-13 | Stop reason: HOSPADM

## 2025-04-11 RX ORDER — ONDANSETRON 2 MG/ML
4 INJECTION INTRAMUSCULAR; INTRAVENOUS EVERY 6 HOURS PRN
Status: DISCONTINUED | OUTPATIENT
Start: 2025-04-11 | End: 2025-04-11

## 2025-04-11 RX ADMIN — OXYCODONE HYDROCHLORIDE 5 MG: 5 TABLET ORAL at 15:25

## 2025-04-11 RX ADMIN — SENNOSIDES AND DOCUSATE SODIUM 1 TABLET: 50; 8.6 TABLET ORAL at 21:18

## 2025-04-11 RX ADMIN — SODIUM CHLORIDE 3 UNITS/HR: 9 INJECTION, SOLUTION INTRAVENOUS at 16:12

## 2025-04-11 RX ADMIN — ENOXAPARIN SODIUM 30 MG: 30 INJECTION SUBCUTANEOUS at 21:18

## 2025-04-11 RX ADMIN — ACETAMINOPHEN 975 MG: 325 TABLET, FILM COATED ORAL at 21:18

## 2025-04-11 RX ADMIN — HYDROMORPHONE HYDROCHLORIDE 0.2 MG: 0.2 INJECTION, SOLUTION INTRAMUSCULAR; INTRAVENOUS; SUBCUTANEOUS at 14:32

## 2025-04-11 RX ADMIN — MORPHINE SULFATE 4 MG: 4 INJECTION INTRAVENOUS at 11:37

## 2025-04-11 RX ADMIN — HYDROXYZINE HYDROCHLORIDE 25 MG: 25 TABLET, FILM COATED ORAL at 22:35

## 2025-04-11 RX ADMIN — HYDROMORPHONE HYDROCHLORIDE 0.2 MG: 0.2 INJECTION, SOLUTION INTRAMUSCULAR; INTRAVENOUS; SUBCUTANEOUS at 22:35

## 2025-04-11 RX ADMIN — LIDOCAINE HYDROCHLORIDE,EPINEPHRINE BITARTRATE 1 ML: 10; .01 INJECTION, SOLUTION INFILTRATION; PERINEURAL at 13:38

## 2025-04-11 RX ADMIN — MORPHINE SULFATE 4 MG: 4 INJECTION INTRAVENOUS at 10:30

## 2025-04-11 RX ADMIN — OXYCODONE HYDROCHLORIDE 5 MG: 5 TABLET ORAL at 21:18

## 2025-04-11 RX ADMIN — TETANUS TOXOID, REDUCED DIPHTHERIA TOXOID AND ACELLULAR PERTUSSIS VACCINE, ADSORBED 0.5 ML: 5; 2.5; 8; 8; 2.5 SUSPENSION INTRAMUSCULAR at 13:37

## 2025-04-11 RX ADMIN — SODIUM CHLORIDE, SODIUM LACTATE, POTASSIUM CHLORIDE, AND CALCIUM CHLORIDE 1000 ML: .6; .31; .03; .02 INJECTION, SOLUTION INTRAVENOUS at 11:37

## 2025-04-11 RX ADMIN — HYDROMORPHONE HYDROCHLORIDE 0.2 MG: 0.2 INJECTION, SOLUTION INTRAMUSCULAR; INTRAVENOUS; SUBCUTANEOUS at 18:33

## 2025-04-11 RX ADMIN — NICOTINE 1 PATCH: 7 PATCH, EXTENDED RELEASE TRANSDERMAL at 15:25

## 2025-04-11 NOTE — CONSULTS
"Consultation - Endocrinology   Name: Viktor Farfan 54 y.o. male I MRN: [id number]  Unit/Bed#: W -01 I Date of Admission: 4/11/2025   Date of Service: 4/11/2025 I Hospital Day: 0   Inpatient consult to Endocrinology  Consult performed by: Leticia Ortiz MD  Consult ordered by: Debi Aguilar PA-C        Physician Requesting Evaluation: Solange Campbell, DO   Reason for Evaluation / Principal Problem: Hyperglycemia    Assessment & Plan  Hyperglycemia due to diabetes mellitus (HCC)  Lab Results   Component Value Date    HGBA1C 6.9 (H) 08/14/2024       Recent Labs     04/11/25  1457   POCGLU 233*       Blood Sugar Average: Last 72 hrs:  (P) 233  Assessment:  This is a 54 y.o.-year-old male with type II diabetes with hyperglycemia and medication noncompliance, alcohol use disorder, admitted following traumatic fall with tibial fracture, pending surgical intervention following glycemic optimization  A1c 6.9 in August 2024, repeat A1c pending  Home regimen: Metformin once daily, likely not compliant     Plan:     DKA ruled out by ER physician, BHB not elevated.  Continue DKA protocol insulin infusion today given hyperglycemia and need to assess 24-hour insulin requirements.  Will transition to basal bolus subcutaneous insulin regimen tomorrow  Will continue to follow and make adjustments as necessary to optimize glycemic management prior to Ortho surgery planned for Tuesday 04/15  Discussed with patient that discharge regimen will be determined based on his A1c and inpatient insulin requirements.  Monitor for hypoglycemia and treat according to protocol           Patient seen at bedside and management plan discussed with attending physician.  Thank you for letting us participate in the care of your patient, please do not hesitate to reach out with questions.  Please Tigertext questions to the clinician covering the \"[id number]\" Role. Thank you.     I have discussed the above management plan in detail with the " primary service.     History of Present Illness   Viktor Farfan is a 54 y.o. male with past medical history of alcohol use disorder, hypertension, type 2 diabetes melitis on metformin at home, admitted following a traumatic fall down 6 feet concrete hole leading to tibial plateau fracture, Ortho surgical intervention planned for 04/15 given the need for glycemic optimization prior to surgery.  Endocrinology has been consulted for management of diabetes with hyperglycemia on presentation.  Blood glucose 400 on presentation with elevated anion gap however BHB not elevated, no acidosis, Improved with hydration and DKA was ruled out by ER physician.  Currently maintained on insulin infusion.  Patient alert awake oriented during our encounter, states that he thinks he takes metformin once daily but is not sure.  Does not know of complications from his diabetes and does not check his sugars at home.  He reported drinking coffee with creamer and high amount of sugar an hour prior to his fall.  Has not been compliant with diabetic diet outpatient.  Does not desire to be discharged home on insulin, agreeable to insulin inpatient.  Patient was admitted in October 2024 for severe hyponatremia secondary to beer potomania and poor solute intake, follows with nephrology outpatient..    Review of Systems  A 10 point ROS performed, negative except stated above in HPI      Objective :  Temp:  [97.9 °F (36.6 °C)] 97.9 °F (36.6 °C)  HR:  [] 94  BP: (117-142)/(71-96) 130/91  Resp:  [16-22] 16  SpO2:  [93 %-99 %] 94 %  O2 Device: None (Room air)    Physical Exam  Constitutional:       General: He is not in acute distress.     Appearance: He is obese. He is not toxic-appearing or diaphoretic.   HENT:      Head: Normocephalic.   Eyes:      Conjunctiva/sclera: Conjunctivae normal.   Pulmonary:      Effort: Pulmonary effort is normal.   Abdominal:      Comments: Protuberant, central obesity   Musculoskeletal:      Comments: Range of  motion not assessed, resting in bed, left knee abrasion   Neurological:      Mental Status: He is alert and oriented to person, place, and time. Mental status is at baseline.   Psychiatric:         Mood and Affect: Mood normal.         Thought Content: Thought content normal.          Lab Results: I have reviewed the following results:            Lab Results   Component Value Date     HGBA1C 6.9 (H) 08/14/2024            Lab Results   Component Value Date     WBC 6.90 04/11/2025     HGB 12.5 04/11/2025     HCT 40.1 04/11/2025     MCV 81 (L) 04/11/2025      (L) 04/11/2025      Component      Latest Ref Rng 4/11/2025   Sodium      135 - 147 mmol/L 131 (L)    Sodium       133 (L)    Potassium      3.5 - 5.3 mmol/L 4.7    Potassium       4.1    Chloride      96 - 108 mmol/L 98    Chloride       99    Carbon Dioxide      21 - 32 mmol/L 20 (L)    Carbon Dioxide       19 (L)    ANION GAP      4 - 13 mmol/L 13    ANION GAP       15 (H)    BUN      5 - 25 mg/dL 6    BUN       6    Creatinine      0.60 - 1.30 mg/dL 0.88    Creatinine       0.94    GLUCOSE      65 - 140 mg/dL 340 (H)    GLUCOSE       355 (H)    ALK PHOS      34 - 104 U/L 171 (H)    Total Protein      6.4 - 8.4 g/dL 7.6    Albumin      3.5 - 5.0 g/dL 3.6    GFR, Calculated       91    pH, Braulio      7.300 - 7.400  7.356    Beta- Hydroxybutyrate      0.02 - 0.27 mmol/L 0.07    Portions of the record may have been created with voice recognition software.

## 2025-04-11 NOTE — ASSESSMENT & PLAN NOTE
"Lab Results   Component Value Date    HGBA1C 6.9 (H) 08/14/2024       Recent Labs     04/11/25  1457   POCGLU 233*       Blood Sugar Average: Last 72 hrs:  (P) 233  Assessment:  This is a 54 y.o.-year-old male with type II diabetes with hyperglycemia and medication noncompliance, alcohol use disorder, admitted following traumatic fall with tibial fracture, pending surgical intervention following glycemic optimization  A1c 6.9 in August 2024, repeat A1c pending  Home regimen: Metformin once daily, likely not compliant     Plan:     DKA ruled out by ER physician, BHB not elevated.  Continue DKA protocol insulin infusion today given hyperglycemia and need to assess 24-hour insulin requirements.  Will transition to basal bolus subcutaneous insulin regimen tomorrow  Will continue to follow and make adjustments as necessary to optimize glycemic management prior to Ortho surgery planned for Tuesday 04/15  Discussed with patient that discharge regimen will be determined based on his A1c and inpatient insulin requirements.  Monitor for hypoglycemia and treat according to protocol           Patient seen at bedside and management plan discussed with attending physician.  Thank you for letting us participate in the care of your patient, please do not hesitate to reach out with questions.  Please Tigertext questions to the clinician covering the \"[id number]\" Role. Thank you.     "

## 2025-04-11 NOTE — ED ATTENDING ATTESTATION
I, Ambika Araujo MD, saw and evaluated the patient. I have discussed the patient with the resident/non-physician practitioner and agree with the resident's/non-physician practitioner's findings, Plan of Care, and MDM as documented in the resident's/non-physician practitioner's note, except where noted. All available labs and Radiology studies were reviewed.  I was present for key portions of any procedure(s) performed by the resident/non-physician practitioner and I was immediately available to provide assistance.       At this point I agree with the current assessment done in the Emergency Department.  I have conducted an independent evaluation of this patient a history and physical is as follows:    Subjective: 54-year-old male with history of alcohol abuse, hypertension on daily aspirin who presents after falling approximately 6 feet through an attic while working on home striking his left side and with head strike without LOC.  His last aspirin use was yesterday evening.  He reports left leg pain without numbness/tingling/weakness but denies headache, neck pain, back pain, chest pain, shortness of breath, abdominal pain, nausea/vomiting, or any other complaints.    Objective: Vital signs stable and afebrile.  Primary survey intact, GCS 15, briskly moving all 4 extremities with sensation intact to light touch in all 4 distal extremities.  Secondary survey notable for no external stigmata of head trauma.  Midline CT and L-spine nontender without step-offs/deformities.  Abrasion overlying the left knee with deformity and tenderness to palpation of left distal thigh as well as left hip pain without pelvic instability or abdominal pain.  2+ DP/PT pulses with SILT in all dermatomes and 5/5 strength with dorsi/plantarflexion bilaterally.    Assessment/Plan: 54-year-old male with history of alcohol abuse and hypertension on daily aspirin presenting status post fall from 6 feet with left lower extremity pain.  Primary  intact and secondary notable for left hip and left distal thigh pain with knee deformity and overlying abrasion without evidence of open fracture/joint.  No evidence of neurovascular compromise.  Last aspirin use yesterday.  Analgesia given.    Imaging notable for closed L tibial plateau fx. ORS consulted. Labs with hyperglycemia without DKA. Given IVF and analgesia. Laceration overlying L knee washed out and repaired.    Admitted to Trauma service.

## 2025-04-11 NOTE — CONSULTS
"Consultation - Orthopedics   Name: Viktor Farfan 54 y.o. male I MRN: [id number]  Unit/Bed#: ED-36 I Date of Admission: 4/11/2025   Date of Service: 4/11/2025 I Hospital Day: 0   Inpatient consult to Orthopedic Surgery  Consult performed by: Jj Leslie PA-C  Consult ordered by: Ambika Araujo MD  Reason for consult: tibial plateau fracture        Physician Requesting Evaluation: Ambika Araujo MD   Reason for Evaluation / Principal Problem: Left tibial plateau fracture    Assessment & Plan  Closed fracture of left tibial plateau, initial encounter  NWB to left lower extremity knee immobilizer  Patient in DKA at time of examination thus operative intervention will need to be withheld until patient is medically optimized for surgery  Plan for OR possibly this weekend if optimized, if not Tuesday, 4/15/2025  Tentative Case request booked for Tuesday 4/15  N.p.o. Tuesday at 0001  Will need medical optimization prior to procedure  Will monitor for ABLA and administer IVF/prbc as indicated for Greater than 2 gram drop or Hgb < 7.  Patient hemoglobin currently at 12.5  PT/OT as tolerated  Incentive spirometry  Pain control per primary team  DVT ppx  : Lovenox in house, await further recommendations for DVT upon discharge pending OR  All other medical comorbidities to be managed per primary team  Dispo: Ortho will follow  See above for additional details   Case reviewed and discussed with Dr. Bledsoe and Dr. Muñoz      Diabetic ketoacidosis without coma associated with diabetes mellitus due to underlying condition (HCC)  Lab Results   Component Value Date    HGBA1C 6.9 (H) 08/14/2024     Management per primary team, patient will need medical optimization prior to OR  No results for input(s): \"POCGLU\" in the last 72 hours.    Blood Sugar Average: Last 72 hrs:      Closed nondisplaced transverse fracture of left patella, initial encounter  Suspected left inferior pole patella avulsion fracture as seen on CT  Continue plan as " above    Orthopedics service will follow.  Please contact the SecureChat role for the Orthopedics service with any questions/concerns.    History of Present Illness   HPI: Viktor Farfan is a 54 y.o. year old male with a significant past medical history of alcohol abuse, hypertension, hyperglycemia on daily aspirin who presents status post mechanical fall.  Patient states he fell into a concrete hole that was approximately 6 feet deep landing on his left side.  He states immediately after the fall he felt pain and limited ability to bear weight on the left lower extremity.  He states most of his pain is concentrated at the left knee region and denies any radiation of symptoms.  Patient rates his pain as sharp in nature exacerbated with any range of motion attempts as well as any palpation around the area.  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  Patient offers no additional complaints at this time and offers no pain elsewhere on his body aside from the left knee.  Denies any acute fevers or chills.  Denies any loss of consciousness.  Denies any new or worsening numbness or paresthesias in the left lower extremity.    Review of Systems   Constitutional:  Negative for activity change, appetite change, chills and diaphoresis.   HENT:  Negative for congestion, dental problem, drooling and ear discharge.    Eyes:  Negative for discharge and itching.   Respiratory:  Negative for apnea, cough, chest tightness and shortness of breath.    Cardiovascular:  Negative for chest pain and palpitations.   Gastrointestinal:  Negative for abdominal distention, diarrhea and nausea.   Genitourinary:  Negative for difficulty urinating, dysuria, flank pain and frequency.   Musculoskeletal:  Positive for arthralgias and gait problem.   Neurological:  Negative for dizziness, facial asymmetry, light-headedness and  headaches.   Psychiatric/Behavioral:  Negative for agitation, behavioral problems and confusion.     significant for findings described in the HPI.  Historical Information   Past Medical History:   Diagnosis Date    Anxiety     Back pain     Chronic right-sided low back pain without sciatica 3/31/2018    Depression     Gastroesophageal reflux disease 3/31/2018    GERD (gastroesophageal reflux disease)     Right foot pain      Past Surgical History:   Procedure Laterality Date    NO PAST SURGERIES      ORIF FINGER FRACTURE Right 8/6/2019    Procedure: OPEN REDUCTION FINGER;  Surgeon: Tomas Morgan MD;  Location: [facility];  Service: Plastics     Social History     Tobacco Use    Smoking status: Every Day     Current packs/day: 1.00     Average packs/day: 1 pack/day for 38.0 years (38.0 ttl pk-yrs)     Types: Cigarettes    Smokeless tobacco: Never   Vaping Use    Vaping status: Never Used   Substance and Sexual Activity    Alcohol use: Yes     Alcohol/week: 6.0 standard drinks of alcohol     Types: 6 Cans of beer per week     Comment: 12 pack of beer a night    Drug use: No    Sexual activity: Not on file     E-Cigarette/Vaping    E-Cigarette Use Never User      E-Cigarette/Vaping Substances    Nicotine No     THC No     CBD No     Flavoring No     Other No     Unknown No      Family history non-contributory    Objective :  HR:  [] 97  BP: (117-141)/(71-96) 141/96  Resp:  [18-22] 18  SpO2:  [93 %-99 %] 99 %  O2 Device: None (Room air)  Physical Exam  Vitals and nursing note reviewed.   Constitutional:       Appearance: Normal appearance.   HENT:      Head: Normocephalic and atraumatic.      Nose: Nose normal.      Mouth/Throat:      Mouth: Mucous membranes are moist.      Pharynx: Oropharynx is clear.   Eyes:      Extraocular Movements: Extraocular movements intact.      Pupils: Pupils are equal, round, and reactive to light.   Cardiovascular:      Rate and Rhythm: Normal rate and regular rhythm.      Pulses:  Normal pulses.   Pulmonary:      Effort: Pulmonary effort is normal.   Abdominal:      General: Abdomen is flat.      Palpations: Abdomen is soft.   Musculoskeletal:         General: Swelling, tenderness and signs of injury present.      Cervical back: Normal range of motion and neck supple.      Comments:   Musculoskeletal: left lower extremity  Patient has a superficial abrasion noted at the superior pole of the left patella.  This does not appear to probe deep or communicate with the joint.  Patient has diffuse tenderness to palpation over left tibial plateau, no significant tenderness palpation over left ankle or left hip joint  Pt guarded on exam due to severe pain  Gross SILT s/s/sp/dp/t.   Full active & passive ROM at the  left ankle, left knee range of motion not assessed due to known fracture  Motor intact 5/5 strength with ankle dorsi/plantar flexion, EHL/FHL  2+ DP/PT pulse comparable contralateral side, brisk capillary refill  Musculature is soft and compressible, no pain with passive stretch  Leg lengths equal    Tertiary: all other noninjured extremities were palpated and ranged looking for secondary injuries. Patient had no pain with range of motion of her other major joints. No tenderness over all other joints/long bones as except already stated.  Bilateral clavicles, shoulders, upper arms, elbows, forearms, wrist, hands, fingers, hips, mid femurs, right knee, midshaft tibias, ankles, feet, toes all palpated range without significant tenderness to palpation, obvious osseous deformity or bony step-offs appreciated.         Neurological:      Mental Status: He is alert.           Lab Results: I have reviewed the following results:   Recent Labs     04/11/25  1031   WBC 6.90   HGB 12.5   HCT 40.1   *   BUN 6   CREATININE 0.94   PTT 28   INR 1.15     Blood Culture:   Lab Results   Component Value Date    BLOODCX No Growth After 5 Days. 10/16/2024     Wound Culture: No results found for:  "\"WOUNDCULT\"    Imaging Results Review: I personally reviewed the following image studies/reports in PACS and discussed pertinent findings with Radiology: xray(s). My interpretation of the radiology images/reports is: Left tibial plateau fracture.  Probable left inferior pole patella fracture seen on CT.  Other Study Results Review: No additional pertinent studies reviewed.  "

## 2025-04-11 NOTE — H&P (VIEW-ONLY)
"Consultation - Orthopedics   Name: Viktor Farfan 54 y.o. male I MRN: [id number]  Unit/Bed#: ED-36 I Date of Admission: 4/11/2025   Date of Service: 4/11/2025 I Hospital Day: 0   Inpatient consult to Orthopedic Surgery  Consult performed by: Jj Leslie PA-C  Consult ordered by: Ambika Araujo MD  Reason for consult: tibial plateau fracture        Physician Requesting Evaluation: Ambika Araujo MD   Reason for Evaluation / Principal Problem: Left tibial plateau fracture    Assessment & Plan  Closed fracture of left tibial plateau, initial encounter  NWB to left lower extremity knee immobilizer  Patient in DKA at time of examination thus operative intervention will need to be withheld until patient is medically optimized for surgery  Plan for OR possibly this weekend if optimized, if not Tuesday, 4/15/2025  Tentative Case request booked for Tuesday 4/15  N.p.o. Tuesday at 0001  Will need medical optimization prior to procedure  Will monitor for ABLA and administer IVF/prbc as indicated for Greater than 2 gram drop or Hgb < 7.  Patient hemoglobin currently at 12.5  PT/OT as tolerated  Incentive spirometry  Pain control per primary team  DVT ppx  : Lovenox in house, await further recommendations for DVT upon discharge pending OR  All other medical comorbidities to be managed per primary team  Dispo: Ortho will follow  See above for additional details   Case reviewed and discussed with Dr. Bledsoe and Dr. Muñoz      Diabetic ketoacidosis without coma associated with diabetes mellitus due to underlying condition (HCC)  Lab Results   Component Value Date    HGBA1C 6.9 (H) 08/14/2024     Management per primary team, patient will need medical optimization prior to OR  No results for input(s): \"POCGLU\" in the last 72 hours.    Blood Sugar Average: Last 72 hrs:      Closed nondisplaced transverse fracture of left patella, initial encounter  Suspected left inferior pole patella avulsion fracture as seen on CT  Continue plan as " above    Orthopedics service will follow.  Please contact the SecureChat role for the Orthopedics service with any questions/concerns.    History of Present Illness   HPI: Viktor Farfan is a 54 y.o. year old male with a significant past medical history of alcohol abuse, hypertension, hyperglycemia on daily aspirin who presents status post mechanical fall.  Patient states he fell into a concrete hole that was approximately 6 feet deep landing on his left side.  He states immediately after the fall he felt pain and limited ability to bear weight on the left lower extremity.  He states most of his pain is concentrated at the left knee region and denies any radiation of symptoms.  Patient rates his pain as sharp in nature exacerbated with any range of motion attempts as well as any palpation around the area.  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  Patient offers no additional complaints at this time and offers no pain elsewhere on his body aside from the left knee.  Denies any acute fevers or chills.  Denies any loss of consciousness.  Denies any new or worsening numbness or paresthesias in the left lower extremity.    Review of Systems   Constitutional:  Negative for activity change, appetite change, chills and diaphoresis.   HENT:  Negative for congestion, dental problem, drooling and ear discharge.    Eyes:  Negative for discharge and itching.   Respiratory:  Negative for apnea, cough, chest tightness and shortness of breath.    Cardiovascular:  Negative for chest pain and palpitations.   Gastrointestinal:  Negative for abdominal distention, diarrhea and nausea.   Genitourinary:  Negative for difficulty urinating, dysuria, flank pain and frequency.   Musculoskeletal:  Positive for arthralgias and gait problem.   Neurological:  Negative for dizziness, facial asymmetry, light-headedness and  headaches.   Psychiatric/Behavioral:  Negative for agitation, behavioral problems and confusion.     significant for findings described in the HPI.  Historical Information   Past Medical History:   Diagnosis Date    Anxiety     Back pain     Chronic right-sided low back pain without sciatica 3/31/2018    Depression     Gastroesophageal reflux disease 3/31/2018    GERD (gastroesophageal reflux disease)     Right foot pain      Past Surgical History:   Procedure Laterality Date    NO PAST SURGERIES      ORIF FINGER FRACTURE Right 8/6/2019    Procedure: OPEN REDUCTION FINGER;  Surgeon: Tomas Morgan MD;  Location: [facility];  Service: Plastics     Social History     Tobacco Use    Smoking status: Every Day     Current packs/day: 1.00     Average packs/day: 1 pack/day for 38.0 years (38.0 ttl pk-yrs)     Types: Cigarettes    Smokeless tobacco: Never   Vaping Use    Vaping status: Never Used   Substance and Sexual Activity    Alcohol use: Yes     Alcohol/week: 6.0 standard drinks of alcohol     Types: 6 Cans of beer per week     Comment: 12 pack of beer a night    Drug use: No    Sexual activity: Not on file     E-Cigarette/Vaping    E-Cigarette Use Never User      E-Cigarette/Vaping Substances    Nicotine No     THC No     CBD No     Flavoring No     Other No     Unknown No      Family history non-contributory    Objective :  HR:  [] 97  BP: (117-141)/(71-96) 141/96  Resp:  [18-22] 18  SpO2:  [93 %-99 %] 99 %  O2 Device: None (Room air)  Physical Exam  Vitals and nursing note reviewed.   Constitutional:       Appearance: Normal appearance.   HENT:      Head: Normocephalic and atraumatic.      Nose: Nose normal.      Mouth/Throat:      Mouth: Mucous membranes are moist.      Pharynx: Oropharynx is clear.   Eyes:      Extraocular Movements: Extraocular movements intact.      Pupils: Pupils are equal, round, and reactive to light.   Cardiovascular:      Rate and Rhythm: Normal rate and regular rhythm.      Pulses:  Normal pulses.   Pulmonary:      Effort: Pulmonary effort is normal.   Abdominal:      General: Abdomen is flat.      Palpations: Abdomen is soft.   Musculoskeletal:         General: Swelling, tenderness and signs of injury present.      Cervical back: Normal range of motion and neck supple.      Comments:   Musculoskeletal: left lower extremity  Patient has a superficial abrasion noted at the superior pole of the left patella.  This does not appear to probe deep or communicate with the joint.  Patient has diffuse tenderness to palpation over left tibial plateau, no significant tenderness palpation over left ankle or left hip joint  Pt guarded on exam due to severe pain  Gross SILT s/s/sp/dp/t.   Full active & passive ROM at the  left ankle, left knee range of motion not assessed due to known fracture  Motor intact 5/5 strength with ankle dorsi/plantar flexion, EHL/FHL  2+ DP/PT pulse comparable contralateral side, brisk capillary refill  Musculature is soft and compressible, no pain with passive stretch  Leg lengths equal    Tertiary: all other noninjured extremities were palpated and ranged looking for secondary injuries. Patient had no pain with range of motion of her other major joints. No tenderness over all other joints/long bones as except already stated.  Bilateral clavicles, shoulders, upper arms, elbows, forearms, wrist, hands, fingers, hips, mid femurs, right knee, midshaft tibias, ankles, feet, toes all palpated range without significant tenderness to palpation, obvious osseous deformity or bony step-offs appreciated.         Neurological:      Mental Status: He is alert.           Lab Results: I have reviewed the following results:   Recent Labs     04/11/25  1031   WBC 6.90   HGB 12.5   HCT 40.1   *   BUN 6   CREATININE 0.94   PTT 28   INR 1.15     Blood Culture:   Lab Results   Component Value Date    BLOODCX No Growth After 5 Days. 10/16/2024     Wound Culture: No results found for:  "\"WOUNDCULT\"    Imaging Results Review: I personally reviewed the following image studies/reports in PACS and discussed pertinent findings with Radiology: xray(s). My interpretation of the radiology images/reports is: Left tibial plateau fracture.  Probable left inferior pole patella fracture seen on CT.  Other Study Results Review: No additional pertinent studies reviewed.  "

## 2025-04-11 NOTE — ED PROVIDER NOTES
Time reflects when diagnosis was documented in both MDM as applicable and the Disposition within this note       Time User Action Codes Description Comment    4/11/2025 12:00 PM Jj Leslie Add [S82.142A] Closed fracture of left tibial plateau, initial encounter     4/11/2025  1:57 PM KevDebi orlando Add [Z91.89] At risk for acid-base imbalance     4/11/2025  1:57 PM Debi Aguilar Add [R73.9] Hyperglycemia     4/11/2025  1:58 PM Debi Aguilar Add [E08.10] Diabetic ketoacidosis without coma associated with diabetes mellitus due to underlying condition (HCC)     4/11/2025  1:59 PM Jj Leslie Add [S82.035A] Closed nondisplaced transverse fracture of left patella, initial encounter           ED Disposition       ED Disposition   Admit    Condition   Stable    Date/Time   Fri Apr 11, 2025 12:50 PM    Comment   Case was discussed with Trauma and the patient's admission status was agreed to be Admission Status: inpatient status to the service of Dr. Park .               Assessment & Plan       Medical Decision Making  Amount and/or Complexity of Data Reviewed  Labs: ordered. Decision-making details documented in ED Course.  Radiology: ordered.    Risk  Prescription drug management.  Decision regarding hospitalization.      Viktor Farfan is a 54 year old PMH alcohol abuse, DM and HTN who is presenting to the ED for left leg pain after a fall.   Differential includes femur fracture versus tib-fib fracture versus left knee dislocation versus intracerebral hemorrhage versus .  Patient CBC without abnormalities.  Patient CMP with mildly decreased sodium to 133.  Patient also with elevated anion gap and elevated glucose of 355.  At this point venous blood gas and beta hydroxybutyrate were ordered to evaluate for possible DKA.  However, patient's venous blood gas showed no acidosis and beta hydroxybutyrate negative ruling out DKA.  Patient lipase within normal limits.  CT head showed no intracranial  abnormalities, CT spine showed no acute fractures.  Chest x-ray without any abnormalities.  Patient's imaging of pelvis, femur, knee and tib-fib on the left side showed tibial plateau fracture.  No signs of compartment syndrome on exam and patient neurovascularly intact.    Pain improved after 4 mg morphine x 2.  Patient's left knee laceration repaired.  See procedure section for additional details.  Patient given tetanus shot.    Ortho consulted who stated patient will have surgery on Tuesday considering his high glucose of 355 that needs to be controlled prior to operation.  Trauma consulted and ultimately it is admitted to their service for further management.      ED Course as of 04/11/25 1947 Fri Apr 11, 2025   1116 LIPASE: 30   1117 GLUCOSE(!): 355   1117 ANION GAP(!): 15   1123 Anion gap that could be    1204 Last drink a few weeks ago, no hx of withdrawal or seizures   1242 Beta- Hydroxybutyrate: 0.07       Medications   multi-electrolyte (Plasmalyte-A/Isolyte-S PH 7.4/Normosol-R) IV solution (has no administration in time range)   nicotine (NICODERM CQ) 7 mg/24hr TD 24 hr patch 1 patch (has no administration in time range)   enoxaparin (LOVENOX) subcutaneous injection 30 mg (30 mg Subcutaneous Not Given 4/11/25 1414)   acetaminophen (TYLENOL) tablet 975 mg (has no administration in time range)   oxyCODONE (ROXICODONE) split tablet 2.5 mg (has no administration in time range)     Or   oxyCODONE (ROXICODONE) IR tablet 5 mg (has no administration in time range)   HYDROmorphone HCl (DILAUDID) injection 0.2 mg (0.2 mg Intravenous Given 4/11/25 1432)   ondansetron (ZOFRAN) injection 4 mg (has no administration in time range)   senna-docusate sodium (SENOKOT S) 8.6-50 mg per tablet 1 tablet (has no administration in time range)   polyethylene glycol (MIRALAX) packet 17 g (17 g Oral Not Given 4/11/25 1413)   insulin regular (HumuLIN R,NovoLIN R) 1 Units/mL in sodium chloride 0.9 % 100 mL infusion (has no  administration in time range)   morphine injection 4 mg (4 mg Intravenous Given 4/11/25 1030)   lactated ringers bolus 1,000 mL (0 mL Intravenous Stopped 4/11/25 1408)   morphine injection 4 mg (4 mg Intravenous Given 4/11/25 1137)   tetanus-diphtheria-acellular pertussis (BOOSTRIX) IM injection 0.5 mL (0.5 mL Intramuscular Given 4/11/25 1337)   lidocaine-epinephrine (XYLOCAINE/EPINEPHRINE) 1 %-1:100,000 injection 1 mL (1 mL Infiltration Given by Other 4/11/25 7951)       ED Risk Strat Scores          No data recorded           History of Present Illness       Chief Complaint   Patient presents with    Leg Pain     Pt fell 6-7 feet from working on a house through a crawl space. Pt hit head and takes aspirin.        Past Medical History:   Diagnosis Date    Anxiety     Back pain     Chronic right-sided low back pain without sciatica 3/31/2018    Depression     Gastroesophageal reflux disease 3/31/2018    GERD (gastroesophageal reflux disease)     Right foot pain       Past Surgical History:   Procedure Laterality Date    NO PAST SURGERIES      ORIF FINGER FRACTURE Right 8/6/2019    Procedure: OPEN REDUCTION FINGER;  Surgeon: Tomas Morgan MD;  Location: [facility];  Service: Plastics      Family History   Problem Relation Age of Onset    COPD Father     Diabetes Father     Cancer Maternal Grandfather       Social History     Tobacco Use    Smoking status: Every Day     Current packs/day: 1.00     Average packs/day: 1 pack/day for 38.0 years (38.0 ttl pk-yrs)     Types: Cigarettes    Smokeless tobacco: Never   Vaping Use    Vaping status: Never Used   Substance Use Topics    Alcohol use: Yes     Alcohol/week: 6.0 standard drinks of alcohol     Types: 6 Cans of beer per week     Comment: 12 pack of beer a night    Drug use: No      E-Cigarette/Vaping    E-Cigarette Use Never User       E-Cigarette/Vaping Substances    Nicotine No     THC No     CBD No     Flavoring No     Other No     Unknown No       I have reviewed and  agree with the history as documented.       Leg Pain  Associated symptoms: no back pain, no fever and no neck pain      Viktor Farfan is a 54 year old PMH alcohol abuse, DM and HTN who is presenting to the ED for left leg pain after a fall.  Patient was on a worksite, when he fell through a hole in the floor approximately 6 feet.  Patient states that he did hit his head but had no loss of consciousness.  Patient is not on blood thinners but does take aspirin which he last took last night..  Patient states most of his pain is from his left hip to his left knee.  He denies any headache, change in his vision, chest pain, shortness of breath, abdominal pain, paresthesias, neck pain and lower back pain.  Patient states he has not had a drink in about 2 weeks, he has not experienced alcohol withdrawal before or had seizures.    Review of Systems   Constitutional:  Negative for chills and fever.   HENT:  Negative for congestion and rhinorrhea.    Eyes:  Negative for visual disturbance.   Respiratory:  Negative for cough and shortness of breath.    Cardiovascular:  Negative for chest pain and palpitations.   Gastrointestinal:  Negative for abdominal pain, nausea and vomiting.   Genitourinary:  Negative for dysuria and hematuria.   Musculoskeletal:  Positive for arthralgias. Negative for back pain and neck pain.   Skin:  Positive for wound.   Neurological:  Negative for dizziness, weakness, numbness and headaches.           Objective       ED Triage Vitals   Temperature Pulse Blood Pressure Respirations SpO2 Patient Position - Orthostatic VS   04/11/25 1454 04/11/25 1015 04/11/25 1015 04/11/25 1015 04/11/25 1015 --   97.9 °F (36.6 °C) 99 117/78 22 98 %       Temp src Heart Rate Source BP Location FiO2 (%) Pain Score    -- 04/11/25 1015 -- -- 04/11/25 1030     Monitor   10 - Worst Possible Pain      Vitals      Date and Time Temp Pulse SpO2 Resp BP Pain Score FACES Pain Rating User   04/11/25 1910 98.6 °F (37 °C) 94 92 % --  124/81 -- -- DII   04/11/25 1833 -- -- -- -- -- 10 - Worst Possible Pain -- BJT   04/11/25 1525 -- -- -- -- -- 10 - Worst Possible Pain -- BJT   04/11/25 1454 97.9 °F (36.6 °C) 94 94 % 16 130/91 -- -- DII   04/11/25 1432 -- -- -- -- -- 5 -- JDT   04/11/25 1400 -- 97 96 % 18 142/81 -- -- JDT   04/11/25 1300 -- 91 97 % 18 126/82 -- -- JDT   04/11/25 1200 -- 103 98 % 18 131/74 -- -- JDT   04/11/25 1137 -- -- -- -- -- 9 -- SM   04/11/25 1130 -- 97 99 % 18 141/96 -- -- JDT   04/11/25 1045 -- 102 95 % 18 118/71 -- -- JDT   04/11/25 1030 -- 99 93 % 18 118/80 -- -- JDT   04/11/25 1030 -- -- -- -- -- 10 - Worst Possible Pain -- KS   04/11/25 1015 -- 99 98 % 22 117/78 -- -- LD            Physical Exam  Constitutional:       General: He is in acute distress.      Appearance: He is normal weight.   HENT:      Head: Normocephalic and atraumatic.      Right Ear: Tympanic membrane normal.      Left Ear: Tympanic membrane normal.      Mouth/Throat:      Mouth: Mucous membranes are moist.      Pharynx: Oropharynx is clear.   Eyes:      Extraocular Movements: Extraocular movements intact.      Conjunctiva/sclera: Conjunctivae normal.      Pupils: Pupils are equal, round, and reactive to light.   Cardiovascular:      Rate and Rhythm: Normal rate and regular rhythm.      Pulses: Normal pulses.      Heart sounds: Normal heart sounds.   Pulmonary:      Effort: Pulmonary effort is normal. No respiratory distress.   Abdominal:      General: There is no distension.      Palpations: Abdomen is soft.      Tenderness: There is no abdominal tenderness.   Musculoskeletal:         General: Swelling (Swelling over left knee) and tenderness (Tenderness to palpation over left knee and over proximal anterior shin on the left side.) present. No deformity. Normal range of motion.      Cervical back: Normal range of motion. No rigidity.      Right lower leg: No edema.      Left lower leg: No edema.      Comments: No midline spinal tenderness.  Patient  unable to flex left knee secondary to pain, but is able to flex hip without difficulty.   Skin:     General: Skin is warm and dry.      Capillary Refill: Capillary refill takes less than 2 seconds.      Findings: Lesion (Patient with 5 cm laceration superior to left knee along with abrasions over anterior superior lower leg) present.   Neurological:      General: No focal deficit present.      Mental Status: He is alert and oriented to person, place, and time. Mental status is at baseline.   Psychiatric:         Mood and Affect: Mood normal.         Behavior: Behavior normal.         Results Reviewed       Procedure Component Value Units Date/Time    Hemoglobin A1C [[id number]]  (Abnormal) Collected: 04/11/25 1031    Lab Status: Final result Specimen: Blood from Arm, Left Updated: 04/11/25 1930     Hemoglobin A1C 12.2 %       mg/dl     Platelet count [[id number]]  (Abnormal) Collected: 04/11/25 1523    Lab Status: Final result Specimen: Blood from Arm, Right Updated: 04/11/25 1536     Platelets 94 Thousands/uL     UA w Reflex to Microscopic w Reflex to Culture [[id number]]     Lab Status: No result Specimen: Urine     Basic metabolic panel [[id number]]  (Abnormal) Collected: 04/11/25 1311    Lab Status: Final result Specimen: Blood from Arm, Left Updated: 04/11/25 1340     Sodium 131 mmol/L      Potassium 4.7 mmol/L      Chloride 98 mmol/L      CO2 20 mmol/L      ANION GAP 13 mmol/L      BUN 6 mg/dL      Creatinine 0.88 mg/dL      Glucose 340 mg/dL      Calcium 8.5 mg/dL      eGFR 97 ml/min/1.73sq m     Narrative:      National Kid[employer] Foundation guidelines for Chronic Kidney Disease (CKD):     Stage 1 with normal or high GFR (GFR > 90 mL/min/1.73 square meters)    Stage 2 Mild CKD (GFR = 60-89 mL/min/1.73 square meters)    Stage 3A Moderate CKD (GFR = 45-59 mL/min/1.73 square meters)    Stage 3B Moderate CKD (GFR = 30-44 mL/min/1.73 square meters)    Stage 4 Severe CKD (GFR = 15-29 mL/min/1.73 square  meters)    Stage 5 End Stage CKD (GFR <15 mL/min/1.73 square meters)  Note: GFR calculation is accurate only with a steady state creatinine    Blood gas, venous [[id number]]  (Abnormal) Collected: 04/11/25 1143    Lab Status: Final result Specimen: Blood from Arm, Left Updated: 04/11/25 1222     pH, Braulio 7.356     pCO2, Braulio 44.0 mm Hg      pO2, Braulio 30.8 mm Hg      HCO3, Braulio 24.1 mmol/L      Base Excess, Braulio -1.6 mmol/L      O2 Content, Braulio 10.7 ml/dL      O2 HGB, VENOUS 55.9 %     Beta Hydroxybutyrate [[id number]]  (Normal) Collected: 04/11/25 1031    Lab Status: Final result Specimen: Blood from Arm, Left Updated: 04/11/25 1222     Beta- Hydroxybutyrate 0.07 mmol/L     Comprehensive metabolic panel [[id number]]  (Abnormal) Collected: 04/11/25 1031    Lab Status: Final result Specimen: Blood from Arm, Left Updated: 04/11/25 1109     Sodium 133 mmol/L      Potassium 4.1 mmol/L      Chloride 99 mmol/L      CO2 19 mmol/L      ANION GAP 15 mmol/L      BUN 6 mg/dL      Creatinine 0.94 mg/dL      Glucose 355 mg/dL      Calcium 8.6 mg/dL      AST 35 U/L      ALT 19 U/L      Alkaline Phosphatase 171 U/L      Total Protein 7.6 g/dL      Albumin 3.6 g/dL      Total Bilirubin 0.77 mg/dL      eGFR 91 ml/min/1.73sq m     Narrative:      National Kid[employer] Disease Foundation guidelines for Chronic Kidney Disease (CKD):     Stage 1 with normal or high GFR (GFR > 90 mL/min/1.73 square meters)    Stage 2 Mild CKD (GFR = 60-89 mL/min/1.73 square meters)    Stage 3A Moderate CKD (GFR = 45-59 mL/min/1.73 square meters)    Stage 3B Moderate CKD (GFR = 30-44 mL/min/1.73 square meters)    Stage 4 Severe CKD (GFR = 15-29 mL/min/1.73 square meters)    Stage 5 End Stage CKD (GFR <15 mL/min/1.73 square meters)  Note: GFR calculation is accurate only with a steady state creatinine    Lipase [[id number]]  (Normal) Collected: 04/11/25 1031    Lab Status: Final result Specimen: Blood from Arm, Left Updated: 04/11/25 1109     Lipase 30 u/L     Protime-INR  [[id number]]  (Abnormal) Collected: 04/11/25 1031    Lab Status: Final result Specimen: Blood from Arm, Left Updated: 04/11/25 1104     Protime 15.4 seconds      INR 1.15    Narrative:      INR Therapeutic Range    Indication                                             INR Range      Atrial Fibrillation                                               2.0-3.0  Hypercoagulable State                                    2.0.2.3  Left Ventricular Asist Device                            2.0-3.0  Mechanical Heart Valve                                  -    Aortic(with afib, MI, embolism, HF, LA enlargement,    and/or coagulopathy)                                     2.0-3.0 (2.5-3.5)     Mitral                                                             2.5-3.5  Prosthetic/Bioprosthetic Heart Valve               2.0-3.0  Venous thromboembolism (VTE: VT, PE        2.0-3.0    APTT [[id number]]  (Normal) Collected: 04/11/25 1031    Lab Status: Final result Specimen: Blood from Arm, Left Updated: 04/11/25 1104     PTT 28 seconds     CBC and differential [[id number]]  (Abnormal) Collected: 04/11/25 1031    Lab Status: Final result Specimen: Blood from Arm, Left Updated: 04/11/25 1052     WBC 6.90 Thousand/uL      RBC 4.97 Million/uL      Hemoglobin 12.5 g/dL      Hematocrit 40.1 %      MCV 81 fL      MCH 25.2 pg      MCHC 31.2 g/dL      RDW 17.2 %      MPV 11.0 fL      Platelets 116 Thousands/uL      nRBC 0 /100 WBCs      Segmented % 53 %      Immature Grans % 0 %      Lymphocytes % 31 %      Monocytes % 10 %      Eosinophils Relative 4 %      Basophils Relative 2 %      Absolute Neutrophils 3.69 Thousands/µL      Absolute Immature Grans 0.03 Thousand/uL      Absolute Lymphocytes 2.11 Thousands/µL      Absolute Monocytes 0.71 Thousand/µL      Eosinophils Absolute 0.24 Thousand/µL      Basophils Absolute 0.12 Thousands/µL             CT lower extremity wo contrast left   ED Interpretation by Jaz Castle MD (04/11 1325)   FINDINGS:      OSSEOUS STRUCTURES: There is a comminuted intra-articular fracture of the proximal tibia. This predominantly involves the lateral tibial plateau with fracture lines extending to the lateral aspect of the lateral tibial plateau as well as to the lateral   tibial spine. No significant displacement. The fracture lines extend more medially and distally to involve the medial tibial metadiaphysis. There is a small 1.5 cm fracture fragment in this area displaced 3 mm anteromedially.     VISUALIZED MUSCULATURE:  Unremarkable.     SOFT TISSUES: There is anterior subcutaneous edema.     OTHER PERTINENT FINDINGS: Associated moderate lipohemarthrosis.     IMPRESSION:     Comminuted intra-articular fracture of the proximal tibia as described above. Associated moderate lipohemarthrosis.        Workstation performed: [id number]        Final Interpretation by Kaleb Barth MD (04/11 1236)      Comminuted intra-articular fracture of the proximal tibia as described above. Associated moderate lipohemarthrosis.         Workstation performed: [id number]         XR chest 1 view portable   ED Interpretation by Jaz Castle MD (04/11 7844)   FINDINGS:     Clear lungs. No discernible pneumothorax or layering pleural effusion on limited supine imaging.     Normal cardiomediastinal silhouette.     No displaced fractures. Degenerative changes of the spine.     Normal upper abdomen.     IMPRESSION:     No acute traumatic injury within limitations of supine imaging.     Follow-up upright imaging may be considered if there is high clinical index of suspicion for injury based on mechanism.     Workstation performed: [id number]        Final Interpretation by Db Berrios DO (04/11 4383)      No acute traumatic injury within limitations of supine imaging.      Follow-up upright imaging may be considered if there is high clinical index of suspicion for injury based on mechanism.      Workstation performed: [id number]         XR  femur 2 views LEFT   ED Interpretation by Jaz Castle MD (04/11 1159)   FINDINGS:     No acute fracture of the left femur. Fat-fluid level noted in the suprapatellar recess in keeping with knee fracture which will be described in a separate report.     No significant degenerative changes.     No lytic or blastic osseous lesion.     Unremarkable soft tissues.     IMPRESSION:     Negative for left femoral fracture.     Left knee fracture will be described separately.        Computerized Assisted Algorithm (CAA) may have been used to analyze all applicable images.        Workstation performed: [id number]           Final Interpretation by Db Berrios DO (04/11 1124)      Negative for left femoral fracture.      Left knee fracture will be described separately.         Computerized Assisted Algorithm (CAA) may have been used to analyze all applicable images.         Workstation performed: [id number]         XR tibia fibula 2 views LEFT   ED Interpretation by Jaz Castle MD (04/11 1434)   FINDINGS:     Acute, nondisplaced fracture of the proximal tibia involving the medial metaphysis with fracture extension into the tibial eminences. The fibula and distal tibia appear intact.     Cortical irregularity lower pole patella also suspicious for nondisplaced fracture.     Moderately large joint effusion (fat/fluid level) in the suprapatellar recess.     Tiny calcaneal spurs incidentally noted.     No lytic or blastic osseous lesion.     Prepatellar soft tissue swelling.     IMPRESSION:     Acute, nondisplaced fracture proximal tibia as above.     Probable nondisplaced fracture lower pole patella.     Moderately large lipohemarthrosis.        Computerized Assisted Algorithm (CAA) may have been used to analyze all applicable images.              Workstation performed: [id number]        Final Interpretation by Db Berrios DO (04/11 1129)      Acute, nondisplaced fracture proximal tibia as above.       Probable nondisplaced fracture lower pole patella.      Moderately large lipohemarthrosis.         Computerized Assisted Algorithm (CAA) may have been used to analyze all applicable images.               Workstation performed: [id number]         XR knee 4+ views left injury   ED Interpretation by Jaz Castle MD (04/11 1434)   FINDINGS:     Acute, nondisplaced fracture of the medial proximal tibial metaphysis extending into the lateral tibial eminence. Some fracture lucency involving the tibial metaphysis to the left of midline also likely.     Cortical irregularity lower pole patella suggesting nondisplaced fracture.     Moderately large lipohemarthrosis.     No significant degenerative changes.     No lytic or blastic osseous lesion.     Prepatellar soft tissue swelling.     IMPRESSION:     Acute, nondisplaced fractures of the proximal tibia and lower pole patella as above.     Moderately large lipohemarthrosis.        Computerized Assisted Algorithm (CAA) may have been used to analyze all applicable images.        Workstation performed: [id number]        Final Interpretation by Db Berrios DO (04/11 1132)      Acute, nondisplaced fractures of the proximal tibia and lower pole patella as above.      Moderately large lipohemarthrosis.         Computerized Assisted Algorithm (CAA) may have been used to analyze all applicable images.         Workstation performed: [id number]         XR pelvis ap only 1 or 2 vw   ED Interpretation by Jaz Castle MD (04/11 9254)   FINDINGS:     No acute fracture or dislocation.     No significant degenerative changes.     No lytic or blastic osseous lesion.     Phleboliths in the pelvis. Otherwise unremarkable soft tissues.     Unremarkable visualized lumbar spine.     IMPRESSION:     No acute osseous abnormality.        Computerized Assisted Algorithm (CAA) may have been used to analyze all applicable images.        Workstation performed: [id number]        Final  Interpretation by Db Berrios DO (04/11 1121)      No acute osseous abnormality.         Computerized Assisted Algorithm (CAA) may have been used to analyze all applicable images.         Workstation performed: [id number]         CT head wo contrast   ED Interpretation by Jaz Castle MD (04/11 1103)   FINDINGS:     PARENCHYMA:No intracranial mass, mass effect or midline shift. No CT signs of acute infarction.  No acute parenchymal hemorrhage.     VENTRICLES AND EXTRA-AXIAL SPACES:  Normal for the patient's age.     VISUALIZED ORBITS:  Normal visualized orbits.     PARANASAL SINUSES:  Small retention cyst or polyp, sphenoid sinus. Partially opacified right mastoid air cells, similar to previous study.     CALVARIUM AND EXTRACRANIAL SOFT TISSUES:  No fracture     IMPRESSION:     No acute intracranial abnormality.                 Workstation performed: [id number]        Final Interpretation by Saleem Solis MD (04/11 1100)      No acute intracranial abnormality.                  Workstation performed: [id number]         CT spine cervical without contrast   ED Interpretation by Jaz Castle MD (04/11 1107)   FINDINGS:     ALIGNMENT: No spinal subluxation     VERTEBRAE:  No fracture.     DEGENERATIVE CHANGES: Mild multilevel cervical degenerative changes are noted without critical central canal stenosis.     PREVERTEBRAL AND PARASPINAL SOFT TISSUES: Unremarkable     THORACIC INLET:  Normal.     IMPRESSION:     No cervical spine fracture or traumatic malalignment.                 Workstation performed: [id number]        Final Interpretation by Saleem Solis MD (04/11 1103)      No cervical spine fracture or traumatic malalignment.                  Workstation performed: [id number]             Universal Protocol:  procedure performed by consultantConsent: Verbal consent obtained. Written consent not obtained.  Risks and benefits: risks, benefits and alternatives were discussed  Consent given by:  patient  Patient understanding: patient states understanding of the procedure being performed  Patient identity confirmed: verbally with patient and arm band  Laceration repair    Date/Time: 4/11/2025 2:00 PM    Performed by: Jaz Castle MD  Authorized by: Jaz Castle MD  Body area: lower extremity  Location details: left lower leg  Laceration length: 4 cm  Foreign bodies: no foreign bodies  Tendon involvement: none  Nerve involvement: none  Vascular damage: no  Anesthesia: local infiltration    Anesthesia:  Local Anesthetic: lidocaine 1% with epinephrine  Anesthetic total: 2 mL    Sedation:  Patient sedated: no      Wound Dehiscence:  Superficial Wound Dehiscence: simple closure      Procedure Details:  Irrigation solution: saline  Irrigation method: syringe  Amount of cleaning: standard  Debridement: none  Degree of undermining: none  Skin closure: Ethilon (4-0)  Number of sutures: 5  Technique: simple  Approximation: close  Approximation difficulty: simple  Dressing: 4x4 sterile gauze          ED Medication and Procedure Management   Prior to Admission Medications   Prescriptions Last Dose Informant Patient Reported? Taking?   Aspirin Low Dose 81 MG chewable tablet 4/10/2025 Evening Self Yes Yes   Sig: Chew 81 mg daily Chew   Diclofenac Sodium (VOLTAREN) 1 % Not Taking Self Yes No   Sig: APPLY 2 GRAMS TO AFFECTED AREA TWICE A DAY   Patient not taking: Reported on 4/11/2025   amLODIPine (NORVASC) 10 mg tablet 4/10/2025 Evening Self Yes Yes   Sig: Take 10 mg by mouth daily   atorvastatin (LIPITOR) 20 mg tablet 4/10/2025 Evening Self Yes Yes   Sig: Take 20 mg by mouth daily   bacitracin topical ointment 500 units/g topical ointment Not Taking Self No No   Sig: Apply 1 large application topically 2 (two) times a day   Patient not taking: Reported on 4/11/2025   celecoxib (CeleBREX) 200 mg capsule  Self Yes No   Sig: Take 200 mg by mouth 2 (two) times a day   clindamycin 1 % gel 4/10/2025 Evening Self Yes Yes    colchicine (COLCRYS) 0.6 mg tablet 4/10/2025 Evening Self Yes Yes   Sig: TAKE 1 TABLET ORALLY TWICE DAILY FOR 7 DAYS.   cyanocobalamin (VITAMIN B-12) 1000 MCG tablet  Self No No   Sig: Take 1 tablet (1,000 mcg total) by mouth daily   cyclobenzaprine (FLEXERIL) 10 mg tablet Not Taking Self Yes No   Sig: Take 10 mg by mouth Three times daily as needed   Patient not taking: Reported on 2025   doxycycline hyclate (VIBRAMYCIN) 100 mg capsule Not Taking Self Yes No   Patient not taking: Reported on 2025   escitalopram (LEXAPRO) 10 mg tablet 4/10/2025 Evening Self Yes Yes   escitalopram (LEXAPRO) 20 mg tablet  Self No No   Sig: Take 1 tablet (20 mg total) by mouth daily   fluticasone (FLONASE) 50 mcg/act nasal spray Not Taking Self No No   Si spray into each nostril daily   Patient not taking: Reported on 2025   folic acid (FOLVITE) 1 mg tablet  Self No No   Sig: Take 1 tablet (1 mg total) by mouth daily   furosemide (LASIX) 20 mg tablet  Self No No   Sig: Take 1 tablet (20 mg total) by mouth daily for 30 doses   gabapentin (NEURONTIN) 100 mg capsule 4/10/2025 Evening Self Yes Yes   Sig: TAKE 2 CAPSULE BY ORAL ROUTE 3 TIMES EVERY BEDTIME   hydrOXYzine HCL (ATARAX) 25 mg tablet Unknown Self No No   Sig: Take 1 tablet (25 mg total) by mouth daily at bedtime as needed for itching   ketorolac (TORADOL) 10 mg tablet Not Taking Self Yes No   Sig: TAKE 1 TABLET ORALLY EVERY 4 TO 6 HOURS FOR 5 DAYS AS NEEDED FOR PAIN MAX OF 4 DOSES PER DAY.   Patient not taking: Reported on 2025   lisinopril (ZESTRIL) 10 mg tablet 4/10/2025 Evening Self Yes Yes   Sig: Take 10 mg by mouth daily   losartan (COZAAR) 25 mg tablet Unknown Self Yes No   meloxicam (MOBIC) 15 mg tablet Unknown Self No No   Sig: TAKE 1 TABLET (15 MG TOTAL) BY MOUTH DAILY.   metFORMIN (GLUCOPHAGE) 500 mg tablet 4/10/2025 Evening Self Yes Yes   Sig: Take 500 mg by mouth 2 (two) times a day with meals With morning and evening   mirtazapine (REMERON)  15 mg tablet Not Taking Self Yes No   Sig: TAKE 1 TABLET BY MOUTH EVERY DAY BEFORE BEDTIME   Patient not taking: Reported on 4/11/2025   mirtazapine (REMERON) 30 mg tablet 4/10/2025 Bedtime Self Yes Yes   Sig: TAKE 1 TABLET BY MOUTH EVERY DAY BEFORE BEDTIME   nicotine (NICODERM CQ) 21 mg/24 hr TD 24 hr patch Not Taking Self No No   Sig: Place 1 patch on the skin over 24 hours daily   Patient not taking: Reported on 4/11/2025   omeprazole (PriLOSEC) 20 mg delayed release capsule 4/10/2025 Evening Self Yes Yes   Sig: Take 20 mg by mouth daily Take before a meal   sildenafil (VIAGRA) 50 MG tablet Not Taking Self Yes No   Sig: TAKE 1 TABLET BY ORAL ROUTE EVERY DAY AS NEEDED APPROXIMATELY 1 HOUR BEFORE SEXUAL ACTIVITY   Patient not taking: Reported on 4/11/2025   spironolactone (ALDACTONE) 50 mg tablet  Self No No   Sig: Take 1 tablet (50 mg total) by mouth daily   thiamine 100 MG tablet  Self No No   Sig: Take 1 tablet (100 mg total) by mouth daily      Facility-Administered Medications: None     Current Discharge Medication List        CONTINUE these medications which have NOT CHANGED    Details   amLODIPine (NORVASC) 10 mg tablet Take 10 mg by mouth daily      Aspirin Low Dose 81 MG chewable tablet Chew 81 mg daily Chew      atorvastatin (LIPITOR) 20 mg tablet Take 20 mg by mouth daily      clindamycin 1 % gel       colchicine (COLCRYS) 0.6 mg tablet TAKE 1 TABLET ORALLY TWICE DAILY FOR 7 DAYS.      escitalopram (LEXAPRO) 10 mg tablet       gabapentin (NEURONTIN) 100 mg capsule TAKE 2 CAPSULE BY ORAL ROUTE 3 TIMES EVERY BEDTIME      lisinopril (ZESTRIL) 10 mg tablet Take 10 mg by mouth daily      metFORMIN (GLUCOPHAGE) 500 mg tablet Take 500 mg by mouth 2 (two) times a day with meals With morning and evening      !! mirtazapine (REMERON) 30 mg tablet TAKE 1 TABLET BY MOUTH EVERY DAY BEFORE BEDTIME      omeprazole (PriLOSEC) 20 mg delayed release capsule Take 20 mg by mouth daily Take before a meal      bacitracin  topical ointment 500 units/g topical ointment Apply 1 large application topically 2 (two) times a day  Qty: 28 g, Refills: 0    Associated Diagnoses: Burn      celecoxib (CeleBREX) 200 mg capsule Take 200 mg by mouth 2 (two) times a day      cyanocobalamin (VITAMIN B-12) 1000 MCG tablet Take 1 tablet (1,000 mcg total) by mouth daily  Qty: 30 tablet, Refills: 0    Associated Diagnoses: Vitamin B12 deficiency; Alcohol abuse      cyclobenzaprine (FLEXERIL) 10 mg tablet Take 10 mg by mouth Three times daily as needed      Diclofenac Sodium (VOLTAREN) 1 % APPLY 2 GRAMS TO AFFECTED AREA TWICE A DAY      doxycycline hyclate (VIBRAMYCIN) 100 mg capsule       fluticasone (FLONASE) 50 mcg/act nasal spray 1 spray into each nostril daily  Qty: 16 g, Refills: 0    Associated Diagnoses: Sinusitis      folic acid (FOLVITE) 1 mg tablet Take 1 tablet (1 mg total) by mouth daily  Qty: 30 tablet, Refills: 0    Associated Diagnoses: Folate deficiency; Alcohol abuse      furosemide (LASIX) 20 mg tablet Take 1 tablet (20 mg total) by mouth daily for 30 doses  Qty: 30 tablet, Refills: 0    Associated Diagnoses: Volume overload      hydrOXYzine HCL (ATARAX) 25 mg tablet Take 1 tablet (25 mg total) by mouth daily at bedtime as needed for itching  Qty: 12 tablet, Refills: 0    Associated Diagnoses: Depressed mood      ketorolac (TORADOL) 10 mg tablet TAKE 1 TABLET ORALLY EVERY 4 TO 6 HOURS FOR 5 DAYS AS NEEDED FOR PAIN MAX OF 4 DOSES PER DAY.      losartan (COZAAR) 25 mg tablet       meloxicam (MOBIC) 15 mg tablet TAKE 1 TABLET (15 MG TOTAL) BY MOUTH DAILY.  Qty: 30 tablet, Refills: 5    Associated Diagnoses: Chronic pain of left knee      !! mirtazapine (REMERON) 15 mg tablet TAKE 1 TABLET BY MOUTH EVERY DAY BEFORE BEDTIME      nicotine (NICODERM CQ) 21 mg/24 hr TD 24 hr patch Place 1 patch on the skin over 24 hours daily  Qty: 28 patch, Refills: 0    Associated Diagnoses: Tobacco abuse      sildenafil (VIAGRA) 50 MG tablet TAKE 1 TABLET BY  ORAL ROUTE EVERY DAY AS NEEDED APPROXIMATELY 1 HOUR BEFORE SEXUAL ACTIVITY      spironolactone (ALDACTONE) 50 mg tablet Take 1 tablet (50 mg total) by mouth daily  Qty: 30 tablet, Refills: 0    Associated Diagnoses: Volume overload      thiamine 100 MG tablet Take 1 tablet (100 mg total) by mouth daily  Qty: 30 tablet, Refills: 0    Associated Diagnoses: Alcohol abuse       !! - Potential duplicate medications found. Please discuss with provider.        No discharge procedures on file.  ED SEPSIS DOCUMENTATION   Time reflects when diagnosis was documented in both MDM as applicable and the Disposition within this note       Time User Action Codes Description Comment    4/11/2025 12:00 PM Jj Leslie [S82.142A] Closed fracture of left tibial plateau, initial encounter     4/11/2025  1:57 PM Debi Aguilar [Z91.89] At risk for acid-base imbalance     4/11/2025  1:57 PM Debi Aguilar [R73.9] Hyperglycemia     4/11/2025  1:58 PM Debi Aguilar [E08.10] Diabetic ketoacidosis without coma associated with diabetes mellitus due to underlying condition ([facility])     4/11/2025  1:59 PM Jj Leslie Add [S82.035A] Closed nondisplaced transverse fracture of left patella, initial encounter                  Jaz Castle MD  04/11/25 1947

## 2025-04-11 NOTE — Clinical Note
Case was discussed with Trauma and the patient's admission status was agreed to be Admission Status: inpatient status to the service of Dr. Hays

## 2025-04-12 LAB
ANION GAP SERPL CALCULATED.3IONS-SCNC: 9 MMOL/L (ref 4–13)
BACTERIA UR QL AUTO: ABNORMAL /HPF
BASOPHILS # BLD AUTO: 0.12 THOUSANDS/ÂΜL (ref 0–0.1)
BASOPHILS NFR BLD AUTO: 1 % (ref 0–1)
BILIRUB UR QL STRIP: NEGATIVE
BUN SERPL-MCNC: 9 MG/DL (ref 5–25)
CALCIUM SERPL-MCNC: 8.6 MG/DL (ref 8.4–10.2)
CHLORIDE SERPL-SCNC: 99 MMOL/L (ref 96–108)
CLARITY UR: CLEAR
CO2 SERPL-SCNC: 23 MMOL/L (ref 21–32)
COLOR UR: YELLOW
CREAT SERPL-MCNC: 0.73 MG/DL (ref 0.6–1.3)
EOSINOPHIL # BLD AUTO: 0.21 THOUSAND/ÂΜL (ref 0–0.61)
EOSINOPHIL NFR BLD AUTO: 2 % (ref 0–6)
ERYTHROCYTE [DISTWIDTH] IN BLOOD BY AUTOMATED COUNT: 17.4 % (ref 11.6–15.1)
GFR SERPL CREATININE-BSD FRML MDRD: 105 ML/MIN/1.73SQ M
GLUCOSE SERPL-MCNC: 135 MG/DL (ref 65–140)
GLUCOSE SERPL-MCNC: 153 MG/DL (ref 65–140)
GLUCOSE SERPL-MCNC: 156 MG/DL (ref 65–140)
GLUCOSE SERPL-MCNC: 156 MG/DL (ref 65–140)
GLUCOSE SERPL-MCNC: 157 MG/DL (ref 65–140)
GLUCOSE SERPL-MCNC: 159 MG/DL (ref 65–140)
GLUCOSE SERPL-MCNC: 166 MG/DL (ref 65–140)
GLUCOSE SERPL-MCNC: 166 MG/DL (ref 65–140)
GLUCOSE SERPL-MCNC: 172 MG/DL (ref 65–140)
GLUCOSE SERPL-MCNC: 174 MG/DL (ref 65–140)
GLUCOSE SERPL-MCNC: 187 MG/DL (ref 65–140)
GLUCOSE SERPL-MCNC: 226 MG/DL (ref 65–140)
GLUCOSE SERPL-MCNC: 234 MG/DL (ref 65–140)
GLUCOSE UR STRIP-MCNC: ABNORMAL MG/DL
HCT VFR BLD AUTO: 38.6 % (ref 36.5–49.3)
HGB BLD-MCNC: 12.3 G/DL (ref 12–17)
HGB UR QL STRIP.AUTO: NEGATIVE
IMM GRANULOCYTES # BLD AUTO: 0.04 THOUSAND/UL (ref 0–0.2)
IMM GRANULOCYTES NFR BLD AUTO: 1 % (ref 0–2)
KETONES UR STRIP-MCNC: NEGATIVE MG/DL
LEUKOCYTE ESTERASE UR QL STRIP: ABNORMAL
LYMPHOCYTES # BLD AUTO: 2.14 THOUSANDS/ÂΜL (ref 0.6–4.47)
LYMPHOCYTES NFR BLD AUTO: 25 % (ref 14–44)
MCH RBC QN AUTO: 25.2 PG (ref 26.8–34.3)
MCHC RBC AUTO-ENTMCNC: 31.9 G/DL (ref 31.4–37.4)
MCV RBC AUTO: 79 FL (ref 82–98)
MONOCYTES # BLD AUTO: 0.95 THOUSAND/ÂΜL (ref 0.17–1.22)
MONOCYTES NFR BLD AUTO: 11 % (ref 4–12)
MUCOUS THREADS UR QL AUTO: ABNORMAL
NEUTROPHILS # BLD AUTO: 5.15 THOUSANDS/ÂΜL (ref 1.85–7.62)
NEUTS SEG NFR BLD AUTO: 60 % (ref 43–75)
NITRITE UR QL STRIP: NEGATIVE
NON-SQ EPI CELLS URNS QL MICRO: ABNORMAL /HPF
NRBC BLD AUTO-RTO: 0 /100 WBCS
PH UR STRIP.AUTO: 6 [PH]
PLATELET # BLD AUTO: 97 THOUSANDS/UL (ref 149–390)
PMV BLD AUTO: 11.3 FL (ref 8.9–12.7)
POTASSIUM SERPL-SCNC: 3.9 MMOL/L (ref 3.5–5.3)
PROT UR STRIP-MCNC: ABNORMAL MG/DL
RBC # BLD AUTO: 4.88 MILLION/UL (ref 3.88–5.62)
RBC #/AREA URNS AUTO: ABNORMAL /HPF
SODIUM SERPL-SCNC: 131 MMOL/L (ref 135–147)
SP GR UR STRIP.AUTO: 1.03 (ref 1–1.03)
UROBILINOGEN UR STRIP-ACNC: <2 MG/DL
WBC # BLD AUTO: 8.61 THOUSAND/UL (ref 4.31–10.16)
WBC #/AREA URNS AUTO: ABNORMAL /HPF

## 2025-04-12 PROCEDURE — 97163 PT EVAL HIGH COMPLEX 45 MIN: CPT

## 2025-04-12 PROCEDURE — 80048 BASIC METABOLIC PNL TOTAL CA: CPT | Performed by: SURGERY

## 2025-04-12 PROCEDURE — 97116 GAIT TRAINING THERAPY: CPT

## 2025-04-12 PROCEDURE — 85025 COMPLETE CBC W/AUTO DIFF WBC: CPT | Performed by: SURGERY

## 2025-04-12 PROCEDURE — 97167 OT EVAL HIGH COMPLEX 60 MIN: CPT

## 2025-04-12 PROCEDURE — 97535 SELF CARE MNGMENT TRAINING: CPT

## 2025-04-12 PROCEDURE — 99232 SBSQ HOSP IP/OBS MODERATE 35: CPT | Performed by: PHYSICIAN ASSISTANT

## 2025-04-12 PROCEDURE — 82948 REAGENT STRIP/BLOOD GLUCOSE: CPT

## 2025-04-12 PROCEDURE — 81001 URINALYSIS AUTO W/SCOPE: CPT | Performed by: PHYSICIAN ASSISTANT

## 2025-04-12 PROCEDURE — 99232 SBSQ HOSP IP/OBS MODERATE 35: CPT | Performed by: INTERNAL MEDICINE

## 2025-04-12 RX ORDER — BLOOD SUGAR DIAGNOSTIC
STRIP MISCELLANEOUS
Qty: 200 EACH | Refills: 0 | Status: SHIPPED | OUTPATIENT
Start: 2025-04-12 | End: 2025-04-13

## 2025-04-12 RX ORDER — INSULIN LISPRO 100 [IU]/ML
1-6 INJECTION, SOLUTION INTRAVENOUS; SUBCUTANEOUS
Status: DISCONTINUED | OUTPATIENT
Start: 2025-04-12 | End: 2025-04-12

## 2025-04-12 RX ORDER — BLOOD-GLUCOSE METER
KIT MISCELLANEOUS
Qty: 1 KIT | Refills: 0 | Status: SHIPPED | OUTPATIENT
Start: 2025-04-12 | End: 2025-04-13

## 2025-04-12 RX ORDER — LANCETS 33 GAUGE
EACH MISCELLANEOUS
Qty: 200 EACH | Refills: 0 | Status: SHIPPED | OUTPATIENT
Start: 2025-04-12 | End: 2025-04-13

## 2025-04-12 RX ORDER — INSULIN GLARGINE 100 [IU]/ML
50 INJECTION, SOLUTION SUBCUTANEOUS
Status: DISCONTINUED | OUTPATIENT
Start: 2025-04-12 | End: 2025-04-13 | Stop reason: HOSPADM

## 2025-04-12 RX ORDER — METHOCARBAMOL 500 MG/1
500 TABLET, FILM COATED ORAL EVERY 6 HOURS SCHEDULED
Status: DISCONTINUED | OUTPATIENT
Start: 2025-04-12 | End: 2025-04-13 | Stop reason: HOSPADM

## 2025-04-12 RX ORDER — OXYCODONE HYDROCHLORIDE 10 MG/1
10 TABLET ORAL EVERY 4 HOURS PRN
Refills: 0 | Status: DISCONTINUED | OUTPATIENT
Start: 2025-04-12 | End: 2025-04-13 | Stop reason: HOSPADM

## 2025-04-12 RX ORDER — INSULIN LISPRO 100 [IU]/ML
1-6 INJECTION, SOLUTION INTRAVENOUS; SUBCUTANEOUS
Status: DISCONTINUED | OUTPATIENT
Start: 2025-04-13 | End: 2025-04-13 | Stop reason: HOSPADM

## 2025-04-12 RX ORDER — GLUCOSAMINE HCL/CHONDROITIN SU 500-400 MG
CAPSULE ORAL
Qty: 200 EACH | Refills: 0 | Status: SHIPPED | OUTPATIENT
Start: 2025-04-12 | End: 2025-04-13

## 2025-04-12 RX ORDER — INSULIN LISPRO 100 [IU]/ML
1-5 INJECTION, SOLUTION INTRAVENOUS; SUBCUTANEOUS
Status: DISCONTINUED | OUTPATIENT
Start: 2025-04-13 | End: 2025-04-12

## 2025-04-12 RX ORDER — OXYCODONE HYDROCHLORIDE 5 MG/1
5 TABLET ORAL EVERY 4 HOURS PRN
Refills: 0 | Status: DISCONTINUED | OUTPATIENT
Start: 2025-04-12 | End: 2025-04-13 | Stop reason: HOSPADM

## 2025-04-12 RX ADMIN — ACETAMINOPHEN 975 MG: 325 TABLET, FILM COATED ORAL at 05:07

## 2025-04-12 RX ADMIN — METHOCARBAMOL 500 MG: 500 TABLET ORAL at 07:57

## 2025-04-12 RX ADMIN — METFORMIN HYDROCHLORIDE 1000 MG: 500 TABLET ORAL at 17:44

## 2025-04-12 RX ADMIN — NICOTINE 1 PATCH: 7 PATCH, EXTENDED RELEASE TRANSDERMAL at 15:03

## 2025-04-12 RX ADMIN — ATORVASTATIN CALCIUM 20 MG: 20 TABLET, FILM COATED ORAL at 08:00

## 2025-04-12 RX ADMIN — ACETAMINOPHEN 975 MG: 325 TABLET, FILM COATED ORAL at 21:57

## 2025-04-12 RX ADMIN — METHOCARBAMOL 500 MG: 500 TABLET ORAL at 17:44

## 2025-04-12 RX ADMIN — FOLIC ACID 1 MG: 1 TABLET ORAL at 08:00

## 2025-04-12 RX ADMIN — METHOCARBAMOL 500 MG: 500 TABLET ORAL at 23:27

## 2025-04-12 RX ADMIN — OXYCODONE HYDROCHLORIDE 10 MG: 10 TABLET ORAL at 19:41

## 2025-04-12 RX ADMIN — ASPIRIN 81 MG CHEWABLE TABLET 81 MG: 81 TABLET CHEWABLE at 08:00

## 2025-04-12 RX ADMIN — Medication 100 MG: at 08:00

## 2025-04-12 RX ADMIN — SODIUM CHLORIDE, SODIUM LACTATE, POTASSIUM CHLORIDE, AND CALCIUM CHLORIDE 75 ML/HR: .6; .31; .03; .02 INJECTION, SOLUTION INTRAVENOUS at 00:29

## 2025-04-12 RX ADMIN — ACETAMINOPHEN 975 MG: 325 TABLET, FILM COATED ORAL at 13:12

## 2025-04-12 RX ADMIN — SODIUM CHLORIDE 3 UNITS/HR: 9 INJECTION, SOLUTION INTRAVENOUS at 13:14

## 2025-04-12 RX ADMIN — SODIUM CHLORIDE, SODIUM LACTATE, POTASSIUM CHLORIDE, AND CALCIUM CHLORIDE 75 ML/HR: .6; .31; .03; .02 INJECTION, SOLUTION INTRAVENOUS at 13:39

## 2025-04-12 RX ADMIN — HYDROMORPHONE HYDROCHLORIDE 0.2 MG: 0.2 INJECTION, SOLUTION INTRAMUSCULAR; INTRAVENOUS; SUBCUTANEOUS at 05:07

## 2025-04-12 RX ADMIN — SODIUM CHLORIDE, SODIUM LACTATE, POTASSIUM CHLORIDE, AND CALCIUM CHLORIDE 75 ML/HR: .6; .31; .03; .02 INJECTION, SOLUTION INTRAVENOUS at 22:46

## 2025-04-12 RX ADMIN — OXYCODONE HYDROCHLORIDE 10 MG: 10 TABLET ORAL at 07:57

## 2025-04-12 RX ADMIN — AMLODIPINE BESYLATE 10 MG: 10 TABLET ORAL at 08:00

## 2025-04-12 RX ADMIN — ENOXAPARIN SODIUM 30 MG: 30 INJECTION SUBCUTANEOUS at 21:55

## 2025-04-12 RX ADMIN — OXYCODONE HYDROCHLORIDE 5 MG: 5 TABLET ORAL at 01:52

## 2025-04-12 RX ADMIN — ESCITALOPRAM OXALATE 10 MG: 10 TABLET ORAL at 08:00

## 2025-04-12 RX ADMIN — OXYCODONE HYDROCHLORIDE 10 MG: 10 TABLET ORAL at 15:04

## 2025-04-12 RX ADMIN — METHOCARBAMOL 500 MG: 500 TABLET ORAL at 13:12

## 2025-04-12 RX ADMIN — INSULIN GLARGINE 50 UNITS: 100 INJECTION, SOLUTION SUBCUTANEOUS at 22:01

## 2025-04-12 RX ADMIN — ENOXAPARIN SODIUM 30 MG: 30 INJECTION SUBCUTANEOUS at 08:00

## 2025-04-12 RX ADMIN — PANTOPRAZOLE SODIUM 20 MG: 20 TABLET, DELAYED RELEASE ORAL at 05:07

## 2025-04-12 RX ADMIN — POLYETHYLENE GLYCOL 3350 17 G: 17 POWDER, FOR SOLUTION ORAL at 08:00

## 2025-04-12 NOTE — ASSESSMENT & PLAN NOTE
- tdap updated  - orthopedics probed the wound and determined the joint space is not threatened, no need for fluid challenge  - wound washed out and repaired with sutures by the ED  - Local wound care with suture removal in 14 days

## 2025-04-12 NOTE — PLAN OF CARE
Problem: PAIN - ADULT  Goal: Verbalizes/displays adequate comfort level or baseline comfort level  Description: Interventions:- Encourage patient to monitor pain and request assistance- Assess pain using appropriate pain scale- Administer analgesics based on type and severity of pain and evaluate response- Implement non-pharmacological measures as appropriate and evaluate response- Consider cultural and social influences on pain and pain management- Notify physician/advanced practitioner if interventions unsuccessful or patient reports new pain  Outcome: Progressing     Problem: INFECTION - ADULT  Goal: Absence or prevention of progression during hospitalization  Description: INTERVENTIONS:- Assess and monitor for signs and symptoms of infection- Monitor lab/diagnostic results- Monitor all insertion sites, i.e. indwelling lines, tubes, and drains- Monitor endotracheal if appropriate and nasal secretions for changes in amount and color- [place] appropriate cooling/warming therapies per order- Administer medications as ordered- Instruct and encourage patient and family to use good hand hygiene technique- Identify and instruct in appropriate isolation precautions for identified infection/condition  Outcome: Progressing  Goal: Absence of fever/infection during neutropenic period  Description: INTERVENTIONS:- Monitor WBC  Outcome: Progressing     Problem: SAFETY ADULT  Goal: Patient will remain free of falls  Description: INTERVENTIONS:- Educate patient/family on patient safety including physical limitations- Instruct patient to call for assistance with activity - Consult OT/PT to assist with strengthening/mobility - Keep Call bell within reach- Keep bed low and locked with side rails adjusted as appropriate- Keep care items and personal belongings within reach- Initiate and maintain comfort rounds- Make Fall Risk Sign visible to staff- Offer Toileting every  Hours, in advance of need- Initiate/Maintain alarm- Obtain  necessary fall risk management equipment: - Apply yellow socks and bracelet for high fall risk patients- Consider moving patient to room near nurses station  Outcome: Progressing  Goal: Maintain or return to baseline ADL function  Description: INTERVENTIONS:-  Assess patient's ability to carry out ADLs; assess patient's baseline for ADL function and identify physical deficits which impact ability to perform ADLs (bathing, care of mouth/teeth, toileting, grooming, dressing, etc.)- Assess/evaluate cause of self-care deficits - Assess range of motion- Assess patient's mobility; develop plan if impaired- Assess patient's need for assistive devices and provide as appropriate- Encourage maximum independence but intervene and supervise when necessary- Involve family in performance of ADLs- Assess for home care needs following discharge - Consider OT consult to assist with ADL evaluation and planning for discharge- Provide patient education as appropriate  Outcome: Progressing  Goal: Maintains/Returns to pre admission functional level  Description: INTERVENTIONS:- Perform AM-PAC 6 Click Basic Mobility/ Daily Activity assessment daily.- Set and communicate daily mobility goal to care team and patient/family/caregiver. - Collaborate with rehabilitation services on mobility goals if consulted- Perform Range of Motion  times a day.- Reposition patient every hours.- Dangle patient  times a day- Stand patient  times a day- Ambulate patient  times a day- Out of bed to chair  times a day - Out of bed for meals times a day- Out of bed for toileting- Record patient progress and toleration of activity level   Outcome: Progressing     Problem: DISCHARGE PLANNING  Goal: Discharge to home or other facility with appropriate resources  Description: INTERVENTIONS:- Identify barriers to discharge w/patient and caregiver- Arrange for needed discharge resources and transportation as appropriate- Identify discharge learning needs (meds, wound  care, etc.)- Arrange for interpretive services to assist at discharge as needed- Refer to Case Management Department for coordinating discharge planning if the patient needs post-hospital services based on physician/advanced practitioner order or complex needs related to functional status, cognitive ability, or social support system  Outcome: Progressing     Problem: Knowledge Deficit  Goal: Patient/family/caregiver demonstrates understanding of disease process, treatment plan, medications, and discharge instructions  Description: Complete learning assessment and assess knowledge base.Interventions:- Provide teaching at level of understanding- Provide teaching via preferred learning methods  Outcome: Progressing

## 2025-04-12 NOTE — ASSESSMENT & PLAN NOTE
Lab Results   Component Value Date    HGBA1C 12.2 (H) 04/11/2025       Recent Labs     04/11/25  1457 04/11/25  1815 04/11/25 2011   POCGLU 233* 282* 161*       Blood Sugar Average: Last 72 hrs:  (P) [id number]    - beta hydroxyburate negative. UA pending.   - anion gap closed with administration of 1 L IVFs  - place on non-DKA insulin gtt  - IVF hydration  - accuchecks, goal -180  - Consult SLIM and Endocrine

## 2025-04-12 NOTE — PLAN OF CARE
Problem: PAIN - ADULT  Goal: Verbalizes/displays adequate comfort level or baseline comfort level  Description: Interventions:- Encourage patient to monitor pain and request assistance- Assess pain using appropriate pain scale- Administer analgesics based on type and severity of pain and evaluate response- Implement non-pharmacological measures as appropriate and evaluate response- Consider cultural and social influences on pain and pain management- Notify physician/advanced practitioner if interventions unsuccessful or patient reports new pain  Outcome: Progressing     Problem: INFECTION - ADULT  Goal: Absence or prevention of progression during hospitalization  Description: INTERVENTIONS:- Assess and monitor for signs and symptoms of infection- Monitor lab/diagnostic results- Monitor all insertion sites, i.e. indwelling lines, tubes, and drains- Monitor endotracheal if appropriate and nasal secretions for changes in amount and color- [place] appropriate cooling/warming therapies per order- Administer medications as ordered- Instruct and encourage patient and family to use good hand hygiene technique- Identify and instruct in appropriate isolation precautions for identified infection/condition  Outcome: Progressing  Goal: Absence of fever/infection during neutropenic period  Description: INTERVENTIONS:- Monitor WBC  Outcome: Progressing     Problem: SAFETY ADULT  Goal: Patient will remain free of falls  Description: INTERVENTIONS:- Educate patient/family on patient safety including physical limitations- Instruct patient to call for assistance with activity - Consult OT/PT to assist with strengthening/mobility - Keep Call bell within reach- Keep bed low and locked with side rails adjusted as appropriate- Keep care items and personal belongings within reach- Initiate and maintain comfort rounds- Make Fall Risk Sign visible to staff- Offer Toileting every  Hours, in advance of need- Initiate/Maintain alarm- Obtain  necessary fall risk management equipment: - Apply yellow socks and bracelet for high fall risk patients- Consider moving patient to room near nurses station  Outcome: Progressing  Goal: Maintain or return to baseline ADL function  Description: INTERVENTIONS:-  Assess patient's ability to carry out ADLs; assess patient's baseline for ADL function and identify physical deficits which impact ability to perform ADLs (bathing, care of mouth/teeth, toileting, grooming, dressing, etc.)- Assess/evaluate cause of self-care deficits - Assess range of motion- Assess patient's mobility; develop plan if impaired- Assess patient's need for assistive devices and provide as appropriate- Encourage maximum independence but intervene and supervise when necessary- Involve family in performance of ADLs- Assess for home care needs following discharge - Consider OT consult to assist with ADL evaluation and planning for discharge- Provide patient education as appropriate  Outcome: Progressing  Goal: Maintains/Returns to pre admission functional level  Description: INTERVENTIONS:- Perform AM-PAC 6 Click Basic Mobility/ Daily Activity assessment daily.- Set and communicate daily mobility goal to care team and patient/family/caregiver. - Collaborate with rehabilitation services on mobility goals if consulted- Perform Range of Motion  times a day.- Reposition patient every  hours.- Dangle patient  times a day- Stand patient times a day- Ambulate patient  times a day- Out of bed to chair  times a day - Out of bed for meals  times a day- Out of bed for toileting- Record patient progress and toleration of activity level   Outcome: Progressing     Problem: DISCHARGE PLANNING  Goal: Discharge to home or other facility with appropriate resources  Description: INTERVENTIONS:- Identify barriers to discharge w/patient and caregiver- Arrange for needed discharge resources and transportation as appropriate- Identify discharge learning needs (meds, wound  care, etc.)- Arrange for interpretive services to assist at discharge as needed- Refer to Case Management Department for coordinating discharge planning if the patient needs post-hospital services based on physician/advanced practitioner order or complex needs related to functional status, cognitive ability, or social support system  Outcome: Progressing     Problem: Knowledge Deficit  Goal: Patient/family/caregiver demonstrates understanding of disease process, treatment plan, medications, and discharge instructions  Description: Complete learning assessment and assess knowledge base.Interventions:- Provide teaching at level of understanding- Provide teaching via preferred learning methods  Outcome: Progressing     Problem: Nutrition/Hydration-ADULT  Goal: Nutrient/Hydration intake appropriate for improving, restoring or maintaining nutritional needs  Description: Monitor and assess patient's nutrition/hydration status for malnutrition. Collaborate with interdisciplinary team and initiate plan and interventions as ordered.  Monitor patient's weight and dietary intake as ordered or per policy. Utilize nutrition screening tool and intervene as necessary. Determine patient's food preferences and provide high-protein, high-caloric foods as appropriate. INTERVENTIONS:- Monitor oral intake, urinary output, labs, and treatment plans- Assess nutrition and hydration status and recommend course of action- Evaluate amount of meals eaten- Assist patient with eating if necessary - Allow adequate time for meals- Recommend/ encourage appropriate diets, oral nutritional supplements, and vitamin/mineral supplements- Order, calculate, and assess calorie counts as needed- Recommend, monitor, and adjust tube feedings and TPN/PPN based on assessed needs- Assess need for intravenous fluids- Provide specific nutrition/hydration education as appropriate- Include patient/family/caregiver in decisions related to nutrition  Outcome:  Progressing

## 2025-04-12 NOTE — PROGRESS NOTES
Progress Note - Trauma   Name: Viktor Farfan 54 y.o. male I MRN: [id number]  Unit/Bed#: W -01 I Date of Admission: 4/11/2025   Date of Service: 4/12/2025 I Hospital Day: 1    Assessment & Plan  Fall  - Status post mechanical fall with the below noted injuries.  - Fall precautions.  - Geriatric Medicine consultation for evaluation, medication review and recommendations.  - PT and OT evaluation and treatment as indicated.  - Case Management consultation for disposition planning.  - The therapy service recommendations are for level 1 resource intensity/postacute care facility placement for rehab, but patient pretty intent on discharge home as soon as possible.  Fracture of left tibia  - Acute closed left intra-articular tibial fracture with lipohemarthrosis, present on admission.  - Appreciate Orthopedic surgery evaluation, recommendations and interventions as noted.  - Patient will need operative intervention for this injury, but to be deferred pending improved medical condition with better diabetic blood glucose control.  - As no urgent operative intervention is indicated from an Orthopedic surgery standpoint, patient may be discharged with short-term outpatient follow-up if otherwise medically appropriate.  If patient remains hospitalized, operative intervention may be considered as early as Tuesday, 4/15/2025.  - Maintain NON-weightbearing status on the left lower extremity in knee immobilizer.  - Monitor left lower extremity neurovascular exam.  - Continue multimodal analgesic regimen.  - Continue DVT prophylaxis.  - PT and OT evaluation and treatment as indicated.  - Outpatient follow up with Orthopedic surgery for re-evaluation.  Knee laceration, left, initial encounter  - Acute left knee laceration, present on presentation.  - Status post wound washout and suture repair by ED staff on 4/11/2025.  - Tetanus vaccination status updated this hospital encounter.  - Appreciate Orthopedic surgery evaluation,  "recommendations and interventions as noted.  - Orthopedic surgery did do a wound evaluation including probing of the wound and determined that the joint space was not threatened and no further interventions or fluid challenge was indicated at the time.  - Continue local wound care as indicated.  - Analgesia as needed.  - Plan for wound reevaluation and suture removal in approximately 14 days (on or around 4/25/2025).  Hyperglycemia due to diabetes mellitus (HCC)  Lab Results   Component Value Date    HGBA1C 12.2 (H) 04/11/2025       Recent Labs     04/12/25  0000 04/12/25  0150 04/12/25  0435 04/12/25  0554   POCGLU 174* 187* 153* 166*       Blood Sugar Average: Last 72 hrs:  (P) 191.125    - Patient with chronic history of type 2 diabetes mellitus, present on presentation, with hyperglycemia on presentation.  - Patient did have anion gap acidosis on presentation, but but a beta hydroxyburate test was negative.  - The patient's anion gap closed with fluid hydration/resuscitation.  - Appreciate Endocrinology evaluation, recommendations and assistance with management.  - Patient currently on insulin infusion therapy with plan to transition to basal bolus therapy potentially today.  - Goal blood glucose range between 140 and 180.  - Hypoglycemia protocol.  - Outpatient follow-up with PCP as well as potentially with Endocrinology as indicated.    VTE Prophylaxis: VTE covered by:  enoxaparin, Subcutaneous, 30 mg at 04/11/25 2118        Disposition: Continue current level of care.  Awaiting operative intervention with orthopedic surgery.  Continue therapy evaluation and treatment as indicated.  Case management following for disposition planning.  Please contact the SecureChat role for the Trauma service with any questions/concerns.    TRAUMA TERTIARY SURVEY  Summary of Diagnosed Injuries: See above.    Transfer from: N/A    Mechanism of Injury:Fall     Chief Complaint: \"I'm still having pain.\"    24 Hour Events : No " significant events overnight.  Subjective : Patient is overall doing okay this morning.  He notes that he continues to have pain in his left knee, but denies any new or worsening pain.  He also denies any pain elsewhere.  He notes that he did not get sleep overnight due to the pain and being restless.  He has been tolerating oral intake without nausea, vomiting or constipation.  He denied any fevers or chills.    Despite the pain, he is hopeful for discharge today after discussion with orthopedic surgery indicating he will not need urgent operative intervention.    Objective :  Temp:  [97.9 °F (36.6 °C)-99.4 °F (37.4 °C)] 98.4 °F (36.9 °C)  HR:  [] 100  BP: (117-142)/(71-96) 119/74  Resp:  [16-22] 16  SpO2:  [88 %-99 %] 93 %  O2 Device: None (Room air)    I/O         04/10 0701 04/11 0700 04/11 0701 04/12 0700 04/12 0701 04/13 0700    IV Piggyback  1000     Total Intake(mL/kg)  1000 (9)     Urine (mL/kg/hr)  650     Stool  0     Total Output  650     Net  +350            Unmeasured Stool Occurrence  0 x             Physical Exam   GENERAL APPEARANCE: Patient in no acute distress.  HEENT: NCAT; EOMs intact; Mucous membranes moist  NECK / BACK: No midline cervical, thoracic or lumbar spine tenderness, step-offs or deformities.  No paraspinal muscular tenderness in the neck or back.  CV: Regular rate and rhythm; no murmur/gallops/rubs appreciated.  CHEST / LUNGS: Clear to auscultation; no wheezes/rales/rhonci.  No chest wall tenderness, crepitus or deformities.  ABD: NABS; soft; non-distended; non-tender.  : Spontaneously.  EXT: +2 pulses bilaterally upper & lower extremities; no edema.  Patient had normal range of motion without pain, tenderness or deformity in the bilateral upper and right lower extremities.  The left lower extremity is in a knee immobilizer with a clean/dry/intact Ace bandage dressing from the foot to the knee in place.  He had minimal left knee swelling and moderate left knee and  proximal lower leg tenderness with soft compressible muscle compartments and an intact neurovascular exam throughout the left lower extremity.  NEURO: GCS 15; no focal neurologic deficits; neurovascularly intact.  SKIN: Warm, dry and well perfused; no rash; no jaundice.           Lab Results: I have reviewed the following results:  Recent Labs     04/11/25  1031 04/11/25  1311 04/12/25  0231   WBC 6.90  --  8.61   HGB 12.5  --  12.3   HCT 40.1  --  38.6   *   < > 97*   SODIUM 133*   < > 131*   K 4.1   < > 3.9   CL 99   < > 99   CO2 19*   < > 23   BUN 6   < > 9   CREATININE 0.94   < > 0.73   GLUC 355*   < > 156*   AST 35  --   --    ALT 19  --   --    ALB 3.6  --   --    TBILI 0.77  --   --    ALKPHOS 171*  --   --    PTT 28  --   --    INR 1.15  --   --     < > = values in this interval not displayed.       Imaging Results Review: I reviewed radiology reports from this admission including: chest xray, CT head, CT C-spine, CT left lower extremity, and xray(s).  Other Study Results Review: No additional pertinent studies reviewed.

## 2025-04-12 NOTE — ASSESSMENT & PLAN NOTE
- Status post mechanical fall with the below noted injuries.  - Fall precautions.  - Geriatric Medicine consultation for evaluation, medication review and recommendations.  - PT and OT evaluation and treatment as indicated.  - Case Management consultation for disposition planning.  - The therapy service recommendations are for level 1 resource intensity/postacute care facility placement for rehab, but patient pretty intent on discharge home as soon as possible.

## 2025-04-12 NOTE — PLAN OF CARE
Problem: PHYSICAL THERAPY ADULT  Goal: Performs mobility at highest level of function for planned discharge setting.  See evaluation for individualized goals.  Description: Treatment/Interventions: Functional transfer training, LE strengthening/ROM, Elevations, Therapeutic exercise, Endurance training, Patient/family training, Equipment eval/education, Bed mobility, Gait training, Compensatory technique education, Continued evaluation, Spoke to nursing, Spoke to case management, Spoke to advanced practitioner    See flowsheet documentation for full assessment, interventions and recommendations.  Outcome: Progressing  Note: Prognosis: Fair  Problem List: Decreased strength, Decreased range of motion, Decreased endurance, Impaired balance, Decreased mobility, Decreased coordination, Decreased safety awareness, Impaired judgement, Decreased skin integrity, Orthopedic restrictions, Pain  Assessment: Pt seen for PT evaluation for mobility assessment & discharge needs. Pt admitted 4/11/2025 s/p fall through ceiling resulting in Fracture of left tibia and patellar. Per ortho, pt to be non op at this time (f/u at later date), NWB LLE in KI. During PT IE, pt requires JAKUB and increased time for bed mobility in hospital bed, completes initial STS transfer from elevated EOB with MODA 2 person + RW, and completes ambulatory transition from EOB to recliner chair with MODA + RW. During additional treatment time, pt progresses to complete transfers with 1 person assistance, ambulates 18ft x2 with RW and MIN-MODA, and MODA 2 person w/ u/l HR and u/l AC for attempt at elevations training (however unsuccessful). Pt displays above outlined functional impairments & limitations, and presents below his baseline level of functional mobility. The AM-PAC & Barthel Index outcome tools were used to assist in determining pt safety w/ mobility/self care & appropriate d/c recommendations, see above for scores. Pt is at risk of falls d/t  multiple comorbidities, impulsivity, h/o falls, impaired balance, impaired insight/safety awareness, use of ambulatory aid, varying levels of pain, acuity of medical illness, ongoing medical treatment of primary dx, abnormal lab values, polypharmacy, and WB restriction. Pt's clinical presentation is currently unstable/unpredictable as seen in pt's presentation of changing level of pain, varying levels of cognitive performance, increased fall risk, new onset of impairment of functional mobility, decreased endurance, and new onset of weakness. Pt will benefit from continued PT services in order to address impairments, decrease risk of falls, maximize independence w/ fnxl mobility, & ensure safety w/ mobility for transition to next level of care. Based on pt presentation & impairments, pt would most appropriately benefit from Level 1 (maximal PT intensity) resources upon d/c.    Barriers to Discharge: Inaccessible home environment     Rehab Resource Intensity Level, PT: I (Maximum Resource Intensity)    See flowsheet documentation for full assessment.

## 2025-04-12 NOTE — ASSESSMENT & PLAN NOTE
- Acute closed left intra-articular tibial fracture with lipohemarthrosis, present on admission.  - Appreciate Orthopedic surgery evaluation, recommendations and interventions as noted.  - Patient will need operative intervention for this injury, but to be deferred pending improved medical condition with better diabetic blood glucose control.  - As no urgent operative intervention is indicated from an Orthopedic surgery standpoint, patient may be discharged with short-term outpatient follow-up if otherwise medically appropriate.  If patient remains hospitalized, operative intervention may be considered as early as Tuesday, 4/15/2025.  - Maintain NON-weightbearing status on the left lower extremity in knee immobilizer.  - Monitor left lower extremity neurovascular exam.  - Continue multimodal analgesic regimen.  - Continue DVT prophylaxis.  - PT and OT evaluation and treatment as indicated.  - Outpatient follow up with Orthopedic surgery for re-evaluation.

## 2025-04-12 NOTE — OCCUPATIONAL THERAPY NOTE
Occupational Therapy Evaluation     Patient Name: Viktor Farfan  Today's Date: 2025  Problem List  Principal Problem:    Fracture of left tibia  Active Problems:    Hyperglycemia due to diabetes mellitus (HCC)    Fall    Knee laceration, left, initial encounter    Past Medical History  Past Medical History:   Diagnosis Date    Anxiety     Back pain     Chronic right-sided low back pain without sciatica 3/31/2018    Depression     Gastroesophageal reflux disease 3/31/2018    GERD (gastroesophageal reflux disease)     Right foot pain      Past Surgical History  Past Surgical History:   Procedure Laterality Date    NO PAST SURGERIES      ORIF FINGER FRACTURE Right 2019    Procedure: OPEN REDUCTION FINGER;  Surgeon: Tomas Morgan MD;  Location: [facility];  Service: Plastics        Patient's identity confirmed via 2 patient identifiers (full name and ) at start of session      25   OT Last Visit   OT Visit Date 25   Note Type   Note type Evaluation  (and Treatment)   Pain Assessment   Pain Assessment Tool FLACC   Pain Score No Pain  (at rest)   Pain Location/Orientation Orientation: Left;Location: Knee   Pain Onset/Description Frequency: Intermittent;Descriptor: Aching   Effect of Pain on Daily Activities limits activity tolerance, speed of ADLs/mobility   Patient's Stated Pain Goal No pain   Hospital Pain Intervention(s) Repositioned;Ambulation/increased activity;Emotional support   Pain Rating: FLACC (Rest) - Face 0   Pain Rating: FLACC (Rest) - Legs 0   Pain Rating: FLACC (Rest) - Activity 0   Pain Rating: FLACC (Rest) - Cry 0   Pain Rating: FLACC (Rest) - Consolability 0   Score: FLACC (Rest) 0   Pain Rating: FLACC (Activity) - Face 1   Pain Rating: FLACC (Activity) - Legs 1   Pain Rating: FLACC (Activity) - Activity 1   Pain Rating: FLACC (Activity) - Cry 1   Pain Rating: FLACC (Activity) - Consolability 1   Score: FLACC (Activity) 5   Restrictions/Precautions   Weight Bearing  "Precautions Per Order Yes   LLE Weight Bearing Per Order (S)  NWB  (s/p L tibial plateau fracture and L patellar fracture; non op in knee immobilizer at all times)   Braces or Orthoses Knee immobilizer  (LLE)   Other Precautions Impulsive;Chair Alarm;Bed Alarm;WBS;Multiple lines;Fall Risk;Pain   Home Living   Type of Home Apartment   Home Layout One level;Stairs to enter with rails;Performs ADLs on one level;Able to live on main level with bedroom/bathroom  (4 TANK to enter; first floor apartment)   Bathroom Shower/Tub Tub/shower unit   Bathroom Toilet Standard   Bathroom Equipment   (none per pt)   Bathroom Accessibility Accessible   Home Equipment   (none per pt)   Prior Function   Level of [place] Independent with functional mobility;Independent with ADLs;Independent with IADLS   Lives With Significant other   Receives Help From Family   IADLs Independent with driving;Independent with meal prep;Independent with medication management   Falls in the last 6 months 1 to 4  (4 falls per pt)   Vocational 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 SO does occasionaly go into office to work but has been home more recently.   Lifestyle   Autonomy Pt lives w/ SO in a 1st floor apartment and is fully (I) PTA, no AD, (+) falls, (+)    Reciprocal Relationships Supportive SO   Service to Others Unemployed   General   Additional Pertinent History Pt admitted s/p fall resulting in left tibial plateau fracture and closed nondisplaced transverse fracture of left patella. Currently NWB in KI. Also noted to have elevated glucose into 300's on admission. PMH includes: alcohol abuse, tobacco abuse, HTN, anemia, neuropathy   Family/Caregiver Present No   Subjective   Subjective \"I'll be fine once I'm home\" \"Do you think the uber wilda would help me get into the house?\"   ADL   Where Assessed Edge of bed   Eating Assistance 7  Independent   Grooming " Assistance 5  Supervision/Setup   UB Bathing Assistance 6  Modified Independent   LB Bathing Assistance 4  Minimal Assistance   UB Dressing Assistance 6  Modified independent   LB Dressing Assistance 4  Minimal Assistance   LB Dressing Deficit Setup;Don/doff R sock;Don/doff R shoe  (sitting EOB, CGA for safety)   Toileting Assistance  4  Minimal Assistance   Bed Mobility   Supine to Sit 4  Minimal assistance   Additional items Assist x 1;HOB elevated;Bedrails;Increased time required;Verbal cues;LE management  (A to manage LLE to EOB)   Additional Comments Able to sit EOB w/ S; OOB to recliner at end of session   Transfers   Sit to Stand 3  Moderate assistance   Additional items Assist x 2;Increased time required;Verbal cues   Stand to Sit 3  Moderate assistance   Additional items Assist x 1;Armrests;Increased time required;Verbal cues   Additional Comments Initial STS from EOB w/ bed height elevated, mod A x 2, multiple attempts required to problem solve hand placement, BLE placement and to maintain WBS. Additional attempt from recliner progressing to mod A x 1 w/ armrests   Functional Mobility   Functional Mobility 3  Moderate assistance   Additional Comments Few hops from EOB>recliner w/ RW and mod A x 1, impulsive, VC for pacing/safety, occasional LOB w/ turning. See additional treatment below   Additional items Rolling walker   Balance   Static Sitting Fair +   Dynamic Sitting Fair   Static Standing Poor +   Dynamic Standing Poor   Activity Tolerance   Activity Tolerance Patient limited by fatigue;Patient limited by pain  (impulsivity)   Medical Staff Made Aware Care coordinated w/ PT RYNE Jaramillo   Nurse Made Aware yes, PIA quintanilla to see pt and updated   RUE Assessment   RUE Assessment WFL   LUE Assessment   LUE Assessment WFL   Hand Function   Gross Motor Coordination Functional   Fine Motor Coordination Functional   Sensation   Light Touch No apparent deficits   Vision-Basic Assessment   Current Vision  Wears glasses only for reading   Cognition   Overall Cognitive Status WFL  (grossly WFL in conversation, questionable higher level deficits suspected)   Arousal/Participation Alert;Cooperative   Attention Attends with cues to redirect   Orientation Level Oriented X4   Memory Decreased short term memory;Decreased recall of precautions   Following Commands Follows one step commands without difficulty   Comments Pleasant and agreeable. Impulsive, VC for pacing and safety during all aspects of activity. Porter confident about abilities/poor insight into deficits. Extensive VC for problem solving and carryover of edu   Assessment   Limitation Decreased ADL status;Decreased Safe judgement during ADL;Decreased endurance;Decreased self-care trans;Decreased high-level ADLs  (pain, balance, fxnl mobility, act fred, fxnl reach, standing fred, and strength, safety awareness, insight, pacing, and problem solving)   Prognosis Good   Assessment Pt is a 54 y.o. male seen for OT evaluation s/p admit to [facility] 4/11/2025 w/Fracture of left tibia. Prior to admission, pt was living with sig other in a 1st floor apartment, fully (I) with ADLs, , no AD, (+) falls, (+) . Personal and environmental factors affecting patient at time of evaluation include current habits and behavioral patterns, lifestyle patterns, limited social support, inaccessible home environment, inaccessible bathroom environment, inaccessibility to community, difficulty completing ADLs, and difficulty completing IADLs. Personal factors supporting patient at time of evaluation include age, (I) PLOF, supportive sig other, attitude towards recovery, and FFSU. Based upon this evaluation, pt is functioning significantly below baseline. Pt will benefit from continued skilled inpatient OT to maximize safety, level of independence and overall performance in ADLs, functional transfers, functional mobility to return to functional baseline/highest level of  function.   Goals   Patient Goals to go home today   LTG Time Frame 10-14   Long Term Goal #1 see goals below   Plan   Treatment Interventions ADL retraining;Functional transfer training;Endurance training;Patient/family training;Equipment evaluation/education;Compensatory technique education;Continued evaluation;Energy conservation   Goal Expiration Date 04/22/25   OT Treatment Day 0   OT Frequency 3-5x/wk   Discharge Recommendation   Rehab Resource Intensity Level, OT I (Maximum Resource Intensity)   Equipment Recommended Bedside commode;Shower transfer bench ($$);Reacher ($)   Commode Type Standard   Additional Comments  If pt refuses rehab and wants to DC home, recommend RW, w/c and BSC   AM-PAC Daily Activity Inpatient   Lower Body Dressing 2   Bathing 2   Toileting 2   Upper Body Dressing 4   Grooming 3   Eating 4   Daily Activity Raw Score 17   Daily Activity Standardized Score (Calc for Raw Score >=11) 37.26   AM-PAC Applied Cognition Inpatient   Following a Speech/Presentation 3   Understanding Ordinary Conversation 4   Taking Medications 3   Remembering Where Things Are Placed or Put Away 4   Remembering List of 4-5 Errands 3   Taking Care of Complicated Tasks 3   Applied Cognition Raw Score 20   Applied Cognition Standardized Score 41.76   Additional Treatment Session   Start Time 0900   End Time 0910   Treatment Assessment Pt seen for additional therapy session to continue education for ADLs, challenge activity tolerance and assess bathroom accessibility. Pt provided w/ personal shorts to practice LB dressing, required A to thread over LLE, able to thread RLE. Pt progressing to min A x 1 for STS from recliner, required min A steadying to alternate Ue's and pull over hips in stance, able to maintain WBS. Progressing to min A x 1 for short distance to bathroom w/ RW. Min A x 1 to practice toilet transfer w/ BSC over toilet. Seated rest break due to fatigue/dyspnea. Continues to require min A for STS from  BSC and short distance back to recliner. All needs met. Pt continues to require VC for pacing due to impulsivity and poor safety awareness impacting performance of ADLs/mobility. Continue to recommend level I services at this time.   Additional Treatment Day 1   End of Consult   Patient Position at End of Consult Bedside chair;Bed/Chair alarm activated;All needs within reach   Nurse Communication Nurse aware of consult     GOALS:    *Goals established to promote patient goal of to go home today:      *Patient will perform grooming tasks standing at sink with (I) in order to increase overall independence    *LB ADL with (S) using AE prn for inc'd independence with self care    *Toileting with (S) for clothing management and hygiene to increase hygiene/thoroughness in order to reduce caregiver burden    *Pt will demonstrate use of long handled AE during 100% of tx sessions for increased ADL safety and independence following D/C     *ADL transfers with (S) for inc'd independence with ADLs/purposeful tasks    *Bed mobility- (S) for inc'd independence to manage own comfort and initiate EOB & OOB purposeful tasks    *Patient will increase functional mobility to and from bathroom with least restrictive assistive device with supervision to increase independence with toileting    *Increase stand tolerance x 3-5  m for inc'd tolerance with standing purposeful tasks including grooming/self-care    *Patient will improve functional activity tolerance to 20 minutes of sustained functional tasks to increase participation in basic self-care and decrease assistance level.     *Patient will increase static/dynamic standing balance to fair+/fair to maximize safety/performance of higher level ADLs/purposeful tasks    *Pt will verbalize and demonstrate understanding of post-op movement precautions 100% in tx sessions for increased safety and functional mobility.     *Caregiver will demonstrate good body mechanics and safe technique when  assisting pt during functional ADLs/transfers to maximize safety upon DC.    The patient's raw score on the AM-PAC Daily Activity Inpatient Short Form is 17. A raw score of less than 19 suggests the patient may benefit from discharge to post-acute rehabilitation services. Please also refer to the recommendation of the Occupational Therapist for safe discharge planning.     Pt seen as a co-eval with PT due to the patient's co-morbidities and clinically unstable presentation indicated by chart review. During session, pt benefited from two skilled therapists due to extensive physical assistance to achieve transitional movements and pt's decreased activity tolerance.      Siomara Gu, MELIZA, OTR/L    [place] License [id number]  [place] License [id number]

## 2025-04-12 NOTE — PROGRESS NOTES
Progress Note - Endocrinology   Name: Viktor Farfan 54 y.o. male I MRN: [id number]  Unit/Bed#: W -01 I Date of Admission: 4/11/2025   Date of Service: 4/12/2025 I Hospital Day: 1    Assessment & Plan  Hyperglycemia due to diabetes mellitus (HCC)  Lab Results   Component Value Date    HGBA1C 12.2 (H) 04/11/2025       Recent Labs     04/12/25  0807 04/12/25  1001 04/12/25  1210 04/12/25  1401   POCGLU 159* 157* 166* 156*     Blood Sugar Average: Last 72 hrs:  (P) 180.9680310578970884    High basal insulin requirements were noted while he was NPO on an insulin infusion. Discussed this in detail with the patient including ramifications for potential abnormal healing and risk of infections of his left tibial plateau fracture if adequate glycemic control is not obtained. He does not wish to stay inpatient for long due to past experiences but is agreeable to increasing his home metformin dose and starting basal insulin with strict blood glucose monitoring over the next few weeks at home. But he would like to leave tomorrow and I have communicated this to his primary team.   Will start Lantus 50 units qhs tonight and discontinue his insulin infusion 1 hour after this is administered  Initiate correctional scale algorithm 3 with meals starting tomorrow at breakfast   If no additional imaging is planned during this hospitalization will start metformin 1000 mg twice daily with first dose to be given today. Have contacted primary team to confirm this.  Initiate insulin teaching  Discharge recommendations to follow pending clinical course but he is agreeable to discharge on Lantus and metformin   Endocrinology will continue to follow and make further recommendations as appropriate      Fracture of left tibia  Further management per primary and orthopedics team    Subjective : Patient seen and examined at bedside. He expresses desire to go home today due to fears of long-term hospitalizations and negative previous  experiences. Had lengthy discussion with patient regarding his glycemic control as summarized in A&P.     Objective :  Temp:  [97.8 °F (36.6 °C)-99.4 °F (37.4 °C)] 97.8 °F (36.6 °C)  HR:  [] 83  BP: (109-144)/(74-91) 118/77  Resp:  [14-17] 15  SpO2:  [88 %-97 %] 94 %  O2 Device: None (Room air)    Physical Exam  Vitals and nursing note reviewed.   Constitutional:       General: He is not in acute distress.     Appearance: He is well-developed. He is obese.   HENT:      Head: Normocephalic and atraumatic.   Eyes:      Conjunctiva/sclera: Conjunctivae normal.   Cardiovascular:      Rate and Rhythm: Normal rate and regular rhythm.      Heart sounds: No murmur heard.  Pulmonary:      Effort: Pulmonary effort is normal. No respiratory distress.      Breath sounds: Normal breath sounds.   Abdominal:      Palpations: Abdomen is soft.      Tenderness: There is no abdominal tenderness.   Musculoskeletal:      Cervical back: Neck supple.      Comments: L leg dressings and immobilizer noted   Skin:     General: Skin is warm and dry.      Capillary Refill: Capillary refill takes less than 2 seconds.   Neurological:      Mental Status: He is alert.   Psychiatric:         Mood and Affect: Mood normal.           Lab Results: I have reviewed the following results:   Latest Reference Range & Units 04/11/25 22:07 04/12/25 00:00 04/12/25 01:50 04/12/25 02:31 04/12/25 04:35 04/12/25 05:54 04/12/25 08:07 04/12/25 10:01 04/12/25 12:10 04/12/25 14:01   POC Glucose 65 - 140 mg/dl 173 (H) 174 (H) 187 (H)  153 (H) 166 (H) 159 (H) 157 (H) 166 (H) 156 (H)   Sodium 135 - 147 mmol/L    131 (L)         Potassium 3.5 - 5.3 mmol/L    3.9         Chloride 96 - 108 mmol/L    99         Carbon Dioxide 21 - 32 mmol/L    23         ANION GAP 4 - 13 mmol/L    9         BUN 5 - 25 mg/dL    9         Creatinine 0.60 - 1.30 mg/dL    0.73         GLUCOSE 65 - 140 mg/dL    156 (H)         Calcium 8.4 - 10.2 mg/dL    8.6         GFR, Calculated  ml/min/1.73sq m    105         (H): Data is abnormally high  (L): Data is abnormally low

## 2025-04-12 NOTE — ASSESSMENT & PLAN NOTE
Lab Results   Component Value Date    HGBA1C 12.2 (H) 04/11/2025       Recent Labs     04/12/25  0000 04/12/25  0150 04/12/25  0435 04/12/25  0554   POCGLU 174* 187* 153* 166*       Blood Sugar Average: Last 72 hrs:  (P) 191.125    - Patient with chronic history of type 2 diabetes mellitus, present on presentation, with hyperglycemia on presentation.  - Patient did have anion gap acidosis on presentation, but but a beta hydroxyburate test was negative.  - The patient's anion gap closed with fluid hydration/resuscitation.  - Appreciate Endocrinology evaluation, recommendations and assistance with management.  - Patient currently on insulin infusion therapy with plan to transition to basal bolus therapy potentially today.  - Goal blood glucose range between 140 and 180.  - Hypoglycemia protocol.  - Outpatient follow-up with PCP as well as potentially with Endocrinology as indicated.

## 2025-04-12 NOTE — PLAN OF CARE
Problem: OCCUPATIONAL THERAPY ADULT  Goal: Performs self-care activities at highest level of function for planned discharge setting.  See evaluation for individualized goals.  Description: Treatment Interventions: ADL retraining, Functional transfer training, Endurance training, Patient/family training, Equipment evaluation/education, Compensatory technique education, Continued evaluation, Energy conservation  Equipment Recommended: Bedside commode, Shower transfer bench ($$), Reacher ($)       See flowsheet documentation for full assessment, interventions and recommendations.   Note: Limitation: Decreased ADL status, Decreased Safe judgement during ADL, Decreased endurance, Decreased self-care trans, Decreased high-level ADLs (pain, balance, fxnl mobility, act fred, fxnl reach, standing fred, and strength, safety awareness, insight, pacing, and problem solving)  Prognosis: Good  Assessment: Pt is a 54 y.o. male seen for OT evaluation s/p admit to [facility] on 4/11/2025 w/Fracture of left tibia. Prior to admission, pt was living with sig other in a 1st floor apartment, fully (I) with ADLs, , no AD, (+) falls, (+) . Personal and environmental factors affecting patient at time of evaluation include current habits and behavioral patterns, lifestyle patterns, limited social support, inaccessible home environment, inaccessible bathroom environment, inaccessibility to community, difficulty completing ADLs, and difficulty completing IADLs. Personal factors supporting patient at time of evaluation include age, (I) PLOF, supportive sig other, attitude towards recovery, and FFSU. Based upon this evaluation, pt is functioning significantly below baseline. Pt will benefit from continued skilled inpatient OT to maximize safety, level of independence and overall performance in ADLs, functional transfers, functional mobility to return to functional baseline/highest level of function.     Rehab Resource Intensity  Level, OT: I (Maximum Resource Intensity)     MELIZA Mancuso, OTR/L  [place] License [id number]  [place] License [id number]

## 2025-04-12 NOTE — NURSING NOTE
"Pt wanted to order outside pizza for dinner. I educated about carb carb control diet and not following a diabetic diet will not improve his uncontrolled. He stated he thought only sugar affected his glucose levels. Pt educated how carbs turn to sugar. Pt stated, \"well maybe it will be ok\". I advised he stick with his hospital diet because it already counts his allowed carb intake. Pt state\"It's already on its way\"    Pizza arrived, I offered to help calculate carbs to try to stay closer to carb control diet. Pt declined the offer stating \"No I just want to eat my pizza\".  "

## 2025-04-12 NOTE — CASE MANAGEMENT
Case Management Progress Note    Patient name Viktor Farfan  Location W /W -01 MRN [id number]  : 1970 Date 2025       LOS (days): 1  Geometric Mean LOS (GMLOS) (days):   Days to GMLOS:        OBJECTIVE:        Current admission status: Inpatient  Preferred Pharmacy:   [facility] - [address] [address]  Phone: [phone] Fax: [phone]    [facility]/pharmacy #[id number] - [place], [place] - [address]  [address] [address]  Phone: [phone] Fax: [phone]    Primary Care Provider: Nader Colvin MD    Primary Insurance: [employer]  Secondary Insurance:     PROGRESS NOTE:    Cm delivered BSC and RW to bedside. Patient signed DME form and CM placed in adapt folder. Patient requesting glucose testing order be sent through insurance to see if covered. CM advised provider. CM to follow for price check.

## 2025-04-12 NOTE — ASSESSMENT & PLAN NOTE
- closed proximal intra-articular tibial fracture with lipohemarthrosis  - per ortho, overlying wound does not probe to the bone/joint  - NWB LLE  - appreciate orthopedics' consult- will require operative repair - tentatively for Tuesday if remains in-house.   - NV Checks  - PT/OT  - pain control  - Lovenox for DVT ppx

## 2025-04-12 NOTE — ASSESSMENT & PLAN NOTE
- Acute left knee laceration, present on presentation.  - Status post wound washout and suture repair by ED staff on 4/11/2025.  - Tetanus vaccination status updated this hospital encounter.  - Appreciate Orthopedic surgery evaluation, recommendations and interventions as noted.  - Orthopedic surgery did do a wound evaluation including probing of the wound and determined that the joint space was not threatened and no further interventions or fluid challenge was indicated at the time.  - Continue local wound care as indicated.  - Analgesia as needed.  - Plan for wound reevaluation and suture removal in approximately 14 days (on or around 4/25/2025).

## 2025-04-12 NOTE — PHYSICAL THERAPY NOTE
PHYSICAL THERAPY EVALUATION & TREATMENT  DATE: 04/12/25  TIME: 0850-0924    NAME:  Viktor Farfan  AGE:   54 y.o.  Mrn:   [id number]  Length Of Stay: 1    ADMIT DX:  Leg pain [M79.606]  Hyperglycemia [R73.9]  Closed nondisplaced transverse fracture of left patella, initial encounter [S82.035A]  At risk for acid-base imbalance [Z91.89]  Diabetic ketoacidosis without coma associated with diabetes mellitus due to underlying condition ([facility]) [E08.10]  Closed fracture of left tibial plateau, initial encounter [S82.142A]    Past Medical History:   Diagnosis Date    Anxiety     Back pain     Chronic right-sided low back pain without sciatica 3/31/2018    Depression     Gastroesophageal reflux disease 3/31/2018    GERD (gastroesophageal reflux disease)     Right foot pain      Past Surgical History:   Procedure Laterality Date    NO PAST SURGERIES      ORIF FINGER FRACTURE Right 8/6/2019    Procedure: OPEN REDUCTION FINGER;  Surgeon: Tomas Morgan MD;  Location: [facility];  Service: Plastics       Performed at least 2 patient identifiers during session: Name, Birthday, ID bracelet, and Epic photo     04/12/25 0850   PT Last Visit   PT Visit Date 04/12/25   Note Type   Note type Evaluation  (& treatment)   Pain Assessment   Pain Assessment Tool FLACC   Pain Location/Orientation Orientation: Left;Location: Leg   Pain Radiating Towards t/o L LE   Pain Onset/Description Frequency: Intermittent;Descriptor: Aching;Descriptor: Discomfort  (with mobility only, no pain at rest)   Effect of Pain on Daily Activities limits tolerance of and safety with functional mobility   Patient's Stated Pain Goal No pain   Hospital Pain Intervention(s) Medication (See MAR);Cold applied;Repositioned;Ambulation/increased activity;Elevated;Emotional support   Multiple Pain Sites No   Pain Rating: FLACC (Rest) - Face 0   Pain Rating: FLACC (Rest) - Legs 0   Pain Rating: FLACC (Rest) - Activity 0   Pain Rating: FLACC (Rest) - Cry 0   Pain Rating: FLACC  (Rest) - Consolability 0   Score: FLACC (Rest) 0   Pain Rating: FLACC (Activity) - Face 1   Pain Rating: FLACC (Activity) - Legs 1   Pain Rating: FLACC (Activity) - Activity 1   Pain Rating: FLACC (Activity) - Cry 1   Pain Rating: FLACC (Activity) - Consolability 1   Score: FLACC (Activity) 5   Restrictions/Precautions   Weight Bearing Precautions Per Order Yes   LLE Weight Bearing Per Order (S)  NWB  (s/p L tibial plateau fracture and L patellar fracture; non op in knee immobilizer at all times)   Braces or Orthoses Knee immobilizer  (L LE, to be worn at all times)   Other Precautions Impulsive;Chair Alarm;Bed Alarm;WBS;Multiple lines;Fall Risk;Pain   Home Living   Type of Home Apartment   Home Layout One level;Stairs to enter with rails  (first floor apartment; 4STE with L HR, single level living)   Bathroom Shower/Tub Tub/shower unit   Bathroom Toilet Standard   Bathroom Equipment Other (Comment)  (NONE)   Bathroom Accessibility Accessible   Home Equipment Other (Comment)  (NONE)   Additional Comments Pt reports that his significant other works from home, previously often went into office and pt would be home alone at those times, however pt states that she is able to work from home more often now.   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   General   Additional Pertinent History Pt is a 54 yr old male admitted 4/11/25 s/p fall approx 6ft through attic,  "+headstrike, + closed fx of L tibial plateau & traverse fx of L patella. Per ortho 4/11/25: NWB LLE in Knee Immobilizer & ACE wrap for edema management. Likely OP surgery f/u later in week. PMH includes: alcohol abuse, HTN, falls, DM, anxiety, chronic back pain, depression, peripheral neuropathy.   Family/Caregiver Present No   Cognition   Overall Cognitive Status WFL  (likely higher level cog deficits; poor safety and insight to deficits; defer to OT to formal cog evaluation as needed.)   Arousal/Participation Cooperative   Orientation Level Oriented X4   Memory Decreased short term memory;Decreased recall of precautions   Following Commands Follows one step commands without difficulty   Subjective   Subjective \"I know I can do it once I get home.\"   RUE Assessment   RUE Assessment WFL   LUE Assessment   LUE Assessment WFL   RLE Assessment   RLE Assessment WFL   LLE Assessment   LLE Assessment X  (NWB LLE in knee immobilizer)   Vision-Basic Assessment   Current Vision Wears glasses only for reading   Patient Visual Report Other (Comment)  (denies acute visual changes)   Coordination   Movements are Fluid and Coordinated 0   Coordination and Movement Description highly impulsive, segmental movements, significant pathway/gait deviations with ambulation   Sensation X  (pt reports chronic numbnesss in B LE from toes to above knees.)   Light Touch   RLE Light Touch Impaired   LLE Light Touch Impaired   Proprioception   RLE Proprioception Grossly intact   LLE Proprioception Grossly Intact   Bed Mobility   Supine to Sit 4  Minimal assistance   Additional items Assist x 1;HOB elevated;Bedrails;Increased time required;Verbal cues;LE management   Sit to Supine   (NT as pt was left seated OOB in recliner chair with alarm engaged at end of session)   Additional Comments Pt needing significantly increased time, effort, and cues for completion of bed mobility in hospital bed. Reports ability to sleep on couch upon return to " home.   Transfers   Sit to Stand 3  Moderate assistance   Additional items Assist x 2;Bedrails;Verbal cues;Impulsive;Increased time required  (significantly elevated bed height; RW, cues for body mechanics and safety + maintenance of NWB LLE; impulsive to initiate but increased time needed to complete)   Stand to Sit 3  Moderate assistance   Additional items Assist x 1;Armrests;Increased time required;Verbal cues  (RW; cues for optimal mechanics and safety + body positioning to ensure NWB LLE)   Stand pivot 3  Moderate assistance   Additional items Assist x 1;Impulsive;Increased time required;Verbal cues  (RW; impuslive to initate, increased time / assist / cues for completion; cues for surface proximity and RW management during approach to target surface)   Ambulation/Elevation   Gait pattern Poor UE support;Improper Weight shift;Decreased foot clearance;Short stride  (highly impulsive, shakey in B UEs at times, significant instability during turning/change of RW positioning)   Gait Assistance 3  Moderate assist   Additional items Assist x 1;Verbal cues;Tactile cues   Assistive Device Rolling walker   Distance 4ft during ambulatory (hopping) transition from EOB to recliner chair.   Ambulation/Elevation Additional Comments See below treatment time for additional gait and elevations training as pt requiring functional seated rest break after transition OOB to recliner chair.   Balance   Static Sitting Fair +   Dynamic Sitting Fair -   Static Standing Poor +  (w/ RW)   Dynamic Standing Poor  (w/ RW)   Ambulatory Poor  (w/ RW)   Endurance Deficit   Endurance Deficit Yes   Activity Tolerance   Activity Tolerance Patient limited by fatigue;Patient limited by pain;Other (Comment)  (impaired insight and safety)   Medical Staff Made Aware Spoke with CM, OT, RN, PCA, trauma AP   Assessment   Prognosis Fair   Problem List Decreased strength;Decreased range of motion;Decreased endurance;Impaired balance;Decreased  mobility;Decreased coordination;Decreased safety awareness;Impaired judgement;Decreased skin integrity;Orthopedic restrictions;Pain   Assessment Pt seen for PT evaluation for mobility assessment & discharge needs. Pt admitted 4/11/2025 s/p fall through ceiling resulting in Fracture of left tibia and patellar. Per ortho, pt to be non op at this time (f/u at later date), NWB LLE in KI. During PT IE, pt requires JAKUB and increased time for bed mobility in hospital bed, completes initial STS transfer from elevated EOB with MODA 2 person + RW, and completes ambulatory transition from EOB to recliner chair with MODA + RW. During additional treatment time, pt progresses to complete transfers with 1 person assistance, ambulates 18ft x2 with RW and MIN-MODA, and MODA 2 person w/ u/l HR and u/l AC for attempt at elevations training (however unsuccessful). Pt displays above outlined functional impairments & limitations, and presents below his baseline level of functional mobility. The AM-PAC & Barthel Index outcome tools were used to assist in determining pt safety w/ mobility/self care & appropriate d/c recommendations, see above for scores. Pt is at risk of falls d/t multiple comorbidities, impulsivity, h/o falls, impaired balance, impaired insight/safety awareness, use of ambulatory aid, varying levels of pain, acuity of medical illness, ongoing medical treatment of primary dx, abnormal lab values, polypharmacy, and WB restriction. Pt's clinical presentation is currently unstable/unpredictable as seen in pt's presentation of changing level of pain, varying levels of cognitive performance, increased fall risk, new onset of impairment of functional mobility, decreased endurance, and new onset of weakness. Pt will benefit from continued PT services in order to address impairments, decrease risk of falls, maximize independence w/ fnxl mobility, & ensure safety w/ mobility for transition to next level of care. Based on pt  "presentation & impairments, pt would most appropriately benefit from Level 1 (maximal PT intensity) resources upon d/c.   Barriers to Discharge Inaccessible home environment   Goals   Patient Goals \"to go home today\"   [facility] Expiration Date 04/26/25   Short Term Goal #1 Pt will: complete all bed mobility independently in order to promote increased OOB functional mobility and simulate home environment; complete all transfers with RW at CLEMENT level in order to increase safety with functional mobility; ambulate >80ft with RW and S in order to increase safety with household distance functional mobility; negotiate 4 stairs with u/l HR + LRAD + no greater than JAKUB in order to facilitate safe access to his home with family assist; demonstrate understanding and independence with LE strengthening HEP; improve ambulatory balance to >/= good grade with LRAD in order to promote safety and increased independence with mobility; tolerate >3hrs OOB in upright position, in order to improve muscular endurance and respiratory status; improve AM-PAC score to >/= 17/24 in order to increase independence with mobility and decrease burden of care; improve Barthel Index score to >/= 55/100 in order to increase independence and decrease risk of falls; maintain appropriate WB restriction 100% of session in order to promote safety and healing.   PT Treatment Day 1   Plan   Treatment/Interventions Functional transfer training;LE strengthening/ROM;Elevations;Therapeutic exercise;Endurance training;Patient/family training;Equipment eval/education;Bed mobility;Gait training;Compensatory technique education;Continued evaluation;Spoke to nursing;Spoke to case management;Spoke to advanced practitioner   PT Frequency 3-5x/wk   Discharge Recommendation   Rehab Resource Intensity Level, PT I (Maximum Resource Intensity)   Additional Comments Pt currently recommended for Level 1 rehab upon d/c, however pt adamant about return to home today, despite poor " performance during PT session. IF pt return to home, will require RW, manual WC with elevating leg rests, BSC.   AM-PAC Basic Mobility Inpatient   Turning in Flat Bed Without Bedrails 3   Lying on Back to Sitting on Edge of Flat Bed Without Bedrails 2   Moving Bed to Chair 2   Standing Up From Chair Using Arms 2   Walk in Room 2   Climb 3-5 Stairs With Railing 1   Basic Mobility Inpatient Raw Score 12   Basic Mobility Standardized Score 32.23   [facility] Highest Level Of Mobility   -[facility] Goal 4: Move to chair/commode   -[facility] Achieved 7: Walk 25 feet or more   Modified Jeanna Scale   Modified Jeanna Scale 4   Barthel Index   Feeding 10   Bathing 0   Grooming Score 0   Dressing Score 5   Bladder Score 10   Bowels Score 10   Toilet Use Score 5   Transfers (Bed/Chair) Score 5   Mobility (Level Surface) Score 0   Stairs Score 0   Barthel Index Score 45   Additional Treatment Session   Start Time 0900   End Time 0924   Treatment Assessment Pt is agreeable to participate in additional mobility training post IE and functional rest break. Pt progresses to complete all functional transfers with MODA 1 person (from recliner chair and elevated toilet), and ambulates 18ft x2 with RW and variable MIN-MODA. Pt impulsive during ambulation, requiring extensive cues for safety, especially during change in direction. Therapist providing increased education re: elevations to access his home, pt denies any functional concerns about completion. However therapist had pt practice elevations. Educated on technique and sequencing - pt's most optimal method is backwards with u/l device and HR vs bumping on buttocks. Pt attempted stair negotiation backwards with u/l AC and u/l HR, however despite MODA 2 person, pt unsafe and unsteady, unable to effectively and safely clear R LE enough to elevate onto standard height step. Attempted x6 trials. At end of session pt was left seated OOB in recliner chair with alarm engaged and needs in reach.  Regarding functional mobility, pt remains significantly below his baseline level of functional mobility and is unsafe for return to home at current status. Continue to recommend Level 1 (maximal PT intensity) resources once medically cleared for d/c from the acute care setting. Will continue skilled PT POC as able and appropriate to address functional impairments and progress towards therapy goals. Next session, plan for intervention of increased gait and elevations training as able.   Additional Treatment Day 1   End of Consult   Patient Position at End of Consult Bedside chair;Bed/Chair alarm activated;All needs within reach     This session, pt required and most appropriately benefited from partial or full skilled PT/OT co-eval due to extensive physical assistance of SKILLED therapists, significant regression from baseline level of mobility, decreased activity tolerance, and unpredictable medical and/or functional status. PT and OT goals were addressed separately as seen in documentation.    Based on patient's [facility] Highest Level of Mobility scores today, patient currently has a goal of [facility] Levels: 7: WALK 25 FEET OR MORE, to be completed with RN staffing each shift, in order to improve overall activity tolerance and mobility, combat hospital related deconditioning, and maximize outcomes for d/c from the acute care setting.     The patient's AM-PAC Basic Mobility Inpatient Short Form Raw Score is 12. A Raw score of less than or equal to 16 suggests the patient may benefit from discharge to post-acute rehabilitation services. Please also refer to the recommendation of the Physical Therapist for safe discharge planning.      Arlyn Arnett, PT, DPT   Available via [employer]  NPI # [id number]  [place] License - [id number]  4/12/2025

## 2025-04-12 NOTE — ASSESSMENT & PLAN NOTE
Lab Results   Component Value Date    HGBA1C 12.2 (H) 04/11/2025       Recent Labs     04/12/25  0807 04/12/25  1001 04/12/25  1210 04/12/25  1401   POCGLU 159* 157* 166* 156*     Blood Sugar Average: Last 72 hrs:  (P) [id number]    High basal insulin requirements were noted while he was NPO on an insulin infusion. Discussed this in detail with the patient including ramifications for potential abnormal healing and risk of infections of his left tibial plateau fracture if adequate glycemic control is not obtained. He does not wish to stay inpatient for long due to past experiences but is agreeable to increasing his home metformin dose and starting basal insulin with strict blood glucose monitoring over the next few weeks at home. But he would like to leave tomorrow and I have communicated this to his primary team.   Will start Lantus 50 units qhs tonight and discontinue his insulin infusion 1 hour after this is administered  Initiate correctional scale algorithm 3 with meals starting tomorrow at breakfast   If no additional imaging is planned during this hospitalization will start metformin 1000 mg twice daily with first dose to be given today. Have contacted primary team to confirm this.  Initiate insulin teaching  Discharge recommendations to follow pending clinical course but he is agreeable to discharge on Lantus and metformin   Endocrinology will continue to follow and make further recommendations as appropriate

## 2025-04-12 NOTE — H&P
"H&P - Trauma   Name: Viktor Farfan 54 y.o. male I MRN: [id number]  Unit/Bed#: W -01 I Date of Admission: 4/11/2025   Date of Service: 4/11/2025 I Hospital Day: 0     Assessment & Plan  Fall  - mechanical fall  - sustained below stated injury  - PT/OT evaluations  - CM for dispo planning  Hyperglycemia due to diabetes mellitus (HCC)  Lab Results   Component Value Date    HGBA1C 12.2 (H) 04/11/2025       Recent Labs     04/11/25  1457 04/11/25  1815 04/11/25 2011   POCGLU 233* 282* 161*       Blood Sugar Average: Last 72 hrs:  (P) [id number]    - beta hydroxyburate negative. UA pending.   - anion gap closed with administration of 1 L IVFs  - place on non-DKA insulin gtt  - IVF hydration  - accuchecks, goal -180  - Consult SLIM and Endocrine  Knee laceration, left, initial encounter  - tdap updated  - orthopedics probed the wound and determined the joint space is not threatened, no need for fluid challenge  - wound washed out and repaired with sutures by the ED  - Local wound care with suture removal in 14 days  Fracture of left tibia  - closed proximal intra-articular tibial fracture with lipohemarthrosis  - per ortho, overlying wound does not probe to the bone/joint  - NWB LLE  - appreciate orthopedics' consult- will require operative repair - tentatively for Tuesday if remains in-house.   - NV Checks  - PT/OT  - pain control  - Lovenox for DVT ppx    Trauma Alert: Evaluation; trauma team notified at 1301 via text   Model of Arrival: Self    [facility]  Consultants:     Orthopedics: routine consult; Epic consult order placed; Ortho has already evaluated.     History of Present Illness   Chief Complaint: \"My knee hurts\"  Mechanism:Fall     Viktor Farfan is a 54 y.o. male who presents s/p mechanical fall into a concrete hole, appx 6 feet. He immediately noticed pain in the left knee. He denies head strike or LOC. He has a h/o diabetes and admits to neuropathy in " bilateral feet. He denies pain elsewhere. He is seen with his wife at bedside. He admits to h/o diabetes and poor compliance with medication for this. He reports that he doesn't feel that he needs to take most of the medications that he is on.     Review of Systems   Constitutional: Negative.    HENT: Negative.     Eyes: Negative.    Respiratory: Negative.  Negative for chest tightness and shortness of breath.    Cardiovascular: Negative.  Negative for chest pain.   Gastrointestinal: Negative.  Negative for abdominal pain.   Genitourinary: Negative.  Negative for dysuria.   Musculoskeletal:         + Left knee pain   Skin:  Positive for wound.        + left knee laceration   Neurological: Negative.  Negative for dizziness, weakness, light-headedness, numbness and headaches.   Hematological: Negative.    Psychiatric/Behavioral: Negative.       Medical History Review: I have reviewed the patient's PMH, PSH, Social History, Family History, Meds, and Allergies   Immunization History   Administered Date(s) Administered    Mumps 04/28/1982    Tdap 04/11/2025     Last Tetanus: today         Objective :  Temp:  [97.9 °F (36.6 °C)-98.6 °F (37 °C)] 98.6 °F (37 °C)  HR:  [] 94  BP: (117-142)/(71-96) 124/81  Resp:  [16-22] 16  SpO2:  [92 %-99 %] 92 %  O2 Device: None (Room air)    Initial Vitals:   Temperature: 97.9 °F (36.6 °C) (04/11/25 1454)  Pulse: 99 (04/11/25 1015)  Respirations: 22 (04/11/25 1015)  Blood Pressure: 117/78 (04/11/25 1015)    Primary Survey:   Airway:        Status: patent;        Pre-hospital Interventions: none        Hospital Interventions: none  Breathing:        Pre-hospital Interventions: none       Effort: normal       Right breath sounds: normal       Left breath sounds: normal  Circulation:        Rhythm: regular       Rate: regular   Right Pulses Left Pulses    R radial: 2+  R femoral: 2+  R pedal: 2+  R carotid: 2+  R popliteal: 2+ L radial: 2+  L femoral: 2+  L pedal: 2+  L carotid: 2+  L  popliteal: 2+   Disability:        GCS: Eye: 4; Verbal: 5 Motor: 6 Total: 15       Right Pupil: round;  reactive         Left Pupil:  round;  reactive      R Motor Strength L Motor Strength    R : 5/5  R dorsiflex: 5/5  R plantarflex: 5/5 L : 5/5  L dorsiflex: 5/5  L plantarflex: 5/5        Sensory:  No sensory deficit  Exposure:       Completed: Yes      Secondary Survey:  Physical Exam  Constitutional:       Appearance: Normal appearance.   HENT:      Head: Normocephalic.      Nose: Nose normal.      Mouth/Throat:      Mouth: Mucous membranes are dry.   Eyes:      Pupils: Pupils are equal, round, and reactive to light.   Cardiovascular:      Rate and Rhythm: Normal rate and regular rhythm.      Pulses: Normal pulses.   Pulmonary:      Effort: Pulmonary effort is normal. No respiratory distress.      Breath sounds: Normal breath sounds.   Abdominal:      General: Abdomen is flat.      Palpations: Abdomen is soft.      Tenderness: There is no abdominal tenderness.   Musculoskeletal:         General: Tenderness present.      Cervical back: Normal range of motion and neck supple. No tenderness.      Comments: + tenderness of the left knee   Skin:     General: Skin is warm and dry.      Comments: + 6 cm left knee laceration   Neurological:      General: No focal deficit present.      Mental Status: He is alert and oriented to person, place, and time.      Sensory: No sensory deficit.      Motor: No weakness.   Psychiatric:         Mood and Affect: Mood normal.             Lab Results: I have reviewed the following results:  Recent Labs     04/11/25  1031 04/11/25  1311 04/11/25  1523   WBC 6.90  --   --    HGB 12.5  --   --    HCT 40.1  --   --    *  --  94*   SODIUM 133* 131*  --    K 4.1 4.7  --    CL 99 98  --    CO2 19* 20*  --    BUN 6 6  --    CREATININE 0.94 0.88  --    GLUC 355* 340*  --    AST 35  --   --    ALT 19  --   --    ALB 3.6  --   --    TBILI 0.77  --   --    ALKPHOS 171*  --   --     PTT 28  --   --    INR 1.15  --   --        Imaging Results: I have personally reviewed pertinent images saved in PACS. CT scan findings (and other pertinent positive findings on images) were discussed with radiology. My interpretation of the images/reports are as follows:  XR pelvis ap only 1 or 2 vw  Result Date: 4/11/2025  Impression: No acute osseous abnormality. Computerized Assisted Algorithm (CAA) may have been used to analyze all applicable images. Workstation performed: [id number]     XR knee 4+ views left injury  Result Date: 4/11/2025  Impression: Acute, nondisplaced fractures of the proximal tibia and lower pole patella as above. Moderately large lipohemarthrosis. Computerized Assisted Algorithm (CAA) may have been used to analyze all applicable images. Workstation performed: [id number]     XR tibia fibula 2 views LEFT  Result Date: 4/11/2025  Impression: Acute, nondisplaced fracture proximal tibia as above. Probable nondisplaced fracture lower pole patella. Moderately large lipohemarthrosis. Computerized Assisted Algorithm (CAA) may have been used to analyze all applicable images. Workstation performed: [id number]     XR chest 1 view portable  Result Date: 4/11/2025  Impression: No acute traumatic injury within limitations of supine imaging. Follow-up upright imaging may be considered if there is high clinical index of suspicion for injury based on mechanism. Workstation performed: [id number]     CT lower extremity wo contrast left  Result Date: 4/11/2025  Impression: Comminuted intra-articular fracture of the proximal tibia as described above. Associated moderate lipohemarthrosis. Workstation performed: [id number]     XR femur 2 views LEFT  Result Date: 4/11/2025  Impression: Negative for left femoral fracture. Left knee fracture will be described separately. Computerized Assisted Algorithm (CAA) may have been used to analyze all applicable images. Workstation performed: [id number]     CT spine  cervical without contrast  Result Date: 4/11/2025  Impression: No cervical spine fracture or traumatic malalignment. Workstation performed: [id number]     CT head wo contrast  Result Date: 4/11/2025  Impression: No acute intracranial abnormality. Workstation performed: [id number]       Other Studies: Other Study Results Review: No additional pertinent studies reviewed.

## 2025-04-13 VITALS
RESPIRATION RATE: 16 BRPM | TEMPERATURE: 98.1 F | SYSTOLIC BLOOD PRESSURE: 120 MMHG | BODY MASS INDEX: 31.46 KG/M2 | WEIGHT: 245.15 LBS | DIASTOLIC BLOOD PRESSURE: 83 MMHG | HEART RATE: 92 BPM | HEIGHT: 74 IN | OXYGEN SATURATION: 93 %

## 2025-04-13 LAB
ANION GAP SERPL CALCULATED.3IONS-SCNC: 8 MMOL/L (ref 4–13)
BUN SERPL-MCNC: 9 MG/DL (ref 5–25)
CALCIUM SERPL-MCNC: 8.1 MG/DL (ref 8.4–10.2)
CHLORIDE SERPL-SCNC: 102 MMOL/L (ref 96–108)
CO2 SERPL-SCNC: 21 MMOL/L (ref 21–32)
CREAT SERPL-MCNC: 0.65 MG/DL (ref 0.6–1.3)
ERYTHROCYTE [DISTWIDTH] IN BLOOD BY AUTOMATED COUNT: 17.2 % (ref 11.6–15.1)
GFR SERPL CREATININE-BSD FRML MDRD: 110 ML/MIN/1.73SQ M
GLUCOSE SERPL-MCNC: 154 MG/DL (ref 65–140)
GLUCOSE SERPL-MCNC: 157 MG/DL (ref 65–140)
GLUCOSE SERPL-MCNC: 188 MG/DL (ref 65–140)
GLUCOSE SERPL-MCNC: 192 MG/DL (ref 65–140)
HCT VFR BLD AUTO: 35.8 % (ref 36.5–49.3)
HGB BLD-MCNC: 11.3 G/DL (ref 12–17)
MCH RBC QN AUTO: 25.3 PG (ref 26.8–34.3)
MCHC RBC AUTO-ENTMCNC: 31.6 G/DL (ref 31.4–37.4)
MCV RBC AUTO: 80 FL (ref 82–98)
PLATELET # BLD AUTO: 78 THOUSANDS/UL (ref 149–390)
PMV BLD AUTO: 11.3 FL (ref 8.9–12.7)
POTASSIUM SERPL-SCNC: 3.7 MMOL/L (ref 3.5–5.3)
RBC # BLD AUTO: 4.46 MILLION/UL (ref 3.88–5.62)
SODIUM SERPL-SCNC: 131 MMOL/L (ref 135–147)
WBC # BLD AUTO: 5.22 THOUSAND/UL (ref 4.31–10.16)

## 2025-04-13 PROCEDURE — 99238 HOSP IP/OBS DSCHRG MGMT 30/<: CPT | Performed by: PHYSICIAN ASSISTANT

## 2025-04-13 PROCEDURE — 82948 REAGENT STRIP/BLOOD GLUCOSE: CPT

## 2025-04-13 PROCEDURE — NC001 PR NO CHARGE: Performed by: PHYSICIAN ASSISTANT

## 2025-04-13 PROCEDURE — 80048 BASIC METABOLIC PNL TOTAL CA: CPT | Performed by: PHYSICIAN ASSISTANT

## 2025-04-13 PROCEDURE — 85027 COMPLETE CBC AUTOMATED: CPT | Performed by: PHYSICIAN ASSISTANT

## 2025-04-13 RX ORDER — INSULIN GLARGINE 100 [IU]/ML
50 INJECTION, SOLUTION SUBCUTANEOUS
Qty: 15 ML | Refills: 0 | Status: SHIPPED | OUTPATIENT
Start: 2025-04-13 | End: 2025-05-13

## 2025-04-13 RX ORDER — GLUCOSAMINE HCL/CHONDROITIN SU 500-400 MG
CAPSULE ORAL
Qty: 200 EACH | Refills: 0 | Status: SHIPPED | OUTPATIENT
Start: 2025-04-13

## 2025-04-13 RX ORDER — LANCETS 33 GAUGE
EACH MISCELLANEOUS
Qty: 200 EACH | Refills: 0 | Status: SHIPPED | OUTPATIENT
Start: 2025-04-13

## 2025-04-13 RX ORDER — AMOXICILLIN 250 MG
1 CAPSULE ORAL
Qty: 5 TABLET | Refills: 0 | Status: ON HOLD | OUTPATIENT
Start: 2025-04-13 | End: 2025-04-15 | Stop reason: ALTCHOICE

## 2025-04-13 RX ORDER — ENOXAPARIN SODIUM 100 MG/ML
40 INJECTION SUBCUTANEOUS DAILY
Qty: 12 ML | Refills: 0 | Status: SHIPPED | OUTPATIENT
Start: 2025-04-13 | End: 2025-04-15

## 2025-04-13 RX ORDER — BLOOD SUGAR DIAGNOSTIC
STRIP MISCELLANEOUS
Qty: 200 EACH | Refills: 0 | Status: SHIPPED | OUTPATIENT
Start: 2025-04-13

## 2025-04-13 RX ORDER — PEN NEEDLE, DIABETIC 32GX 5/32"
NEEDLE, DISPOSABLE MISCELLANEOUS
Qty: 100 EACH | Refills: 0 | Status: SHIPPED | OUTPATIENT
Start: 2025-04-13

## 2025-04-13 RX ORDER — BLOOD-GLUCOSE METER
KIT MISCELLANEOUS
Qty: 1 KIT | Refills: 0 | Status: SHIPPED | OUTPATIENT
Start: 2025-04-13

## 2025-04-13 RX ORDER — OXYCODONE HYDROCHLORIDE 5 MG/1
5-10 TABLET ORAL EVERY 4 HOURS PRN
Qty: 36 TABLET | Refills: 0 | Status: SHIPPED | OUTPATIENT
Start: 2025-04-13 | End: 2025-04-15

## 2025-04-13 RX ORDER — METHOCARBAMOL 500 MG/1
500 TABLET, FILM COATED ORAL EVERY 6 HOURS SCHEDULED
Qty: 56 TABLET | Refills: 0 | Status: SHIPPED | OUTPATIENT
Start: 2025-04-13 | End: 2025-04-27

## 2025-04-13 RX ORDER — ACETAMINOPHEN 325 MG/1
650 TABLET ORAL EVERY 4 HOURS PRN
Start: 2025-04-13 | End: 2025-04-15

## 2025-04-13 RX ORDER — POLYETHYLENE GLYCOL 3350 17 G/17G
17 POWDER, FOR SOLUTION ORAL DAILY
Qty: 85 G | Refills: 0 | Status: ON HOLD | OUTPATIENT
Start: 2025-04-14 | End: 2025-04-15 | Stop reason: ALTCHOICE

## 2025-04-13 RX ADMIN — PANTOPRAZOLE SODIUM 20 MG: 20 TABLET, DELAYED RELEASE ORAL at 05:20

## 2025-04-13 RX ADMIN — OXYCODONE HYDROCHLORIDE 10 MG: 10 TABLET ORAL at 03:22

## 2025-04-13 RX ADMIN — FOLIC ACID 1 MG: 1 TABLET ORAL at 10:08

## 2025-04-13 RX ADMIN — HYDROMORPHONE HYDROCHLORIDE 0.2 MG: 0.2 INJECTION, SOLUTION INTRAMUSCULAR; INTRAVENOUS; SUBCUTANEOUS at 13:24

## 2025-04-13 RX ADMIN — ENOXAPARIN SODIUM 30 MG: 30 INJECTION SUBCUTANEOUS at 10:08

## 2025-04-13 RX ADMIN — OXYCODONE HYDROCHLORIDE 10 MG: 10 TABLET ORAL at 11:57

## 2025-04-13 RX ADMIN — METFORMIN HYDROCHLORIDE 1000 MG: 500 TABLET ORAL at 07:46

## 2025-04-13 RX ADMIN — ASPIRIN 81 MG CHEWABLE TABLET 81 MG: 81 TABLET CHEWABLE at 10:08

## 2025-04-13 RX ADMIN — Medication 100 MG: at 10:08

## 2025-04-13 RX ADMIN — ACETAMINOPHEN 975 MG: 325 TABLET, FILM COATED ORAL at 05:20

## 2025-04-13 RX ADMIN — METHOCARBAMOL 500 MG: 500 TABLET ORAL at 05:20

## 2025-04-13 RX ADMIN — ATORVASTATIN CALCIUM 20 MG: 20 TABLET, FILM COATED ORAL at 10:08

## 2025-04-13 RX ADMIN — ESCITALOPRAM OXALATE 10 MG: 10 TABLET ORAL at 10:08

## 2025-04-13 RX ADMIN — METHOCARBAMOL 500 MG: 500 TABLET ORAL at 11:57

## 2025-04-13 RX ADMIN — OXYCODONE HYDROCHLORIDE 10 MG: 10 TABLET ORAL at 07:46

## 2025-04-13 RX ADMIN — ACETAMINOPHEN 975 MG: 325 TABLET, FILM COATED ORAL at 13:24

## 2025-04-13 RX ADMIN — AMLODIPINE BESYLATE 10 MG: 10 TABLET ORAL at 10:08

## 2025-04-13 RX ADMIN — INSULIN LISPRO 1 UNITS: 100 INJECTION, SOLUTION INTRAVENOUS; SUBCUTANEOUS at 07:46

## 2025-04-13 RX ADMIN — INSULIN LISPRO 1 UNITS: 100 INJECTION, SOLUTION INTRAVENOUS; SUBCUTANEOUS at 11:58

## 2025-04-13 NOTE — DISCHARGE INSTR - AVS FIRST PAGE
Discharge Instructions - Orthopedics      Weight Bearing Status:                                           - Maintain NON-weightbearing status on the left lower extremity in a knee immobilizer.    DVT prophylaxis:  - Continue Lovenox for a total of 28 days.    Pain:  - Continue analgesics as directed.    Appt Instructions:   - If you do not have your appointment, please call the Orthopedic Surgery Clinic at [phone] to schedule an appointment as instructed.  - Otherwise, followup as scheduled.  - Contact the office sooner if you experience any increased numbness/tingling in the extremities.    Miscellaneous:  - Activity as tolerated with assistance.  - Continue PT and OT evaluation and treatment as indicated.    Discharge Instructions - Endocrinology    Please continue your new diabetic medication regimen including Lantus 50 units daily at bedtime and Metformin 1,000 mg twice daily.    Monitor your blood glucose 4 times daily (three times daily before meals and at bedtime). Please keep a log of you blood sugar results to bring to your follow up Endocrinology appointment.    Follow up with Dr. Lamb of Endocrinology in approximately 2 weeks. Call with questions or concerns at ***

## 2025-04-13 NOTE — DISCHARGE SUMMARY
Discharge Summary - Trauma   Name: Viktor Farfan 54 y.o. male I MRN: [id number]  Unit/Bed#: W -01 I Date of Admission: 4/11/2025   Date of Service: 4/13/2025 I Hospital Day: 2      Fall  Assessment & Plan  - Status post mechanical fall with the below noted injuries.  - Fall precautions.  - Geriatric Medicine consultation for evaluation, medication review and recommendations.  - PT and OT evaluation and treatment as indicated.  - Case Management consultation for disposition planning.  - The therapy service recommendations are for level 1 resource intensity/postacute care facility placement for rehab, but patient pretty intent on discharge home as soon as possible.    * Fracture of left tibia  Assessment & Plan  - Acute closed left intra-articular tibial fracture with lipohemarthrosis, present on admission.  - Appreciate Orthopedic surgery evaluation, recommendations and interventions as noted.  - Patient will need operative intervention for this injury, but to be deferred pending improved medical condition with better diabetic blood glucose control.  - As no urgent operative intervention is indicated from an Orthopedic surgery standpoint, patient may be discharged with short-term outpatient follow-up to discuss elective operative intervention.  - Maintain NON-weightbearing status on the left lower extremity in knee immobilizer.  - Monitor left lower extremity neurovascular exam.  - Continue multimodal analgesic regimen.  - Continue DVT prophylaxis.  - PT and OT evaluation and treatment as indicated.  - Outpatient follow up with Orthopedic surgery for re-evaluation.    Knee laceration, left, initial encounter  Assessment & Plan  - Acute left knee laceration, present on presentation.  - Status post wound washout and suture repair by ED staff on 4/11/2025.  - Tetanus vaccination status updated this hospital encounter.  - Appreciate Orthopedic surgery evaluation, recommendations and interventions as noted.  -  Orthopedic surgery did do a wound evaluation including probing of the wound and determined that the joint space was not threatened and no further interventions or fluid challenge was indicated at the time.  - Continue local wound care as indicated.  - Analgesia as needed.  - Plan for wound reevaluation and suture removal in approximately 14 days (on or around 4/25/2025).    Hyperglycemia due to diabetes mellitus (HCC)  Assessment & Plan  Lab Results   Component Value Date    HGBA1C 12.2 (H) 04/11/2025       Recent Labs     04/12/25  2155 04/13/25  0620 04/13/25  0707 04/13/25  1058   POCGLU 172* 157* 154* 188*         Blood Sugar Average: Last 72 hrs:  (P) [id number]    - Patient with chronic history of type 2 diabetes mellitus, present on presentation, with hyperglycemia on presentation.  - Patient did have anion gap acidosis on presentation, but but a beta hydroxyburate test was negative.  - The patient's anion gap closed with fluid hydration/resuscitation.  - Appreciate Endocrinology evaluation, recommendations and assistance with management.  - Patient transition off of insulin infusion therapy to basal insulin therapy with Lantus at bedtime as well as oral metformin therapy 1000 mg twice daily beginning on 4/12/2025 with improved/adequate blood glucose control.  - Goal blood glucose range between 140 and 180.  - Hypoglycemia protocol.  - Outpatient follow-up with PCP as well as with Endocrinology as indicated.      Discharge Summary - Trauma Service   Viktor Farfan 54 y.o. male MRN: [id number]  Unit/Bed#: W -01 Encounter: [id number]    Admission Date: 4/11/2025     Discharge Date: 4/13/2025    Admitting Diagnosis: Leg pain [M79.606]  Hyperglycemia [R73.9]  Closed nondisplaced transverse fracture of left patella, initial encounter [S82.035A]  At risk for acid-base imbalance [Z91.89]  Diabetic ketoacidosis without coma associated with diabetes mellitus due to underlying condition (HCC)  [E08.10]  Closed fracture of left tibial plateau, initial encounter [S82.672A]    Discharge Diagnosis: See above.    Attending and Service: Dr. Park, Acute Care Surgical Services.    Consulting Physician(s):     Orthopedic surgery.  Endocrinology.    Imaging and Procedures Performed:   Orders Placed This Encounter   Procedures    Laceration repair       XR pelvis ap only 1 or 2 vw  Result Date: 4/11/2025  Impression: No acute osseous abnormality. Computerized Assisted Algorithm (CAA) may have been used to analyze all applicable images. Workstation performed: [id number]     XR knee 4+ views left injury  Result Date: 4/11/2025  Impression: Acute, nondisplaced fractures of the proximal tibia and lower pole patella as above. Moderately large lipohemarthrosis. Computerized Assisted Algorithm (CAA) may have been used to analyze all applicable images. Workstation performed: [id number]     XR tibia fibula 2 views LEFT  Result Date: 4/11/2025  Impression: Acute, nondisplaced fracture proximal tibia as above. Probable nondisplaced fracture lower pole patella. Moderately large lipohemarthrosis. Computerized Assisted Algorithm (CAA) may have been used to analyze all applicable images. Workstation performed: [id number]     XR chest 1 view portable  Result Date: 4/11/2025  Impression: No acute traumatic injury within limitations of supine imaging. Follow-up upright imaging may be considered if there is high clinical index of suspicion for injury based on mechanism. Workstation performed: [id number]     CT lower extremity wo contrast left  Result Date: 4/11/2025  Impression: Comminuted intra-articular fracture of the proximal tibia as described above. Associated moderate lipohemarthrosis. Workstation performed: [id number]     XR femur 2 views LEFT  Result Date: 4/11/2025  Impression: Negative for left femoral fracture. Left knee fracture will be described separately. Computerized Assisted Algorithm (CAA) may have been used  to analyze all applicable images. Workstation performed: [id number]     CT spine cervical without contrast  Result Date: 4/11/2025  Impression: No cervical spine fracture or traumatic malalignment. Workstation performed: [id number]     CT head wo contrast  Result Date: 4/11/2025  Impression: No acute intracranial abnormality. Workstation performed: [id number]       Hospital Course: Viktor Farfan is a 54-year-old male who presented to the emergency department for evaluation following a mechanical fall landing on concrete.  He fell approximately 6 feet before hitting the ground.  He noticed immediate left knee pain and denied any head strike or loss of consciousness.  He did admit to having some neuropathy in his feet due to history of diabetes and denies pain elsewhere.  During his ER workup, he was found to have a left knee laceration and tibial plateau fracture.  During his initial evaluation by trauma service, his primary survey was unremarkable.  On secondary survey, he was afebrile with normal vital signs; he had left knee tenderness and a 6 cm laceration; his mucous membranes were dry; the remainder of his exam was unremarkable.  His initial workup included labs and the above and imaging studies.    He was admitted to the trauma service status post mechanical fall with left knee laceration and closed tibial plateau fracture with lipohemarthrosis as well as hyperglycemia due to poorly controlled type 2 diabetes mellitus and associated neuropathy.  Orthopedic surgery was consulted and recommended delayed operative intervention to allow time for improved blood glucose control with a goal to decrease perioperative complications.  Endocrinology was consulted and assisted with his diabetic management during his hospital encounter.  He did undergo laceration repair at bedside for his left knee laceration with plan to remove sutures in approximate 14 days.  PT and OT evaluated him with recommendations for postacute  care facility placement for rehab, but the patient did improve and ultimately preferred discharge to home with home health services.  Case management assisted with disposition planning.  The patient was deemed stable for discharge on 4/13/2025.  For further details of his hospital encounter, please see his complete medical records.    On discharge, the patient is instructed to follow-up with the patient's primary care provider to review the events of the patient's recent hospitalization. The patient is instructed to follow-up in the Trauma Clinic as needed.  The patient is instructed to follow-up with orthopedic surgery within the next week to review his hospital encounter and have further discussion regarding management of his left lower extremity injury.  The patient is instructed to follow-up with endocrinology in the next 2 weeks to review his hospital encounter and for ongoing management of his diabetes.  The patient should follow the provided discharge instructions.    Condition at Discharge: stable     Discharge instructions/Information to patient and family:   See after visit summary for information provided to patient and family.      Provisions for Follow-Up Care:  See after visit summary for information related to follow-up care and any pertinent home health orders.      Disposition: See After Visit Summary for discharge disposition information.    Planned Readmission: No    Discharge Statement   I spent 30 minutes discharging the patient. This time was spent on the day of discharge. I had direct contact with the patient on the day of discharge. Additional documentation is required if more than 30 minutes were spent on discharge.     Discharge Medications:  See after visit summary for reconciled discharge medications provided to patient and family.      Gregorio Clemente PA-C  4/13/2025  12:54 PM

## 2025-04-13 NOTE — PROGRESS NOTES
Progress Note - Trauma   Name: Viktor Farfan 54 y.o. male I MRN: [id number]  Unit/Bed#: W -01 I Date of Admission: 4/11/2025   Date of Service: 4/13/2025 I Hospital Day: 2    Assessment & Plan  Fall  - Status post mechanical fall with the below noted injuries.  - Fall precautions.  - Geriatric Medicine consultation for evaluation, medication review and recommendations.  - PT and OT evaluation and treatment as indicated.  - Case Management consultation for disposition planning.  - The therapy service recommendations are for level 1 resource intensity/postacute care facility placement for rehab, but patient pretty intent on discharge home as soon as possible.  Fracture of left tibia  - Acute closed left intra-articular tibial fracture with lipohemarthrosis, present on admission.  - Appreciate Orthopedic surgery evaluation, recommendations and interventions as noted.  - Patient will need operative intervention for this injury, but to be deferred pending improved medical condition with better diabetic blood glucose control.  - As no urgent operative intervention is indicated from an Orthopedic surgery standpoint, patient may be discharged with short-term outpatient follow-up if otherwise medically appropriate.  If patient remains hospitalized, operative intervention may be considered as early as Tuesday, 4/15/2025.  - Maintain NON-weightbearing status on the left lower extremity in knee immobilizer.  - Monitor left lower extremity neurovascular exam.  - Continue multimodal analgesic regimen.  - Continue DVT prophylaxis.  - PT and OT evaluation and treatment as indicated.  - Outpatient follow up with Orthopedic surgery for re-evaluation.  Knee laceration, left, initial encounter  - Acute left knee laceration, present on presentation.  - Status post wound washout and suture repair by ED staff on 4/11/2025.  - Tetanus vaccination status updated this hospital encounter.  - Appreciate Orthopedic surgery evaluation,  recommendations and interventions as noted.  - Orthopedic surgery did do a wound evaluation including probing of the wound and determined that the joint space was not threatened and no further interventions or fluid challenge was indicated at the time.  - Continue local wound care as indicated.  - Analgesia as needed.  - Plan for wound reevaluation and suture removal in approximately 14 days (on or around 4/25/2025).  Hyperglycemia due to diabetes mellitus (HCC)  Lab Results   Component Value Date    HGBA1C 12.2 (H) 04/11/2025       Recent Labs     04/12/25 2001 04/12/25 2155 04/13/25 0620 04/13/25  0707   POCGLU 234* 172* 157* 154*       Blood Sugar Average: Last 72 hrs:  (P) 180.2769324901578717    - Patient with chronic history of type 2 diabetes mellitus, present on presentation, with hyperglycemia on presentation.  - Patient did have anion gap acidosis on presentation, but but a beta hydroxyburate test was negative.  - The patient's anion gap closed with fluid hydration/resuscitation.  - Appreciate Endocrinology evaluation, recommendations and assistance with management.  - Patient transition off of insulin infusion therapy to basal insulin therapy with Lantus at bedtime as well as oral metformin therapy 1000 mg twice daily beginning on 4/12/2025 with improved/adequate blood glucose control.  - Goal blood glucose range between 140 and 180.  - Hypoglycemia protocol.  - Outpatient follow-up with PCP as well as with Endocrinology as indicated.    Bowel Regimen: On MiraLAX and Senokot-S.  VTE Prophylaxis:VTE covered by:  enoxaparin, Subcutaneous, 30 mg at 04/12/25 2155        Disposition: Continue current level of care.  Continue therapy evaluation shims indicated.  Case management following for disposition planning.  Please contact the SecureChat role for the Trauma service with any questions/concerns.    24 Hour Events : Patient transition off of insulin infusion over the last 24 hours.  Subjective : Patient  notes that he is feeling a little better today.  He was able to get some sleep overnight.  He notes he is having intermittent spasms in the left lower extremity, but his pain control regimen is overall improved compared to yesterday.  He is tolerating oral intake without nausea, vomiting or constipation.  He has no new complaints today and is hopeful for discharge.    Objective :  Temp:  [97.8 °F (36.6 °C)-98.3 °F (36.8 °C)] 97.9 °F (36.6 °C)  HR:  [] 100  BP: (109-144)/(70-91) 118/82  Resp:  [14-16] 16  SpO2:  [90 %-95 %] 91 %  O2 Device: None (Room air)    I/O         04/11 0701 04/12 0700 04/12 0701 04/13 0700 04/13 0701 04/14 0700    P.O.  240     I.V. (mL/kg)  1671.3 (15.1)     IV Piggyback 1000      Total Intake(mL/kg) 1000 (9) 1911.3 (17.2)     Urine (mL/kg/hr) 650 950 (0.4)     Stool 0      Total Output 650 950     Net +350 +961.3            Unmeasured Urine Occurrence  0 x     Unmeasured Stool Occurrence 0 x 0 x             Physical Exam   GENERAL APPEARANCE: Patient in no acute distress.  HEENT: NCAT; EOMs intact; Mucous membranes moist  CV: Regular rate and rhythm; no murmur/gallops/rubs appreciated.  CHEST / LUNGS: Clear to auscultation; no wheezes/rales/rhonci.  Breathing comfortably on room air.  ABD: NABS; soft; non-distended; non-tender.  : Spontaneously.  EXT: +2 pulses bilaterally upper & lower extremities; no edema.  Left lower extremity remains in a knee immobilizer with mild tenderness around the left knee and proximal lower leg with minimal swelling and a clean/dry/intact overlying dressing covering the left knee laceration.  The left lower extremity neurovascular exams intact with soft compressible muscle compartments.  NEURO: GCS 15; no focal neurologic deficits; neurovascularly intact.  SKIN: Warm, dry and well perfused; no rash; no jaundice.         Lab Results: I have reviewed the following results:  Recent Labs     04/11/25  1031 04/11/25  1311 04/12/25  0231   WBC 6.90  --   8.61   HGB 12.5  --  12.3   HCT 40.1  --  38.6   *   < > 97*   SODIUM 133*   < > 131*   K 4.1   < > 3.9   CL 99   < > 99   CO2 19*   < > 23   BUN 6   < > 9   CREATININE 0.94   < > 0.73   GLUC 355*   < > 156*   AST 35  --   --    ALT 19  --   --    ALB 3.6  --   --    TBILI 0.77  --   --    ALKPHOS 171*  --   --    PTT 28  --   --    INR 1.15  --   --     < > = values in this interval not displayed.       Imaging Results Review: I reviewed radiology reports from this admission including: CT head, CT C-spine, CT left lower extremity, and xray(s).  Other Study Results Review: No additional pertinent studies reviewed.

## 2025-04-13 NOTE — ASSESSMENT & PLAN NOTE
Lab Results   Component Value Date    HGBA1C 12.2 (H) 04/11/2025       Recent Labs     04/12/25 2001 04/12/25  2155 04/13/25  0620 04/13/25  0707   POCGLU 234* 172* 157* 154*       Blood Sugar Average: Last 72 hrs:  (P) [id number]    - Patient with chronic history of type 2 diabetes mellitus, present on presentation, with hyperglycemia on presentation.  - Patient did have anion gap acidosis on presentation, but but a beta hydroxyburate test was negative.  - The patient's anion gap closed with fluid hydration/resuscitation.  - Appreciate Endocrinology evaluation, recommendations and assistance with management.  - Patient transition off of insulin infusion therapy to basal insulin therapy with Lantus at bedtime as well as oral metformin therapy 1000 mg twice daily beginning on 4/12/2025 with improved/adequate blood glucose control.  - Goal blood glucose range between 140 and 180.  - Hypoglycemia protocol.  - Outpatient follow-up with PCP as well as with Endocrinology as indicated.

## 2025-04-13 NOTE — CASE MANAGEMENT
Case Management Progress Note    Patient name Viktor Farfan  Location W /W -01 MRN [id number]  : 1970 Date 2025       LOS (days): 2  Geometric Mean LOS (GMLOS) (days):   Days to GMLOS:        OBJECTIVE:        Current admission status: Inpatient  Preferred Pharmacy:   [facility] [place], [place] - [address] [place] [address]  Phone: [phone] Fax: [phone]    [id number] [place] - [address]  [address] [address]  Phone: [phone] Fax: [phone]    [facility] - [place] - [address]  [address]  [place] [address]  Phone: [phone] Fax: [phone]    Primary Care Provider: Nader Colvin MD    Primary Insurance: [employer]  Secondary Insurance:     PROGRESS NOTE:    Provider requesting C services to also add SN for diabetic education. CM messaged [facility] and they report they are able to provide these services as well. Provider updated.

## 2025-04-13 NOTE — ASSESSMENT & PLAN NOTE
- Acute closed left intra-articular tibial fracture with lipohemarthrosis, present on admission.  - Appreciate Orthopedic surgery evaluation, recommendations and interventions as noted.  - Patient will need operative intervention for this injury, but to be deferred pending improved medical condition with better diabetic blood glucose control.  - As no urgent operative intervention is indicated from an Orthopedic surgery standpoint, patient may be discharged with short-term outpatient follow-up to discuss elective operative intervention.  - Maintain NON-weightbearing status on the left lower extremity in knee immobilizer.  - Monitor left lower extremity neurovascular exam.  - Continue multimodal analgesic regimen.  - Continue DVT prophylaxis.  - PT and OT evaluation and treatment as indicated.  - Outpatient follow up with Orthopedic surgery for re-evaluation.

## 2025-04-13 NOTE — CASE MANAGEMENT
Case Management Discharge Planning Note    Patient name Viktor Farfan  Location W /W -01 MRN [id number]  : 1970 Date 2025       Current Admission Date: 2025  Current Admission Diagnosis:Fracture of left tibia   Patient Active Problem List    Diagnosis Date Noted Date Diagnosed    Hyperglycemia due to diabetes mellitus (HCC) 2025     Fall 2025     Knee laceration, left, initial encounter 2025     Fracture of left tibia 2025     Hospital discharge follow-up 2024     Volume overload 10/27/2024     Ambulatory dysfunction 10/24/2024     At risk for acid-base imbalance 10/22/2024     Electrolyte imbalance risk 10/22/2024     HTN (hypertension) 10/22/2024     Anemia 10/22/2024     Vitamin B12 deficiency 10/22/2024     Folate deficiency 10/22/2024     Urinary retention 10/22/2024     Thrombocytopenia (HCC) 10/19/2024     Transaminitis 10/19/2024     Acute hypoxic respiratory failure (HCC) 10/16/2024     SIRS (systemic inflammatory response syndrome) ([facility]) 10/16/2024     Alcohol abuse 10/16/2024     Tobacco abuse 10/16/2024     Primary osteoarthritis of left knee 01/10/2024     Chronic pain of left knee 2024       LOS (days): 2  Geometric Mean LOS (GMLOS) (days):   Days to GMLOS:     OBJECTIVE:  Risk of Unplanned Readmission Score: 16.08         Current admission status: Inpatient   Preferred Pharmacy:   [facility] [place], [place] - [address] [place] [address]  Phone: [phone] Fax: [phone]    [id number] - [place] - [address]  [address]  [facility] [address]  Phone: [phone] Fax: [phone]    Primary Care Provider: Nader Colvin MD    Primary Insurance: [employer]  Secondary Insurance:     DISCHARGE DETAILS:    Discharge planning discussed with:: patient  Freedom of Choice: Yes  Comments - Freedom of Choice: Cm met with patient. Patient  [facility]vised [facility] able [facility] for [facility]. Patient in agreement with reserving this agency. Patient would like gluclose monitor sent to [facility] since patient unable to have anyone pick it up for him. Cm updated provider. CM to follow for price check for patient.                     Requested Home Health Care         Is the patient interested in HHC at discharge?: Yes  Home Health Discipline requested:: Occupational Therapy, Physical Therapy  Home Health Agency Name:: [facility]  Homebound Criteria Met:: Uses an Assist Device (i.e. cane, walker, etc), Requires the Assistance of Another Person for Safe Ambulation or to Leave the Home  Supporting Clincal Findings:: Limited Endurance         Other Referral/Resources/Interventions Provided:  Referral Comments: RW and BSC delivered to bedside. [facility]HC- [facility]         Treatment Team Recommendation: Acute Rehab  Discharge Destination Plan:: Home with Home Health Care  Transport at Discharge : Ride Share

## 2025-04-13 NOTE — ASSESSMENT & PLAN NOTE
Lab Results   Component Value Date    HGBA1C 12.2 (H) 04/11/2025       Recent Labs     04/12/25  2155 04/13/25  0620 04/13/25  0707 04/13/25  1058   POCGLU 172* 157* 154* 188*         Blood Sugar Average: Last 72 hrs:  (P) 180.9145627095122233    - Patient with chronic history of type 2 diabetes mellitus, present on presentation, with hyperglycemia on presentation.  - Patient did have anion gap acidosis on presentation, but but a beta hydroxyburate test was negative.  - The patient's anion gap closed with fluid hydration/resuscitation.  - Appreciate Endocrinology evaluation, recommendations and assistance with management.  - Patient transition off of insulin infusion therapy to basal insulin therapy with Lantus at bedtime as well as oral metformin therapy 1000 mg twice daily beginning on 4/12/2025 with improved/adequate blood glucose control.  - Goal blood glucose range between 140 and 180.  - Hypoglycemia protocol.  - Outpatient follow-up with PCP as well as with Endocrinology as indicated.

## 2025-04-13 NOTE — UTILIZATION REVIEW
Initial Clinical Review    Admission: Date/Time/Statement:   Admission Orders (From admission, onward)       Ordered        04/11/25 1356  Inpatient Admission  Once                          Orders Placed This Encounter   Procedures    Inpatient Admission     Standing Status:   Standing     Number of Occurrences:   1     Level of Care:   Med Surg [16]     Bed Type:   Trauma [7]     Estimated length of stay:   More than 2 Midnights     Certification:   I certify that inpatient services are medically necessary for this patient for a duration of greater than two midnights. See H&P and MD Progress Notes for additional information about the patient's course of treatment.     ED Arrival Information       Expected   -    Arrival   4/11/2025 10:10    Acuity   Emergent              Means of arrival   Walk-In    Escorted by   Friend    Service   Trauma    Admission type   Emergency              Arrival complaint   L leg lac             Chief Complaint   Patient presents with    Leg Pain     Pt fell 6-7 feet from working on a house through a crawl space. Pt hit head and takes aspirin.        Initial Presentation:   54 yom to ER from home who presents after falling approximately 6 feet through an attic while working on home striking his left side and with head strike without LOC, c/o left lower extremity pain. Hx alcohol abuse, hypertension on daily aspirin. Exam: GCS 15, briskly moving all 4 extremities with sensation intact to light touch in all 4 distal extremities. Secondary survey notable for no external stigmata of head trauma. Midline CT and L-spine nontender without step-offs/deformities. Abrasion overlying the left knee with deformity and tenderness to palpation of left distal thigh as well as left hip pain without pelvic instability or abdominal pain. 2+ DP/PT pulses with SILT in all dermatomes and 5/5 strength with dorsi/plantarflexion bilaterally. Laceration overlying L knee washed out and repaired. Admission imaging  showing closed L tibial plateau fx. Labs:  , Na 133, anion gap 15, CO2 19, alk phos 171, gluc 355, PT 15.4, u/a+gluc, prot, leuk.   Admitted to inpatient status for fx L tibia. Plan: pain mgt, ortho consult, accuchecks, IV Insulin gtt, neuro checks, therapies, endocrinology consult.    Per ortho: Left tibial plateau fracture   Patient in DKA at time of examination thus operative intervention will need to be withheld until patient is medically optimized for surgery. Nonweightbearing left lower extremity. Knee immobilizer placed as well as an Ace bandage to assist with swelling of the left lower extremity. Ice pack to affected knee    Per endocrinology:   Type 2 diabetes managed on metformin 1000 mg daily at home was admitted to ER as he fell 6 feet down in concrete hole which resulted into tibial fracture..  He was evaluated by orthopedic surgery and schedule for surgery on Tuesday.  He was noted to have blood sugar in 500 mg per DL range, anion gap was also elevated.  He responded to IV fluids, beta hydroxybutyrate was negative which rules out diabetic ketoacidosis  He takes metformin 1000 mg daily however admits noncompliance to metformin.  He does not check blood sugars at home  Plan: Type 2 diabetes presented with severe hyperglycemia-obtain A1c  -Currently patient is hospitalized, will hold metformin  -Recommend insulin infusion as per non-DKA protocol for now  -He can be switched to basal bolus insulin regimen once blood sugars are better controlled      Date: 4/12/25   Day 2:   NWB maintained for L tibia fx, ortho following. Using IV Dilaudid & Oxycodone for pain control. Plan for surgical intervention when medically cleared. Endo follow-up: T 2DM, uncontrolled with hyperglycemia: At this time, I recommend transitioning off IV insulin to Lantus 50 units once daily and metformin. Use correctional lispro as needed. Discussed need for glucose control to reduce risk of surgical wound infection or poor wound  healing.  Exam: EXT: +2 pulses bilaterally upper & lower extremities; no edema.  Patient had normal range of motion without pain, tenderness or deformity in the bilateral upper and right lower extremities.  The left lower extremity is in a knee immobilizer with a clean/dry/intact Ace bandage dressing from the foot to the knee in place.  He had minimal left knee swelling and moderate left knee and proximal lower leg tenderness with soft compressible muscle compartments and an intact neurovascular exam throughout the left lower extremity       ED Treatment-Medication Administration from 04/11/2025 1010 to 04/11/2025 1452         Date/Time Order Dose Route Action     04/11/2025 1030 morphine injection 4 mg 4 mg Intravenous Given     04/11/2025 1137 lactated ringers bolus 1,000 mL 1,000 mL Intravenous New Bag     04/11/2025 1137 morphine injection 4 mg 4 mg Intravenous Given     04/11/2025 1337 tetanus-diphtheria-acellular pertussis (BOOSTRIX) IM injection 0.5 mL 0.5 mL Intramuscular Given     04/11/2025 1338 lidocaine-epinephrine (XYLOCAINE/EPINEPHRINE) 1 %-1:100,000 injection 1 mL 1 mL Infiltration Given by Other     04/11/2025 1432 HYDROmorphone HCl (DILAUDID) injection 0.2 mg 0.2 mg Intravenous Given            Scheduled Medications:  Medications 04/04 04/05 04/06 04/07 04/08 04/09 04/10 04/11 04/12 04/13   acetaminophen (TYLENOL) tablet 975 mg  Dose: 975 mg  Freq: Every 8 hours scheduled Route: PO  Indications Comment: Around the clock pain.  Start: 04/11/25 3970 [phone] [id number] 0364 3506 0614     1400     2208        amLODIPine (NORVASC) tablet 10 mg  Dose: 10 mg  Freq: Daily Route: PO  Start: 04/12/25 0900   Admin Instructions:      Order specific questions:               0800      1008        aspirin chewable tablet 81 mg  Dose: 81 mg  Freq: Daily Route: PO  Start: 04/12/25 0900            0800      1008        atorvastatin (LIPITOR) tablet 20 mg  Dose: 20 mg  Freq: Daily Route:  PO  Start: 04/12/25 0900            0800      1008        enoxaparin (LOVENOX) subcutaneous injection 30 mg  Dose: 30 mg  Freq: Every 12 hours scheduled Route: SC  Start: 04/11/25 1400   Admin Instructions:              (5457) [C]     2118 0800 2155 1008 2100        escitalopram (LEXAPRO) tablet 10 mg  Dose: 10 mg  Freq: Daily Route: PO  Start: 04/12/25 0900   Admin Instructions:               0800      1008        folic acid (FOLVITE) tablet 1 mg  Dose: 1 mg  Freq: Daily Route: PO  Start: 04/12/25 0900            0800      1008        insulin glargine (LANTUS) subcutaneous injection 50 Units 0.5 mL  Dose: 50 Units  Freq: Daily at bedtime Route: SC  Start: 04/12/25 2200   Admin Instructions:               2201 2200         insulin lispro (HumALOG/ADMELOG) 100 units/mL subcutaneous injection 1-5 Units  Dose: 1-5 Units  Freq: 3 times daily before meals Route: SC  Start: 04/13/25 0700 End: 04/12/25 1533   Admin Instructions:               1533-D/C'd        insulin lispro (HumALOG/ADMELOG) 100 units/mL subcutaneous injection 1-5 Units  Dose: 1-5 Units  Freq: 3 times daily before meals Route: SC  Start: 04/13/25 0700 End: 04/12/25 1449   Admin Instructions:               1449-D/C'd        insulin lispro (HumALOG/ADMELOG) 100 units/mL subcutaneous injection 1-6 Units  Dose: 1-6 Units  Freq: 3 times daily before meals Route: SC  Start: 04/13/25 0700   Admin Instructions:                0746     1130     1600         insulin lispro (HumALOG/ADMELOG) 100 units/mL subcutaneous injection 1-6 Units  Dose: 1-6 Units  Freq: 3 times daily before meals Route: SC  Start: 04/12/25 1600 End: 04/12/25 1533   Admin Instructions:               1533-D/C'd       lactated ringers bolus 1,000 mL  Dose: 1,000 mL  Freq: Once Route: IV  Last Dose: Stopped (04/11/25 1408)  Start: 04/11/25 1130 End: 04/11/25 1408           1137     1408          lidocaine-epinephrine (XYLOCAINE/EPINEPHRINE) 1 %-1:100,000 injection 1  mL  Dose: 1 mL  Freq: Once Route: INFILTRATION  Start: 04/11/25 1330 End: 04/11/25 1338   Admin Instructions:              1338 [C]          metFORMIN (GLUCOPHAGE) tablet 1,000 mg  Dose: 1,000 mg  Freq: 2 times daily with meals Route: PO  Start: 04/12/25 1715   Admin Instructions:               1749      0746     1630        methocarbamol (ROBAXIN) tablet 500 mg  Dose: 500 mg  Freq: Every 6 hours scheduled Route: PO  Start: 04/12/25 0745            0757     1312     1744     2327      0520     1200     1800        morphine injection 4 mg  Dose: 4 mg  Freq: Once Route: IV  Start: 04/11/25 1130 End: 04/11/25 1137   Admin Instructions:              1137          morphine injection 4 mg  Dose: 4 mg  Freq: Once Route: IV  Start: 04/11/25 1030 End: 04/11/25 1030   Admin Instructions:              1030          nicotine (NICODERM CQ) 7 mg/24hr TD 24 hr patch 1 patch  Dose: 1 patch  Freq: Daily Route: TD  Start: 04/11/25 1400   Admin Instructions:              1525      1402     1503      1459     1500        pantoprazole (PROTONIX) EC tablet 20 mg  Dose: 20 mg  Freq: Daily (early morning) Route: PO  Start: 04/12/25 0600   Admin Instructions:               0504      0520        polyethylene glycol (MIRALAX) packet 17 g  Dose: 17 g  Freq: Daily Route: PO  Indications of Use: CONSTIPATION  Start: 04/11/25 1400   Admin Instructions:              (9389)      0855      (3604)        senna-docusate sodium (SENOKOT S) 8.6-50 mg per tablet 1 tablet  Dose: 1 tablet  Freq: Daily at bedtime Route: PO  Start: 04/11/25 2200           2117 [phone]) 9727        tetanus-diphtheria-acellular pertussis (BOOSTRIX) IM injection 0.5 mL  Dose: 0.5 mL  Freq: Once Route: IM  Start: 04/11/25 1330 End: 04/11/25 1337   Admin Instructions:              1337          thiamine tablet 100 mg  Dose: 100 mg  Freq: Daily Route: PO  Start: 04/12/25 0900            0800      1008                    Continuous Meds Sorted by Name  for Viktor Farfan as  of 04/04/25 through 4/13/25  Legend:       Medications 04/04 04/05 04/06 04/07 04/08 04/09 04/10 04/11 04/12 04/13   insulin regular (HumuLIN R,NovoLIN R) 1 Units/mL in sodium chloride 0.9 % 100 mL infusion  Rate: 0.3-21 mL/hr Dose: 0.3-21 Units/hr  Freq: Titrated Route: IV  Last Dose: Stopped (04/12/25 2200)  Start: 04/11/25 1415 End: 04/12/25 2300   Admin Instructions:      Order specific questions:              1612     1837     2012 [C]     2233      0033     0202 [C]     0436 [C]     0604     0808 [C]     1010 [C]     1210 [C]     1314 [C]     1402 [C]     1612 [C]     1825 [C]     2002 [C]     2200 [C]     2300-D/C'd       lactated ringers infusion  Rate: 75 mL/hr Dose: 75 mL/hr  Freq: Continuous Route: IV  Indications of Use: IV Hydration  Last Dose: Stopped (04/13/25 0749)  Start: 04/12/25 0000 End: 04/13/25 0747            0029     1339     2246      0747-D/C'd  0749 [C]        multi-electrolyte (Plasmalyte-A/Isolyte-S PH 7.4/Normosol-R) IV solution  Rate: 75 mL/hr Dose: 75 mL/hr  Freq: Continuous Route: IV  Indications of Use: IV Hydration  Start: 04/12/25 0000 End: 04/11/25 2058 2058-D/C'd                    PRN Meds Sorted by Name  for Viktor Farfan as of 04/04/25 through 4/13/25  Legend:       Medications 04/04 04/05 04/06 04/07 04/08 04/09 04/10 04/11 04/12 04/13   HYDROmorphone HCl (DILAUDID) injection 0.2 mg  Dose: 0.2 mg  Freq: Every 2 hour PRN Route: IV  PRN Reasons: breakthrough pain,other  PRN Comment: or if patient qualifies for treatment of moderate or severe pain but cannot take oral medications  Start: 04/11/25 1357   Admin Instructions:              1432     1833     2235      0507         hydrOXYzine HCL (ATARAX) tablet 25 mg  Dose: 25 mg  Freq: Daily at bedtime PRN Route: PO  PRN Reason: itching  Start: 04/11/25 2130   Admin Instructions:              2235          ondansetron (ZOFRAN) injection 4 mg  Dose: 4 mg  Freq: Every 4 hours PRN Route: IV  PRN Reasons:  nausea,vomiting  Start: 04/11/25 1357   Admin Instructions:                   ondansetron (ZOFRAN) injection 4 mg  Dose: 4 mg  Freq: Every 6 hours PRN Route: IV  PRN Reasons: nausea,vomiting  Start: 04/11/25 1352 End: 04/11/25 1359   Admin Instructions:              1359-D/C'd         oxyCODONE (ROXICODONE) IR tablet 5 mg  Dose: 5 mg  Freq: Every 4 hours PRN Route: PO  PRN Reason: moderate pain  Start: 04/12/25 0740   Admin Instructions:                                            Or   oxyCODONE (ROXICODONE) immediate release tablet 10 mg  Dose: 10 mg  Freq: Every 4 hours PRN Route: PO  PRN Reason: severe pain  Start: 04/12/25 0740   Admin Instructions:               0757     1504     1941      0322     0746         oxyCODONE (ROXICODONE) split tablet 2.5 mg  Dose: 2.5 mg  Freq: Every 4 hours PRN Route: PO  PRN Reason: moderate pain  Start: 04/11/25 1357 End: 04/12/25 0744   Admin Instructions:                              0744-D/C'd       Or   oxyCODONE (ROXICODONE) IR tablet 5 mg  Dose: 5 mg  Freq: Every 4 hours PRN Route: PO  PRN Reason: severe pain  Start: 04/11/25 1357 End: 04/12/25 0744   Admin Instructions:              1525     2118      0152     0744-D/C'd       Legend:       [id number]/0404/0504/0604/0704/0804/0904/1004/1104/1204/13          ED Triage Vitals   Temperature Pulse Respirations Blood Pressure SpO2 Pain Score   04/11/25 1454 04/11/25 1015 04/11/25 1015 04/11/25 1015 04/11/25 1015 04/11/25 1030   97.9 °F (36.6 °C) 99 22 117/78 98 % 10 - Worst Possible Pain     Weight (last 2 days)       Date/Time Weight    04/11/25 10:20:49 111 (245.15)            Vital Signs (last 3 days)       Date/Time Temp Pulse Resp BP MAP (mmHg) SpO2 O2 Device Patient Position - Orthostatic VS Dat Coma Scale Score Pain    04/13/25 10:57:06 98.1 °F (36.7 °C) 92 16 120/83 95 93 % None (Room air) Lying -- --    04/13/25 0746 -- -- -- -- -- -- -- -- -- 10 - Worst Possible Pain    04/13/25 0745 -- -- -- -- -- -- None  (Room air) -- 15 --    04/13/25 0707 97.9 °F (36.6 °C) 100 16 118/82 94 91 % None (Room air) Lying -- --    04/13/25 0520 -- -- -- -- -- -- -- -- -- 5    04/13/25 0430 -- -- -- -- -- -- -- -- 15 --    04/13/25 0322 -- -- -- -- -- -- -- -- -- 10 - Worst Possible Pain    04/13/25 02:47:54 98.1 °F (36.7 °C) 94 16 137/84 102 93 % -- -- -- --    04/13/25 0000 -- -- -- -- -- -- -- -- 15 --    04/12/25 23:27:39 98 °F (36.7 °C) 93 -- 114/70 85 92 % -- -- -- --    04/12/25 2208 98.3 °F (36.8 °C) -- 15 -- -- -- -- -- -- --    04/12/25 21:57:12 98.3 °F (36.8 °C) 97 16 128/89 102 93 % -- -- -- --    04/12/25 2157 -- -- -- -- -- -- -- -- -- 8    04/12/25 1946 -- -- -- -- -- 95 % None (Room air) -- 15 10 - Worst Possible Pain    04/12/25 1941 -- -- -- -- -- -- -- -- -- 10 - Worst Possible Pain    04/12/25 1900 98 °F (36.7 °C) -- 16 124/82 -- -- -- -- -- --    04/12/25 1504 -- -- -- -- -- -- -- -- -- 10 - Worst Possible Pain    04/12/25 14:54:35 97.8 °F (36.6 °C) 83 15 118/77 91 94 % -- -- -- --    04/12/25 1312 -- -- -- -- -- -- -- -- -- Med Not Given for Pain - for MAR use only    04/12/25 10:47:07 98.3 °F (36.8 °C) 92 14 109/79 89 90 % -- -- -- --    04/12/25 0923 -- -- -- -- -- -- -- -- -- No Pain    04/12/25 0800 -- -- -- 144/91 -- -- -- -- -- --    04/12/25 0757 -- -- -- -- -- -- None (Room air) -- 15 8    04/12/25 07:22:35 97.9 °F (36.6 °C) 87 17 144/91 109 97 % -- -- -- --    04/12/25 07:21:43 97.9 °F (36.6 °C) 89 16 144/91 109 96 % -- -- -- --    04/12/25 0507 -- -- -- -- -- -- -- -- -- 8    04/12/25 01:52:52 98.4 °F (36.9 °C) 100 -- 119/74 89 93 % -- -- -- --    04/12/25 0152 -- -- -- -- -- -- -- -- -- 10 - Worst Possible Pain    04/11/25 2235 -- -- -- -- -- -- -- -- -- 8 04/11/25 2217 99.4 °F (37.4 °C) 91 -- 129/74 92 88 % -- -- -- --    04/11/25 2118 -- -- -- -- -- -- -- -- -- 8    04/11/25 19:10:01 98.6 °F (37 °C) 94 -- 124/81 95 92 % -- -- -- --    04/11/25 1833 -- -- -- -- -- -- -- -- -- 10 - Worst Possible Pain     04/11/25 1525 -- -- -- -- -- -- None (Room air) -- 15 10 - Worst Possible Pain    04/11/25 14:54:36 97.9 °F (36.6 °C) 94 16 130/91 104 94 % -- -- -- --    04/11/25 1432 -- -- -- -- -- -- -- -- -- 5    04/11/25 1400 -- 97 18 142/81 -- 96 % -- -- 15 --    04/11/25 1300 -- 91 18 126/82 98 97 % -- -- 15 --    04/11/25 1200 -- 103 18 131/74 -- 98 % -- -- 15 --    04/11/25 1137 -- -- -- -- -- -- -- -- -- 9    04/11/25 1130 -- 97 18 141/96 114 99 % -- -- 15 --    04/11/25 1100 -- -- -- -- -- -- -- -- 15 --    04/11/25 1045 -- 102 18 118/71 -- 95 % -- -- 15 --    04/11/25 1030 -- 99 18 118/80 -- 93 % -- -- 15 10 - Worst Possible Pain    04/11/25 10:15:02 -- 99 22 117/78 -- 98 % None (Room air) -- 15 --    04/10/25 2000 -- -- -- -- -- -- -- -- 15 --              Pertinent Labs/Diagnostic Test Results:   Radiology:  CT lower extremity wo contrast left   ED Interpretation by Jaz Castle MD (04/11 1325)   FINDINGS:     OSSEOUS STRUCTURES: There is a comminuted intra-articular fracture of the proximal tibia. This predominantly involves the lateral tibial plateau with fracture lines extending to the lateral aspect of the lateral tibial plateau as well as to the lateral   tibial spine. No significant displacement. The fracture lines extend more medially and distally to involve the medial tibial metadiaphysis. There is a small 1.5 cm fracture fragment in this area displaced 3 mm anteromedially.     VISUALIZED MUSCULATURE:  Unremarkable.     SOFT TISSUES: There is anterior subcutaneous edema.     OTHER PERTINENT FINDINGS: Associated moderate lipohemarthrosis.     IMPRESSION:     Comminuted intra-articular fracture of the proximal tibia as described above. Associated moderate lipohemarthrosis.        Workstation performed: [id number]        Final Interpretation by Kaleb Barth MD (04/11 1236)      Comminuted intra-articular fracture of the proximal tibia as described above. Associated moderate lipohemarthrosis.          Workstation performed: [id number]         XR chest 1 view portable   ED Interpretation by Jaz Castle MD (04/11 1434)   FINDINGS:     Clear lungs. No discernible pneumothorax or layering pleural effusion on limited supine imaging.     Normal cardiomediastinal silhouette.     No displaced fractures. Degenerative changes of the spine.     Normal upper abdomen.     IMPRESSION:     No acute traumatic injury within limitations of supine imaging.     Follow-up upright imaging may be considered if there is high clinical index of suspicion for injury based on mechanism.     Workstation performed: [id number]        Final Interpretation by Db Berrios DO (04/11 1119)      No acute traumatic injury within limitations of supine imaging.      Follow-up upright imaging may be considered if there is high clinical index of suspicion for injury based on mechanism.      Workstation performed: [id number]         XR femur 2 views LEFT   ED Interpretation by Jaz Castle MD (04/11 1159)   FINDINGS:     No acute fracture of the left femur. Fat-fluid level noted in the suprapatellar recess in keeping with knee fracture which will be described in a separate report.     No significant degenerative changes.     No lytic or blastic osseous lesion.     Unremarkable soft tissues.     IMPRESSION:     Negative for left femoral fracture.     Left knee fracture will be described separately.        Computerized Assisted Algorithm (CAA) may have been used to analyze all applicable images.        Workstation performed: [id number]           Final Interpretation by Db Berrios DO (04/11 1124)      Negative for left femoral fracture.      Left knee fracture will be described separately.         Computerized Assisted Algorithm (CAA) may have been used to analyze all applicable images.         Workstation performed: [id number]         XR tibia fibula 2 views LEFT   ED Interpretation by Jaz Castle MD (04/11 1434)   FINDINGS:      Acute, nondisplaced fracture of the proximal tibia involving the medial metaphysis with fracture extension into the tibial eminences. The fibula and distal tibia appear intact.     Cortical irregularity lower pole patella also suspicious for nondisplaced fracture.     Moderately large joint effusion (fat/fluid level) in the suprapatellar recess.     Tiny calcaneal spurs incidentally noted.     No lytic or blastic osseous lesion.     Prepatellar soft tissue swelling.     IMPRESSION:     Acute, nondisplaced fracture proximal tibia as above.     Probable nondisplaced fracture lower pole patella.     Moderately large lipohemarthrosis.        Computerized Assisted Algorithm (CAA) may have been used to analyze all applicable images.              Workstation performed: [id number]        Final Interpretation by Db Berrios DO (04/11 1129)      Acute, nondisplaced fracture proximal tibia as above.      Probable nondisplaced fracture lower pole patella.      Moderately large lipohemarthrosis.         Computerized Assisted Algorithm (CAA) may have been used to analyze all applicable images.               Workstation performed: [id number]         XR knee 4+ views left injury   ED Interpretation by Jaz Castle MD (04/11 4174)   FINDINGS:     Acute, nondisplaced fracture of the medial proximal tibial metaphysis extending into the lateral tibial eminence. Some fracture lucency involving the tibial metaphysis to the left of midline also likely.     Cortical irregularity lower pole patella suggesting nondisplaced fracture.     Moderately large lipohemarthrosis.     No significant degenerative changes.     No lytic or blastic osseous lesion.     Prepatellar soft tissue swelling.     IMPRESSION:     Acute, nondisplaced fractures of the proximal tibia and lower pole patella as above.     Moderately large lipohemarthrosis.        Computerized Assisted Algorithm (CAA) may have been used to analyze all applicable images.         Workstation performed: [id number]        Final Interpretation by Db Berrios DO (04/11 1132)      Acute, nondisplaced fractures of the proximal tibia and lower pole patella as above.      Moderately large lipohemarthrosis.         Computerized Assisted Algorithm (CAA) may have been used to analyze all applicable images.         Workstation performed: [id number]         XR pelvis ap only 1 or 2 vw   ED Interpretation by Jaz Castle MD (04/11 1434)   FINDINGS:     No acute fracture or dislocation.     No significant degenerative changes.     No lytic or blastic osseous lesion.     Phleboliths in the pelvis. Otherwise unremarkable soft tissues.     Unremarkable visualized lumbar spine.     IMPRESSION:     No acute osseous abnormality.        Computerized Assisted Algorithm (CAA) may have been used to analyze all applicable images.        Workstation performed: [id number]        Final Interpretation by Db Berrios DO (04/11 1121)      No acute osseous abnormality.         Computerized Assisted Algorithm (CAA) may have been used to analyze all applicable images.         Workstation performed: [id number]         CT head wo contrast   ED Interpretation by Jaz Castle MD (04/11 1103)   FINDINGS:     PARENCHYMA:No intracranial mass, mass effect or midline shift. No CT signs of acute infarction.  No acute parenchymal hemorrhage.     VENTRICLES AND EXTRA-AXIAL SPACES:  Normal for the patient's age.     VISUALIZED ORBITS:  Normal visualized orbits.     PARANASAL SINUSES:  Small retention cyst or polyp, sphenoid sinus. Partially opacified right mastoid air cells, similar to previous study.     CALVARIUM AND EXTRACRANIAL SOFT TISSUES:  No fracture     IMPRESSION:     No acute intracranial abnormality.                 Workstation performed: [id number]        Final Interpretation by Saleem Solis MD (04/11 1100)      No acute intracranial abnormality.                  Workstation performed:  [id number]         CT spine cervical without contrast   ED Interpretation by Jaz Castle MD (04/11 1107)   FINDINGS:     ALIGNMENT: No spinal subluxation     VERTEBRAE:  No fracture.     DEGENERATIVE CHANGES: Mild multilevel cervical degenerative changes are noted without critical central canal stenosis.     PREVERTEBRAL AND PARASPINAL SOFT TISSUES: Unremarkable     THORACIC INLET:  Normal.     IMPRESSION:     No cervical spine fracture or traumatic malalignment.                 Workstation performed: [id number]        Final Interpretation by Saleem Solis MD (04/11 1103)      No cervical spine fracture or traumatic malalignment.                  Workstation performed: [id number]           Cardiology:  No orders to display     GI:  No orders to display           Results from last 7 days   Lab Units 04/13/25  0834 04/12/25  0231 04/11/25  1523 04/11/25  1031   WBC Thousand/uL 5.22 8.61  --  6.90   HEMOGLOBIN g/dL 11.3* 12.3  --  12.5   HEMATOCRIT % 35.8* 38.6  --  40.1   PLATELETS Thousands/uL 78* 97* 94* 116*   TOTAL NEUT ABS Thousands/µL  --  5.15  --  3.69         Results from last 7 days   Lab Units 04/13/25  0834 04/12/25  0231 04/11/25  1311 04/11/25  1031   SODIUM mmol/L 131* 131* 131* 133*   POTASSIUM mmol/L 3.7 3.9 4.7 4.1   CHLORIDE mmol/L 102 99 98 99   CO2 mmol/L 21 23 20* 19*   ANION GAP mmol/L 8 9 13 15*   BUN mg/dL 9 9 6 6   CREATININE mg/dL 0.65 0.73 0.88 0.94   EGFR ml/min/1.73sq m 110 105 97 91   CALCIUM mg/dL 8.1* 8.6 8.5 8.6     Results from last 7 days   Lab Units 04/11/25  1031   AST U/L 35   ALT U/L 19   ALK PHOS U/L 171*   TOTAL PROTEIN g/dL 7.6   ALBUMIN g/dL 3.6   TOTAL BILIRUBIN mg/dL 0.77     Results from last 7 days   Lab Units 04/13/25  1058 04/13/25  0707 04/13/25  0620 04/12/25  2155 04/12/25 2001 04/12/25  1806 04/12/25  1604 04/12/25  1401 04/12/25  1210 04/12/25  1001 04/12/25  0807 04/12/25  0554   POC GLUCOSE mg/dl 188* 154* 157* 172* 234* 226* 135 156* 166* 157* 159* 166*      Results from last 7 days   Lab Units 04/13/25  0834 04/12/25  0231 04/11/25  1311 04/11/25  1031   GLUCOSE RANDOM mg/dL 192* 156* 340* 355*         Results from last 7 days   Lab Units 04/11/25  1031   HEMOGLOBIN A1C % 12.2*   EAG mg/dl 303     Beta- Hydroxybutyrate   Date Value Ref Range Status   04/11/2025 0.07 0.02 - 0.27 mmol/L Final          Results from last 7 days   Lab Units 04/11/25  1143   PH REBECCA  7.356   PCO2 REBECCA mm Hg 44.0   PO2 REBECCA mm Hg 30.8*   HCO3 REBECCA mmol/L 24.1   BASE EXC REBECCA mmol/L -1.6   O2 CONTENT REBECCA ml/dL 10.7   O2 HGB, VENOUS % 55.9*                     Results from last 7 days   Lab Units 04/11/25  1031   PROTIME seconds 15.4*   INR  1.15   PTT seconds 28                                             Results from last 7 days   Lab Units 04/11/25  1031   LIPASE u/L 30                 Results from last 7 days   Lab Units 04/12/25  0029   CLARITY UA  Clear   COLOR UA  Yellow   SPEC GRAV UA  1.028   PH UA  6.0   GLUCOSE UA mg/dl >=1000 (1%)*   KETONES UA mg/dl Negative   BLOOD UA  Negative   PROTEIN UA mg/dl Trace*   NITRITE UA  Negative   BILIRUBIN UA  Negative   UROBILINOGEN UA (BE) mg/dl <2.0   LEUKOCYTES UA  Elevated glucose may cause decreased leukocyte values. See urine microscopic for UWBC result*   WBC UA /hpf 1-2   RBC UA /hpf 1-2   BACTERIA UA /hpf None Seen   EPITHELIAL CELLS WET PREP /hpf None Seen   MUCUS THREADS  Occasional*                                                   Past Medical History:   Diagnosis Date    Anxiety     Back pain     Chronic right-sided low back pain without sciatica 3/31/2018    Depression     Gastroesophageal reflux disease 3/31/2018    GERD (gastroesophageal reflux disease)     Right foot pain      Present on Admission:   Hyperglycemia due to diabetes mellitus (HCC)      Admitting Diagnosis: Leg pain [M79.606]  Hyperglycemia [R73.9]  Closed nondisplaced transverse fracture of left patella, initial encounter [S82.035A]  At risk for acid-base imbalance  [Z91.89]  Diabetic ketoacidosis without coma associated with diabetes mellitus due to underlying condition (HCC) [E08.10]  Closed fracture of left tibial plateau, initial encounter [H42.997A]  Age/Sex: 54 y.o. male    Network Utilization Review Department  ATTENTION: Please call with any questions or concerns to [phone] and carefully listen to the prompts so that you are directed to the right person. All voicemails are confidential.   For Discharge needs, contact Care Management DC Support Team at [phone] opt. [phone]  Send all requests for admission clinical reviews, approved or denied determinations and any other requests to dedicated fax number below belonging to the campus where the patient is receiving treatment. List of dedicated fax numbers for the Facilities:  FACILITY NAME UR FAX NUMBER   ADMISSION DENIALS (Administrative/Medical Necessity) [phone]   DISCHARGE SUPPORT TEAM (NETWORK) [phone]   PARENT CHILD HEALTH (Maternity/NICU/Pediatrics) [phone]   [facility] [phone]   [facility] [phone]   Replaced by [facility] [phone]   [facility] [phone]   [facility] [phone]   [facility] [phone]   [facility] [phone]   [facility] [phone]   [facility] [phone]   [facility] [phone]   [facility] [phone]   [facility] [phone]

## 2025-04-13 NOTE — CASE MANAGEMENT
Case Management Discharge Planning Note    Patient name Viktor Farfan  Location W /W -01 MRN [id number]  : 1970 Date 2025       Current Admission Date: 2025  Current Admission Diagnosis:Fracture of left tibia   Patient Active Problem List    Diagnosis Date Noted Date Diagnosed    Hyperglycemia due to diabetes mellitus (HCC) 2025     Fall 2025     Knee laceration, left, initial encounter 2025     Fracture of left tibia 2025     Hospital discharge follow-up 2024     Volume overload 10/27/2024     Ambulatory dysfunction 10/24/2024     At risk for acid-base imbalance 10/22/2024     Electrolyte imbalance risk 10/22/2024     HTN (hypertension) 10/22/2024     Anemia 10/22/2024     Vitamin B12 deficiency 10/22/2024     Folate deficiency 10/22/2024     Urinary retention 10/22/2024     Thrombocytopenia (HCC) 10/19/2024     Transaminitis 10/19/2024     Acute hypoxic respiratory failure ([facility]) 10/16/2024     SIRS (systemic inflammatory response syndrome) ([facility]) 10/16/2024     Alcohol abuse 10/16/2024     Tobacco abuse 10/16/2024     Primary osteoarthritis of left knee 01/10/2024     Chronic pain of left knee 2024       LOS (days): 2  Geometric Mean LOS (GMLOS) (days):   Days to GMLOS:     OBJECTIVE:  Risk of Unplanned Readmission Score: 20.19         Current admission status: Inpatient   Preferred Pharmacy:   [facility] [place], [place] - [address] [place] [address]  Phone: [phone] Fax: [phone]    [id number] [place] - [address]  [address]  [place] [address]  Phone: [phone] Fax: [phone]    [facility] [place] - [place] - [address]  [address]  [place] [address]  Phone: [phone] Fax: [phone]    Primary Care Provider: Nader Colvin MD    Primary Insurance: [employer]  Secondary Insurance:      DISCHARGE DETAILS:        Transport at Discharge : Ride Share     Number/Name of Dispatcher: Round Trip     ETA of Transport (Date): 04/13/25  ETA of Transport (Time): 1400         Additional Comments: CM advised patient needs transport home and would need a large vehicle to keep leg straight. CM requested transport for 1:50. RN advised

## 2025-04-13 NOTE — UTILIZATION REVIEW
Initial Clinical Review    Admission: Date/Time/Statement:   Admission Orders (From admission, onward)       Ordered        04/11/25 1356  Inpatient Admission  Once                          Orders Placed This Encounter   Procedures    Inpatient Admission     Standing Status:   Standing     Number of Occurrences:   1     Level of Care:   Med Surg [16]     Bed Type:   Trauma [7]     Estimated length of stay:   More than 2 Midnights     Certification:   I certify that inpatient services are medically necessary for this patient for a duration of greater than two midnights. See H&P and MD Progress Notes for additional information about the patient's course of treatment.     ED Arrival Information       Expected   -    Arrival   4/11/2025 10:10    Acuity   Emergent              Means of arrival   Walk-In    Escorted by   Friend    Service   Trauma    Admission type   Emergency              Arrival complaint   L leg lac             Chief Complaint   Patient presents with    Leg Pain     Pt fell 6-7 feet from working on a house through a crawl space. Pt hit head and takes aspirin.        Initial Presentation: 54 y.o. male ***    Anticipated Length of Stay/Certification Statement: ***    Date: ***   Day 2: ***    ED Treatment-Medication Administration from 04/11/2025 1010 to 04/11/2025 1452         Date/Time Order Dose Route Action     04/11/2025 1030 morphine injection 4 mg 4 mg Intravenous Given     04/11/2025 1137 lactated ringers bolus 1,000 mL 1,000 mL Intravenous New Bag     04/11/2025 1137 morphine injection 4 mg 4 mg Intravenous Given     04/11/2025 1337 tetanus-diphtheria-acellular pertussis (BOOSTRIX) IM injection 0.5 mL 0.5 mL Intramuscular Given     04/11/2025 1338 lidocaine-epinephrine (XYLOCAINE/EPINEPHRINE) 1 %-1:100,000 injection 1 mL 1 mL Infiltration Given by Other     04/11/2025 1432 HYDROmorphone HCl (DILAUDID) injection 0.2 mg 0.2 mg Intravenous Given            Scheduled Medications:  acetaminophen, 975  mg, Oral, Q8H JUAN  amLODIPine, 10 mg, Oral, Daily  aspirin, 81 mg, Oral, Daily  atorvastatin, 20 mg, Oral, Daily  enoxaparin, 30 mg, Subcutaneous, Q12H JUAN  escitalopram, 10 mg, Oral, Daily  folic acid, 1 mg, Oral, Daily  insulin glargine, 50 Units, Subcutaneous, HS  insulin lispro, 1-6 Units, Subcutaneous, TID AC  metFORMIN, 1,000 mg, Oral, BID With Meals  methocarbamol, 500 mg, Oral, Q6H JUAN  nicotine, 1 patch, Transdermal, Daily  pantoprazole, 20 mg, Oral, Early Morning  polyethylene glycol, 17 g, Oral, Daily  senna-docusate sodium, 1 tablet, Oral, HS  thiamine, 100 mg, Oral, Daily      Continuous IV Infusions:     PRN Meds:  HYDROmorphone, 0.2 mg, Intravenous, Q2H PRN  hydrOXYzine HCL, 25 mg, Oral, HS PRN  ondansetron, 4 mg, Intravenous, Q4H PRN  oxyCODONE, 5 mg, Oral, Q4H PRN   Or  oxyCODONE, 10 mg, Oral, Q4H PRN      ED Triage Vitals   Temperature Pulse Respirations Blood Pressure SpO2 Pain Score   04/11/25 1454 04/11/25 1015 04/11/25 1015 04/11/25 1015 04/11/25 1015 04/11/25 1030   97.9 °F (36.6 °C) 99 22 117/78 98 % 10 - Worst Possible Pain     Weight (last 2 days) before discharge       Date/Time Weight    04/11/25 10:20:49 111 (245.15)            Vital Signs (last 3 days) before discharge       Date/Time Temp Pulse Resp BP MAP (mmHg) SpO2 O2 Device Patient Position - Orthostatic VS Dat Coma Scale Score Pain    04/13/25 1324 -- -- -- -- -- -- -- -- -- 10 - Worst Possible Pain    04/13/25 1157 -- -- -- -- -- -- -- -- -- 10 - Worst Possible Pain    04/13/25 10:57:06 98.1 °F (36.7 °C) 92 16 120/83 95 93 % None (Room air) Lying -- --    04/13/25 0746 -- -- -- -- -- -- -- -- -- 10 - Worst Possible Pain    04/13/25 0745 -- -- -- -- -- -- None (Room air) -- 15 --    04/13/25 0707 97.9 °F (36.6 °C) 100 16 118/82 94 91 % None (Room air) Lying -- --    04/13/25 0520 -- -- -- -- -- -- -- -- -- 5    04/13/25 0430 -- -- -- -- -- -- -- -- 15 --    04/13/25 0322 -- -- -- -- -- -- -- -- -- 10 - Worst Possible Pain     04/13/25 02:47:54 98.1 °F (36.7 °C) 94 16 137/84 102 93 % -- -- -- --    04/13/25 0000 -- -- -- -- -- -- -- -- 15 --    04/12/25 23:27:39 98 °F (36.7 °C) 93 -- 114/70 85 92 % -- -- -- --    04/12/25 2208 98.3 °F (36.8 °C) -- 15 -- -- -- -- -- -- --    04/12/25 21:57:12 98.3 °F (36.8 °C) 97 16 128/89 102 93 % -- -- -- --    04/12/25 2157 -- -- -- -- -- -- -- -- -- 8    04/12/25 1946 -- -- -- -- -- 95 % None (Room air) -- 15 10 - Worst Possible Pain    04/12/25 1941 -- -- -- -- -- -- -- -- -- 10 - Worst Possible Pain    04/12/25 1900 98 °F (36.7 °C) -- 16 124/82 -- -- -- -- -- --    04/12/25 1504 -- -- -- -- -- -- -- -- -- 10 - Worst Possible Pain    04/12/25 14:54:35 97.8 °F (36.6 °C) 83 15 118/77 91 94 % -- -- -- --    04/12/25 1312 -- -- -- -- -- -- -- -- -- Med Not Given for Pain - for MAR use only    04/12/25 10:47:07 98.3 °F (36.8 °C) 92 14 109/79 89 90 % -- -- -- --    04/12/25 0923 -- -- -- -- -- -- -- -- -- No Pain    04/12/25 0800 -- -- -- 144/91 -- -- -- -- -- --    04/12/25 0757 -- -- -- -- -- -- None (Room air) -- 15 8    04/12/25 07:22:35 97.9 °F (36.6 °C) 87 17 144/91 109 97 % -- -- -- --    04/12/25 07:21:43 97.9 °F (36.6 °C) 89 16 144/91 109 96 % -- -- -- --    04/12/25 0507 -- -- -- -- -- -- -- -- -- 8 04/12/25 01:52:52 98.4 °F (36.9 °C) 100 -- 119/74 89 93 % -- -- -- --    04/12/25 0152 -- -- -- -- -- -- -- -- -- 10 - Worst Possible Pain    04/11/25 2235 -- -- -- -- -- -- -- -- -- 8 04/11/25 2217 99.4 °F (37.4 °C) 91 -- 129/74 92 88 % -- -- -- --    04/11/25 2118 -- -- -- -- -- -- -- -- -- 8 04/11/25 19:10:01 98.6 °F (37 °C) 94 -- 124/81 95 92 % -- -- -- --    04/11/25 1833 -- -- -- -- -- -- -- -- -- 10 - Worst Possible Pain    04/11/25 1525 -- -- -- -- -- -- None (Room air) -- 15 10 - Worst Possible Pain    04/11/25 14:54:36 97.9 °F (36.6 °C) 94 16 130/91 104 94 % -- -- -- --    04/11/25 1432 -- -- -- -- -- -- -- -- -- 5    04/11/25 1400 -- 97 18 142/81 -- 96 % -- -- 15 --    04/11/25 1300  -- 91 18 126/82 98 97 % -- -- 15 --    04/11/25 1200 -- 103 18 131/74 -- 98 % -- -- 15 --    04/11/25 1137 -- -- -- -- -- -- -- -- -- 9    04/11/25 1130 -- 97 18 141/96 114 99 % -- -- 15 --    04/11/25 1100 -- -- -- -- -- -- -- -- 15 --    04/11/25 1045 -- 102 18 118/71 -- 95 % -- -- 15 --    04/11/25 1030 -- 99 18 118/80 -- 93 % -- -- 15 10 - Worst Possible Pain    04/11/25 10:15:02 -- 99 22 117/78 -- 98 % None (Room air) -- 15 --    04/10/25 2000 -- -- -- -- -- -- -- -- 15 --              Pertinent Labs/Diagnostic Test Results:   Radiology:  CT lower extremity wo contrast left   ED Interpretation by Jaz Castle MD (04/11 4761)   FINDINGS:     OSSEOUS STRUCTURES: There is a comminuted intra-articular fracture of the proximal tibia. This predominantly involves the lateral tibial plateau with fracture lines extending to the lateral aspect of the lateral tibial plateau as well as to the lateral   tibial spine. No significant displacement. The fracture lines extend more medially and distally to involve the medial tibial metadiaphysis. There is a small 1.5 cm fracture fragment in this area displaced 3 mm anteromedially.     VISUALIZED MUSCULATURE:  Unremarkable.     SOFT TISSUES: There is anterior subcutaneous edema.     OTHER PERTINENT FINDINGS: Associated moderate lipohemarthrosis.     IMPRESSION:     Comminuted intra-articular fracture of the proximal tibia as described above. Associated moderate lipohemarthrosis.        Workstation performed: [id number]        Final Interpretation by Kaleb Barth MD (04/11 8433)      Comminuted intra-articular fracture of the proximal tibia as described above. Associated moderate lipohemarthrosis.         Workstation performed: [id number]         XR chest 1 view portable   ED Interpretation by Jaz Caslte MD (04/11 5077)   FINDINGS:     Clear lungs. No discernible pneumothorax or layering pleural effusion on limited supine imaging.     Normal cardiomediastinal  silhouette.     No displaced fractures. Degenerative changes of the spine.     Normal upper abdomen.     IMPRESSION:     No acute traumatic injury within limitations of supine imaging.     Follow-up upright imaging may be considered if there is high clinical index of suspicion for injury based on mechanism.     Workstation performed: [id number]        Final Interpretation by Db Berrios DO (04/11 1119)      No acute traumatic injury within limitations of supine imaging.      Follow-up upright imaging may be considered if there is high clinical index of suspicion for injury based on mechanism.      Workstation performed: [id number]         XR femur 2 views LEFT   ED Interpretation by Jaz Castle MD (04/11 1159)   FINDINGS:     No acute fracture of the left femur. Fat-fluid level noted in the suprapatellar recess in keeping with knee fracture which will be described in a separate report.     No significant degenerative changes.     No lytic or blastic osseous lesion.     Unremarkable soft tissues.     IMPRESSION:     Negative for left femoral fracture.     Left knee fracture will be described separately.        Computerized Assisted Algorithm (CAA) may have been used to analyze all applicable images.        Workstation performed: [id number]           Final Interpretation by Db Berrios DO (04/11 1124)      Negative for left femoral fracture.      Left knee fracture will be described separately.         Computerized Assisted Algorithm (CAA) may have been used to analyze all applicable images.         Workstation performed: [id number]         XR tibia fibula 2 views LEFT   ED Interpretation by Jaz Castle MD (04/11 5044)   FINDINGS:     Acute, nondisplaced fracture of the proximal tibia involving the medial metaphysis with fracture extension into the tibial eminences. The fibula and distal tibia appear intact.     Cortical irregularity lower pole patella also suspicious for nondisplaced  fracture.     Moderately large joint effusion (fat/fluid level) in the suprapatellar recess.     Tiny calcaneal spurs incidentally noted.     No lytic or blastic osseous lesion.     Prepatellar soft tissue swelling.     IMPRESSION:     Acute, nondisplaced fracture proximal tibia as above.     Probable nondisplaced fracture lower pole patella.     Moderately large lipohemarthrosis.        Computerized Assisted Algorithm (CAA) may have been used to analyze all applicable images.              Workstation performed: [id number]        Final Interpretation by Db Berrios DO (04/11 1129)      Acute, nondisplaced fracture proximal tibia as above.      Probable nondisplaced fracture lower pole patella.      Moderately large lipohemarthrosis.         Computerized Assisted Algorithm (CAA) may have been used to analyze all applicable images.               Workstation performed: [id number]         XR knee 4+ views left injury   ED Interpretation by Jaz Castle MD (04/11 6754)   FINDINGS:     Acute, nondisplaced fracture of the medial proximal tibial metaphysis extending into the lateral tibial eminence. Some fracture lucency involving the tibial metaphysis to the left of midline also likely.     Cortical irregularity lower pole patella suggesting nondisplaced fracture.     Moderately large lipohemarthrosis.     No significant degenerative changes.     No lytic or blastic osseous lesion.     Prepatellar soft tissue swelling.     IMPRESSION:     Acute, nondisplaced fractures of the proximal tibia and lower pole patella as above.     Moderately large lipohemarthrosis.        Computerized Assisted Algorithm (CAA) may have been used to analyze all applicable images.        Workstation performed: [id number]        Final Interpretation by Db Berrios DO (04/11 1132)      Acute, nondisplaced fractures of the proximal tibia and lower pole patella as above.      Moderately large lipohemarthrosis.          Computerized Assisted Algorithm (CAA) may have been used to analyze all applicable images.         Workstation performed: [id number]         XR pelvis ap only 1 or 2 vw   ED Interpretation by Jaz Castle MD (04/11 4054)   FINDINGS:     No acute fracture or dislocation.     No significant degenerative changes.     No lytic or blastic osseous lesion.     Phleboliths in the pelvis. Otherwise unremarkable soft tissues.     Unremarkable visualized lumbar spine.     IMPRESSION:     No acute osseous abnormality.        Computerized Assisted Algorithm (CAA) may have been used to analyze all applicable images.        Workstation performed: [id number]        Final Interpretation by Db Berrios DO (04/11 1121)      No acute osseous abnormality.         Computerized Assisted Algorithm (CAA) may have been used to analyze all applicable images.         Workstation performed: [id number]         CT head wo contrast   ED Interpretation by Jaz Castle MD (04/11 1103)   FINDINGS:     PARENCHYMA:No intracranial mass, mass effect or midline shift. No CT signs of acute infarction.  No acute parenchymal hemorrhage.     VENTRICLES AND EXTRA-AXIAL SPACES:  Normal for the patient's age.     VISUALIZED ORBITS:  Normal visualized orbits.     PARANASAL SINUSES:  Small retention cyst or polyp, sphenoid sinus. Partially opacified right mastoid air cells, similar to previous study.     CALVARIUM AND EXTRACRANIAL SOFT TISSUES:  No fracture     IMPRESSION:     No acute intracranial abnormality.                 Workstation performed: [id number]        Final Interpretation by Saleem Solis MD (04/11 1100)      No acute intracranial abnormality.                  Workstation performed: [id number]         CT spine cervical without contrast   ED Interpretation by Jaz Castle MD (04/11 1107)   FINDINGS:     ALIGNMENT: No spinal subluxation     VERTEBRAE:  No fracture.     DEGENERATIVE CHANGES: Mild multilevel cervical degenerative  changes are noted without critical central canal stenosis.     PREVERTEBRAL AND PARASPINAL SOFT TISSUES: Unremarkable     THORACIC INLET:  Normal.     IMPRESSION:     No cervical spine fracture or traumatic malalignment.                 Workstation performed: [id number]        Final Interpretation by Saleem Solis MD (04/11 1103)      No cervical spine fracture or traumatic malalignment.                  Workstation performed: [id number]           Cardiology:  No orders to display     GI:  No orders to display           Results from last 7 days   Lab Units 04/13/25  0834 04/12/25  0231 04/11/25  1523 04/11/25  1031   WBC Thousand/uL 5.22 8.61  --  6.90   HEMOGLOBIN g/dL 11.3* 12.3  --  12.5   HEMATOCRIT % 35.8* 38.6  --  40.1   PLATELETS Thousands/uL 78* 97* 94* 116*   TOTAL NEUT ABS Thousands/µL  --  5.15  --  3.69         Results from last 7 days   Lab Units 04/13/25  0834 04/12/25  0231 04/11/25  1311 04/11/25  1031   SODIUM mmol/L 131* 131* 131* 133*   POTASSIUM mmol/L 3.7 3.9 4.7 4.1   CHLORIDE mmol/L 102 99 98 99   CO2 mmol/L 21 23 20* 19*   ANION GAP mmol/L 8 9 13 15*   BUN mg/dL 9 9 6 6   CREATININE mg/dL 0.65 0.73 0.88 0.94   EGFR ml/min/1.73sq m 110 105 97 91   CALCIUM mg/dL 8.1* 8.6 8.5 8.6     Results from last 7 days   Lab Units 04/11/25  1031   AST U/L 35   ALT U/L 19   ALK PHOS U/L 171*   TOTAL PROTEIN g/dL 7.6   ALBUMIN g/dL 3.6   TOTAL BILIRUBIN mg/dL 0.77     Results from last 7 days   Lab Units 04/13/25  1058 04/13/25  0707 04/13/25  0620 04/12/25  2155 04/12/25  2001 04/12/25  1806 04/12/25  1604 04/12/25  1401 04/12/25  1210 04/12/25  1001 04/12/25  0807 04/12/25  0554   POC GLUCOSE mg/dl 188* 154* 157* 172* 234* 226* 135 156* 166* 157* 159* 166*     Results from last 7 days   Lab Units 04/13/25  0834 04/12/25  0231 04/11/25  1311 04/11/25  1031   GLUCOSE RANDOM mg/dL 192* 156* 340* 355*         Results from last 7 days   Lab Units 04/11/25  1031   HEMOGLOBIN A1C % 12.2*   EAG mg/dl 303      Beta- Hydroxybutyrate   Date Value Ref Range Status   04/11/2025 0.07 0.02 - 0.27 mmol/L Final          Results from last 7 days   Lab Units 04/11/25  1143   PH REBECCA  7.356   PCO2 REBECCA mm Hg 44.0   PO2 REBECCA mm Hg 30.8*   HCO3 REBECCA mmol/L 24.1   BASE EXC REBECCA mmol/L -1.6   O2 CONTENT REBECCA ml/dL 10.7   O2 HGB, VENOUS % 55.9*                     Results from last 7 days   Lab Units 04/11/25  1031   PROTIME seconds 15.4*   INR  1.15   PTT seconds 28                                             Results from last 7 days   Lab Units 04/11/25  1031   LIPASE u/L 30                 Results from last 7 days   Lab Units 04/12/25  0029   CLARITY UA  Clear   COLOR UA  Yellow   SPEC GRAV UA  1.028   PH UA  6.0   GLUCOSE UA mg/dl >=1000 (1%)*   KETONES UA mg/dl Negative   BLOOD UA  Negative   PROTEIN UA mg/dl Trace*   NITRITE UA  Negative   BILIRUBIN UA  Negative   UROBILINOGEN UA (BE) mg/dl <2.0   LEUKOCYTES UA  Elevated glucose may cause decreased leukocyte values. See urine microscopic for UWBC result*   WBC UA /hpf 1-2   RBC UA /hpf 1-2   BACTERIA UA /hpf None Seen   EPITHELIAL CELLS WET PREP /hpf None Seen   MUCUS THREADS  Occasional*                                                   Past Medical History:   Diagnosis Date    Anxiety     Back pain     Chronic right-sided low back pain without sciatica 3/31/2018    Depression     Gastroesophageal reflux disease 3/31/2018    GERD (gastroesophageal reflux disease)     Right foot pain      Present on Admission:   Hyperglycemia due to diabetes mellitus ([facility])   Fall   Knee laceration, left, initial encounter   Fracture of left tibia      Admitting Diagnosis: Leg pain [M79.606]  Hyperglycemia [R73.9]  Closed nondisplaced transverse fracture of left patella, initial encounter [S82.035A]  At risk for acid-base imbalance [Z91.89]  Diabetic ketoacidosis without coma associated with diabetes mellitus due to underlying condition ([facility]) [E08.10]  Closed fracture of left tibial plateau, initial  encounter [U55.488O]  Age/Sex: 54 y.o. male    Network Utilization Review Department  ATTENTION: Please call with any questions or concerns to [phone] and carefully listen to the prompts so that you are directed to the right person. All voicemails are confidential.   For Discharge needs, contact Care Management DC Support Team at [phone] opt. [phone]  Send all requests for admission clinical reviews, approved or denied determinations and any other requests to dedicated fax number below belonging to the campus where the patient is receiving treatment. List of dedicated fax numbers for the Facilities:  FACILITY NAME UR FAX NUMBER   ADMISSION DENIALS (Administrative/Medical Necessity) [phone]   DISCHARGE SUPPORT TEAM (NETWORK) [phone]   PARENT CHILD HEALTH (Maternity/NICU/Pediatrics) [phone]   [facility] [phone]   [facility] [phone]   [facility] [phone]   General acute hospital [phone]   [facility] [phone]   [facility] [phone]   [facility] [phone]   [facility] [phone]   [facility] [phone]   [facility] [phone]   [facility] [phone]   [facility] [phone]

## 2025-04-13 NOTE — PLAN OF CARE
Problem: PAIN - ADULT  Goal: Verbalizes/displays adequate comfort level or baseline comfort level  Description: Interventions:- Encourage patient to monitor pain and request assistance- Assess pain using appropriate pain scale- Administer analgesics based on type and severity of pain and evaluate response- Implement non-pharmacological measures as appropriate and evaluate response- Consider cultural and social influences on pain and pain management- Notify physician/advanced practitioner if interventions unsuccessful or patient reports new pain  Outcome: Progressing     Problem: INFECTION - ADULT  Goal: Absence or prevention of progression during hospitalization  Description: INTERVENTIONS:- Assess and monitor for signs and symptoms of infection- Monitor lab/diagnostic results- Monitor all insertion sites, i.e. indwelling lines, tubes, and drains- Monitor endotracheal if appropriate and nasal secretions for changes in amount and color- [place] appropriate cooling/warming therapies per order- Administer medications as ordered- Instruct and encourage patient and family to use good hand hygiene technique- Identify and instruct in appropriate isolation precautions for identified infection/condition  Outcome: Progressing  Goal: Absence of fever/infection during neutropenic period  Description: INTERVENTIONS:- Monitor WBC  Outcome: Progressing     Problem: SAFETY ADULT  Goal: Patient will remain free of falls  Description: INTERVENTIONS:- Educate patient/family on patient safety including physical limitations- Instruct patient to call for assistance with activity - Consult OT/PT to assist with strengthening/mobility - Keep Call bell within reach- Keep bed low and locked with side rails adjusted as appropriate- Keep care items and personal belongings within reach- Initiate and maintain comfort rounds- Make Fall Risk Sign visible to staff- Offer Toileting every  Hours, in advance of need- Initiate/Maintain alarm- Obtain  necessary fall risk management equipment: - Apply yellow socks and bracelet for high fall risk patients- Consider moving patient to room near nurses station  Outcome: Progressing  Goal: Maintain or return to baseline ADL function  Description: INTERVENTIONS:-  Assess patient's ability to carry out ADLs; assess patient's baseline for ADL function and identify physical deficits which impact ability to perform ADLs (bathing, care of mouth/teeth, toileting, grooming, dressing, etc.)- Assess/evaluate cause of self-care deficits - Assess range of motion- Assess patient's mobility; develop plan if impaired- Assess patient's need for assistive devices and provide as appropriate- Encourage maximum independence but intervene and supervise when necessary- Involve family in performance of ADLs- Assess for home care needs following discharge - Consider OT consult to assist with ADL evaluation and planning for discharge- Provide patient education as appropriate  Outcome: Progressing  Goal: Maintains/Returns to pre admission functional level  Description: INTERVENTIONS:- Perform AM-PAC 6 Click Basic Mobility/ Daily Activity assessment daily.- Set and communicate daily mobility goal to care team and patient/family/caregiver. - Collaborate with rehabilitation services on mobility goals if consulted- Perform Range of Motion  times a day.- Reposition patient every  hours.- Dangle patient times a day- Stand patient  times a day- Ambulate patient  times a day- Out of bed to chair  times a day - Out of bed for meals  times a day- Out of bed for toileting- Record patient progress and toleration of activity level   Outcome: Progressing     Problem: DISCHARGE PLANNING  Goal: Discharge to home or other facility with appropriate resources  Description: INTERVENTIONS:- Identify barriers to discharge w/patient and caregiver- Arrange for needed discharge resources and transportation as appropriate- Identify discharge learning needs (meds, wound  care, etc.)- Arrange for interpretive services to assist at discharge as needed- Refer to Case Management Department for coordinating discharge planning if the patient needs post-hospital services based on physician/advanced practitioner order or complex needs related to functional status, cognitive ability, or social support system  Outcome: Progressing     Problem: Knowledge Deficit  Goal: Patient/family/caregiver demonstrates understanding of disease process, treatment plan, medications, and discharge instructions  Description: Complete learning assessment and assess knowledge base.Interventions:- Provide teaching at level of understanding- Provide teaching via preferred learning methods  Outcome: Progressing     Problem: Nutrition/Hydration-ADULT  Goal: Nutrient/Hydration intake appropriate for improving, restoring or maintaining nutritional needs  Description: Monitor and assess patient's nutrition/hydration status for malnutrition. Collaborate with interdisciplinary team and initiate plan and interventions as ordered.  Monitor patient's weight and dietary intake as ordered or per policy. Utilize nutrition screening tool and intervene as necessary. Determine patient's food preferences and provide high-protein, high-caloric foods as appropriate. INTERVENTIONS:- Monitor oral intake, urinary output, labs, and treatment plans- Assess nutrition and hydration status and recommend course of action- Evaluate amount of meals eaten- Assist patient with eating if necessary - Allow adequate time for meals- Recommend/ encourage appropriate diets, oral nutritional supplements, and vitamin/mineral supplements- Order, calculate, and assess calorie counts as needed- Recommend, monitor, and adjust tube feedings and TPN/PPN based on assessed needs- Assess need for intravenous fluids- Provide specific nutrition/hydration education as appropriate- Include patient/family/caregiver in decisions related to nutrition  Outcome:  Progressing

## 2025-04-14 ENCOUNTER — TELEPHONE (OUTPATIENT)
Dept: CASE MANAGEMENT | Facility: HOSPITAL | Age: 55
End: 2025-04-14

## 2025-04-14 ENCOUNTER — TELEPHONE (OUTPATIENT)
Dept: OBGYN CLINIC | Facility: CLINIC | Age: 55
End: 2025-04-14

## 2025-04-14 ENCOUNTER — ANESTHESIA EVENT (OUTPATIENT)
Dept: PERIOP | Facility: HOSPITAL | Age: 55
DRG: 313 | End: 2025-04-14
Payer: COMMERCIAL

## 2025-04-14 NOTE — TELEPHONE ENCOUNTER
CC received called from [facility] explaining that after one of their discharge follow up calls to patient, they learned that patient does not have a way to the hospital for his scheduled surgery. Surgery was moved to later start time to help with transportation needs. CC informed through chats that patient would need to be present at Surgical waiting room by 10:30am.     CC then contacted [facility] and scheduled transport through [facility]. CC informed that only transport that would fit patient's current needs would be a stretcher van and NOT a BLS. CC placed order for stretcher van for tomorrow at 10am so that patient could be at hospital by 10:30am. CC also confirmed for transport that there are 4 steps leading into the home.    CC then contacted patient and informed him of transport being available to him for his current state. Patient is aware that there will be about a $300 bill for the stretcher van transport. Patient stated that there is no other way since he is in a lot of pain and can not walk and agreed to bill. CC awaiting confirmed transportation time leaving patient's home and coming to [facility].

## 2025-04-14 NOTE — UTILIZATION REVIEW
NOTIFICATION OF INPATIENT ADMISSION   AUTHORIZATION REQUEST   SERVICING FACILITY:   [place] [address]  Tax ID: [id number]  NPI: [id number]   ATTENDING PROVIDER:  Attending Name and NPI#: Solange Campbell Do [[id number]]  Address: [address] [place] [address]  Phone: [phone]     ADMISSION INFORMATION:  Place of Service: Inpatient Acute [facility]  Place of Service Code: 21  Inpatient Admission Date/Time: 4/11/25  1:56 PM  Discharge Date/Time: 4/13/2025  2:00 PM  Admitting Diagnosis Code/Description:  Leg pain [M79.606]  Hyperglycemia [R73.9]  Closed nondisplaced transverse fracture of left patella, initial encounter [S82.035A]  At risk for acid-base imbalance [Z91.89]  Diabetic ketoacidosis without coma associated with diabetes mellitus due to underlying condition (HCC) [E08.10]  Closed fracture of left tibial plateau, initial encounter [S82.142A]     UTILIZATION REVIEW CONTACT:  Janey Mueller Utilization   Network Utilization Review Department  Phone: [phone]  Fax: [phone]  Email: [email or web address]  Contact for approvals/pending authorizations, clinical reviews, and discharge.     PHYSICIAN ADVISORY SERVICES:  Medical Necessity Denial & Beqk-xa-Ixet Review  Phone: [phone]  Fax: [phone]  Email: [email or web address]     DISCHARGE SUPPORT TEAM:  For Patients Discharge Needs & Updates  Phone: [phone] opt. [phone] Fax: [phone]  Email: [email or web address]

## 2025-04-14 NOTE — TELEPHONE ENCOUNTER
Caller: Patient    Doctor: Dr. Bledsoe    Reason for call: Patient called has questions regarding surgery scheduled tomorrow 4/15, requested a call back to discuss. Please advise.    Call back#: [phone]

## 2025-04-15 ENCOUNTER — HOSPITAL ENCOUNTER (INPATIENT)
Facility: HOSPITAL | Age: 55
LOS: 1 days | Discharge: HOME | DRG: 313 | End: 2025-04-15
Attending: STUDENT IN AN ORGANIZED HEALTH CARE EDUCATION/TRAINING PROGRAM | Admitting: STUDENT IN AN ORGANIZED HEALTH CARE EDUCATION/TRAINING PROGRAM
Payer: COMMERCIAL

## 2025-04-15 ENCOUNTER — APPOINTMENT (OUTPATIENT)
Dept: RADIOLOGY | Facility: HOSPITAL | Age: 55
DRG: 313 | End: 2025-04-15
Payer: COMMERCIAL

## 2025-04-15 ENCOUNTER — ANESTHESIA (OUTPATIENT)
Dept: PERIOP | Facility: HOSPITAL | Age: 55
DRG: 313 | End: 2025-04-15
Payer: COMMERCIAL

## 2025-04-15 VITALS
DIASTOLIC BLOOD PRESSURE: 75 MMHG | SYSTOLIC BLOOD PRESSURE: 122 MMHG | RESPIRATION RATE: 18 BRPM | OXYGEN SATURATION: 98 % | TEMPERATURE: 98.5 F | HEART RATE: 104 BPM

## 2025-04-15 DIAGNOSIS — E11.65 HYPERGLYCEMIA DUE TO DIABETES MELLITUS (HCC): ICD-10-CM

## 2025-04-15 DIAGNOSIS — S82.102D CLOSED FRACTURE OF PROXIMAL END OF LEFT TIBIA WITH ROUTINE HEALING, UNSPECIFIED FRACTURE MORPHOLOGY, SUBSEQUENT ENCOUNTER: Primary | ICD-10-CM

## 2025-04-15 LAB — GLUCOSE SERPL-MCNC: 197 MG/DL (ref 65–140)

## 2025-04-15 PROCEDURE — 82948 REAGENT STRIP/BLOOD GLUCOSE: CPT

## 2025-04-15 PROCEDURE — C1713 ANCHOR/SCREW BN/BN,TIS/BN: HCPCS | Performed by: STUDENT IN AN ORGANIZED HEALTH CARE EDUCATION/TRAINING PROGRAM

## 2025-04-15 PROCEDURE — 73560 X-RAY EXAM OF KNEE 1 OR 2: CPT

## 2025-04-15 PROCEDURE — 2W3RX3Z IMMOBILIZATION OF LEFT LOWER LEG USING BRACE: ICD-10-PCS | Performed by: ORTHOPAEDIC SURGERY

## 2025-04-15 PROCEDURE — 27536 TREAT KNEE FRACTURE: CPT | Performed by: STUDENT IN AN ORGANIZED HEALTH CARE EDUCATION/TRAINING PROGRAM

## 2025-04-15 PROCEDURE — 0QSH04Z REPOSITION LEFT TIBIA WITH INTERNAL FIXATION DEVICE, OPEN APPROACH: ICD-10-PCS | Performed by: STUDENT IN AN ORGANIZED HEALTH CARE EDUCATION/TRAINING PROGRAM

## 2025-04-15 DEVICE — 3.5MM VARIABLE ANGLE LOCKING SCREW/SLF-TPNG/STRDRV/80MM: Type: IMPLANTABLE DEVICE | Site: KNEE | Status: FUNCTIONAL

## 2025-04-15 DEVICE — 3.5MM CORTEX SCREW SELF-TAPPING 38MM: Type: IMPLANTABLE DEVICE | Site: KNEE | Status: FUNCTIONAL

## 2025-04-15 DEVICE — 3.5MM VARIABLE ANGLE LOCKING SCREW/SLF-TPNG/STRDRV/60MM: Type: IMPLANTABLE DEVICE | Site: KNEE | Status: FUNCTIONAL

## 2025-04-15 DEVICE — 3.5MM VARIABLE ANGLE LOCKING SCREW/SLF-TPNG/STRDRV/85MM: Type: IMPLANTABLE DEVICE | Site: KNEE | Status: FUNCTIONAL

## 2025-04-15 DEVICE — 3.5MM VARIABLE ANGLE LOCKING SCREW/SLF-TPNG/STRDRV/70MM: Type: IMPLANTABLE DEVICE | Site: KNEE | Status: FUNCTIONAL

## 2025-04-15 DEVICE — 3.5MM CORTEX SCREW SELF-TAPPING 34MM: Type: IMPLANTABLE DEVICE | Site: KNEE | Status: FUNCTIONAL

## 2025-04-15 DEVICE — 3.5MM VA-LCP PROX TIBIA PLATE LARGE BEND/8H/147MM/LEFT
Type: IMPLANTABLE DEVICE | Site: KNEE | Status: FUNCTIONAL
Brand: VA-LCP

## 2025-04-15 DEVICE — 3.5MM CORTEX SCREW SELF-TAPPING 32MM: Type: IMPLANTABLE DEVICE | Site: KNEE | Status: FUNCTIONAL

## 2025-04-15 RX ORDER — MAGNESIUM HYDROXIDE 1200 MG/15ML
LIQUID ORAL AS NEEDED
Status: DISCONTINUED | OUTPATIENT
Start: 2025-04-15 | End: 2025-04-15 | Stop reason: HOSPADM

## 2025-04-15 RX ORDER — DEXAMETHASONE SODIUM PHOSPHATE 10 MG/ML
INJECTION, SOLUTION INTRAMUSCULAR; INTRAVENOUS AS NEEDED
Status: DISCONTINUED | OUTPATIENT
Start: 2025-04-15 | End: 2025-04-15

## 2025-04-15 RX ORDER — FENTANYL CITRATE 50 UG/ML
INJECTION, SOLUTION INTRAMUSCULAR; INTRAVENOUS AS NEEDED
Status: DISCONTINUED | OUTPATIENT
Start: 2025-04-15 | End: 2025-04-15

## 2025-04-15 RX ORDER — SODIUM CHLORIDE, SODIUM LACTATE, POTASSIUM CHLORIDE, CALCIUM CHLORIDE 600; 310; 30; 20 MG/100ML; MG/100ML; MG/100ML; MG/100ML
INJECTION, SOLUTION INTRAVENOUS CONTINUOUS PRN
Status: DISCONTINUED | OUTPATIENT
Start: 2025-04-15 | End: 2025-04-15

## 2025-04-15 RX ORDER — GLYCOPYRROLATE 0.2 MG/ML
INJECTION INTRAMUSCULAR; INTRAVENOUS AS NEEDED
Status: DISCONTINUED | OUTPATIENT
Start: 2025-04-15 | End: 2025-04-15

## 2025-04-15 RX ORDER — PROPOFOL 10 MG/ML
INJECTION, EMULSION INTRAVENOUS AS NEEDED
Status: DISCONTINUED | OUTPATIENT
Start: 2025-04-15 | End: 2025-04-15

## 2025-04-15 RX ORDER — VANCOMYCIN HYDROCHLORIDE 1 G/20ML
INJECTION, POWDER, LYOPHILIZED, FOR SOLUTION INTRAVENOUS AS NEEDED
Status: DISCONTINUED | OUTPATIENT
Start: 2025-04-15 | End: 2025-04-15 | Stop reason: HOSPADM

## 2025-04-15 RX ORDER — METOCLOPRAMIDE HYDROCHLORIDE 5 MG/ML
10 INJECTION INTRAMUSCULAR; INTRAVENOUS ONCE AS NEEDED
Status: DISCONTINUED | OUTPATIENT
Start: 2025-04-15 | End: 2025-04-15 | Stop reason: HOSPADM

## 2025-04-15 RX ORDER — KETAMINE HCL IN NACL, ISO-OSM 100MG/10ML
SYRINGE (ML) INJECTION AS NEEDED
Status: DISCONTINUED | OUTPATIENT
Start: 2025-04-15 | End: 2025-04-15

## 2025-04-15 RX ORDER — NEOSTIGMINE METHYLSULFATE 1 MG/ML
INJECTION INTRAVENOUS AS NEEDED
Status: DISCONTINUED | OUTPATIENT
Start: 2025-04-15 | End: 2025-04-15

## 2025-04-15 RX ORDER — OXYCODONE HYDROCHLORIDE 5 MG/1
5 TABLET ORAL EVERY 4 HOURS PRN
Qty: 15 TABLET | Refills: 0 | Status: SHIPPED | OUTPATIENT
Start: 2025-04-15 | End: 2025-04-23 | Stop reason: SDUPTHER

## 2025-04-15 RX ORDER — ONDANSETRON 2 MG/ML
4 INJECTION INTRAMUSCULAR; INTRAVENOUS ONCE AS NEEDED
Status: DISCONTINUED | OUTPATIENT
Start: 2025-04-15 | End: 2025-04-15 | Stop reason: HOSPADM

## 2025-04-15 RX ORDER — MIDAZOLAM HYDROCHLORIDE 2 MG/2ML
INJECTION, SOLUTION INTRAMUSCULAR; INTRAVENOUS AS NEEDED
Status: DISCONTINUED | OUTPATIENT
Start: 2025-04-15 | End: 2025-04-15

## 2025-04-15 RX ORDER — LIDOCAINE HYDROCHLORIDE 10 MG/ML
INJECTION, SOLUTION EPIDURAL; INFILTRATION; INTRACAUDAL; PERINEURAL AS NEEDED
Status: DISCONTINUED | OUTPATIENT
Start: 2025-04-15 | End: 2025-04-15

## 2025-04-15 RX ORDER — CHLORHEXIDINE GLUCONATE ORAL RINSE 1.2 MG/ML
15 SOLUTION DENTAL EVERY 12 HOURS SCHEDULED
Status: DISCONTINUED | OUTPATIENT
Start: 2025-04-15 | End: 2025-04-15 | Stop reason: HOSPADM

## 2025-04-15 RX ORDER — ALBUTEROL SULFATE 0.83 MG/ML
2.5 SOLUTION RESPIRATORY (INHALATION) ONCE AS NEEDED
Status: DISCONTINUED | OUTPATIENT
Start: 2025-04-15 | End: 2025-04-15 | Stop reason: HOSPADM

## 2025-04-15 RX ORDER — FENTANYL CITRATE/PF 50 MCG/ML
50 SYRINGE (ML) INJECTION
Status: COMPLETED | OUTPATIENT
Start: 2025-04-15 | End: 2025-04-15

## 2025-04-15 RX ORDER — ASPIRIN 81 MG/1
81 TABLET ORAL 2 TIMES DAILY
Qty: 84 TABLET | Refills: 0 | Status: SHIPPED | OUTPATIENT
Start: 2025-04-15 | End: 2025-05-27

## 2025-04-15 RX ORDER — HYDROMORPHONE HCL/PF 1 MG/ML
SYRINGE (ML) INJECTION AS NEEDED
Status: DISCONTINUED | OUTPATIENT
Start: 2025-04-15 | End: 2025-04-15

## 2025-04-15 RX ORDER — ONDANSETRON 2 MG/ML
INJECTION INTRAMUSCULAR; INTRAVENOUS AS NEEDED
Status: DISCONTINUED | OUTPATIENT
Start: 2025-04-15 | End: 2025-04-15

## 2025-04-15 RX ORDER — ROCURONIUM BROMIDE 10 MG/ML
INJECTION, SOLUTION INTRAVENOUS AS NEEDED
Status: DISCONTINUED | OUTPATIENT
Start: 2025-04-15 | End: 2025-04-15

## 2025-04-15 RX ORDER — OXYCODONE HYDROCHLORIDE 5 MG/1
5 TABLET ORAL EVERY 4 HOURS PRN
Refills: 0 | Status: DISCONTINUED | OUTPATIENT
Start: 2025-04-15 | End: 2025-04-15 | Stop reason: HOSPADM

## 2025-04-15 RX ORDER — CEFAZOLIN SODIUM 2 G/50ML
2000 SOLUTION INTRAVENOUS
Status: COMPLETED | OUTPATIENT
Start: 2025-04-15 | End: 2025-04-15

## 2025-04-15 RX ORDER — CEPHALEXIN 500 MG/1
500 CAPSULE ORAL EVERY 12 HOURS SCHEDULED
Qty: 10 CAPSULE | Refills: 0 | Status: SHIPPED | OUTPATIENT
Start: 2025-04-15 | End: 2025-04-20

## 2025-04-15 RX ORDER — HYDROMORPHONE HCL/PF 1 MG/ML
0.5 SYRINGE (ML) INJECTION
Status: COMPLETED | OUTPATIENT
Start: 2025-04-15 | End: 2025-04-15

## 2025-04-15 RX ADMIN — HYDROMORPHONE HYDROCHLORIDE 0.5 MG: 1 INJECTION, SOLUTION INTRAMUSCULAR; INTRAVENOUS; SUBCUTANEOUS at 14:56

## 2025-04-15 RX ADMIN — ROCURONIUM 10 MG: 50 INJECTION, SOLUTION INTRAVENOUS at 12:41

## 2025-04-15 RX ADMIN — SUGAMMADEX 200 MG: 100 INJECTION, SOLUTION INTRAVENOUS at 13:57

## 2025-04-15 RX ADMIN — ONDANSETRON 4 MG: 2 INJECTION INTRAMUSCULAR; INTRAVENOUS at 13:14

## 2025-04-15 RX ADMIN — SODIUM CHLORIDE, SODIUM LACTATE, POTASSIUM CHLORIDE, AND CALCIUM CHLORIDE: .6; .31; .03; .02 INJECTION, SOLUTION INTRAVENOUS at 12:40

## 2025-04-15 RX ADMIN — DEXAMETHASONE SODIUM PHOSPHATE 10 MG: 10 INJECTION INTRAMUSCULAR; INTRAVENOUS at 12:55

## 2025-04-15 RX ADMIN — PROPOFOL 30 MG: 10 INJECTION, EMULSION INTRAVENOUS at 13:38

## 2025-04-15 RX ADMIN — NEOSTIGMINE METHYLSULFATE 3 MG: 1 INJECTION INTRAVENOUS at 13:40

## 2025-04-15 RX ADMIN — CEFAZOLIN SODIUM 2000 MG: 2 SOLUTION INTRAVENOUS at 11:57

## 2025-04-15 RX ADMIN — PROPOFOL 50 MG: 10 INJECTION, EMULSION INTRAVENOUS at 11:55

## 2025-04-15 RX ADMIN — SODIUM CHLORIDE, SODIUM LACTATE, POTASSIUM CHLORIDE, AND CALCIUM CHLORIDE: .6; .31; .03; .02 INJECTION, SOLUTION INTRAVENOUS at 11:50

## 2025-04-15 RX ADMIN — FENTANYL CITRATE 50 MCG: 50 INJECTION INTRAMUSCULAR; INTRAVENOUS at 14:41

## 2025-04-15 RX ADMIN — PROPOFOL 150 MG: 10 INJECTION, EMULSION INTRAVENOUS at 11:54

## 2025-04-15 RX ADMIN — MIDAZOLAM 1 MG: 1 INJECTION INTRAMUSCULAR; INTRAVENOUS at 11:50

## 2025-04-15 RX ADMIN — GLYCOPYRROLATE 0.4 MG: 0.2 INJECTION INTRAMUSCULAR; INTRAVENOUS at 13:40

## 2025-04-15 RX ADMIN — CHLORHEXIDINE GLUCONATE 15 ML: 1.2 SOLUTION ORAL at 11:03

## 2025-04-15 RX ADMIN — PROPOFOL 50 MG: 10 INJECTION, EMULSION INTRAVENOUS at 13:43

## 2025-04-15 RX ADMIN — ROCURONIUM 50 MG: 50 INJECTION, SOLUTION INTRAVENOUS at 11:55

## 2025-04-15 RX ADMIN — ROCURONIUM 20 MG: 50 INJECTION, SOLUTION INTRAVENOUS at 12:03

## 2025-04-15 RX ADMIN — HYDROMORPHONE HYDROCHLORIDE 0.5 MG: 1 INJECTION, SOLUTION INTRAMUSCULAR; INTRAVENOUS; SUBCUTANEOUS at 12:24

## 2025-04-15 RX ADMIN — LIDOCAINE HYDROCHLORIDE 50 MG: 10 INJECTION, SOLUTION EPIDURAL; INFILTRATION; INTRACAUDAL at 11:54

## 2025-04-15 RX ADMIN — FENTANYL CITRATE 50 MCG: 50 INJECTION INTRAMUSCULAR; INTRAVENOUS at 14:36

## 2025-04-15 RX ADMIN — HYDROMORPHONE HYDROCHLORIDE 0.5 MG: 1 INJECTION, SOLUTION INTRAMUSCULAR; INTRAVENOUS; SUBCUTANEOUS at 12:14

## 2025-04-15 RX ADMIN — Medication 25 MG: at 12:29

## 2025-04-15 RX ADMIN — HYDROMORPHONE HYDROCHLORIDE 0.5 MG: 1 INJECTION, SOLUTION INTRAMUSCULAR; INTRAVENOUS; SUBCUTANEOUS at 15:11

## 2025-04-15 RX ADMIN — MIDAZOLAM 1 MG: 1 INJECTION INTRAMUSCULAR; INTRAVENOUS at 11:52

## 2025-04-15 RX ADMIN — Medication 25 MG: at 12:26

## 2025-04-15 RX ADMIN — FENTANYL CITRATE 100 MCG: 50 INJECTION INTRAMUSCULAR; INTRAVENOUS at 11:54

## 2025-04-15 RX ADMIN — OXYCODONE HYDROCHLORIDE 5 MG: 5 TABLET ORAL at 16:06

## 2025-04-15 NOTE — UTILIZATION REVIEW
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  Patient CT and x-ray images demonstrated a left bicondylar tibial plateau with impaction of the anterior medial cortex and fracturing the lateral tibial plateau.  There was change in the patient's articular slope but no large areas of articular impaction.  Noted to restore stability to the knee and allow for early range of motion the patient was consented for surgical fixation  Implants: Synthes 8 hole large been proximal tibial locking plate  Operative Findings:   X-rays were taken of the contralateral lower extremity to visualize the patient's native tibial slope.  This confirmed hyperextension injury with loss of tibial inclination.  Given the patient's smoking history, diabetes, alcohol use the patient tibial plateau hyperextension injury that is able to be reduced in a minimally invasive fashion.  An anterior femoral distractor was used to correct the patient's articular slope and the proximal tibial angle once this was done a laterally based incision was made and a proximal tibial locking plate was utilized with locking screws to secure the medial condyle into position from the lateral side and percutaneously fixated distally.  Once this was performed the distractor was able to be removed and the proximal tibia angle was corrected to match the patient's contralateral side and stable.     Admission  Orders: Date/Time/Statement:   Admission Orders (From admission, onward)       Ordered        04/15/25 1539  INPATIENT ADMISSION  Once                     Orders Placed This Encounter   Procedures    INPATIENT ADMISSION     Standing Status:   Standing     Number of Occurrences:   1     Level of Care:   Med Surg [16]     Estimated length of stay:   More than 2 Midnights     Certification:   I certify that inpatient services are medically necessary for this patient for a duration of greater than two midnights. See H&P and MD Progress Notes for additional information about the patient's course of treatment.     Diet: as tolertaed  Mobility: OOB as tolerated  DVT Prophylaxis: OOB; SCD    Scheduled Medications:  chlorhexidine, 15 mL, Swish & Spit, Q12H JUAN    Continuous IV Infusions:  IVF lactated ringers infusion     Ordering User: Blake Genao CRNA Ordering Date/Time: Tue Apr 15, 2025 1212   Ordering Provider: Blake Genao CRNA Authorizing Provider: Blake Genao CRNA     PRN Meds:  albuterol, 2.5 mg, Nebulization, Once PRN  metoclopramide, 10 mg, Intravenous, Once PRN  ondansetron, 4 mg, Intravenous, Once PRN  oxyCODONE, 5 mg, Oral, Q4H PRN - 4/15 X 1    Vital Signs (last 3 days)                  Date/Time Temp Pulse Resp BP MAP (mmHg) SpO2 O2 Flow Rate (L/min) O2 Device Cardiac (WDL)   04/15/25 1541 98.5 °F (36.9 °C) 104 18 122/75 -- 98 % -- None (Room air) --   04/15/25 1530 98.5 °F (36.9 °C) 102 20 124/69 89 97 % -- -- WDL   04/15/25 1515 98 °F (36.7 °C) 102 21 109/60 79 96 % 2 L/min Nasal cannula WDL   04/15/25 1511 -- 100 20 109/60 79 95 % -- -- --   04/15/25 1500 98 °F (36.7 °C) 102 20 109/60 79 92 % 2 L/min Nasal cannula WDL   04/15/25 1456 -- 102 20 108/64 81 96 % -- -- --   04/15/25 1445 98 °F (36.7 °C) 94 20 120/61 84 91 % 2 L/min Nasal cannula WDL   04/15/25 1430 -- 106 Abnormal  17 137/70 98 93 % 2 L/min Simple mask --   04/15/25 1415 -- 110 Abnormal  20 138/65 92 92 % 2 L/min Simple mask --   04/15/25 1407 97.4 °F  (36.3 °C) Abnormal  106 Abnormal  16 140/64 -- 95 % 10 L/min Simple mask X   04/15/25 1047 97.3 °F (36.3 °C) Abnormal  94 16 131/79 -- 96 % -- None (Room air)      Pertinent Labs/Diagnostic Test Results:   Results from last 7 days   Lab Units 04/13/25  0834 04/12/25  0231 04/11/25  1523 04/11/25  1031   WBC Thousand/uL 5.22 8.61  --  6.90   HEMOGLOBIN g/dL 11.3* 12.3  --  12.5   HEMATOCRIT % 35.8* 38.6  --  40.1   PLATELETS Thousands/uL 78* 97* 94* 116*   TOTAL NEUT ABS Thousands/µL  --  5.15  --  3.69       Results from last 7 days   Lab Units 04/13/25  0834 04/12/25  0231 04/11/25  1311 04/11/25  1031   SODIUM mmol/L 131* 131* 131* 133*   POTASSIUM mmol/L 3.7 3.9 4.7 4.1   CHLORIDE mmol/L 102 99 98 99   CO2 mmol/L 21 23 20* 19*   ANION GAP mmol/L 8 9 13 15*   BUN mg/dL 9 9 6 6   CREATININE mg/dL 0.65 0.73 0.88 0.94   EGFR ml/min/1.73sq m 110 105 97 91   CALCIUM mg/dL 8.1* 8.6 8.5 8.6     Results from last 7 days   Lab Units 04/11/25  1031   AST U/L 35   ALT U/L 19   ALK PHOS U/L 171*   TOTAL PROTEIN g/dL 7.6   ALBUMIN g/dL 3.6   TOTAL BILIRUBIN mg/dL 0.77     Results from last 7 days   Lab Units 04/15/25  1102 04/13/25  1058 04/13/25  0707 04/13/25  0620 04/12/25  2155 04/12/25  2001 04/12/25  1806 04/12/25  1604 04/12/25  1401 04/12/25  1210 04/12/25  1001 04/12/25  0807   POC GLUCOSE mg/dl 197* 188* 154* 157* 172* 234* 226* 135 156* 166* 157* 159*     Results from last 7 days   Lab Units 04/13/25  0834 04/12/25  0231 04/11/25  1311 04/11/25  1031   GLUCOSE RANDOM mg/dL 192* 156* 340* 355*     Results from last 7 days   Lab Units 04/11/25  1031   HEMOGLOBIN A1C % 12.2*   EAG mg/dl 303       Results from last 7 days   Lab Units 04/11/25  1143   PH REBECCA  7.356   PCO2 REBECCA mm Hg 44.0   PO2 REBECCA mm Hg 30.8*   HCO3 REBECCA mmol/L 24.1   BASE EXC REBECCA mmol/L -1.6   O2 CONTENT REBECCA ml/dL 10.7   O2 HGB, VENOUS % 55.9*     Results from last 7 days   Lab Units 04/11/25  1031   PROTIME seconds 15.4*   INR  1.15   PTT seconds 28      Results from last 7 days   Lab Units 04/11/25  1031   LIPASE u/L 30     Results from last 7 days   Lab Units 04/12/25  0029   CLARITY UA  Clear   COLOR UA  Yellow   SPEC GRAV UA  1.028   PH UA  6.0   GLUCOSE UA mg/dl >=1000 (1%)*   KETONES UA mg/dl Negative   BLOOD UA  Negative   PROTEIN UA mg/dl Trace*   NITRITE UA  Negative   BILIRUBIN UA  Negative   UROBILINOGEN UA (BE) mg/dl <2.0   LEUKOCYTES UA  Elevated glucose may cause decreased leukocyte values. See urine microscopic for UWBC result*   WBC UA /hpf 1-2   RBC UA /hpf 1-2   BACTERIA UA /hpf None Seen   EPITHELIAL CELLS WET PREP /hpf None Seen   MUCUS THREADS  Occasional*     Network Utilization Review Department  ATTENTION: Please call with any questions or concerns to [phone] and carefully listen to the prompts so that you are directed to the right person. All voicemails are confidential.   For Discharge needs, contact Care Management DC Support Team at [phone] opt. 2  Send all requests for admission clinical reviews, approved or denied determinations and any other requests to dedicated fax number below belonging to the [facility] where the patient is receiving treatment. List of dedicated fax numbers for the Facilities:  FACILITY NAME UR FAX NUMBER   ADMISSION DENIALS (Administrative/Medical Necessity) [phone]   DISCHARGE SUPPORT TEAM (NETWORK) [phone]   PARENT CHILD HEALTH (Maternity/NICU/Pediatrics) [phone]   [facility] [phone]   [facility] [phone]   [facility] [phone]   [facility] [phone]   [facility] [phone]   [facility] [phone]   [facility] [phone]   [facility] [phone]   [facility] [phone]   [facility]  [phone]   [facility] [phone]   [facility] [phone]

## 2025-04-15 NOTE — ANESTHESIA POSTPROCEDURE EVALUATION
Post-Op Assessment Note    CV Status:  Stable    Pain management: adequate       Mental Status:  Alert   Hydration Status:  Stable   PONV Controlled:  None   Airway Patency:  Patent     Post Op Vitals Reviewed: Yes    No anethesia notable event occurred.    Staff: Anesthesiologist           Last Filed PACU Vitals:  Vitals Value Taken Time   Temp 98 °F (36.7 °C) 04/15/25 1515   Pulse 104 04/15/25 1523   /60 04/15/25 1515   Resp 19 04/15/25 1523   SpO2 96 % 04/15/25 1523   Vitals shown include unfiled device data.    Modified Paul:     Vitals Value Taken Time   Activity 2 04/15/25 1500   Respiration 2 04/15/25 1500   Circulation 2 04/15/25 1500   Consciousness 2 04/15/25 1500   Oxygen Saturation 1 04/15/25 1500     Modified Paul Score: 9

## 2025-04-15 NOTE — ANESTHESIA PREPROCEDURE EVALUATION
Procedure:  OPEN REDUCTION W/ INTERNAL FIXATION (ORIF) TIBIAL PLATEAU (Left: Knee)    ECHO (10/17/24):  Interpretation Summary     Left Ventricle: Left ventricular cavity size is normal. Wall thickness is normal. The left ventricular ejection fraction is 60%. Systolic function is normal. Wall motion is normal. Unable to assess diastolic function due to E/A fusion due to tachycardia.    Tricuspid Valve: There is mild regurgitation. The right ventricular systolic pressure is mildly elevated. The estimated right ventricular systolic pressure is 38.00 mmHg.    Relevant Problems   ANESTHESIA (within normal limits)      CARDIO   (+) HTN (hypertension)      ENDO (within normal limits)      GI/HEPATIC (within normal limits)      /RENAL (within normal limits)      GYN (within normal limits)      HEMATOLOGY   (+) Anemia   (+) Thrombocytopenia (HCC)      NEURO/PSYCH (within normal limits)      Behavioral Health   (+) Alcohol abuse   (+) Tobacco abuse      Orthopedic/Musculoskeletal   (+) Fracture of left tibia      Lab Results   Component Value Date    WBC 5.22 04/13/2025    HGB 11.3 (L) 04/13/2025    HCT 35.8 (L) 04/13/2025    MCV 80 (L) 04/13/2025    PLT 78 (L) 04/13/2025     Lab Results   Component Value Date    SODIUM 131 (L) 04/13/2025    K 3.7 04/13/2025     04/13/2025    CO2 21 04/13/2025    AGAP 8 04/13/2025    BUN 9 04/13/2025    CREATININE 0.65 04/13/2025    GLUC 192 (H) 04/13/2025    CALCIUM 8.1 (L) 04/13/2025    AST 35 04/11/2025    ALT 19 04/11/2025    ALKPHOS 171 (H) 04/11/2025    TP 7.6 04/11/2025    TBILI 0.77 04/11/2025    EGFR 110 04/13/2025     Lab Results   Component Value Date    PTT 28 04/11/2025     Lab Results   Component Value Date    INR 1.15 04/11/2025    INR 1.1 11/26/2024    INR 1.27 (H) 10/27/2024    PROTIME 15.4 (H) 04/11/2025    PROTIME 14.6 11/26/2024    PROTIME 16.6 (H) 10/27/2024       Physical Exam    Airway    Mallampati score: III  TM Distance: <3 FB  Neck ROM: full     Dental        Cardiovascular  Cardiovascular exam normal    Pulmonary  Pulmonary exam normal     Other Findings        Anesthesia Plan  ASA Score- 3     Anesthesia Type- general with ASA Monitors.         Additional Monitors:     Airway Plan: ETT.           Plan Factors-Exercise tolerance (METS): >4 METS.    Chart reviewed. EKG reviewed. Imaging results reviewed. Existing labs reviewed. Patient summary reviewed.                  Induction- intravenous.    Postoperative Plan- Plan for postoperative opioid use.         Informed Consent- Anesthetic plan and risks discussed with patient.  I personally reviewed this patient with the CRNA. Discussed and agreed on the Anesthesia Plan with the CRNA..      NPO Status:  Vitals Value Taken Time   Date of last liquid 04/15/25 04/15/25 1053   Time of last liquid 2100 04/15/25 1053   Date of last solid 04/14/25 04/15/25 1053   Time of last solid 2100 04/15/25 1053

## 2025-04-15 NOTE — DISCHARGE INSTR - AVS FIRST PAGE
Discharge Instructions - Orthopedics  Viktor Farfan 54 y.o. male MRN: [id number]  Unit/Bed#: AN OR MAIN    Weight Bearing Status:                                           Patient to be nonweightbearing to left lower extremity    DVT prophylaxis:  Patient to be discharged on aspirin 81 Mg twice daily for 6 weeks    Pain:  Continue analgesics as directed    Dressing Instructions:   -Please keep clean, dry and intact until follow up   -DO NOT REMOVE DRESSINGS OVER SURGICAL WOUNDS  -DO NOT SHOWER UNTIL AUTHORIZED BY OPERATING PHYSICIAN   -DO NOT REMOVE STAPLES/SUTURES UNLESS AUTHORIZED BY OPERATING PHYSICIAN  -If dressing become saturated/wet contact the treating orthopedic surgeon prior to removing dressings    Appt Instructions:   If you do not have your appointment, please call the clinic at [phone] to schedule with Dr. Bledsoe  Otherwise follow up as scheduled.    Contact the office sooner if you experience any increased numbness/tingling in the extremities.      Miscellaneous: Follow-up in 2 weeks for suture removal and repeat x-ray

## 2025-04-15 NOTE — ANESTHESIA POSTPROCEDURE EVALUATION
Post-Op Assessment Note    CV Status:  Stable  Pain Score: 0    Pain management: adequate       Mental Status:  Arousable and sleepy   Hydration Status:  Euvolemic and stable   PONV Controlled:  Controlled   Airway Patency:  Patent and adequate     Post Op Vitals Reviewed: Yes    No anethesia notable event occurred.    Staff: CRNA       Last Filed PACU Vitals:  Vitals Value Taken Time   Temp 97.4 °F (36.3 °C) 04/15/25 1407   Pulse 110 04/15/25 1415   /65 04/15/25 1411   Resp 23 04/15/25 1415   SpO2 92 % 04/15/25 1415   Vitals shown include unfiled device data.    Modified Paul:     Vitals Value Taken Time   Activity 0 04/15/25 1407   Respiration 1 04/15/25 1407   Circulation 2 04/15/25 1407   Consciousness 0 04/15/25 1407   Oxygen Saturation 1 04/15/25 1407     Modified Paul Score: 4

## 2025-04-15 NOTE — OP NOTE
OPERATIVE REPORT  PATIENT NAME: Viktor Farfan    :  1970  MRN: [id number]  Pt Location: [facility] 01    SURGERY DATE: 4/15/2025    Surgeon(s) and Role:     * Jone Bledsoe DO - Primary        * Ruddy Kuhn MD - Assisting     * Jj Leslie PA-C - Assisting   I was present for the entire procedure. and I was present for all critical portions of the procedure.    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  Patient CT and x-ray images demonstrated a left bicondylar tibial plateau with impaction of the anterior medial cortex and fracturing the lateral tibial plateau.  There was change in the patient's articular slope but no large areas of articular impaction.  Noted to restore stability to the knee and allow for early range of motion the patient was consented for surgical fixation      Implants:   Synthes 8 hole large been proximal tibial locking plate    Tourniquet time:   Tourniquet was inflated to 250 mmHg for 73 minutes to the left lower extremity      Complications:   No acute complications were encountered.  Patient was transferred to PACU in stable condition    Operative findings:  Patient's x-rays were taken of the contralateral lower extremity to visualize the patient's native tibial slope.  This confirmed hyperextension injury with loss of tibial inclination.  Given the patient's smoking history, diabetes, alcohol use the patient  tibial plateau hyperextension injury that is able to be reduced in a minimally invasive fashion.  An anterior femoral distractor was used to correct the patient's articular slope and the proximal tibial angle once this was done a laterally based incision was made and a proximal tibial locking plate was utilized with locking screws to secure the medial condyle into position from the lateral side and percutaneously fixated distally.  Once this was performed the distractor was able to be removed and the proximal tibia angle was corrected to match the patient's contralateral side and stable.    Procedure and Technique:  Patient is a 54-year-old male that was seen examined the preoperative holding area.  Operative extremity was marked.  All patient's questions were answered.  The patient was then taken back to the operating room and general endotracheal anesthesia was administered by department esthesia.  The patient was then transferred over the operating table using CT LS spine precautions.  All bony promises were well-padded.  A well-padded nonsterile tourniquet was applied to the left upper thigh.  The patient's left lower extremity was then prepped and draped in a standard sterile orthopedic fashion.  Timeouts performed to confirm correct site, correct patient, correct procedure.  All were in agreement procedure started.  The left lower extremity was exsanguinated and the tourniquet was inflated 250 mmHg.  A anterior lateral approach was then made sharply with #10 blade the skin subcutaneous tissues.  Electrocautery was used to obtain hemostasis.  Dissection was carried down to the level of the iliotibial band and anterior compartment fascia.  A incision was made through the fascia of the anterior compartment along Bella's tubercle in line with the incision and split the iliotibial band in line with its fibers.  Full-thickness flaps were raised anteriorly and posteriorly for eventual coverage of the plate.   Dissection was carried down to the level of the tibia and a blunt elevator was used to remove musculature off of the anterior aspect of the tibia.  The patient's contralateral tibial films were compared to his intraoperative films of his tibial plateau and it was noted that the patient had a neutral inclination of his tibial plateau on the injured side and approximately 7 degrees of tibial inclination on the contralateral side.  Therefore a anteriorly based femoral distractor was then inserted and the distractor was able to reduce the patient's native tibial slope.  Once this performed the plate was then secured to the proximal tibia using a 3.5 mm cortical screw.  The diaphysis was then secured using a 3.5 mm cortical screw through the anterior lateral approach.  The plate was then further secured proximally with multiple 3.5 mm locking screws engaging into the medial tibial plateau.  The 2 distal cortical screws in the plate were placed utilizing percutaneous technique.  After fixation was complete the femoral distractor was removed and released.  Final reduction implant placement was confirmed under multiplanar fluoroscopic imaging with a wrist restoration of the proximal tibial angle can compared to the contralateral side.  The wounds were copiously irrigated with sterile normal saline.  1 g vancomycin was placed deep in the wound.  The iliotibial band was repaired using 0 PDS fascia.  The subcutaneous tissues repaired with a 2-0 Monocryl suture.  The skin was closed with 2-0 nylon sutures.  The wounds were then dressed in Adaptic 4 x 4's cast padding the patient was wrapped in Ace wrap from toes to the upper thigh.  The tourniquet was then let down.  The patient was transferred off the operating table using CT LS spine precautions extubated transferred back in stable condition        Postoperative plan:  Patient be nonweightbearing to the left lower extremity.  The patient received 5 days of Keflex 500 mg twice  daily for infection prophylaxis.  The patient will be started on aspirin 81 mg twice daily for DVT prophylax for treatment course of 6 weeks.  The patient will need follow-up in the office in 2 weeks time for repeat x-ray evaluation and removal of the sutures    SIGNATURE: Jone Bledsoe DO  DATE: April 15, 2025  TIME: 4:15 PM

## 2025-04-15 NOTE — INTERVAL H&P NOTE
H&P reviewed. After examining the patient I find no changes in the patients condition since the H&P had been written.  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  He states that he is not sure exact mechanism that led to his left knee being injured whether it was a hyperextension or falling directly into it however he did present to the emergency department with a left closed bicondylar tibial plateau.  This was confirmed on x-ray and CT.  The patient did not denies any new numbness or paresthesias.  He has a history of peripheral neuropathy from the toes up to the ankle.  He does have sensation in this area but states that this decreased from normal.  Sensation around his knee is intact.  The patient did have a laceration above the level of the superior pole of the patella.  This did not communicate with the fracture per orthopedics who probed on initial consultation.    -- On exam the patient has his laceration just proximal to the suprapubic area pole of the patella.  It is healing well.  There are some superficial abrasions anteriorly over the patella.  No lacerations over the proximal tibia.  He has tenderness to palpation over the medial and lateral tibial plateau.  No tenderness at the ankle.  He is able to activate tibialis anterior, extensor hallux longus, gastrocnemius muscles.  His sensation is intact to light touch distally in all dermatomal distributions however decreased below the level of the ankle.  He has +2 dorsalis pedis and posterior tibial pulses.  Compartment soft compressible  -X-ray and CT demonstrated a bicondylar tibial plateau fracture.  The patient has a hyperextension type injury with some minor impaction of the anterior metaphyseal cortex on the medial side and lateral side.  There is a tension failure in the posterior aspect of the tibial  plateau.  Overall his slope does not appear to be reversed.  Do not have contralateral films to compare  -I discussion with the patient about operative versus nonoperative management options.  In order to restore stability of the tibia given his bicondylar tibial plateau fracture and allow for early range of motion the patient was consented for surgical fixation.  The patient does have diabetes which is labile in its control.  He does smoke a pack of cigarettes a day.  I discussed that these 2 risk factors would increase his risk of complications in the postoperative period including nonunions, wound healing complications, infection.  The patient understands this and will try to proceed with smoking cessation in the postoperative period I also stressed the importance of him not weightbearing on his lower extremity for 8 weeks as the patient has had difficulty doing so since being discharged from the hospital.  -Risks, benefits, alternatives were discussed, risks including but not limited to deep vein thrombosis, pulmonary embolism, malunion, nonunion, damage to neurovascular structures both near and distant, infection both deep and superficial, symptomatic hardware, sincere implant fracture, hardware failure, metal sensitivity, chronic pain, postoperative stiffness, avascular necrosis, extremity weakness, decreased range of motion, need for subsequent repeat procedures, as well as the risks associated with general endotracheal anesthesia which include but not limited to death.  Patient agreed informed consent was obtained.    Vitals:    04/15/25 1047   BP: 131/79   Pulse: 94   Resp: 16   Temp: (!) 97.3 °F (36.3 °C)   SpO2: 96%

## 2025-04-17 LAB
DME PARACHUTE DELIVERY DATE ACTUAL: NORMAL
DME PARACHUTE DELIVERY DATE REQUESTED: NORMAL
DME PARACHUTE ITEM DESCRIPTION: NORMAL
DME PARACHUTE ITEM DESCRIPTION: NORMAL
DME PARACHUTE ORDER STATUS: NORMAL
DME PARACHUTE SUPPLIER NAME: NORMAL
DME PARACHUTE SUPPLIER PHONE: NORMAL

## 2025-04-17 NOTE — ASSESSMENT & PLAN NOTE
Folate level 5.9  Continue oral folic acid supplementation   Left volar wrist mass excision, Left carpal tunnel release

## 2025-04-23 DIAGNOSIS — S82.102D CLOSED FRACTURE OF PROXIMAL END OF LEFT TIBIA WITH ROUTINE HEALING, UNSPECIFIED FRACTURE MORPHOLOGY, SUBSEQUENT ENCOUNTER: ICD-10-CM

## 2025-04-23 RX ORDER — OXYCODONE HYDROCHLORIDE 5 MG/1
5 TABLET ORAL EVERY 4 HOURS PRN
Qty: 15 TABLET | Refills: 0 | Status: SHIPPED | OUTPATIENT
Start: 2025-04-23 | End: 2025-05-03

## 2025-04-23 NOTE — TELEPHONE ENCOUNTER
Reason for call:   [x] Refill   [] Prior Auth  [] Other:     Office:   [] PCP/Provider -   [x] Specialty/Provider -     Medication: oxyCODONE (Roxicodone) 5 immediate release tablet     Dose/Frequency: Take 1 tablet (5 mg total) by mouth every 4 (four) hours as needed for moderate pain for up to 10 days     Quantity: 15    Pharmacy: [facility] [place] [place]     Local Pharmacy   Does the patient have enough for 3 days?   [] Yes   [x] No - Send as HP to POD

## 2025-04-23 NOTE — TELEPHONE ENCOUNTER
Medication:  PDMP 04/18/2025 04/15/2025 oxyCODONE HCL (Tablet) 15.0 3 5 MG 37.50 [facility] Medicaid 0 / 0 [place]   1 [id number] 04/13/2025 04/13/2025 oxyCODONE HCL (Tablet) 36.0 6 5 MG 45.0 [facility]  Refill must be reviewed and completed by the office or provider. The refill is unable to be approved or denied by the medication management team.

## 2025-04-24 ENCOUNTER — TELEPHONE (OUTPATIENT)
Age: 55
End: 2025-04-24

## 2025-04-24 NOTE — TELEPHONE ENCOUNTER
Caller: Sahra lipscomb OT    Doctor: Dr. Bledsoe    Reason for call: OT calling to update the DR. Patient is not adhering to the non weight baring status, as it is not realistic  due to his living arrangements OT is recommending a 22 x 16 wheel chair for the patient.  Also when is the patient able to get his leg wet. Patient will need a Tub transfer  bench and a plastic cast covering LT LEG. Please have the Dr submitt to insurance for higher chance of approval       Call back#: [phone]

## 2025-04-29 ENCOUNTER — OFFICE VISIT (OUTPATIENT)
Dept: OBGYN CLINIC | Facility: CLINIC | Age: 55
End: 2025-04-29

## 2025-04-29 ENCOUNTER — APPOINTMENT (OUTPATIENT)
Dept: RADIOLOGY | Facility: AMBULARY SURGERY CENTER | Age: 55
End: 2025-04-29
Attending: STUDENT IN AN ORGANIZED HEALTH CARE EDUCATION/TRAINING PROGRAM
Payer: COMMERCIAL

## 2025-04-29 ENCOUNTER — TELEPHONE (OUTPATIENT)
Dept: OBGYN CLINIC | Facility: HOSPITAL | Age: 55
End: 2025-04-29

## 2025-04-29 VITALS — HEIGHT: 74 IN | WEIGHT: 245.15 LBS | BODY MASS INDEX: 31.46 KG/M2

## 2025-04-29 DIAGNOSIS — S82.102D CLOSED FRACTURE OF PROXIMAL END OF LEFT TIBIA WITH ROUTINE HEALING, UNSPECIFIED FRACTURE MORPHOLOGY, SUBSEQUENT ENCOUNTER: Primary | ICD-10-CM

## 2025-04-29 DIAGNOSIS — S82.102D CLOSED FRACTURE OF PROXIMAL END OF LEFT TIBIA WITH ROUTINE HEALING, UNSPECIFIED FRACTURE MORPHOLOGY, SUBSEQUENT ENCOUNTER: ICD-10-CM

## 2025-04-29 PROCEDURE — 73560 X-RAY EXAM OF KNEE 1 OR 2: CPT

## 2025-04-29 PROCEDURE — 99024 POSTOP FOLLOW-UP VISIT: CPT | Performed by: STUDENT IN AN ORGANIZED HEALTH CARE EDUCATION/TRAINING PROGRAM

## 2025-04-29 RX ORDER — OXYCODONE HYDROCHLORIDE 5 MG/1
5 TABLET ORAL EVERY 6 HOURS PRN
Qty: 10 TABLET | Refills: 0 | Status: SHIPPED | OUTPATIENT
Start: 2025-04-29

## 2025-04-29 NOTE — TELEPHONE ENCOUNTER
Caller: Lacey -Pharmacist     Doctor: Dr. Bledsoe     Reason for call: unable to prescribe oxycodone next date to be able to refill will be October [facility] states oxycodone has exceeded for patient     Call back#: [phone]

## 2025-04-29 NOTE — PROGRESS NOTES
Orthopaedic Surgery - Office Note  Viktor Farfan (54 y.o. male)   : 1970   MRN: [id number]  Encounter Date: 2025    Chief Complaint   Patient presents with    Left Knee - Post-op     Past Surgical History:   Procedure Laterality Date    NO PAST SURGERIES      ORIF FINGER FRACTURE Right 2019    Procedure: OPEN REDUCTION FINGER;  Surgeon: Tomas Morgan MD;  Location: [facility];  Service: Plastics    [place] OPEN TX TIBIAL FRACTURE PROXIMAL UNICONDYLAR Left 4/15/2025    Procedure: OPEN REDUCTION W/ INTERNAL FIXATION (ORIF) TIBIAL PLATEAU;  Surgeon: Jone Bledsoe DO;  Location[facility];  Service: Orthopedics     Assessment / Plan  Left bicondylar tibial plateau fracture s/p ORIF DOS: 4/15/2025  Noncompliance    X-rays of the left knee were reviewed which demonstrated no signs of hardware failure or loosening of the lateral tibial plateau plate.  The patient has had some compression of his medial cortex when compared to intraoperative films.  Tibial slope maintained on lateral views.  No signs of plate fracturing  Patient has been noncompliant with weightbearing status as previously noted through phone encounter from patient's occupational therapist.  I reinforced with the patient the necessity of his nonweightbearing status to the left lower extremity.  I described the changes that we are already seeing on his radiographs due to his noncompliance.  I discussed especially given his smoking and alcohol use that this fracture will heal more slowly than a healthy individual and that his risk of complications hardware failure or fracturing and infection and wound healing complications is elevated due to these above confounding factors.    Nonweightbearing left lower extremity  Range of motion as tolerated left knee  Sutures removed.  Steri-Strips applied.  Instructed patient to take them off in 4 to 5 days if they have not fallen off on their own.  Okay to shower.  Do not submerge underwater Continue DVT  prophylaxis with aspirin 81 twice daily  DME order placed for shower bench  New prescription for oxycodone provided only to be used for sleep at night.  Discussed with patient that he needs to switch over the over-the-counter pain medications at this time  New prescription for physical therapy provided  Open 6 weeks with repeat x-rays of the left knee    History of Present Illness  Viktor Farfan is a 54 y.o. male who presents 2 weeks status post open duction internal fixation left tibial plateau fracture.  He states that he is still in significant pain but he has also been bearing weight on his left leg. - We were previously contacted by his in-home occupational therapist about his noncompliance with weightbearing restrictions.  He has been using crutches but using 2 legs to ambulate.  He is continuing tobacco use and alcohol use.  He denies any new trauma or falls.  He denies any numbness or tingling.  He has mainly pain over his lateral incision.  He continues to smoke.    Review of Systems  Pertinent items are noted in HPI.  All other systems were reviewed and are negative.    Physical Exam  There were no vitals taken for this visit.  Cons: Appears well.  No apparent distress.  Psych: Alert. Oriented x3.  Mood and affect normal.  Eyes: PERRLA, EOMI  Resp: Normal effort.  No audible wheezing or stridor.  CV: Palpable pulse.  No discernable arrhythmia.    Lymph:  No palpable cervical, axillary, or inguinal lymphadenopathy.  Skin: Warm.  No palpable masses.  No visible lesions.  Neuro: Normal muscle tone.  Normal and symmetric DTR's.     The left lower extremity was exposed and inspected. Visible skin intact without erythema, ecchymosis, effusion or obvious osseous deformity. TTP sincere-incisionally. Passive ROM from 0 degrees of knee flexion to 100 degrees. Sensation intact to superficial peroneal, deep peroneal, sural, saphenous, plantar nerve distributions. Motor intact to extensor hallux longus, tibialis anterior,  gastrocnemius muscles, extensor mechanism intact. Limb is well perfused. Brisk capillary refill in all 5 digits. Compartments soft and compressible.      Studies Reviewed  X-rays of the left knee were reviewed which demonstrated no signs of hardware failure or loosening of the lateral tibial plateau plate.  The patient has had some compression of his medial cortex when compared to intraoperative films.  Tibial slope maintained on lateral views.  No signs of plate fracturing    Procedures      Medical, Surgical, Family, and Social History  The patient's medical history, family history, and social history, were reviewed and updated as appropriate.    Past Medical History:   Diagnosis Date    Anxiety     Back pain     Chronic right-sided low back pain without sciatica 3/31/2018    Depression     Gastroesophageal reflux disease 3/31/2018    GERD (gastroesophageal reflux disease)     Right foot pain            Family History   Problem Relation Age of Onset    COPD Father     Diabetes Father     Cancer Maternal Grandfather        Social History     Occupational History    Not on file   Tobacco Use    Smoking status: Every Day     Current packs/day: 1.00     Average packs/day: 1 pack/day for 38.0 years (38.0 ttl pk-yrs)     Types: Cigarettes    Smokeless tobacco: Never   Vaping Use    Vaping status: Never Used   Substance and Sexual Activity    Alcohol use: Yes     Alcohol/week: 6.0 standard drinks of alcohol     Types: 6 Cans of beer per week     Comment: 12 pack of beer a night    Drug use: No    Sexual activity: Not on file       No Known Allergies      Current Outpatient Medications:     Alcohol Swabs 70 % PADS, May substitute brand based on insurance coverage. Check glucose ACHS., Disp: 200 each, Rfl: 0    amLODIPine (NORVASC) 10 mg tablet, Take 10 mg by mouth daily, Disp: , Rfl:     aspirin (ECOTRIN LOW STRENGTH) 81 mg EC tablet, Take 1 tablet (81 mg total) by mouth 2 (two) times a day, Disp: 84 tablet, Rfl: 0     Aspirin Low Dose 81 MG chewable tablet, Chew 81 mg daily Chew, Disp: , Rfl:     Blood Glucose Monitoring Suppl (OneTouch Verio Reflect) w/Device KIT, May substitute brand based on insurance coverage. Check glucose ACHS., Disp: 1 kit, Rfl: 0    clindamycin 1 % gel, , Disp: , Rfl:     escitalopram (LEXAPRO) 10 mg tablet, , Disp: , Rfl:     gabapentin (NEURONTIN) 100 mg capsule, TAKE 2 CAPSULE BY ORAL ROUTE 3 TIMES EVERY BEDTIME, Disp: , Rfl:     glucose blood (OneTouch Verio) test strip, May substitute brand based on insurance coverage. Check glucose ACHS., Disp: 200 each, Rfl: 0    hydrOXYzine HCL (ATARAX) 25 mg tablet, Take 1 tablet (25 mg total) by mouth daily at bedtime as needed for itching, Disp: 12 tablet, Rfl: 0    Insulin Glargine Solostar (Lantus SoloStar) 100 UNIT/ML SOPN, Inject 0.5 mL (50 Units total) under the skin daily at bedtime, Disp: 15 mL, Rfl: 0    Insulin Pen Needle (BD Pen Needle Enedelia 2nd Gen) 32G X 4 MM MISC, For use with insulin pen. Pharmacy may dispense brand covered by insurance., Disp: 100 each, Rfl: 0    lisinopril (ZESTRIL) 10 mg tablet, Take 10 mg by mouth daily, Disp: , Rfl:     metFORMIN (GLUCOPHAGE) 1000 MG tablet, Take 1 tablet (1,000 mg total) by mouth 2 (two) times a day with meals, Disp: 60 tablet, Rfl: 0    mirtazapine (REMERON) 30 mg tablet, TAKE 1 TABLET BY MOUTH EVERY DAY BEFORE BEDTIME, Disp: , Rfl:     omeprazole (PriLOSEC) 20 mg delayed release capsule, Take 20 mg by mouth daily Take before a meal, Disp: , Rfl:     OneTouch Delica Lancets 33G MISC, May substitute brand based on insurance coverage. Check glucose ACHS., Disp: 200 each, Rfl: 0    oxyCODONE (Roxicodone) 5 immediate release tablet, Take 1 tablet (5 mg total) by mouth every 4 (four) hours as needed for moderate pain for up to 10 days Max Daily Amount: 30 mg, Disp: 15 tablet, Rfl: 0    methocarbamol (ROBAXIN) 500 mg tablet, Take 1 tablet (500 mg total) by mouth every 6 (six) hours for 14 days, Disp: 56 tablet,  Rfl: 0      Ruddy Kuhn MD    Scribe Attestation      I,:   am acting as a scribe while in the presence of the attending physician.:       I,:   personally performed the services described in this documentation    as scribed in my presence.:

## 2025-04-30 NOTE — TELEPHONE ENCOUNTER
Called and spoke with the pt about this message.  He said when he was there yesterday he asked if he could pay with cash and the pharmacy said they are unable to do this.  He said the pharmacy stated that the medication would have to go through authorization but he is going to call the pharmacy now see if he still needs that.  Advised to c/b with questions/concerns.

## 2025-05-08 DIAGNOSIS — S82.142A CLOSED FRACTURE OF LEFT TIBIAL PLATEAU, INITIAL ENCOUNTER: ICD-10-CM

## 2025-05-08 DIAGNOSIS — S82.102D CLOSED FRACTURE OF PROXIMAL END OF LEFT TIBIA WITH ROUTINE HEALING, UNSPECIFIED FRACTURE MORPHOLOGY, SUBSEQUENT ENCOUNTER: ICD-10-CM

## 2025-05-08 DIAGNOSIS — E08.10 DIABETIC KETOACIDOSIS WITHOUT COMA ASSOCIATED WITH DIABETES MELLITUS DUE TO UNDERLYING CONDITION (HCC): ICD-10-CM

## 2025-05-08 NOTE — TELEPHONE ENCOUNTER
Reason for call:   [x] Refill   [] Prior Auth  [] Other:     Office:   [] PCP/Provider -   [x] Specialty/Provider - [facility]  Authorized By: Jone Bledsoe,         Medication:    Pharmacy: methocarbamol (ROBAXIN) 500 mg tablet ()     oxyCODONE (Roxicodone) 5 immediate release tablet     Local Pharmacy   Does the patient have enough for 3 days?   [] Yes   [x] No - Send as HP to POD    Mail Away Pharmacy   Does the patient have enough for 10 days?   [] Yes   [] No - Send as HP to POD

## 2025-05-13 PROBLEM — S81.012A KNEE LACERATION, LEFT, INITIAL ENCOUNTER: Status: RESOLVED | Noted: 2025-04-11 | Resolved: 2025-05-13

## 2025-05-14 DIAGNOSIS — S82.102D CLOSED FRACTURE OF PROXIMAL END OF LEFT TIBIA WITH ROUTINE HEALING, UNSPECIFIED FRACTURE MORPHOLOGY, SUBSEQUENT ENCOUNTER: ICD-10-CM

## 2025-05-14 RX ORDER — ASPIRIN 81 MG/1
81 TABLET, COATED ORAL 2 TIMES DAILY
Qty: 60 TABLET | Refills: 1 | Status: SHIPPED | OUTPATIENT
Start: 2025-05-14

## 2025-06-10 ENCOUNTER — APPOINTMENT (OUTPATIENT)
Dept: RADIOLOGY | Facility: AMBULARY SURGERY CENTER | Age: 55
End: 2025-06-10
Attending: STUDENT IN AN ORGANIZED HEALTH CARE EDUCATION/TRAINING PROGRAM
Payer: COMMERCIAL

## 2025-06-10 ENCOUNTER — OFFICE VISIT (OUTPATIENT)
Dept: OBGYN CLINIC | Facility: CLINIC | Age: 55
End: 2025-06-10

## 2025-06-10 VITALS — BODY MASS INDEX: 31.46 KG/M2 | WEIGHT: 245.15 LBS | HEIGHT: 74 IN

## 2025-06-10 DIAGNOSIS — S82.102D CLOSED FRACTURE OF PROXIMAL END OF LEFT TIBIA WITH ROUTINE HEALING, UNSPECIFIED FRACTURE MORPHOLOGY, SUBSEQUENT ENCOUNTER: ICD-10-CM

## 2025-06-10 PROCEDURE — 73560 X-RAY EXAM OF KNEE 1 OR 2: CPT

## 2025-06-10 PROCEDURE — 99024 POSTOP FOLLOW-UP VISIT: CPT | Performed by: STUDENT IN AN ORGANIZED HEALTH CARE EDUCATION/TRAINING PROGRAM

## 2025-06-10 RX ORDER — METHOCARBAMOL 500 MG/1
500 TABLET, FILM COATED ORAL 4 TIMES DAILY
Qty: 20 TABLET | Refills: 0 | Status: SHIPPED | OUTPATIENT
Start: 2025-06-10

## 2025-06-10 NOTE — PROGRESS NOTES
Orthopaedic Surgery - Office Note  Viktor Farfan (54 y.o. male)   : 1970   MRN: [id number]  Encounter Date: 6/10/2025    No chief complaint on file.    Past Surgical History:   Procedure Laterality Date    NO PAST SURGERIES      ORIF FINGER FRACTURE Right 2019    Procedure: OPEN REDUCTION FINGER;  Surgeon: Tomas Morgan MD;  [facility]: [facility];  Service: Plastics    [place] OPEN TX TIBIAL FRACTURE PROXIMAL UNICONDYLAR Left 4/15/2025    Procedure: OPEN REDUCTION W/ INTERNAL FIXATION (ORIF) TIBIAL PLATEAU;  Surgeon: Jone Bledsoe DO;  [facility];  Service: Orthopedics     Assessment / Plan  Left bicondylar tibial plateau fracture s/p ORIF DOS: 4/15/2025    X-rays of the left knee were reviewed which demonstrated no signs of hardware failure or loosening of the lateral tibial plateau plate.  The patient has had some compression of his medial cortex but this is stabilized with a medial proximal tibial angle of 85 degrees.  There is increasing consolidation on the medial cortex.  No signs of screw loosening.  Tibial slope maintained on lateral views.  No signs of plate fracturing  Patient to be weightbearing as tolerated left lower extremity  Range of motion as tolerated left knee  Patient completed DVT prophylaxis with aspirin 81 twice daily  Discussed with patient to use over-the-counter anti-inflammatories and Tylenol for pain control at this time  New prescription for physical therapy provided for strength and mobility training   New script for Robaxin provided   Follow-up 6 weeks with repeat x-rays of the left knee    History of Present Illness  Viktor Farfan is a 54 y.o. male who presents 2 weeks status post open duction internal fixation left tibial plateau fracture.  He states that he is still in significant pain but he has also been bearing weight on his left leg. - We were previously contacted by his in-home occupational therapist about his noncompliance with weightbearing restrictions.  He has  been using crutches but using 2 legs to ambulate.  He is continuing tobacco use and alcohol use.  He denies any new trauma or falls.  He denies any numbness or tingling.  He has mainly pain over his lateral incision.  He continues to smoke.    Interval history 6/10/2025  Patient presents proximately 8 weeks status post ORIF left tibial plateau.  He continues to have mild pain in the extremity at this time.  He is currently taking Tylenol and ibuprofen for pain control.  Continuing to use tobacco and use alcohol.  Patient states states he has been standing on the extremity but has not been walking on the extremity.  Pt has completed PT/OT at this time. Denies any injuries or traumas.  Denies any acute numbness or paresthesias of the left lower extremity.    Review of Systems  Pertinent items are noted in HPI.  All other systems were reviewed and are negative.    Physical Exam  There were no vitals taken for this visit.  Cons: Appears well.  No apparent distress.  Psych: Alert. Oriented x3.  Mood and affect normal.  Eyes: PERRLA, EOMI  Resp: Normal effort.  No audible wheezing or stridor.  CV: Palpable pulse.  No discernable arrhythmia.    Lymph:  No palpable cervical, axillary, or inguinal lymphadenopathy.  Skin: Warm.  No palpable masses.  No visible lesions.  Neuro: Normal muscle tone.  Normal and symmetric DTR's.     The left lower extremity was exposed and inspected. Visible skin intact without erythema, ecchymosis, effusion or obvious osseous deformity.  No TTP sincere-incisionally.  Active/passive ROM from 0 degrees of knee flexion to 120 degrees without pain. Sensation intact to superficial peroneal, deep peroneal, sural, saphenous, plantar nerve distributions. Motor intact to extensor hallux longus, tibialis anterior, gastrocnemius muscles, extensor mechanism intact. Limb is well perfused. Brisk capillary refill in all 5 digits. Compartments soft and compressible.      Studies Reviewed  X-rays of the left knee  were reviewed which demonstrated interval increased consolidation of the metaphyseal fracture sites and the medial cortex.  The patient's medial proximal tibial angle has been stable at 85 degrees.  No signs of screw loosening or breakage of the lateral fixation.  Joint spaces well-maintained.    Procedures      Medical, Surgical, Family, and Social History  The patient's medical history, family history, and social history, were reviewed and updated as appropriate.    Past Medical History:   Diagnosis Date    Anxiety     Back pain     Chronic right-sided low back pain without sciatica 3/31/2018    Depression     Gastroesophageal reflux disease 3/31/2018    GERD (gastroesophageal reflux disease)     Right foot pain            Family History   Problem Relation Name Age of Onset    COPD Father      Diabetes Father      Cancer Maternal Grandfather         Social History     Occupational History    Not on file   Tobacco Use    Smoking status: Every Day     Current packs/day: 1.00     Average packs/day: 1 pack/day for 38.0 years (38.0 ttl pk-yrs)     Types: Cigarettes    Smokeless tobacco: Never   Vaping Use    Vaping status: Never Used   Substance and Sexual Activity    Alcohol use: Yes     Alcohol/week: 6.0 standard drinks of alcohol     Types: 6 Cans of beer per week     Comment: 12 pack of beer a night    Drug use: No    Sexual activity: Not on file       No Known Allergies      Current Outpatient Medications:     Alcohol Swabs 70 % PADS, May substitute brand based on insurance coverage. Check glucose ACHS., Disp: 200 each, Rfl: 0    amLODIPine (NORVASC) 10 mg tablet, Take 10 mg by mouth daily, Disp: , Rfl:     Aspirin Low Dose 81 MG chewable tablet, Chew 81 mg daily Chew, Disp: , Rfl:     Aspirin Low Dose 81 MG EC tablet, TAKE 1 TABLET BY MOUTH 2 TIMES A DAY., Disp: 60 tablet, Rfl: 1    Blood Glucose Monitoring Suppl (OneTouch Verio Reflect) w/Device KIT, May substitute brand based on insurance coverage. Check  glucose ACHS., Disp: 1 kit, Rfl: 0    clindamycin 1 % gel, , Disp: , Rfl:     escitalopram (LEXAPRO) 10 mg tablet, , Disp: , Rfl:     gabapentin (NEURONTIN) 100 mg capsule, TAKE 2 CAPSULE BY ORAL ROUTE 3 TIMES EVERY BEDTIME, Disp: , Rfl:     glucose blood (OneTouch Verio) test strip, May substitute brand based on insurance coverage. Check glucose ACHS., Disp: 200 each, Rfl: 0    hydrOXYzine HCL (ATARAX) 25 mg tablet, Take 1 tablet (25 mg total) by mouth daily at bedtime as needed for itching, Disp: 12 tablet, Rfl: 0    Insulin Pen Needle (BD Pen Needle Enedelia 2nd Gen) 32G X 4 MM MISC, For use with insulin pen. Pharmacy may dispense brand covered by insurance., Disp: 100 each, Rfl: 0    lisinopril (ZESTRIL) 10 mg tablet, Take 10 mg by mouth daily, Disp: , Rfl:     metFORMIN (GLUCOPHAGE) 1000 MG tablet, Take 1 tablet (1,000 mg total) by mouth 2 (two) times a day with meals, Disp: 60 tablet, Rfl: 0    methocarbamol (ROBAXIN) 500 mg tablet, Take 1 tablet (500 mg total) by mouth every 6 (six) hours for 14 days, Disp: 56 tablet, Rfl: 0    mirtazapine (REMERON) 30 mg tablet, TAKE 1 TABLET BY MOUTH EVERY DAY BEFORE BEDTIME, Disp: , Rfl:     omeprazole (PriLOSEC) 20 mg delayed release capsule, Take 20 mg by mouth daily Take before a meal, Disp: , Rfl:     OneTouch Delica Lancets 33G MISC, May substitute brand based on insurance coverage. Check glucose ACHS., Disp: 200 each, Rfl: 0    oxyCODONE (Roxicodone) 5 immediate release tablet, Take 1 tablet (5 mg total) by mouth every 6 (six) hours as needed for severe pain Max Daily Amount: 20 mg, Disp: 10 tablet, Rfl: 0      Jj Leslie PA-C    Scribe Attestation      I,:   am acting as a scribe while in the presence of the attending physician.:       I,:   personally performed the services described in this documentation    as scribed in my presence.:

## 2025-06-25 RX ORDER — METHOCARBAMOL 500 MG/1
500 TABLET, FILM COATED ORAL EVERY 6 HOURS SCHEDULED
Qty: 56 TABLET | Refills: 0 | OUTPATIENT
Start: 2025-06-25 | End: 2025-07-09

## 2025-06-25 RX ORDER — OXYCODONE HYDROCHLORIDE 5 MG/1
5 TABLET ORAL EVERY 6 HOURS PRN
Qty: 10 TABLET | Refills: 0 | OUTPATIENT
Start: 2025-06-25

## 2025-07-02 DIAGNOSIS — E08.10 DIABETIC KETOACIDOSIS WITHOUT COMA ASSOCIATED WITH DIABETES MELLITUS DUE TO UNDERLYING CONDITION (HCC): ICD-10-CM

## 2025-07-02 DIAGNOSIS — S82.142A CLOSED FRACTURE OF LEFT TIBIAL PLATEAU, INITIAL ENCOUNTER: ICD-10-CM

## 2025-07-02 NOTE — TELEPHONE ENCOUNTER
Reason for call:   [x] Refill   [] Prior Auth  [] Other:     Office:   [] PCP/Provider -   [x] Specialty/Provider - ortho    Medication: methocarbamol     Dose/Frequency: 500 mg take 1 tablet 4 times a day     Quantity: 20    Pharmacy: [facility] on [address] in [place]     Local Pharmacy   Does the patient have enough for 3 days?   [] Yes   [x] No - Send as HP to POD- has 1 pill left     Mail Away Pharmacy   Does the patient have enough for 10 days?   [] Yes   [] No - Send as HP to POD

## 2025-07-07 RX ORDER — METHOCARBAMOL 500 MG/1
500 TABLET, FILM COATED ORAL EVERY 6 HOURS SCHEDULED
Qty: 56 TABLET | Refills: 0 | Status: SHIPPED | OUTPATIENT
Start: 2025-07-07 | End: 2025-07-21

## 2025-07-13 DIAGNOSIS — S82.102D CLOSED FRACTURE OF PROXIMAL END OF LEFT TIBIA WITH ROUTINE HEALING, UNSPECIFIED FRACTURE MORPHOLOGY, SUBSEQUENT ENCOUNTER: ICD-10-CM

## 2025-07-14 RX ORDER — ASPIRIN 81 MG/1
81 TABLET, COATED ORAL 2 TIMES DAILY
Qty: 60 TABLET | Refills: 1 | Status: SHIPPED | OUTPATIENT
Start: 2025-07-14

## 2025-07-22 ENCOUNTER — APPOINTMENT (OUTPATIENT)
Dept: RADIOLOGY | Facility: AMBULARY SURGERY CENTER | Age: 55
End: 2025-07-22
Attending: STUDENT IN AN ORGANIZED HEALTH CARE EDUCATION/TRAINING PROGRAM
Payer: COMMERCIAL

## 2025-07-22 VITALS — WEIGHT: 245.15 LBS | BODY MASS INDEX: 31.46 KG/M2 | HEIGHT: 74 IN

## 2025-07-22 DIAGNOSIS — S82.102D CLOSED FRACTURE OF PROXIMAL END OF LEFT TIBIA WITH ROUTINE HEALING, UNSPECIFIED FRACTURE MORPHOLOGY, SUBSEQUENT ENCOUNTER: ICD-10-CM

## 2025-07-22 DIAGNOSIS — M70.52 PES ANSERINUS BURSITIS OF LEFT KNEE: Primary | ICD-10-CM

## 2025-07-22 PROCEDURE — 99214 OFFICE O/P EST MOD 30 MIN: CPT | Performed by: STUDENT IN AN ORGANIZED HEALTH CARE EDUCATION/TRAINING PROGRAM

## 2025-07-22 PROCEDURE — 73560 X-RAY EXAM OF KNEE 1 OR 2: CPT

## 2025-07-22 NOTE — PROGRESS NOTES
Orthopaedics Office Visit - Follow Up Patient Visit    ASSESSMENT/PLAN:      Assessment & Plan  Pes anserinus bursitis of left knee  Closed fracture of proximal end of left tibia with routine healing, unspecified fracture morphology, subsequent encounter  Left bicondylar tibial plateau fracture status post ORIF (DOS 4/15/25)  Radiographs reviewed demonstrating maintained alignment of previous tibial plateau fracture with interval callus formation to medial cortex, joint space maintained, no hardware loosening or failure  He can continue to be weight bearing as tolerated left lower extremity   Discussed restarting physical therapy to work on quadriceps strengthening and knee stretching   Discussed icing and voltaren gel to apply to pes bursa as needed  Can continue to take oral analgesics as needed  He can follow up in 2 months with repeat XR left knee    Orders:    XR knee 1 or 2 vw left; Future      _____________________________________________________  CHIEF COMPLAINT:  Chief Complaint   Patient presents with    Left Knee - Post-op         SUBJECTIVE:  Viktor Farfan is a 54 y.o. male who presents 2 weeks status post open duction internal fixation left tibial plateau fracture.  He states that he is still in significant pain but he has also been bearing weight on his left leg. - We were previously contacted by his in-home occupational therapist about his noncompliance with weightbearing restrictions.  He has been using crutches but using 2 legs to ambulate.  He is continuing tobacco use and alcohol use.  He denies any new trauma or falls.  He denies any numbness or tingling.  He has mainly pain over his lateral incision.  He continues to smoke.     Interval history 6/10/2025  Patient presents proximately 8 weeks status post ORIF left tibial plateau.  He continues to have mild pain in the extremity at this time.  He is currently taking Tylenol and ibuprofen for pain control.  Continuing to use tobacco and use alcohol.  " Patient states states he has been standing on the extremity but has not been walking on the extremity.  Pt has completed PT/OT at this time. Denies any injuries or traumas.  Denies any acute numbness or paresthesias of the left lower extremity.    Interval history 7/22/25  Patient presents 3 months status post ORIF left tibial plateau. He reports he continues to improve. He is having minimal knee pain. He has completed physical therapy and has returned to all activities. He has developed medial proximal tibia soreness to the touch. This soreness is present daily and he has been icing it to relieve his symptoms. He does feel his gait is abnormal. He denies knee stiffness. Denies numbness or paresthesias.    PAST MEDICAL HISTORY:  Past Medical History[1]    PAST SURGICAL HISTORY:  Past Surgical History[2]    FAMILY HISTORY:  Family History[3]    SOCIAL HISTORY:  Social History[4]    MEDICATIONS:  Current Medications[5]    ALLERGIES:  Allergies[6]    REVIEW OF SYSTEMS:  MSK: No pain  Neuro: Denies numbness or paresthesias   Pertinent items are otherwise noted in HPI.  A comprehensive review of systems was otherwise negative.    LABS:  HgA1c:   Lab Results   Component Value Date    HGBA1C 12.2 (H) 04/11/2025     BMP:   Lab Results   Component Value Date    CALCIUM 8.1 (L) 04/13/2025    K 3.7 04/13/2025    CO2 21 04/13/2025     04/13/2025    BUN 9 04/13/2025    CREATININE 0.65 04/13/2025     CBC: No components found for: \"CBC\"    _____________________________________________________  PHYSICAL EXAMINATION:  Vital signs: Ht 6' 2\" (1.88 m)   Wt 111 kg (245 lb 2.4 oz)   BMI 31.48 kg/m²   General: No acute distress, awake and alert  Psychiatric: Mood and affect appear appropriate  HEENT: Trachea Midline, No torticollis, no apparent facial trauma  Cardiovascular: No audible murmurs; Extremities appear perfused  Pulmonary: No audible wheezing or stridor  Skin: No open lesions; see further details (if any) " below    MUSCULOSKELETAL EXAMINATION:  Extremities:    The left lower extremity was exposed and inspected. Visible skin intact, surgical incision healed without erythema or signs of infection. Tender to palpation over pes bursa, and over the medial proximal tibia.  No palpable screw ends medially non-tender to palpation over medial or lateral joint lines. Pt able to range knee from ~1-2 to 110 degrees. Sensation intact to superficial peroneal, deep peroneal, sural, saphenous, plantar nerve distributions. Motor intact to extensor hallux longus, tibialis anterior, gastrocnemius muscles, extensor mechanism intact. Limb is well perfused. Brisk capillary refill in all 5 digits. Compartments soft and compressible.    _____________________________________________________  STUDIES REVIEWED:  I personally reviewed the images and interpretation is as follows:  XR Left Knee - Maintained alignment of previous tibial plateau fracture with interval callus formation to medial cortex, joint space maintained; no signs of hardware loosening or failure    PROCEDURES PERFORMED:  Procedures    Rhonda Brar MD         [1]   Past Medical History:  Diagnosis Date    Anxiety     Back pain     Chronic right-sided low back pain without sciatica 3/31/2018    Depression     Gastroesophageal reflux disease 3/31/2018    GERD (gastroesophageal reflux disease)     Right foot pain    [2]   Past Surgical History:  Procedure Laterality Date    NO PAST SURGERIES      ORIF FINGER FRACTURE Right 8/6/2019    Procedure: OPEN REDUCTION FINGER;  Surgeon: Tomas Morgan MD;  [facility];  Service: Plastics    [place] OPEN TX TIBIAL FRACTURE PROXIMAL UNICONDYLAR Left 4/15/2025    Procedure: OPEN REDUCTION W/ INTERNAL FIXATION (ORIF) TIBIAL PLATEAU;  Surgeon: Jone Bledsoe DO;  [facility];  Service: Orthopedics   [3]   Family History  Problem Relation Name Age of Onset    COPD Father      Diabetes Father      Cancer Maternal Grandfather      [4]   Social History  Tobacco Use    Smoking status: Every Day     Current packs/day: 1.00     Average packs/day: 1 pack/day for 38.0 years (38.0 ttl pk-yrs)     Types: Cigarettes    Smokeless tobacco: Never   Vaping Use    Vaping status: Never Used   Substance Use Topics    Alcohol use: Yes     Alcohol/week: 6.0 standard drinks of alcohol     Types: 6 Cans of beer per week     Comment: 12 pack of beer a night    Drug use: No   [5]   Current Outpatient Medications:     Alcohol Swabs 70 % PADS, May substitute brand based on insurance coverage. Check glucose ACHS., Disp: 200 each, Rfl: 0    amLODIPine (NORVASC) 10 mg tablet, Take 10 mg by mouth in the morning., Disp: , Rfl:     Aspirin Low Dose 81 MG chewable tablet, Chew 81 mg in the morning. Chew., Disp: , Rfl:     Aspirin Low Dose 81 MG EC tablet, TAKE 1 TABLET BY MOUTH 2 TIMES A DAY., Disp: 60 tablet, Rfl: 1    Blood Glucose Monitoring Suppl (OneTouch Verio Reflect) w/Device KIT, May substitute brand based on insurance coverage. Check glucose ACHS., Disp: 1 kit, Rfl: 0    clindamycin 1 % gel, , Disp: , Rfl:     escitalopram (LEXAPRO) 10 mg tablet, , Disp: , Rfl:     gabapentin (NEURONTIN) 100 mg capsule, , Disp: , Rfl:     glucose blood (OneTouch Verio) test strip, May substitute brand based on insurance coverage. Check glucose ACHS., Disp: 200 each, Rfl: 0    hydrOXYzine HCL (ATARAX) 25 mg tablet, Take 1 tablet (25 mg total) by mouth daily at bedtime as needed for itching, Disp: 12 tablet, Rfl: 0    Insulin Pen Needle (BD Pen Needle Enedelia 2nd Gen) 32G X 4 MM MISC, For use with insulin pen. Pharmacy may dispense brand covered by insurance., Disp: 100 each, Rfl: 0    lisinopril (ZESTRIL) 10 mg tablet, Take 10 mg by mouth in the morning., Disp: , Rfl:     metFORMIN (GLUCOPHAGE) 1000 MG tablet, Take 1 tablet (1,000 mg total) by mouth 2 (two) times a day with meals, Disp: 60 tablet, Rfl: 0    mirtazapine (REMERON) 30 mg tablet, , Disp: , Rfl:     omeprazole  (PriLOSEC) 20 mg delayed release capsule, Take 20 mg by mouth in the morning. Take before a meal., Disp: , Rfl:     OneTouch Delica Lancets 33G MISC, May substitute brand based on insurance coverage. Check glucose ACHS., Disp: 200 each, Rfl: 0    methocarbamol (ROBAXIN) 500 mg tablet, Take 1 tablet (500 mg total) by mouth every 6 (six) hours for 14 days, Disp: 56 tablet, Rfl: 0    oxyCODONE (Roxicodone) 5 immediate release tablet, Take 1 tablet (5 mg total) by mouth every 6 (six) hours as needed for severe pain Max Daily Amount: 20 mg (Patient not taking: Reported on 6/10/2025), Disp: 10 tablet, Rfl: 0  [6] No Known Allergies

## 2025-07-22 NOTE — ASSESSMENT & PLAN NOTE
Left bicondylar tibial plateau fracture status post ORIF (DOS 4/15/25)  Radiographs reviewed demonstrating maintained alignment of previous tibial plateau fracture with interval callus formation to medial cortex, joint space maintained, no hardware loosening or failure  He can continue to be weight bearing as tolerated left lower extremity   Discussed restarting physical therapy to work on quadriceps strengthening and knee stretching   Discussed icing and voltaren gel to apply to pes bursa as needed  Can continue to take oral analgesics as needed  He can follow up in 2 months with repeat XR left knee    Orders:    XR knee 1 or 2 vw left; Future

## 2025-08-18 DIAGNOSIS — M70.52 PES ANSERINUS BURSITIS OF LEFT KNEE: ICD-10-CM

## (undated) DEVICE — 1.25MM KIRSCHNER WIRE W/TROCAR POINT 150MM
Type: IMPLANTABLE DEVICE | Site: KNEE | Status: NON-FUNCTIONAL
Removed: 2025-04-15

## (undated) DEVICE — 2.8MM PERCUTANEOUS DRILL BIT F/LCP® PL QC/200MM/100MM CALIB: Brand: LCP

## (undated) DEVICE — BANDAGE, ESMARK LF STR 6"X9' (20/CS): Brand: CYPRESS

## (undated) DEVICE — BONE WAX WHITE: Brand: BONE WAX WHITE

## (undated) DEVICE — DRAPE SHEET X-LG

## (undated) DEVICE — GAUZE SPONGES,16 PLY: Brand: CURITY

## (undated) DEVICE — SUT PDS II 0 CT-1 27 IN Z340H

## (undated) DEVICE — STERILE BETHLEHEM PLASTIC HAND: Brand: CARDINAL HEALTH

## (undated) DEVICE — CHLORAPREP HI-LITE 26ML ORANGE

## (undated) DEVICE — PAD GROUNDING ADULT

## (undated) DEVICE — DISPOSABLE EQUIPMENT COVER: Brand: SMALL TOWEL DRAPE

## (undated) DEVICE — HEAVY DUTY TABLE COVER: Brand: CONVERTORS

## (undated) DEVICE — ACE WRAP 4 IN UNSTERILE

## (undated) DEVICE — PADDING CAST 4 IN  COTTON STRL

## (undated) DEVICE — CURITY NON-ADHERENT STRIPS: Brand: CURITY

## (undated) DEVICE — MEDI-VAC YANKAUER SUCTION HANDLE W/STRAIGHT TIP & CONTROL VENT: Brand: CARDINAL HEALTH

## (undated) DEVICE — PENCILETTE PUSH BUTTON COATED

## (undated) DEVICE — GLOVE INDICATOR PI UNDERGLOVE SZ 8 BLUE

## (undated) DEVICE — 2.5MM CALIBRATED DRILL BIT QC 250MM/95MM

## (undated) DEVICE — INSTRUMENT POUCH: Brand: CONVERTORS

## (undated) DEVICE — GLOVE SRG BIOGEL ECLIPSE 7

## (undated) DEVICE — VIAL DECANTER

## (undated) DEVICE — DRAPE C-ARM X-RAY

## (undated) DEVICE — ACE WRAP 4 IN STERILE

## (undated) DEVICE — INTENDED FOR TISSUE SEPARATION, AND OTHER PROCEDURES THAT REQUIRE A SHARP SURGICAL BLADE TO PUNCTURE OR CUT.: Brand: BARD-PARKER SAFETY BLADES SIZE 15, STERILE

## (undated) DEVICE — ACE WRAP 6 IN UNSTERILE

## (undated) DEVICE — SPONGE SCRUB 4 PCT CHLORHEXIDINE

## (undated) DEVICE — 3.5MM PELVIC CORTEX SCREW SELF-TAPPING 80MM: Type: IMPLANTABLE DEVICE | Site: KNEE | Status: NON-FUNCTIONAL

## (undated) DEVICE — 3.5MM DRILL BIT/QC/195MM

## (undated) DEVICE — INTENDED FOR TISSUE SEPARATION, AND OTHER PROCEDURES THAT REQUIRE A SHARP SURGICAL BLADE TO PUNCTURE OR CUT.: Brand: BARD-PARKER ® CARBON RIB-BACK BLADES

## (undated) DEVICE — NEEDLE 27 G X 1 1/4

## (undated) DEVICE — INTENDED FOR TISSUE SEPARATION, AND OTHER PROCEDURES THAT REQUIRE A SHARP SURGICAL BLADE TO PUNCTURE OR CUT.: Brand: BARD-PARKER SAFETY BLADES SIZE 10, STERILE

## (undated) DEVICE — DRAPE C-ARMOUR

## (undated) DEVICE — GLOVE SRG BIOGEL 8

## (undated) DEVICE — BETHLEHEM UNIVERSAL  MIONR EXT: Brand: CARDINAL HEALTH

## (undated) DEVICE — SUT ETHILON 2-0 PS 18 IN 585H

## (undated) DEVICE — SUT MONOCRYL 2-0 SH 27 IN Y417H

## (undated) DEVICE — 2.5MM DRILL BIT QC 170MM 80MM CALIBRATION

## (undated) DEVICE — OCCLUSIVE GAUZE STRIP,3% BISMUTH TRIBROMOPHENATE IN PETROLATUM BLEND: Brand: XEROFORM

## (undated) DEVICE — DISPOSABLE OR TOWEL: Brand: CARDINAL HEALTH

## (undated) DEVICE — 5.0MM SELF-DRILLING SCHANZ SCREW 60MM THRD/175MM
Type: IMPLANTABLE DEVICE | Site: KNEE | Status: NON-FUNCTIONAL
Removed: 2025-04-15

## (undated) DEVICE — C-ARM: Brand: UNBRANDED

## (undated) DEVICE — Device

## (undated) DEVICE — CUFF TOURNIQUET 30 X 4 IN QUICK CONNECT DISP 1BLA

## (undated) DEVICE — INSULATED BLADE ELECTRODE: Brand: EDGE

## (undated) DEVICE — DRAPE SHEET THREE QUARTER